# Patient Record
Sex: MALE | Race: WHITE | NOT HISPANIC OR LATINO | Employment: FULL TIME | URBAN - METROPOLITAN AREA
[De-identification: names, ages, dates, MRNs, and addresses within clinical notes are randomized per-mention and may not be internally consistent; named-entity substitution may affect disease eponyms.]

---

## 2020-02-18 ENCOUNTER — TELEPHONE (OUTPATIENT)
Dept: ENDOCRINOLOGY | Facility: CLINIC | Age: 40
End: 2020-02-18

## 2020-06-03 ENCOUNTER — HOSPITAL ENCOUNTER (EMERGENCY)
Facility: HOSPITAL | Age: 40
Discharge: HOME | End: 2020-06-04
Admitting: EMERGENCY MEDICINE

## 2020-06-03 DIAGNOSIS — E86.0 DEHYDRATION: Primary | ICD-10-CM

## 2020-06-03 DIAGNOSIS — R73.9 HYPERGLYCEMIA: ICD-10-CM

## 2020-06-03 LAB — GLUCOSE BLDC GLUCOMTR-MCNC: >500 MG/DL (ref 70–105)

## 2020-06-03 PROCEDURE — 96372 THER/PROPH/DIAG INJ SC/IM: CPT

## 2020-06-03 PROCEDURE — 99284 EMERGENCY DEPT VISIT MOD MDM: CPT

## 2020-06-03 PROCEDURE — 82962 GLUCOSE BLOOD TEST: CPT

## 2020-06-04 VITALS
HEIGHT: 73 IN | OXYGEN SATURATION: 99 % | BODY MASS INDEX: 28.63 KG/M2 | HEART RATE: 70 BPM | TEMPERATURE: 98 F | WEIGHT: 216.05 LBS | RESPIRATION RATE: 16 BRPM | DIASTOLIC BLOOD PRESSURE: 61 MMHG | SYSTOLIC BLOOD PRESSURE: 103 MMHG

## 2020-06-04 LAB
ACETONE BLD QL: NEGATIVE
ALBUMIN SERPL-MCNC: 4.6 G/DL (ref 3.5–5.2)
ALBUMIN/GLOB SERPL: 1.2 G/DL
ALP SERPL-CCNC: 99 U/L (ref 39–117)
ALT SERPL W P-5'-P-CCNC: 41 U/L (ref 1–41)
AMPHET+METHAMPHET UR QL: POSITIVE
ANION GAP SERPL CALCULATED.3IONS-SCNC: 14 MMOL/L (ref 5–15)
ARTERIAL PATENCY WRIST A: POSITIVE
AST SERPL-CCNC: 22 U/L (ref 1–40)
ATMOSPHERIC PRESS: ABNORMAL MM[HG]
BARBITURATES UR QL SCN: NEGATIVE
BASE EXCESS BLDA CALC-SCNC: -2.3 MMOL/L (ref 0–3)
BASOPHILS # BLD AUTO: 0.1 10*3/MM3 (ref 0–0.2)
BASOPHILS NFR BLD AUTO: 0.8 % (ref 0–1.5)
BDY SITE: ABNORMAL
BENZODIAZ UR QL SCN: NEGATIVE
BILIRUB SERPL-MCNC: 0.2 MG/DL (ref 0.2–1.2)
BILIRUB UR QL STRIP: NEGATIVE
BUN BLD-MCNC: 33 MG/DL (ref 6–20)
BUN BLD-MCNC: ABNORMAL MG/DL
BUN/CREAT SERPL: ABNORMAL
CALCIUM SPEC-SCNC: 10.1 MG/DL (ref 8.6–10.5)
CANNABINOIDS SERPL QL: POSITIVE
CHLORIDE SERPL-SCNC: 91 MMOL/L (ref 98–107)
CLARITY UR: CLEAR
CO2 BLDA-SCNC: 25.1 MMOL/L (ref 22–29)
CO2 SERPL-SCNC: 24 MMOL/L (ref 22–29)
COCAINE UR QL: NEGATIVE
COLOR UR: YELLOW
CREAT BLD-MCNC: 1.93 MG/DL (ref 0.76–1.27)
D-LACTATE SERPL-SCNC: 1.9 MMOL/L (ref 0.5–2)
DEPRECATED RDW RBC AUTO: 41.1 FL (ref 37–54)
EOSINOPHIL # BLD AUTO: 0.4 10*3/MM3 (ref 0–0.4)
EOSINOPHIL NFR BLD AUTO: 5 % (ref 0.3–6.2)
ERYTHROCYTE [DISTWIDTH] IN BLOOD BY AUTOMATED COUNT: 13.6 % (ref 12.3–15.4)
GFR SERPL CREATININE-BSD FRML MDRD: 38 ML/MIN/1.73
GFR SERPL CREATININE-BSD FRML MDRD: 47 ML/MIN/1.73
GLOBULIN UR ELPH-MCNC: 3.9 GM/DL
GLUCOSE BLD-MCNC: 494 MG/DL (ref 65–99)
GLUCOSE BLDC GLUCOMTR-MCNC: 342 MG/DL (ref 70–105)
GLUCOSE UR STRIP-MCNC: ABNORMAL MG/DL
HCO3 BLDA-SCNC: 23.8 MMOL/L (ref 21–28)
HCT VFR BLD AUTO: 48.1 % (ref 37.5–51)
HEMODILUTION: NO
HGB BLD-MCNC: 16.3 G/DL (ref 13–17.7)
HGB UR QL STRIP.AUTO: NEGATIVE
HOLD SPECIMEN: NORMAL
HOROWITZ INDEX BLD+IHG-RTO: 21 %
KETONES UR QL STRIP: NEGATIVE
LEUKOCYTE ESTERASE UR QL STRIP.AUTO: NEGATIVE
LYMPHOCYTES # BLD AUTO: 2.6 10*3/MM3 (ref 0.7–3.1)
LYMPHOCYTES NFR BLD AUTO: 31.6 % (ref 19.6–45.3)
MCH RBC QN AUTO: 29.4 PG (ref 26.6–33)
MCHC RBC AUTO-ENTMCNC: 33.9 G/DL (ref 31.5–35.7)
MCV RBC AUTO: 86.6 FL (ref 79–97)
METHADONE UR QL SCN: NEGATIVE
MODALITY: ABNORMAL
MONOCYTES # BLD AUTO: 0.7 10*3/MM3 (ref 0.1–0.9)
MONOCYTES NFR BLD AUTO: 8 % (ref 5–12)
NEUTROPHILS # BLD AUTO: 4.5 10*3/MM3 (ref 1.7–7)
NEUTROPHILS NFR BLD AUTO: 54.6 % (ref 42.7–76)
NITRITE UR QL STRIP: NEGATIVE
NRBC BLD AUTO-RTO: 0.1 /100 WBC (ref 0–0.2)
OPIATES UR QL: NEGATIVE
OXYCODONE UR QL SCN: NEGATIVE
PCO2 BLDA: 44.6 MM HG (ref 35–48)
PH BLDA: 7.33 PH UNITS (ref 7.35–7.45)
PH UR STRIP.AUTO: 5.5 [PH] (ref 5–8)
PLATELET # BLD AUTO: 283 10*3/MM3 (ref 140–450)
PMV BLD AUTO: 9.5 FL (ref 6–12)
PO2 BLDA: 78.3 MM HG (ref 83–108)
POTASSIUM BLD-SCNC: 4.6 MMOL/L (ref 3.5–5.2)
PROT SERPL-MCNC: 8.5 G/DL (ref 6–8.5)
PROT UR QL STRIP: NEGATIVE
RBC # BLD AUTO: 5.56 10*6/MM3 (ref 4.14–5.8)
SAO2 % BLDCOA: 94.5 % (ref 94–98)
SODIUM BLD-SCNC: 129 MMOL/L (ref 136–145)
SP GR UR STRIP: 1.03 (ref 1–1.03)
TROPONIN T SERPL-MCNC: <0.01 NG/ML (ref 0–0.03)
UROBILINOGEN UR QL STRIP: ABNORMAL
WBC NRBC COR # BLD: 8.3 10*3/MM3 (ref 3.4–10.8)

## 2020-06-04 PROCEDURE — 80307 DRUG TEST PRSMV CHEM ANLYZR: CPT | Performed by: NURSE PRACTITIONER

## 2020-06-04 PROCEDURE — 84484 ASSAY OF TROPONIN QUANT: CPT | Performed by: NURSE PRACTITIONER

## 2020-06-04 PROCEDURE — 87150 DNA/RNA AMPLIFIED PROBE: CPT | Performed by: NURSE PRACTITIONER

## 2020-06-04 PROCEDURE — 82962 GLUCOSE BLOOD TEST: CPT

## 2020-06-04 PROCEDURE — 81003 URINALYSIS AUTO W/O SCOPE: CPT | Performed by: NURSE PRACTITIONER

## 2020-06-04 PROCEDURE — 80053 COMPREHEN METABOLIC PANEL: CPT | Performed by: NURSE PRACTITIONER

## 2020-06-04 PROCEDURE — 83605 ASSAY OF LACTIC ACID: CPT

## 2020-06-04 PROCEDURE — 36600 WITHDRAWAL OF ARTERIAL BLOOD: CPT

## 2020-06-04 PROCEDURE — 82570 ASSAY OF URINE CREATININE: CPT | Performed by: NURSE PRACTITIONER

## 2020-06-04 PROCEDURE — 82803 BLOOD GASES ANY COMBINATION: CPT

## 2020-06-04 PROCEDURE — 87147 CULTURE TYPE IMMUNOLOGIC: CPT | Performed by: NURSE PRACTITIONER

## 2020-06-04 PROCEDURE — 87040 BLOOD CULTURE FOR BACTERIA: CPT | Performed by: NURSE PRACTITIONER

## 2020-06-04 PROCEDURE — 63710000001 INSULIN LISPRO (HUMAN) PER 5 UNITS: Performed by: PHYSICIAN ASSISTANT

## 2020-06-04 PROCEDURE — 85025 COMPLETE CBC W/AUTO DIFF WBC: CPT | Performed by: NURSE PRACTITIONER

## 2020-06-04 PROCEDURE — 93005 ELECTROCARDIOGRAM TRACING: CPT | Performed by: NURSE PRACTITIONER

## 2020-06-04 PROCEDURE — 82009 KETONE BODYS QUAL: CPT | Performed by: NURSE PRACTITIONER

## 2020-06-04 RX ORDER — SODIUM CHLORIDE 0.9 % (FLUSH) 0.9 %
10 SYRINGE (ML) INJECTION AS NEEDED
Status: DISCONTINUED | OUTPATIENT
Start: 2020-06-04 | End: 2020-06-04 | Stop reason: HOSPADM

## 2020-06-04 RX ADMIN — SODIUM CHLORIDE 1000 ML: 900 INJECTION, SOLUTION INTRAVENOUS at 00:14

## 2020-06-04 RX ADMIN — INSULIN LISPRO 8 UNITS: 100 INJECTION, SOLUTION INTRAVENOUS; SUBCUTANEOUS at 02:37

## 2020-06-04 RX ADMIN — SODIUM CHLORIDE 1000 ML: 0.9 INJECTION, SOLUTION INTRAVENOUS at 02:01

## 2020-06-04 RX ADMIN — SODIUM CHLORIDE 1000 ML: 0.9 INJECTION, SOLUTION INTRAVENOUS at 01:24

## 2020-06-04 NOTE — ED PROVIDER NOTES
Subjective   40-year-old  male presents to the emergency room with complaint of dehydration and high blood sugar and dizziness that started today.  Patient states that he worked outside with a family member and helped do yard work and felt like he got overheated and dehydrated so he came inside and drink some water but did not feel better continued to feel dizzy so he checked his blood sugar as he is a type II diabetic and it was over 500.  Onset: Today  Location: Generalized  Duration: Constant  Character: Fatigue and weakness and dizziness  Aggravating/Alleviating Factors: Activity/none  Radiation: None  Severity: Severe            Review of Systems   Constitutional: Positive for activity change, appetite change and fatigue.   HENT: Negative.    Eyes: Negative.    Respiratory: Negative.    Cardiovascular: Negative.    Gastrointestinal: Negative.    Genitourinary: Negative.    Musculoskeletal: Negative.    Allergic/Immunologic: Negative.    Neurological: Positive for dizziness and weakness.   Psychiatric/Behavioral: Negative.        History reviewed. No pertinent past medical history.    Allergies   Allergen Reactions   • Bactrim [Sulfamethoxazole-Trimethoprim] Swelling       History reviewed. No pertinent surgical history.    No family history on file.    Social History     Socioeconomic History   • Marital status: Single     Spouse name: Not on file   • Number of children: Not on file   • Years of education: Not on file   • Highest education level: Not on file           Objective   Physical Exam   Constitutional: He is oriented to person, place, and time. He appears well-developed and well-nourished. No distress.   HENT:   Head: Normocephalic and atraumatic.   Mouth/Throat: Mucous membranes are dry.   Eyes: Pupils are equal, round, and reactive to light. Conjunctivae and EOM are normal.   Neck: Normal range of motion. Neck supple.   Cardiovascular: Normal rate and regular rhythm.   Pulmonary/Chest:  Effort normal and breath sounds normal.   Abdominal: Soft. Bowel sounds are normal.   Musculoskeletal: Normal range of motion. He exhibits no edema, tenderness or deformity.   Neurological: He is alert and oriented to person, place, and time.   Skin: Skin is warm. Capillary refill takes less than 2 seconds. He is diaphoretic. No erythema.   Psychiatric: He has a normal mood and affect. His behavior is normal. Judgment and thought content normal.   Nursing note and vitals reviewed.      Procedures           ED Course  ED Course as of Jun 04 0226   Thu Jun 04, 2020   0130 TURNED PATIENT CARE OVER TO VITALIY MADRID    [CT]   0153 EKG interpreted by ER physician reviewed by myself.  Rate of 68 sinus rhythm.    [MG]      ED Course User Index  [CT] Minoo Barclay APRN  [MG] Bessy Lockhart PA-C        PIV ESTABLISHED. CBC, CMP, ABG, SERUM ACETONE, EKG AND TROPONIN OBTAINED. PATIENT APPEARS CLINICALLY DRY.  TOTAL OF 3 L NS BOLUS GIVEN DURING MY MANAGEMENT.  No radiology results for the last day  Labs Reviewed   COMPREHENSIVE METABOLIC PANEL - Abnormal; Notable for the following components:       Result Value    Glucose 494 (*)     Creatinine 1.93 (*)     Sodium 129 (*)     Chloride 91 (*)     eGFR Non  Amer 38 (*)     eGFR   Amer 47 (*)     All other components within normal limits    Narrative:     GFR Normal >60  Chronic Kidney Disease <60  Kidney Failure <15     URINALYSIS W/ MICROSCOPIC IF INDICATED (NO CULTURE) - Abnormal; Notable for the following components:    Glucose, UA >=1000 mg/dL (3+) (*)     All other components within normal limits    Narrative:     Urine microscopic not indicated.   URINE DRUG SCREEN EMPLOYEE HEALTH/OCC HEALTH - Abnormal; Notable for the following components:    THC, Screen, Urine Positive (*)     Amphet/Methamphet, Screen Positive (*)     All other components within normal limits    Narrative:     Negative Thresholds For Drugs Screened:     Amphetamines               500 ng/ml    Barbiturates               200 ng/ml   Benzodiazepines            100 ng/ml   Cocaine                    300 ng/ml   Methadone                  300 ng/ml   Opiates                    300 ng/ml   Oxycodone                  100 ng/ml   THC                        50 ng/ml    The Normal Value for all drugs tested is negative. This report includes final unconfirmed screening results to be used for medical treatment purposes only. Unconfirmed results must not be used for non-medical purposes such as employment or legal testing. Clinical consideration should be applied to any drug of abuse test, particulary when unconfirmed results are used.  All urine drugs of abuse requests without chain of custody are for medical screening purposes only.  False positives are possible.     BUN - Abnormal; Notable for the following components:    BUN 33 (*)     All other components within normal limits   BLOOD GAS, ARTERIAL - Abnormal; Notable for the following components:    pH, Arterial 7.335 (*)     pO2, Arterial 78.3 (*)     Base Excess, Arterial -2.3 (*)     All other components within normal limits   POCT GLUCOSE FINGERSTICK - Abnormal; Notable for the following components:    Glucose >500 (*)     All other components within normal limits   TROPONIN (IN-HOUSE) - Normal    Narrative:     Troponin T Reference Range:  <= 0.03 ng/mL-   Negative for AMI  >0.03 ng/mL-     Abnormal for myocardial necrosis.  Clinicians would have to utilize clinical acumen, EKG, Troponin and serial changes to determine if it is an Acute Myocardial Infarction or myocardial injury due to an underlying chronic condition.       Results may be falsely decreased if patient taking Biotin.     CBC WITH AUTO DIFFERENTIAL - Normal   ACETONE - Normal   POC LACTATE - Normal   BLOOD CULTURE   BLOOD CULTURE   BLOOD GAS, ARTERIAL   LACTIC ACID, PLASMA   CBC AND DIFFERENTIAL    Narrative:     The following orders were created for panel order CBC & Differential.  Procedure                                Abnormality         Status                     ---------                               -----------         ------                     CBC Auto Differential[[id number]]        Normal              Final result                 Please view results for these tests on the individual orders.   KETONE BODIES SERUM    Narrative:     The following orders were created for panel order Ketone Bodies, Serum (Not performed at [facility]).  Procedure                               Abnormality         Status                     ---------                               -----------         ------                     Acetone[[id number]]                      Normal              Final result                 Please view results for these tests on the individual orders.   EXTRA TUBES    Narrative:     The following orders were created for panel order Extra Tubes.  Procedure                               Abnormality         Status                     ---------                               -----------         ------                     Gold Top - SST[[id number]]                                   Final result                 Please view results for these tests on the individual orders.   GOLD TOP - SST     Medications   sodium chloride 0.9 % flush 10 mL (has no administration in time range)   sodium chloride 0.9 % bolus 1,000 mL (1,000 mL Intravenous New Bag 6/4/20 0201)   insulin lispro (humaLOG) injection 8 Units (has no administration in time range)   sodium chloride 0.9 % bolus 1,000 mL (0 mL Intravenous Stopped 6/4/20 0124)   sodium chloride 0.9 % bolus 1,000 mL (0 mL Intravenous Stopped 6/4/20 0200)                                            MDM  Number of Diagnoses or Management Options  Dehydration:   Hyperglycemia:   Diagnosis management comments: I examined the patient using the appropriate personal protective equipment.      DISPOSITION:   Chart Review:  Comorbidity:  has no past medical history on  file.  Differentials:this list is not all inclusive and does not constitute the entirety of considered causes --> DKA, dehydration, drug use, UTI,   ECG: interpreted by ER physician and reviewed by myself: Sinus rhythm   Labs:     Imaging: Was interpreted by physician and reviewed by myself:  No radiology results for the last day    Disposition/Treatment:    40-year-old male who presents to the ER with dizziness and clinically dehydrated. Patient's labwork was fairly unremarkable other than an elevated glucose >500, UA THC and amp/meth. He was given Insulin and 3L of NS with significant reduction in symptoms. He was told to follow up with his PCP for further work up and management. He was stable and in agreement with plan       Amount and/or Complexity of Data Reviewed  Clinical lab tests: reviewed    Patient Progress  Patient progress: stable      Final diagnoses:   Dehydration   Hyperglycemia            Bessy Lockhart PA-C  06/04/20 0227

## 2020-06-05 LAB
BACTERIA BLD CULT: ABNORMAL
BOTTLE TYPE: ABNORMAL

## 2020-06-06 LAB
BACTERIA SPEC AEROBE CULT: ABNORMAL
GRAM STN SPEC: ABNORMAL
ISOLATED FROM: ABNORMAL

## 2020-06-09 LAB — BACTERIA SPEC AEROBE CULT: NORMAL

## 2020-06-19 DIAGNOSIS — E11.65 UNCONTROLLED TYPE 2 DIABETES MELLITUS WITH HYPERGLYCEMIA (HCC): Primary | ICD-10-CM

## 2020-06-29 ENCOUNTER — TELEPHONE (OUTPATIENT)
Dept: ENDOCRINOLOGY | Facility: CLINIC | Age: 40
End: 2020-06-29

## 2020-07-22 ENCOUNTER — TELEPHONE (OUTPATIENT)
Dept: ENDOCRINOLOGY | Facility: CLINIC | Age: 40
End: 2020-07-22

## 2020-07-22 NOTE — TELEPHONE ENCOUNTER
Mailed patient letter letting know that Dr. Zavala is out of the office 8/10/20-8/12/20, and to contact the office to get the appointment R/S asap.

## 2020-07-27 ENCOUNTER — OFFICE VISIT (OUTPATIENT)
Dept: ENDOCRINOLOGY | Facility: CLINIC | Age: 40
End: 2020-07-27

## 2020-07-27 DIAGNOSIS — E11.65 UNCONTROLLED TYPE 2 DIABETES MELLITUS WITH HYPERGLYCEMIA (HCC): ICD-10-CM

## 2020-07-27 PROCEDURE — G0108 DIAB MANAGE TRN  PER INDIV: HCPCS | Performed by: DIETITIAN, REGISTERED

## 2020-07-27 RX ORDER — FLURBIPROFEN SODIUM 0.3 MG/ML
SOLUTION/ DROPS OPHTHALMIC
Qty: 150 EACH | Refills: 1 | Status: SHIPPED | OUTPATIENT
Start: 2020-07-27 | End: 2022-04-15 | Stop reason: SDUPTHER

## 2020-07-27 RX ORDER — FLURBIPROFEN SODIUM 0.3 MG/ML
SOLUTION/ DROPS OPHTHALMIC
COMMUNITY
End: 2020-07-27 | Stop reason: SDUPTHER

## 2020-07-27 NOTE — PROGRESS NOTES
Pt was seen by educ today & has appt again on 7/29.  Needs to yariel initial appt with me which was kunal for 8/11

## 2021-09-20 ENCOUNTER — TELEPHONE (OUTPATIENT)
Dept: ENDOCRINOLOGY | Facility: CLINIC | Age: 41
End: 2021-09-20

## 2021-09-20 NOTE — TELEPHONE ENCOUNTER
"Pt called this morning wanting to reschedule his NP appt from 8/11/20. Advised pt that since the referral was over a year old that I might need a new referral. Advised pt that I would look at the referral closer when I could get back to my office and that I would call him back later today. Attempted to call pt back to discuss referral. Pt answered the phone and said.... \"Quit f^^^ing calling me you stupid f^^k\" and hung up.          "

## 2022-03-14 ENCOUNTER — TELEPHONE (OUTPATIENT)
Dept: ENDOCRINOLOGY | Facility: CLINIC | Age: 42
End: 2022-03-14

## 2022-04-15 ENCOUNTER — OFFICE VISIT (OUTPATIENT)
Dept: ENDOCRINOLOGY | Facility: CLINIC | Age: 42
End: 2022-04-15

## 2022-04-15 VITALS
WEIGHT: 197 LBS | OXYGEN SATURATION: 99 % | TEMPERATURE: 97.1 F | SYSTOLIC BLOOD PRESSURE: 135 MMHG | DIASTOLIC BLOOD PRESSURE: 80 MMHG | HEART RATE: 108 BPM | BODY MASS INDEX: 26.11 KG/M2 | HEIGHT: 73 IN

## 2022-04-15 DIAGNOSIS — E55.9 VITAMIN D DEFICIENCY: ICD-10-CM

## 2022-04-15 DIAGNOSIS — I10 ESSENTIAL HYPERTENSION: ICD-10-CM

## 2022-04-15 DIAGNOSIS — Z79.4 TYPE 2 DIABETES MELLITUS WITH HYPERGLYCEMIA, WITH LONG-TERM CURRENT USE OF INSULIN: Primary | ICD-10-CM

## 2022-04-15 DIAGNOSIS — E11.65 TYPE 2 DIABETES MELLITUS WITH HYPERGLYCEMIA, WITH LONG-TERM CURRENT USE OF INSULIN: Primary | ICD-10-CM

## 2022-04-15 DIAGNOSIS — R79.89 LOW TESTOSTERONE: ICD-10-CM

## 2022-04-15 LAB — GLUCOSE BLDC GLUCOMTR-MCNC: 149 MG/DL (ref 70–105)

## 2022-04-15 PROCEDURE — 99204 OFFICE O/P NEW MOD 45 MIN: CPT | Performed by: INTERNAL MEDICINE

## 2022-04-15 PROCEDURE — 82962 GLUCOSE BLOOD TEST: CPT | Performed by: INTERNAL MEDICINE

## 2022-04-15 RX ORDER — FLURBIPROFEN SODIUM 0.3 MG/ML
SOLUTION/ DROPS OPHTHALMIC
Qty: 150 EACH | Refills: 1 | Status: ON HOLD | OUTPATIENT
Start: 2022-04-15

## 2022-04-15 RX ORDER — GABAPENTIN 600 MG/1
600 TABLET ORAL 3 TIMES DAILY
COMMUNITY
End: 2023-04-06

## 2022-04-15 RX ORDER — MAGNESIUM 200 MG
1000 TABLET ORAL DAILY
Qty: 100 EACH | Refills: 4 | Status: SHIPPED | OUTPATIENT
Start: 2022-04-15 | End: 2023-04-06

## 2022-04-15 RX ORDER — AMLODIPINE BESYLATE AND BENAZEPRIL HYDROCHLORIDE 10; 20 MG/1; MG/1
1 CAPSULE ORAL DAILY
COMMUNITY
End: 2023-04-06

## 2022-04-15 NOTE — PATIENT INSTRUCTIONS
Please change her Basaglar to 30 units subcu once a day  Please change NovoLog to 10 units subcu before each meal as a base dose along with a sliding scale of 1 unit for each 30-minute blood sugar over 140 at meals  Start vitamin D 5000 units p.o. daily  Start vitamin B12 1000 mcg sublingual daily  Get your labs done fasting in the next few days and make sure they are drawn before 10 AM  Arrange for comprehensive dives education  Always keep glucose source in case of low blood sugar  Annual eye exam and flu vaccine

## 2022-04-15 NOTE — PROGRESS NOTES
Endocrine Consult Outpatient  Referred by VITALIY Ordoñez for uncontrolled diabetes consultation  Patient Care Team:  Chikis Timmons PA as PCP - General (Physician Assistant)     Chief Complaint: Uncontrolled type 2 diabetes        HPI: This is a 41-year-old male with history of type 2 diabetes, hypertension is referred for diabetes evaluation management.    For type 2 diabetes: Initial diagnosis was in 2011, he has not done dives education, he does have a glucometer and checks his blood sugar 3-4 times a day unfortunately did not bring records for review.  He tells me his blood sugar runs between 1 42-80 most of the time.  He is trying to follow his diet.  He is currently on Basaglar 30 units twice a day with NovoLog on a sliding scale but does not take NovoLog if the blood sugar is less than 200.  He does admit fatigue and tiredness with some dry mouth.  Denies any weight changes.    Hypertension: Fair control    He was found to have low vitamin D and low testosterone and is not taking any of those at this time.  He does admit some erectile dysfunction.  He does get some morning erections.  Denies any excessive sweating or mood odors or body odor or chest trauma to the testes or trauma to the head.  He has fathered kids.  He was on Suboxone for a long time but he has been off Suboxone for now.  3 years.    Past Medical History:   Diagnosis Date   • Hypertension    • Type 2 diabetes mellitus (HCC)        Social History     Socioeconomic History   • Marital status: Single   • Number of children: 2   • Years of education: 14   Tobacco Use   • Smoking status: Never Smoker   • Smokeless tobacco: Never Used   Vaping Use   • Vaping Use: Never used   Substance and Sexual Activity   • Alcohol use: Never       Family History   Problem Relation Age of Onset   • Diabetes Father    • Hypertension Maternal Uncle    • Stroke Maternal Uncle        Allergies   Allergen Reactions   • Bactrim  [Sulfamethoxazole-Trimethoprim] Swelling       ROS:   Constitutional:  Admit fatigue, tiredness.    Eyes:  Denies change in visual acuity   HENT:  Denies nasal congestion or sore throat   Respiratory: denies cough, shortness of breath.   Cardiovascular:  denies chest pain, edema   GI:  Denies abdominal pain, nausea, vomiting.    :  Denies dysuria   Musculoskeletal:  Denies back pain or joint pain   Integument:  Denies dry skin, rash   Neurologic:  Denies headache, focal weakness or sensory changes   Endocrine:  Denies polyuria or polydipsia   Psychiatric:  Denies depression or anxiety      Vitals:    04/15/22 0834   BP: 135/80   Pulse: 108   Temp: 97.1 °F (36.2 °C)   SpO2: 99%     Body mass index is 25.99 kg/m².      Physical Exam:  GEN: NAD, conversant  EYES: EOMI, PERRL, no conjunctival erythema  NECK: no thyromegaly, full ROM   CV: RRR, no murmurs/rubs/gallops, no peripheral edema  LUNG: CTAB, no wheezes/rales/ronchi  SKIN: no rashes, no acanthosis  MSK: no deformities, full ROM of all extremities  NEURO: no tremors, DTR normal  PSYCH: AOX3, appropriate mood, affect normal      Results Review:     I reviewed the patient's new clinical results.    Lab Results   Component Value Date    GLUCOSE 494 (C) 06/04/2020    BUN  06/04/2020      Comment:      Testing performed by alternate method    BUN 33 (H) 06/04/2020    CREATININE 1.93 (H) 06/04/2020    EGFRIFNONA 38 (L) 06/04/2020    EGFRIFAFRI 47 (L) 06/04/2020    BCR  06/04/2020      Comment:      Testing not performed.    K 4.6 06/04/2020    CO2 24.0 06/04/2020    CALCIUM 10.1 06/04/2020    ALBUMIN 4.60 06/04/2020    AST 22 06/04/2020    ALT 41 06/04/2020     Labs from August 2021 showed a white count of 5.8, hemoglobin 16.1, hematocrit 46.3 and platelet count 250  Sodium 138, potassium 4.5, chloride 98, CO2 28, BUN 17, creatinine 0.9, glucose 235, calcium 10.1, AST 12, ALT 18  LDL was 89, cholesterol total was 207, HDL 35, triglycerides 417, TSH 2.1, free T4 1.3,  total T3 1.5, A1c 9.1, vitamin D 18, magnesium 2.1, B12 640, testosterone total was 181.    Medication Review: Reviewed.       Current Outpatient Medications:   •  amLODIPine-benazepril (LOTREL) 10-20 MG per capsule, Take 1 capsule by mouth Daily., Disp: , Rfl:   •  B-D UF III MINI PEN NEEDLES 31G X 5 MM misc, Pt to use with insulin pens 5 times daily, Disp: 150 each, Rfl: 1  •  gabapentin (NEURONTIN) 600 MG tablet, Take 600 mg by mouth 3 (Three) Times a Day., Disp: , Rfl:   •  insulin aspart (novoLOG FLEXPEN) 100 UNIT/ML solution pen-injector sc pen, Inject  under the skin into the appropriate area as directed 3 (Three) Times a Day With Meals. Sliding Scal, Disp: , Rfl:   •  Insulin Glargine (BASAGLAR KWIKPEN SC), Inject 30 Units under the skin into the appropriate area as directed 2 (Two) Times a Day., Disp: , Rfl:     Assessment/Plan   Diabetes mellitus type 2: Uncontrolled, at this time I will change Basaglar to 30 units once a day, add NovoLog 10 units with each meal as a base dose along with NovoLog sliding scale 1 unit for each 30 mg blood sugar over 140 at meals.  He will continue to check blood sugars at least 3 times a day.  I will check A1c, CMP, lipid panel, urine microalbumin along with C-peptide for further evaluation.  We will also refer him for comprehensive diabetes education.  He is advised to always keep glucose source in case of low blood sugars.    Hypertension: Fair control    Vitamin D deficiency: We will add vitamin D 5000 units p.o. daily    Low testosterone: We will recheck total and free testosterone with LH, FSH, SHBG and prolactin level for further evaluation.           Kathia Villatoro MD FACE.

## 2022-04-19 RX ORDER — INSULIN GLARGINE 100 [IU]/ML
30 INJECTION, SOLUTION SUBCUTANEOUS DAILY
Qty: 15 ML | Refills: 8 | Status: SHIPPED | OUTPATIENT
Start: 2022-04-19 | End: 2023-04-06

## 2023-04-06 ENCOUNTER — APPOINTMENT (OUTPATIENT)
Dept: CARDIOLOGY | Facility: HOSPITAL | Age: 43
End: 2023-04-06
Payer: MEDICAID

## 2023-04-06 ENCOUNTER — HOSPITAL ENCOUNTER (OUTPATIENT)
Facility: HOSPITAL | Age: 43
Setting detail: OBSERVATION
Discharge: HOME | End: 2023-04-07
Attending: EMERGENCY MEDICINE | Admitting: INTERNAL MEDICINE
Payer: MEDICAID

## 2023-04-06 ENCOUNTER — APPOINTMENT (OUTPATIENT)
Dept: GENERAL RADIOLOGY | Facility: HOSPITAL | Age: 43
End: 2023-04-06
Payer: MEDICAID

## 2023-04-06 ENCOUNTER — APPOINTMENT (OUTPATIENT)
Dept: CT IMAGING | Facility: HOSPITAL | Age: 43
End: 2023-04-06
Payer: MEDICAID

## 2023-04-06 DIAGNOSIS — L03.116 CELLULITIS OF LEFT LOWER EXTREMITY: Primary | ICD-10-CM

## 2023-04-06 DIAGNOSIS — R53.83 LETHARGY: ICD-10-CM

## 2023-04-06 LAB
ALBUMIN SERPL-MCNC: 4 G/DL (ref 3.5–5.2)
ALBUMIN/GLOB SERPL: 1.3 G/DL
ALP SERPL-CCNC: 72 U/L (ref 39–117)
ALT SERPL W P-5'-P-CCNC: 22 U/L (ref 1–41)
AMPHET+METHAMPHET UR QL: POSITIVE
ANION GAP SERPL CALCULATED.3IONS-SCNC: 8 MMOL/L (ref 5–15)
ARTERIAL PATENCY WRIST A: ABNORMAL
AST SERPL-CCNC: 22 U/L (ref 1–40)
ATMOSPHERIC PRESS: ABNORMAL MM[HG]
BARBITURATES UR QL SCN: NEGATIVE
BASE EXCESS BLDA CALC-SCNC: 3.4 MMOL/L (ref 0–3)
BASOPHILS # BLD AUTO: 0 10*3/MM3 (ref 0–0.2)
BASOPHILS NFR BLD AUTO: 0.7 % (ref 0–1.5)
BDY SITE: ABNORMAL
BENZODIAZ UR QL SCN: NEGATIVE
BH CV LOWER VASCULAR LEFT COMMON FEMORAL AUGMENT: NORMAL
BH CV LOWER VASCULAR LEFT COMMON FEMORAL COMPETENT: NORMAL
BH CV LOWER VASCULAR LEFT COMMON FEMORAL COMPRESS: NORMAL
BH CV LOWER VASCULAR LEFT COMMON FEMORAL PHASIC: NORMAL
BH CV LOWER VASCULAR LEFT COMMON FEMORAL SPONT: NORMAL
BH CV LOWER VASCULAR LEFT DISTAL FEMORAL COMPRESS: NORMAL
BH CV LOWER VASCULAR LEFT GASTRONEMIUS COMPRESS: NORMAL
BH CV LOWER VASCULAR LEFT GREATER SAPH AK COMPRESS: NORMAL
BH CV LOWER VASCULAR LEFT GREATER SAPH BK COMPRESS: NORMAL
BH CV LOWER VASCULAR LEFT LESSER SAPH COMPRESS: NORMAL
BH CV LOWER VASCULAR LEFT MID FEMORAL AUGMENT: NORMAL
BH CV LOWER VASCULAR LEFT MID FEMORAL COMPETENT: NORMAL
BH CV LOWER VASCULAR LEFT MID FEMORAL COMPRESS: NORMAL
BH CV LOWER VASCULAR LEFT MID FEMORAL PHASIC: NORMAL
BH CV LOWER VASCULAR LEFT MID FEMORAL SPONT: NORMAL
BH CV LOWER VASCULAR LEFT PERONEAL COMPRESS: NORMAL
BH CV LOWER VASCULAR LEFT POPLITEAL AUGMENT: NORMAL
BH CV LOWER VASCULAR LEFT POPLITEAL COMPETENT: NORMAL
BH CV LOWER VASCULAR LEFT POPLITEAL COMPRESS: NORMAL
BH CV LOWER VASCULAR LEFT POPLITEAL PHASIC: NORMAL
BH CV LOWER VASCULAR LEFT POPLITEAL SPONT: NORMAL
BH CV LOWER VASCULAR LEFT POSTERIOR TIBIAL COMPRESS: NORMAL
BH CV LOWER VASCULAR LEFT PROXIMAL FEMORAL COMPRESS: NORMAL
BH CV LOWER VASCULAR LEFT SAPHENOFEMORAL JUNCTION COMPRESS: NORMAL
BH CV LOWER VASCULAR RIGHT COMMON FEMORAL AUGMENT: NORMAL
BH CV LOWER VASCULAR RIGHT COMMON FEMORAL COMPETENT: NORMAL
BH CV LOWER VASCULAR RIGHT COMMON FEMORAL COMPRESS: NORMAL
BH CV LOWER VASCULAR RIGHT COMMON FEMORAL PHASIC: NORMAL
BH CV LOWER VASCULAR RIGHT COMMON FEMORAL SPONT: NORMAL
BH CV LOWER VASCULAR RIGHT DISTAL FEMORAL COMPRESS: NORMAL
BH CV LOWER VASCULAR RIGHT GASTRONEMIUS COMPRESS: NORMAL
BH CV LOWER VASCULAR RIGHT GREATER SAPH AK COMPRESS: NORMAL
BH CV LOWER VASCULAR RIGHT GREATER SAPH BK COMPRESS: NORMAL
BH CV LOWER VASCULAR RIGHT LESSER SAPH COMPRESS: NORMAL
BH CV LOWER VASCULAR RIGHT MID FEMORAL AUGMENT: NORMAL
BH CV LOWER VASCULAR RIGHT MID FEMORAL COMPETENT: NORMAL
BH CV LOWER VASCULAR RIGHT MID FEMORAL COMPRESS: NORMAL
BH CV LOWER VASCULAR RIGHT MID FEMORAL PHASIC: NORMAL
BH CV LOWER VASCULAR RIGHT MID FEMORAL SPONT: NORMAL
BH CV LOWER VASCULAR RIGHT PERONEAL COMPRESS: NORMAL
BH CV LOWER VASCULAR RIGHT POPLITEAL AUGMENT: NORMAL
BH CV LOWER VASCULAR RIGHT POPLITEAL COMPETENT: NORMAL
BH CV LOWER VASCULAR RIGHT POPLITEAL COMPRESS: NORMAL
BH CV LOWER VASCULAR RIGHT POPLITEAL PHASIC: NORMAL
BH CV LOWER VASCULAR RIGHT POPLITEAL SPONT: NORMAL
BH CV LOWER VASCULAR RIGHT POSTERIOR TIBIAL COMPRESS: NORMAL
BH CV LOWER VASCULAR RIGHT PROXIMAL FEMORAL COMPRESS: NORMAL
BH CV LOWER VASCULAR RIGHT SAPHENOFEMORAL JUNCTION COMPRESS: NORMAL
BH CV VAS PRELIMINARY FINDINGS SCRIPTING: 1
BILIRUB SERPL-MCNC: 0.4 MG/DL (ref 0–1.2)
BILIRUB UR QL STRIP: NEGATIVE
BUN SERPL-MCNC: 10 MG/DL (ref 6–20)
BUN/CREAT SERPL: 14.7 (ref 7–25)
CALCIUM SPEC-SCNC: 9.2 MG/DL (ref 8.6–10.5)
CANNABINOIDS SERPL QL: POSITIVE
CHLORIDE SERPL-SCNC: 101 MMOL/L (ref 98–107)
CLARITY UR: CLEAR
CO2 BLDA-SCNC: 31.2 MMOL/L (ref 22–29)
CO2 SERPL-SCNC: 28 MMOL/L (ref 22–29)
COCAINE UR QL: NEGATIVE
COLOR UR: YELLOW
CREAT SERPL-MCNC: 0.68 MG/DL (ref 0.76–1.27)
CRP SERPL-MCNC: 0.41 MG/DL (ref 0–0.5)
DEPRECATED RDW RBC AUTO: 42 FL (ref 37–54)
EGFRCR SERPLBLD CKD-EPI 2021: 117.5 ML/MIN/1.73
EOSINOPHIL # BLD AUTO: 0.3 10*3/MM3 (ref 0–0.4)
EOSINOPHIL NFR BLD AUTO: 5.8 % (ref 0.3–6.2)
ERYTHROCYTE [DISTWIDTH] IN BLOOD BY AUTOMATED COUNT: 13.5 % (ref 12.3–15.4)
ERYTHROCYTE [SEDIMENTATION RATE] IN BLOOD: 23 MM/HR (ref 0–15)
GLOBULIN UR ELPH-MCNC: 3 GM/DL
GLUCOSE SERPL-MCNC: 189 MG/DL (ref 65–99)
GLUCOSE UR STRIP-MCNC: ABNORMAL MG/DL
HCO3 BLDA-SCNC: 29.6 MMOL/L (ref 21–28)
HCT VFR BLD AUTO: 43.3 % (ref 37.5–51)
HEMODILUTION: NO
HGB BLD-MCNC: 14.2 G/DL (ref 13–17.7)
HGB UR QL STRIP.AUTO: NEGATIVE
HOLD SPECIMEN: NORMAL
HOLD SPECIMEN: NORMAL
INHALED O2 CONCENTRATION: 21 %
KETONES UR QL STRIP: NEGATIVE
LEUKOCYTE ESTERASE UR QL STRIP.AUTO: NEGATIVE
LYMPHOCYTES # BLD AUTO: 1.3 10*3/MM3 (ref 0.7–3.1)
LYMPHOCYTES NFR BLD AUTO: 22.7 % (ref 19.6–45.3)
MAXIMAL PREDICTED HEART RATE: 176 BPM
MCH RBC QN AUTO: 29.4 PG (ref 26.6–33)
MCHC RBC AUTO-ENTMCNC: 32.9 G/DL (ref 31.5–35.7)
MCV RBC AUTO: 89.3 FL (ref 79–97)
METHADONE UR QL SCN: NEGATIVE
MODALITY: ABNORMAL
MONOCYTES # BLD AUTO: 0.6 10*3/MM3 (ref 0.1–0.9)
MONOCYTES NFR BLD AUTO: 11 % (ref 5–12)
NEUTROPHILS NFR BLD AUTO: 3.3 10*3/MM3 (ref 1.7–7)
NEUTROPHILS NFR BLD AUTO: 59.8 % (ref 42.7–76)
NITRITE UR QL STRIP: NEGATIVE
NRBC BLD AUTO-RTO: 0.1 /100 WBC (ref 0–0.2)
NT-PROBNP SERPL-MCNC: <36 PG/ML (ref 0–450)
OPIATES UR QL: NEGATIVE
OXYCODONE UR QL SCN: NEGATIVE
PCO2 BLDA: 49.8 MM HG (ref 35–48)
PH BLDA: 7.38 PH UNITS (ref 7.35–7.45)
PH UR STRIP.AUTO: 6.5 [PH] (ref 5–8)
PLATELET # BLD AUTO: 272 10*3/MM3 (ref 140–450)
PMV BLD AUTO: 7.7 FL (ref 6–12)
PO2 BLDA: 104.6 MM HG (ref 83–108)
POTASSIUM SERPL-SCNC: 3.8 MMOL/L (ref 3.5–5.2)
PROT SERPL-MCNC: 7 G/DL (ref 6–8.5)
PROT UR QL STRIP: NEGATIVE
RBC # BLD AUTO: 4.85 10*6/MM3 (ref 4.14–5.8)
SAO2 % BLDCOA: 97.8 % (ref 94–98)
SODIUM SERPL-SCNC: 137 MMOL/L (ref 136–145)
SP GR UR STRIP: 1.04 (ref 1–1.03)
STRESS TARGET HR: 150 BPM
UROBILINOGEN UR QL STRIP: ABNORMAL
WBC NRBC COR # BLD: 5.5 10*3/MM3 (ref 3.4–10.8)
WHOLE BLOOD HOLD COAG: NORMAL
WHOLE BLOOD HOLD SPECIMEN: NORMAL

## 2023-04-06 PROCEDURE — 70450 CT HEAD/BRAIN W/O DYE: CPT

## 2023-04-06 PROCEDURE — G0378 HOSPITAL OBSERVATION PER HR: HCPCS

## 2023-04-06 PROCEDURE — 80307 DRUG TEST PRSMV CHEM ANLYZR: CPT | Performed by: NURSE PRACTITIONER

## 2023-04-06 PROCEDURE — 86140 C-REACTIVE PROTEIN: CPT | Performed by: NURSE PRACTITIONER

## 2023-04-06 PROCEDURE — 71045 X-RAY EXAM CHEST 1 VIEW: CPT

## 2023-04-06 PROCEDURE — 81003 URINALYSIS AUTO W/O SCOPE: CPT | Performed by: NURSE PRACTITIONER

## 2023-04-06 PROCEDURE — 85025 COMPLETE CBC W/AUTO DIFF WBC: CPT | Performed by: NURSE PRACTITIONER

## 2023-04-06 PROCEDURE — 93970 EXTREMITY STUDY: CPT

## 2023-04-06 PROCEDURE — 87040 BLOOD CULTURE FOR BACTERIA: CPT | Performed by: NURSE PRACTITIONER

## 2023-04-06 PROCEDURE — 96375 TX/PRO/DX INJ NEW DRUG ADDON: CPT

## 2023-04-06 PROCEDURE — 25010000002 NALOXONE PER 1 MG: Performed by: NURSE PRACTITIONER

## 2023-04-06 PROCEDURE — 96365 THER/PROPH/DIAG IV INF INIT: CPT

## 2023-04-06 PROCEDURE — 83880 ASSAY OF NATRIURETIC PEPTIDE: CPT | Performed by: NURSE PRACTITIONER

## 2023-04-06 PROCEDURE — 80053 COMPREHEN METABOLIC PANEL: CPT | Performed by: NURSE PRACTITIONER

## 2023-04-06 PROCEDURE — 93005 ELECTROCARDIOGRAM TRACING: CPT | Performed by: NURSE PRACTITIONER

## 2023-04-06 PROCEDURE — 96372 THER/PROPH/DIAG INJ SC/IM: CPT

## 2023-04-06 PROCEDURE — 36600 WITHDRAWAL OF ARTERIAL BLOOD: CPT

## 2023-04-06 PROCEDURE — 99285 EMERGENCY DEPT VISIT HI MDM: CPT

## 2023-04-06 PROCEDURE — 85652 RBC SED RATE AUTOMATED: CPT | Performed by: NURSE PRACTITIONER

## 2023-04-06 PROCEDURE — 25010000002 HEPARIN (PORCINE) PER 1000 UNITS: Performed by: NURSE PRACTITIONER

## 2023-04-06 PROCEDURE — 82803 BLOOD GASES ANY COMBINATION: CPT

## 2023-04-06 RX ORDER — SODIUM CHLORIDE 0.9 % (FLUSH) 0.9 %
10 SYRINGE (ML) INJECTION EVERY 12 HOURS SCHEDULED
Status: DISCONTINUED | OUTPATIENT
Start: 2023-04-06 | End: 2023-04-07 | Stop reason: HOSPADM

## 2023-04-06 RX ORDER — ACETAMINOPHEN 325 MG/1
650 TABLET ORAL EVERY 4 HOURS PRN
Status: DISCONTINUED | OUTPATIENT
Start: 2023-04-06 | End: 2023-04-07 | Stop reason: HOSPADM

## 2023-04-06 RX ORDER — NITROGLYCERIN 0.4 MG/1
0.4 TABLET SUBLINGUAL
Status: DISCONTINUED | OUTPATIENT
Start: 2023-04-06 | End: 2023-04-07 | Stop reason: HOSPADM

## 2023-04-06 RX ORDER — FAMOTIDINE 10 MG/ML
20 INJECTION, SOLUTION INTRAVENOUS EVERY 12 HOURS SCHEDULED
Status: DISCONTINUED | OUTPATIENT
Start: 2023-04-06 | End: 2023-04-07 | Stop reason: HOSPADM

## 2023-04-06 RX ORDER — INSULIN ASPART 100 [IU]/ML
10 INJECTION, SOLUTION INTRAVENOUS; SUBCUTANEOUS
OUTPATIENT
Start: 2023-04-06

## 2023-04-06 RX ORDER — FENOFIBRATE 145 MG/1
145 TABLET, COATED ORAL DAILY
Status: DISCONTINUED | OUTPATIENT
Start: 2023-04-07 | End: 2023-04-07 | Stop reason: HOSPADM

## 2023-04-06 RX ORDER — LOSARTAN POTASSIUM 50 MG/1
100 TABLET ORAL DAILY
Status: DISCONTINUED | OUTPATIENT
Start: 2023-04-06 | End: 2023-04-07 | Stop reason: HOSPADM

## 2023-04-06 RX ORDER — BISACODYL 5 MG/1
5 TABLET, DELAYED RELEASE ORAL DAILY PRN
Status: DISCONTINUED | OUTPATIENT
Start: 2023-04-06 | End: 2023-04-07 | Stop reason: HOSPADM

## 2023-04-06 RX ORDER — PEN NEEDLE, DIABETIC 30 GX3/16"
1 NEEDLE, DISPOSABLE MISCELLANEOUS 4 TIMES DAILY
Qty: 200 EACH | Refills: 2 | OUTPATIENT
Start: 2023-04-06

## 2023-04-06 RX ORDER — POLYETHYLENE GLYCOL 3350 17 G/17G
17 POWDER, FOR SOLUTION ORAL DAILY
Status: DISCONTINUED | OUTPATIENT
Start: 2023-04-06 | End: 2023-04-07 | Stop reason: HOSPADM

## 2023-04-06 RX ORDER — CLINDAMYCIN HYDROCHLORIDE 300 MG/1
300 CAPSULE ORAL 3 TIMES DAILY
Qty: 30 CAPSULE | Refills: 0 | Status: SHIPPED | OUTPATIENT
Start: 2023-04-06 | End: 2023-04-07 | Stop reason: HOSPADM

## 2023-04-06 RX ORDER — HEPARIN SODIUM 5000 [USP'U]/ML
5000 INJECTION, SOLUTION INTRAVENOUS; SUBCUTANEOUS EVERY 12 HOURS SCHEDULED
Status: DISCONTINUED | OUTPATIENT
Start: 2023-04-06 | End: 2023-04-07 | Stop reason: HOSPADM

## 2023-04-06 RX ORDER — BUPRENORPHINE HYDROCHLORIDE 8 MG/1
24 TABLET SUBLINGUAL NIGHTLY
Status: DISCONTINUED | OUTPATIENT
Start: 2023-04-06 | End: 2023-04-06

## 2023-04-06 RX ORDER — LANCETS 33 GAUGE
1 EACH MISCELLANEOUS
Qty: 200 EACH | Refills: 2 | OUTPATIENT
Start: 2023-04-06

## 2023-04-06 RX ORDER — CEPHALEXIN 500 MG/1
500 CAPSULE ORAL EVERY 8 HOURS SCHEDULED
Status: DISCONTINUED | OUTPATIENT
Start: 2023-04-06 | End: 2023-04-07 | Stop reason: HOSPADM

## 2023-04-06 RX ORDER — LANCING DEVICE
1 EACH MISCELLANEOUS ONCE
Qty: 1 EACH | Refills: 0 | OUTPATIENT
Start: 2023-04-06 | End: 2023-04-06

## 2023-04-06 RX ORDER — AMLODIPINE BESYLATE 5 MG/1
10 TABLET ORAL DAILY
Status: DISCONTINUED | OUTPATIENT
Start: 2023-04-06 | End: 2023-04-07 | Stop reason: HOSPADM

## 2023-04-06 RX ORDER — BUPRENORPHINE HYDROCHLORIDE 8 MG/1
8 TABLET SUBLINGUAL EVERY 8 HOURS
Status: DISCONTINUED | OUTPATIENT
Start: 2023-04-06 | End: 2023-04-07 | Stop reason: HOSPADM

## 2023-04-06 RX ORDER — INSULIN GLARGINE 100 [IU]/ML
30 INJECTION, SOLUTION SUBCUTANEOUS DAILY
OUTPATIENT
Start: 2023-04-06

## 2023-04-06 RX ORDER — BISACODYL 10 MG
10 SUPPOSITORY, RECTAL RECTAL DAILY PRN
Status: DISCONTINUED | OUTPATIENT
Start: 2023-04-06 | End: 2023-04-07 | Stop reason: HOSPADM

## 2023-04-06 RX ORDER — SODIUM CHLORIDE 0.9 % (FLUSH) 0.9 %
10 SYRINGE (ML) INJECTION AS NEEDED
Status: DISCONTINUED | OUTPATIENT
Start: 2023-04-06 | End: 2023-04-07 | Stop reason: HOSPADM

## 2023-04-06 RX ORDER — AMOXICILLIN 250 MG
2 CAPSULE ORAL 2 TIMES DAILY
Status: DISCONTINUED | OUTPATIENT
Start: 2023-04-06 | End: 2023-04-07 | Stop reason: HOSPADM

## 2023-04-06 RX ORDER — ONDANSETRON 2 MG/ML
4 INJECTION INTRAMUSCULAR; INTRAVENOUS EVERY 6 HOURS PRN
Status: DISCONTINUED | OUTPATIENT
Start: 2023-04-06 | End: 2023-04-07 | Stop reason: HOSPADM

## 2023-04-06 RX ORDER — NALOXONE HCL 0.4 MG/ML
0.4 VIAL (ML) INJECTION ONCE
Status: COMPLETED | OUTPATIENT
Start: 2023-04-06 | End: 2023-04-06

## 2023-04-06 RX ORDER — CLINDAMYCIN PHOSPHATE 600 MG/50ML
600 INJECTION, SOLUTION INTRAVENOUS ONCE
Status: COMPLETED | OUTPATIENT
Start: 2023-04-06 | End: 2023-04-06

## 2023-04-06 RX ORDER — SODIUM CHLORIDE 9 MG/ML
40 INJECTION, SOLUTION INTRAVENOUS AS NEEDED
Status: DISCONTINUED | OUTPATIENT
Start: 2023-04-06 | End: 2023-04-07 | Stop reason: HOSPADM

## 2023-04-06 RX ORDER — HYDROCHLOROTHIAZIDE 25 MG/1
25 TABLET ORAL DAILY
Status: DISCONTINUED | OUTPATIENT
Start: 2023-04-06 | End: 2023-04-07 | Stop reason: HOSPADM

## 2023-04-06 RX ORDER — SILDENAFIL 50 MG/1
50 TABLET, FILM COATED ORAL AS NEEDED
COMMUNITY

## 2023-04-06 RX ADMIN — Medication 10 ML: at 20:16

## 2023-04-06 RX ADMIN — CLINDAMYCIN PHOSPHATE 600 MG: 600 INJECTION, SOLUTION INTRAVENOUS at 13:07

## 2023-04-06 RX ADMIN — HEPARIN SODIUM 5000 UNITS: 5000 INJECTION INTRAVENOUS; SUBCUTANEOUS at 20:14

## 2023-04-06 RX ADMIN — LOSARTAN POTASSIUM 100 MG: 50 TABLET, FILM COATED ORAL at 17:27

## 2023-04-06 RX ADMIN — NALOXONE HYDROCHLORIDE 0.4 MG: 0.4 INJECTION, SOLUTION INTRAMUSCULAR; INTRAVENOUS; SUBCUTANEOUS at 11:25

## 2023-04-06 RX ADMIN — CEPHALEXIN 500 MG: 500 CAPSULE ORAL at 21:28

## 2023-04-06 RX ADMIN — CEPHALEXIN 500 MG: 500 CAPSULE ORAL at 14:22

## 2023-04-06 RX ADMIN — BUPRENORPHINE 8 MG: 8 TABLET SUBLINGUAL at 20:13

## 2023-04-06 RX ADMIN — ACETAMINOPHEN 650 MG: 325 TABLET, FILM COATED ORAL at 20:13

## 2023-04-06 RX ADMIN — HYDROCHLOROTHIAZIDE 25 MG: 25 TABLET ORAL at 17:27

## 2023-04-06 RX ADMIN — FAMOTIDINE 20 MG: 10 INJECTION INTRAVENOUS at 20:12

## 2023-04-06 RX ADMIN — SENNOSIDES AND DOCUSATE SODIUM 2 TABLET: 50; 8.6 TABLET ORAL at 20:13

## 2023-04-06 RX ADMIN — AMLODIPINE BESYLATE 10 MG: 5 TABLET ORAL at 17:27

## 2023-04-06 RX ADMIN — POLYETHYLENE GLYCOL 3350 17 G: 17 POWDER, FOR SOLUTION ORAL at 17:27

## 2023-04-06 NOTE — Clinical Note
Level of Care: Telemetry [5]   Admitting Physician: SURINDER BARTON [[id number]]   Attending Physician: SURINDER BARTON [[id number]]   Bed Request Comments: carlene

## 2023-04-06 NOTE — CONSULTS
"Diabetes Education  Assessment/Teaching    Patient Name:  Tomas Song  YOB: 1980  MRN: [id number]  Admit Date:  4/6/2023      Assessment Date:  4/6/2023  Flowsheet Row Most Recent Value   General Information     Referral From: MD order  [Received consult due to bs >200. A1c has been ordered this adm but has not been resulted. Adm bs 189.]   Height 185.4 cm (73\")   Height Method Stated   Weight 86.2 kg (190 lb)   Weight Method Stated   Pregnancy Assessment    Diabetes History    What type of diabetes do you have? Type 2   Length of Diabetes Diagnosis --  [Dx 2011]   Do you test your blood sugar at home? yes   Frequency of checks may check 1 time/day, he does not want to stick himself more often than this   Meter type True Metrix, [facility]   Who performs the test? self   Have you had low blood sugar? (<70mg/dl) no   Education Preferences    Nutrition Information    Assessment Topics    DM Goals           Flowsheet Row Most Recent Value   DM Education Needs    Meter Has own   Frequency of Testing 3 times a day  [Discussed importance of checking bs premeals to determine insulin dosing for the meal. Pt states, \"I will not prick my fingers that much. They are sensitive. I am wanting a sensor device but insurance not covering.\" Encouraged pt to discuss with PCP.]   Blood Glucose Target Range Discussed healthy A1c target and healthy bs range.   Medication Insulin, Vial, Pen  [Per Dr. Kathia Villatoro's chart note from 4/15/2022, pt to be taking Basaglar 30 units daily and Novolog 10 units ac plus 1 unit for every 30 mg/dL >140.]   Healthy Eating --  [Pt states usually eating 2 meals/day]   Motivation Not interested   Teaching Method Explanation, Discussion   Patient Response Needs reinforcement            Other Comments: Met with pt at bedside. Per NP chart note, pt ran out of diabetes medications last week. Pt telling educator he does have Basaglar at home. He stated he " has been taking 30 units bid. Later during conversation, pt states MD changed the dosage to 30 units daily and this is the dosage he has been taking. Pt then told educator he was out of Basaglar but it is ready to be picked up at [facility] pharmacy. Pt states he is also supposed to be taking Novolog but unsure of dosage. He does not take routinely before meals. He states he was not able to refill this medication because it was going to cost several hundred dollars. He states 2 weeks ago, he purchased Humulin R vial from [facility] and has been using this per sliding scale. Pt checking bs 1 time/day.     Pt used to go to [facility]. He last saw Dr. Kathia Villatoro 4/15/2022. Pt states he has been getting rxs refilled by PCP. Pt states has new PCP.     Educator contacted [facility] pharmacy. Pt does have rx for Basaglar at [facility]. It was restocked on 4/2/2023 due to patient did not . Pt has refills left but the rx expires on 4/18/2023. Rx refill would be $50 copay. Pt does have Novolog pens ready for pickup at [facility] pharmacy for $50 copay. He also has refills for this rx but the rx will be expiring soon. Per pharmacy tech, pt did receive True Metrix meter on 11/2022. To ensure pt will have appropriate supplies, pt will need new rx for Basaglar, Novolog, pen needles, True Metrix test strips, lancing device and lancets. Pt states he does not have lancing device anymore. Insulin has not been ordered yet for this admission. Pt lethargic during visit. Having difficulty remembering questions educator is asking. Educator will follow up with pt at later time for further education.         Electronically signed by:  Haven Marcum RN  04/06/23 16:22 EDT

## 2023-04-06 NOTE — ED NOTES
Nursing report ED to floor  Tomas Song  42 y.o.  male    HPI:   Chief Complaint   Patient presents with    Altered Mental Status     Pt found unconscious in vehicle at work. PMH DM -  with EMS. Pt reports just starting his BG medicine again about a week ago. Left leg edema with pain x1 week.       Admitting doctor:   Aldair Lind MD    Admitting diagnosis:   The primary encounter diagnosis was Cellulitis of left lower extremity. A diagnosis of Lethargy was also pertinent to this visit.    Code status:   Current Code Status       Date Active Code Status Order ID Comments User Context       4/6/2023 1534 CPR (Attempt to Resuscitate) [id number]  Jemima Sierra, [facility]        Question Answer    Code Status (Patient has no pulse and is not breathing) CPR (Attempt to Resuscitate)    Medical Interventions (Patient has pulse or is breathing) Full Support    Level Of Support Discussed With Patient                    Allergies:   Bactrim [sulfamethoxazole-trimethoprim]    Isolation:  No active isolations     Fall Risk:  Fall Risk Assessment was completed, and patient is at moderate risk for falls.   Predictive Model Details         2 (Low) Factor Value    Calculated 4/6/2023 14:09 Age 42    Risk of Fall Model Musculoskeletal Assessment WDL     Active Peripheral IV Present     Imaging order in this encounter Present     Respiratory Rate 7     Skin Assessment WDL     Magnesium not on file     Drug Use No     Junior Scale not on file     Number of Distinct Medication Classes administered 2     Peripheral Vascular Assessment WDL     Creatinine 0.68 mg/dL     Financial Class Medicaid     Clinically Relevant Sex Not Female     Chloride 101 mmol/L     Gastrointestinal Assessment WDL     Albumin 4 g/dL     Calcium 9.2 mg/dL     Cardiac Assessment WDL     Diastolic BP 92     Days after Admission 0.192     Total Bilirubin 0.4 mg/dL     ALT 22 U/L     Potassium 3.8 mmol/L         Weight:       04/06/23  0930    Weight: 86.2 kg (190 lb)       Intake and Output    Intake/Output Summary (Last 24 hours) at 4/6/2023 1747  Last data filed at 4/6/2023 1337  Gross per 24 hour   Intake 50 ml   Output --   Net 50 ml       Diet:   Dietary Orders (From admission, onward)       Start     Ordered    04/06/23 1521  Diet: Diabetic Diets; Consistent Carbohydrate; Texture: Regular Texture (IDDSI 7); Fluid Consistency: Thin (IDDSI 0)  Diet Effective Now        References:    Diet Order [place]   Question Answer Comment   Diets: Diabetic Diets    Diabetic Diet: Consistent Carbohydrate    Texture: Regular Texture (IDDSI 7)    Fluid Consistency: Thin (IDDSI 0)        04/06/23 1521                     Most recent vitals:   Vitals:    04/06/23 1547 04/06/23 1601 04/06/23 1727 04/06/23 1731   BP: 127/82 134/86 135/90 135/90   Pulse: 85 83 86 85   Resp: 10 11  11   Temp:       TempSrc:       SpO2: 97% 99%  99%   Weight:       Height:           Active LDAs/IV Access:   Lines, Drains & Airways       Active LDAs       Name Placement date Placement time Site Days    Peripheral IV 04/06/23 1014 Anterior;Right Hand 04/06/23  1014  Hand  less than 1                    Skin Condition:   Skin Assessments (last day)       None             Labs (abnormal labs have a star):   Labs Reviewed   COMPREHENSIVE METABOLIC PANEL - Abnormal; Notable for the following components:       Result Value    Glucose 189 (*)     Creatinine 0.68 (*)     All other components within normal limits    Narrative:     GFR Normal >60  Chronic Kidney Disease <60  Kidney Failure <15     URINE DRUG SCREEN - Abnormal; Notable for the following components:    Amphet/Methamphet, Screen Positive (*)     THC, Screen, Urine Positive (*)     All other components within normal limits    Narrative:     Negative Thresholds Per Drugs Screened:    Amphetamines                 500 ng/ml  Barbiturates                 200 ng/ml  Benzodiazepines              100 ng/ml  Cocaine                       300 ng/ml  Methadone                    300 ng/ml  Opiates                      300 ng/ml  Oxycodone                    100 ng/ml  THC                           50 ng/ml    The Normal Value for all drugs tested is negative. This report includes final unconfirmed screening results to be used for medical treatment purposes only. Unconfirmed results must not be used for non-medical purposes such as employment or legal testing. Clinical consideration should be applied to any drug of abuse test, particularly when unconfirmed results are used.          All urine drugs of abuse requests without chain of custody are for medical screening purposes only.  False positives are possible.     SEDIMENTATION RATE - Abnormal; Notable for the following components:    Sed Rate 23 (*)     All other components within normal limits   URINALYSIS W/ MICROSCOPIC IF INDICATED (NO CULTURE) - Abnormal; Notable for the following components:    Specific Gravity, UA 1.041 (*)     Glucose, UA >=1000 mg/dL (3+) (*)     All other components within normal limits    Narrative:     Urine microscopic not indicated.   BLOOD GAS, ARTERIAL - Abnormal; Notable for the following components:    pCO2, Arterial 49.8 (*)     HCO3, Arterial 29.6 (*)     Base Excess, Arterial 3.4 (*)     CO2 Content 31.2 (*)     All other components within normal limits   C-REACTIVE PROTEIN - Normal   BNP (IN-HOUSE) - Normal    Narrative:     Among patients with dyspnea, NT-proBNP is highly sensitive for the detection of acute congestive heart failure. In addition NT-proBNP of <300 pg/ml effectively rules out acute congestive heart failure with 99% negative predictive value.     CBC WITH AUTO DIFFERENTIAL - Normal   BLOOD CULTURE   BLOOD CULTURE   RAINBOW DRAW    Narrative:     The following orders were created for panel order [place] Draw.  Procedure                               Abnormality         Status                     ---------                               -----------          ------                     Green Top (Gel)[[id number]]                                  Final result               Lavender Top[[id number]]                                     Final result               Gold Top - SST[[id number]]                                   Final result               Light Blue Top[[id number]]                                   Final result                 Please view results for these tests on the individual orders.   BLOOD GAS, ARTERIAL   AMMONIA   TSH   HEMOGLOBIN A1C   LIPID PANEL   MAGNESIUM   PHOSPHORUS   VITAMIN B12   VITAMIN D,25-HYDROXY   CBC AND DIFFERENTIAL    Narrative:     The following orders were created for panel order CBC & Differential.  Procedure                               Abnormality         Status                     ---------                               -----------         ------                     CBC Auto Differential[[id number]]        Normal              Final result                 Please view results for these tests on the individual orders.   GREEN TOP   LAVENDER TOP   GOLD TOP - SST   LIGHT BLUE TOP       LOC: Person, Place, Time, and Situation    Telemetry:  Telemetry    Cardiac Monitoring Ordered: yes    EKG:   ECG 12 Lead Altered Mental Status   Preliminary Result   HEART RATE= 83  bpm   RR Interval= 728  ms   ME Interval= 171  ms   P Horizontal Axis= -13  deg   P Front Axis= 53  deg   QRSD Interval= 89  ms   QT Interval= 364  ms   QRS Axis= 61  deg   T Wave Axis= 64  deg   - NORMAL ECG -   Sinus rhythm   Electronically Signed By:    Date and Time of Study: 2023-04-06 09:52:45          Medications Given in the ED:   Medications   sodium chloride 0.9 % flush 10 mL (has no administration in time range)   cephalexin (KEFLEX) capsule 500 mg (500 mg Oral Given 4/6/23 1422)   sodium chloride 0.9 % flush 10 mL (has no administration in time range)   sodium chloride 0.9 % flush 10 mL (has no administration in time range)   sodium chloride 0.9 % infusion 40 mL (has  no administration in time range)   heparin (porcine) 5000 UNIT/ML injection 5,000 Units (has no administration in time range)   nitroglycerin (NITROSTAT) SL tablet 0.4 mg (has no administration in time range)   acetaminophen (TYLENOL) tablet 650 mg (has no administration in time range)   ondansetron (ZOFRAN) injection 4 mg (has no administration in time range)   famotidine (PEPCID) injection 20 mg (has no administration in time range)   sennosides-docusate (PERICOLACE) 8.6-50 MG per tablet 2 tablet (has no administration in time range)     And   polyethylene glycol (MIRALAX) packet 17 g (17 g Oral Given 4/6/23 1727)     And   bisacodyl (DULCOLAX) EC tablet 5 mg (has no administration in time range)     And   bisacodyl (DULCOLAX) suppository 10 mg (has no administration in time range)   vitamin D3 capsule 5,000 Units (has no administration in time range)   losartan (COZAAR) tablet 100 mg (100 mg Oral Given 4/6/23 1727)   hydroCHLOROthiazide (HYDRODIURIL) tablet 25 mg (25 mg Oral Given 4/6/23 1727)   fenofibrate (TRICOR) tablet 145 mg (has no administration in time range)   buprenorphine (SUBUTEX) SL tablet 24 mg (has no administration in time range)   amLODIPine (NORVASC) tablet 10 mg (10 mg Oral Given 4/6/23 1727)   naloxone (NARCAN) injection 0.4 mg (0.4 mg Intravenous Given 4/6/23 1125)   clindamycin (CLEOCIN) 600 mg in sodium chloride 0.9% 50 mL IVPB (premix) (0 mg Intravenous Stopped 4/6/23 1337)       Imaging results:  CT Head Without Contrast    Result Date: 4/6/2023  Impression: No acute intracranial abnormality. Probable chronic left maxillary and ethmoid sinusitis. Electronically Signed: Santana Xavier  4/6/2023 12:28 PM EDT  Workstation ID: [id number]    XR Chest 1 View    Result Date: 4/6/2023  Impression: No acute radiographic abnormality. Electronically Signed: Nito Velazquez  4/6/2023 10:49 AM EDT  Workstation ID: [id number]     Social issues:   Social History     Socioeconomic History    Marital status:  Single    Number of children: 2    Years of education: 14   Tobacco Use    Smoking status: Never    Smokeless tobacco: Never   Vaping Use    Vaping Use: Never used   Substance and Sexual Activity    Alcohol use: Never    Drug use: Not Currently       NIH Stroke Scale:  Interval: (not recorded)  1a. Level of Consciousness: (not recorded)  1b. LOC Questions: (not recorded)  1c. LOC Commands: (not recorded)  2. Best Gaze: (not recorded)  3. Visual: (not recorded)  4. Facial Palsy: (not recorded)  5a. Motor Arm, Left: (not recorded)  5b. Motor Arm, Right: (not recorded)  6a. Motor Leg, Left: (not recorded)  6b. Motor Leg, Right: (not recorded)  7. Limb Ataxia: (not recorded)  8. Sensory: (not recorded)  9. Best Language: (not recorded)  10. Dysarthria: (not recorded)  11. Extinction and Inattention (formerly Neglect): (not recorded)    Total (NIH Stroke Scale): (not recorded)     Additional notable assessment information:     Nursing report ED to floor:  Jeannette Bass RN   04/06/23 17:47 EDT

## 2023-04-06 NOTE — ED PROVIDER NOTES
Subjective   History of Present Illness  Chief complaint: Altered mental status      Context: Patient is a 42-year-old male who comes in by EMS with reportedly decreased LOC after he was found sleeping in his car.  He states he ran out of his medications last week including his diabetic medications and diuretics.  He states has had some symmetrical swelling to his lower extremities that is improving since resuming his diuretics a couple days ago but states his left leg is little bit more red than it normally is.  He states he has been having some sleepwalking as well.        PCP: delaney               Review of Systems   Constitutional: Positive for fatigue. Negative for fever.   Cardiovascular: Positive for leg swelling.       Past Medical History:   Diagnosis Date   • Hypertension    • Type 2 diabetes mellitus        Allergies   Allergen Reactions   • Bactrim [Sulfamethoxazole-Trimethoprim] Swelling       History reviewed. No pertinent surgical history.    Family History   Problem Relation Age of Onset   • Diabetes Father    • Hypertension Maternal Uncle    • Stroke Maternal Uncle        Social History     Socioeconomic History   • Marital status: Single   • Number of children: 2   • Years of education: 14   Tobacco Use   • Smoking status: Never   • Smokeless tobacco: Never   Vaping Use   • Vaping Use: Never used   Substance and Sexual Activity   • Alcohol use: Never   • Drug use: Not Currently           Objective   Physical Exam     Vital signs and triage nurse note reviewed.   Constitutional: Awake, ; disheveled.  Smells of marijuana  HEENT: Normocephalic, atraumatic; 2-3 mm pupils are PERRL with intact EOM; oropharynx is pink and moist without exudate or erythema, ill fitting upper dentures.  Neck: Supple, full range of motion without pain; no JVD   Cardiovascular: Regular rate and rhythm, normal S1-S2.  No murmurs or rubs   Pulmonary: Respiratory effort regular nonlabored, breath sounds clear to auscultation  all fields, notably no rales.   Abdomen: Soft, nontender nondistended with normoactive bowel sounds; no rebound or guarding.   Musculoskeletal: Independent range of motion of all extremities with no palpable tenderness; symmetrical swelling of bilateral lower extremities with some erythema noted on the left, distal resting sensorimotor intact   Neuro: oriented x3, speech is clear and appropriate, GCS 15   Skin:  Fleshtone warm, dry,     Procedures           ED Course  ED Course as of 04/06/23 1347   Thu Apr 06, 2023   1130 Nursing staff reports patient is lethargic [JW]      ED Course User Index  [JW] Danita Henderson, APRN           Labs Reviewed   COMPREHENSIVE METABOLIC PANEL - Abnormal; Notable for the following components:       Result Value    Glucose 189 (*)     Creatinine 0.68 (*)     All other components within normal limits    Narrative:     GFR Normal >60  Chronic Kidney Disease <60  Kidney Failure <15     URINE DRUG SCREEN - Abnormal; Notable for the following components:    Amphet/Methamphet, Screen Positive (*)     THC, Screen, Urine Positive (*)     All other components within normal limits    Narrative:     Negative Thresholds Per Drugs Screened:    Amphetamines                 500 ng/ml  Barbiturates                 200 ng/ml  Benzodiazepines              100 ng/ml  Cocaine                      300 ng/ml  Methadone                    300 ng/ml  Opiates                      300 ng/ml  Oxycodone                    100 ng/ml  THC                           50 ng/ml    The Normal Value for all drugs tested is negative. This report includes final unconfirmed screening results to be used for medical treatment purposes only. Unconfirmed results must not be used for non-medical purposes such as employment or legal testing. Clinical consideration should be applied to any drug of abuse test, particularly when unconfirmed results are used.          All urine drugs of abuse requests without chain of custody are  for medical screening purposes only.  False positives are possible.     SEDIMENTATION RATE - Abnormal; Notable for the following components:    Sed Rate 23 (*)     All other components within normal limits   URINALYSIS W/ MICROSCOPIC IF INDICATED (NO CULTURE) - Abnormal; Notable for the following components:    Specific Gravity, UA 1.041 (*)     Glucose, UA >=1000 mg/dL (3+) (*)     All other components within normal limits    Narrative:     Urine microscopic not indicated.   BLOOD GAS, ARTERIAL - Abnormal; Notable for the following components:    pCO2, Arterial 49.8 (*)     HCO3, Arterial 29.6 (*)     Base Excess, Arterial 3.4 (*)     CO2 Content 31.2 (*)     All other components within normal limits   C-REACTIVE PROTEIN - Normal   BNP (IN-HOUSE) - Normal    Narrative:     Among patients with dyspnea, NT-proBNP is highly sensitive for the detection of acute congestive heart failure. In addition NT-proBNP of <300 pg/ml effectively rules out acute congestive heart failure with 99% negative predictive value.     CBC WITH AUTO DIFFERENTIAL - Normal   BLOOD CULTURE   BLOOD CULTURE   RAINBOW DRAW    Narrative:     The following orders were created for panel order [place] Draw.  Procedure                               Abnormality         Status                     ---------                               -----------         ------                     Green Top (Gel)[[id number]]                                  Final result               Lavender Top[[id number]]                                     Final result               Gold Top - SST[[id number]]                                   Final result               Light Blue Top[[id number]]                                   Final result                 Please view results for these tests on the individual orders.   BLOOD GAS, ARTERIAL   CBC AND DIFFERENTIAL    Narrative:     The following orders were created for panel order CBC & Differential.  Procedure                              "  Abnormality         Status                     ---------                               -----------         ------                     CBC Auto Differential[[id number]]        Normal              Final result                 Please view results for these tests on the individual orders.   GREEN TOP   LAVENDER TOP   GOLD TOP - SST   LIGHT BLUE TOP     Medications   sodium chloride 0.9 % flush 10 mL (has no administration in time range)   naloxone (NARCAN) injection 0.4 mg (0.4 mg Intravenous Given 4/6/23 1125)   clindamycin (CLEOCIN) 600 mg in sodium chloride 0.9% 50 mL IVPB (premix) (600 mg Intravenous New Bag 4/6/23 1307)     CT Head Without Contrast    Result Date: 4/6/2023  Impression: No acute intracranial abnormality. Probable chronic left maxillary and ethmoid sinusitis. Electronically Signed: Santana Xavier  4/6/2023 12:28 PM EDT  Workstation ID: [id number]    XR Chest 1 View    Result Date: 4/6/2023  Impression: No acute radiographic abnormality. Electronically Signed: Nito Velazquez  4/6/2023 10:49 AM EDT  Workstation ID: [id number]    Prior to Admission medications    Medication Sig Start Date End Date Taking? Authorizing Provider   amLODIPine (NORVASC) 10 MG tablet  9/4/22   Scot Jane MD   amLODIPine-benazepril (LOTREL) 10-20 MG per capsule Take 1 capsule by mouth Daily.    Scot Jane MD   amphetamine-dextroamphetamine (ADDERALL) 30 MG tablet Take 1 tablet by mouth 3 (Three) Times a Day. 10/13/22   Scot Jane MD B-D 3CC LUER-MITCHELL SYR 25GX1/2\" 25G X 1-1/2\" 3 ML misc  9/1/22   Scot Jane MD B-D UF III MINI PEN NEEDLES 31G X 5 MM misc Pt to use with insulin pens 5 times daily 4/15/22   Kathia Villatoro MD   buprenorphine (SUBUTEX) 8 MG sublingual tablet SL tablet DISSOLVE THREE TABLETS UNDER THE TONGUE EVERY DAY 10/17/22   Scot Jane MD   clindamycin (CLEOCIN) 300 MG capsule Take 1 capsule by mouth 3 (Three) Times a Day. 4/6/23   Danita Henderson, APRN " "  Cyanocobalamin (Vitamin B-12) 1000 MCG sublingual tablet Place 1 tablet under the tongue Daily. 4/15/22   Kathia Villatoro MD   fenofibrate 160 MG tablet  8/22/22   Scot Jane MD   gabapentin (NEURONTIN) 600 MG tablet Take 600 mg by mouth 3 (Three) Times a Day.    Scot Jane MD   gabapentin (NEURONTIN) 800 MG tablet  7/21/22   Scot Jane MD   hydroCHLOROthiazide (HYDRODIURIL) 25 MG tablet Take 1 tablet by mouth Daily. 8/18/22   Scot Jane MD   insulin aspart (novoLOG FLEXPEN) 100 UNIT/ML solution pen-injector sc pen Inject 10 units before each meal as a base dose and a sliding scale 1 unit for each 30 mg blood sugar over 140 at meals. 4/15/22   Kathia Villatoro MD   Insulin Glargine (BASAGLAR KWIKPEN) 100 UNIT/ML injection pen Inject 30 Units under the skin into the appropriate area as directed Daily. 4/19/22   Kathia Villatoro MD   losartan (COZAAR) 100 MG tablet  8/23/22   Scot Jane MD   polyethylene glycol (MIRALAX) 17 GM/SCOOP powder See Admin Instructions. 9/9/22   Scot Jane MD   promethazine (PHENERGAN) 25 MG tablet  9/26/22   Scot Jane MD   QUEtiapine (SEROquel) 50 MG tablet  7/21/22   Scot Jane MD   Testosterone Cypionate (DEPOTESTOTERONE CYPIONATE) 200 MG/ML injection  8/23/22   Scot Jane MD   vitamin D3 125 MCG (5000 UT) capsule capsule Take 1 tab po daily. 4/15/22   Kathia Villatoro MD                                     Medical Decision Making  Inspect report: Patient most recently had 28 days of buprenorphine and 84 tablets for 28-day supply of Adderall filled on 3/10/2023          /85   Pulse 74   Temp 97.7 °F (36.5 °C) (Oral)   Resp 8   Ht 185.4 cm (73\")   Wt 86.2 kg (190 lb)   SpO2 97%   BMI 25.07 kg/m²           Radiology interpretation:  X-rays reviewed independently and interpreted by me: No focal infiltrates pulmonary edema.  CT of the head no ICH with chronic sinusitis  Further " interpretation by radiologist as above  Lab interpretation:  Labs all viewed by me and significant for,  drug screen positive for amphetamines and THC, has an scription for Adderall; glucose 189  EKG viewed and interpreted by me and corroborated by Dr. Morrison, sinus rhythm rate of 83 without acute ST depression elevation  comparison: 6/4/2020 sinus rhythm rate of 68            Appropriate PPE worn during exam.  Patient had an IV established labs x-ray CT was obtained to evaluate for electrolyte abnormalities infection ICH.  Cardiac ultrasound tech reports ultrasound negative for DVT SVT but some enlarged lymph nodes noted in the left groin.  Nursing staff reported he was drowsy was given Narcan.  He had no change in his mental status and remains somewhat somnolent but arousable to tactile stimuli.  ABG was ordered.  Given a dose of clindamycin was placed.  Spoke with Jemima nurse practitioner for hospitalist admission who accepted patient for further evaluation management and antibiotic therapy         i discussed findings with patient who voices understanding of discharge instructions, signs and symptoms requiring return to ED; discharged improved and in stable condition with follow up for re-evaluation.  This document is intended for medical expert use only. Reading of this document by patients and/or patient's family without participating medical staff guidance may result in misinterpretation and unintended morbidity.  Any interpretation of such data is the responsibility of the patient and/or family member responsible for the patient in concert with their primary or specialist providers, not to be left for sources of online searches such as [employer], [employer] or similar queries. Relying on these approaches to knowledge may result in misinterpretation, misguided goals of care and even death should patients or family members try recommendations outside of the realm of professional medical care in a supervised inpatient  environment.       Cellulitis of left lower extremity: acute illness or injury  Lethargy: acute illness or injury  Amount and/or Complexity of Data Reviewed  Labs: ordered.  Radiology: ordered.  ECG/medicine tests: ordered.      Risk  Prescription drug management.  Decision regarding hospitalization.          Final diagnoses:   Cellulitis of left lower extremity   Lethargy       ED Disposition  ED Disposition     ED Disposition   Decision to Admit    Condition   --    Comment   Level of Care: Telemetry [5]   Admitting Physician: SURINDER BARTON [[id number]]   Attending Physician: SURINDER BARTON [[id number]]   Bed Request Comments: [facility]               No follow-up provider specified.       Medication List      New Prescriptions    clindamycin 300 MG capsule  Commonly known as: CLEOCIN  Take 1 capsule by mouth 3 (Three) Times a Day.           Where to Get Your Medications      These medications were sent to [facility] [id number] - [place], [place] - [address] - [phone]  - [phone] FX  [address] [place] [address]    Phone: [phone]   · clindamycin 300 MG capsule          Danita Henderson, APRN  04/06/23 1246       Danita Henderson, APRN  04/06/23 1347

## 2023-04-06 NOTE — H&P
[facility] Medicine Services  History & Physical    Patient Name: Tomas Song  : 1980  MRN: [id number]  Primary Care Physician:  Ty Gao DO  Date of admission: 2023  Date and Time of Service: 2023 at 14:16 EDT      Subjective      Chief Complaint: Lethargy and cellulitis    History of Present Illness: Tomas Song is a 42 y.o. male who presented to [facility] on 2023 comes in by EMS with reportedly decreased LOC after he was found sleeping in his car.  He states he ran out of his medications last week including his diabetic medications and diuretics.  He states has had some symmetrical swelling to his lower extremities that is improving since resuming his diuretics a couple days ago but states his left leg is little bit more red than it normally is.  He states he has been having some sleepwalking as well.    In ED temp 97.7, pulse 85, /92 and on room air.  proBNP less than 36, glucose 199, potassium 3.8, creatinine 0.68, ALT and AST normal, CRP 0.41, WBC 5.50, sed rate 23.  ABG unremarkable    CT Head Without Contrast    Result Date: 2023  Impression: No acute intracranial abnormality. Probable chronic left maxillary and ethmoid sinusitis. Electronically Signed: Santana Xavier  2023 12:28 PM EDT  Workstation ID: [id number]    XR Chest 1 View    Result Date: 2023  Impression: No acute radiographic abnormality. Electronically Signed: Nito Velazquez  2023 10:49 AM EDT  Workstation ID: [id number]    ECG 12 Lead Altered Mental Status   Preliminary Result   HEART RATE= 83  bpm   RR Interval= 728  ms   CA Interval= 171  ms   P Horizontal Axis= -13  deg   P Front Axis= 53  deg   QRSD Interval= 89  ms   QT Interval= 364  ms   QRS Axis= 61  deg   T Wave Axis= 64  deg   - NORMAL ECG -   Sinus rhythm   Electronically Signed By:    Date and Time of Study: 2023 09:52:45              Review of Systems   Cardiovascular: Positive for leg  "swelling.   Endocrine:        Follows a diabetic diet at home. He lives with his parents and his father is also a diabetic and so his mother does the cooking.    Skin: Positive for color change.        Left lower extremity is reddened, swollen and has been this way for a week   Gastrointestinal:        A couple times a year gets a sulphur taste in his mouth and then will vomit profusely for about 24 hrs.    Psychiatric/Behavioral:        Admits to smoking marjuana but denies other drug use. Admits to changing shifts at work a few weeks ago which requires him to get up at 3am and he is having a hard time adjusting to it.    All other systems reviewed and are negative.      Personal History     Past Medical History:   Diagnosis Date   • Hypertension    • Type 2 diabetes mellitus        History reviewed. No pertinent surgical history.    Family History: family history includes Diabetes in his father; Hypertension in his maternal uncle; Stroke in his maternal uncle. Otherwise pertinent FHx was reviewed and not pertinent to current issue.    Social History:  reports that he has never smoked. He has never used smokeless tobacco. He reports that he does not currently use drugs. He reports that he does not drink alcohol.    Home Medications:  Prior to Admission Medications     Prescriptions Last Dose Informant Patient Reported? Taking?    amLODIPine (NORVASC) 10 MG tablet   Yes No    amLODIPine-benazepril (LOTREL) 10-20 MG per capsule   Yes No    Take 1 capsule by mouth Daily.    amphetamine-dextroamphetamine (ADDERALL) 30 MG tablet   Yes No    Take 1 tablet by mouth 3 (Three) Times a Day.    B-D 3CC LUER-MITCHELL SYR 25GX1/2\" 25G X 1-1/2\" 3 ML misc   Yes No    B-D UF III MINI PEN NEEDLES 31G X 5 MM misc   No No    Pt to use with insulin pens 5 times daily    buprenorphine (SUBUTEX) 8 MG sublingual tablet SL tablet   Yes No    DISSOLVE THREE TABLETS UNDER THE TONGUE EVERY DAY    Cyanocobalamin (Vitamin B-12) 1000 MCG sublingual " tablet   No No    Place 1 tablet under the tongue Daily.    fenofibrate 160 MG tablet   Yes No    gabapentin (NEURONTIN) 600 MG tablet   Yes No    Take 600 mg by mouth 3 (Three) Times a Day.    gabapentin (NEURONTIN) 800 MG tablet   Yes No    hydroCHLOROthiazide (HYDRODIURIL) 25 MG tablet   Yes No    Take 1 tablet by mouth Daily.    insulin aspart (novoLOG FLEXPEN) 100 UNIT/ML solution pen-injector sc pen   No No    Inject 10 units before each meal as a base dose and a sliding scale 1 unit for each 30 mg blood sugar over 140 at meals.    Insulin Glargine (BASAGLAR KWIKPEN) 100 UNIT/ML injection pen   No No    Inject 30 Units under the skin into the appropriate area as directed Daily.    losartan (COZAAR) 100 MG tablet   Yes No    polyethylene glycol (MIRALAX) 17 GM/SCOOP powder   Yes No    See Admin Instructions.    promethazine (PHENERGAN) 25 MG tablet   Yes No    QUEtiapine (SEROquel) 50 MG tablet   Yes No    Testosterone Cypionate (DEPOTESTOTERONE CYPIONATE) 200 MG/ML injection   Yes No    vitamin D3 125 MCG (5000 UT) capsule capsule   No No    Take 1 tab po daily.            Allergies:  Allergies   Allergen Reactions   • Bactrim [Sulfamethoxazole-Trimethoprim] Swelling       Objective      Vitals:   Temp:  [97.7 °F (36.5 °C)] 97.7 °F (36.5 °C)  Heart Rate:  [73-97] 85  Resp:  [7-18] 7  BP: (112-174)/() 135/92    Physical Exam               Result Review    Result Review:  I have personally reviewed the results from the time of this admission to 4/6/2023 14:16 EDT and agree with these findings:  [x]  Laboratory  [x]  Microbiology  [x]  Radiology  [x]  EKG/Telemetry   []  Cardiology/Vascular   []  Pathology  []  Old records  []  Other:      Assessment & Plan        Active Hospital Problems:  Active Hospital Problems    Diagnosis    • **Cellulitis of left lower extremity      Plan:     Lethargy  - Patient awakens with sternal rub and is alert and oriented x4 and answers questions appropriately  - CT head  negative  - ABG unremarkable  - UDS positive for THC and amphetamines, patient is on Adderall at home  - Vitamin B12, vitamin D, ammonia and TSH ordered  - Gabapentin on hold    Cellulitis of LLE  - Patient thinks he was bitten by something a week or so ago.  - Venous Doppler negative  -  WBC 5.50, sed rate 23, CRP 0.41 and afebrile  - Blood cultures pending  - Cleocin given in ER  - Keflex started    Type 2 diabetes  - Check A1c  - Consistent carb diet  - Sliding scale insulin and Accu-Cheks before meals and at bedtime  - Diabetic educator to assess any needs obtaining medications as patient was out of his medicine recently    Hypertension  Hyperlipidemia  - Check lipid panel  - /92  - Resume Norvasc, hydrochlorothiazide, losartan and fenofibrate     H/O drug abuse  - Resume Suboxone  - UDS reviewed and patient denies drug abuse except for marijuana    Constipation  - Resume home MiraLAX  - Patient reports if not on MiraLAX will go long periods without a BM    GI prophylaxis  - Pepcid    DVT prophylaxis:  Medical DVT prophylaxis orders are present.     Low testosterone: On testosterone injections weekly  Vitamin D deficiency: Check vitamin D level, resume vitamin D3        CODE STATUS:  Level Of Support Discussed With: Patient  Code Status (Patient has no pulse and is not breathing): CPR (Attempt to Resuscitate)  Medical Interventions (Patient has pulse or is breathing): Full Support       Admission Status:  I believe this patient meets observation status.    I discussed the patient's findings and my recommendations with patient.      Signature: Electronically signed by JESSICA Andujar, 04/06/23, 14:16 EDT.  [facility] Hospitalist Team

## 2023-04-07 VITALS
RESPIRATION RATE: 12 BRPM | HEART RATE: 96 BPM | WEIGHT: 182.32 LBS | TEMPERATURE: 98.6 F | HEIGHT: 73 IN | BODY MASS INDEX: 24.16 KG/M2 | OXYGEN SATURATION: 97 % | SYSTOLIC BLOOD PRESSURE: 122 MMHG | DIASTOLIC BLOOD PRESSURE: 62 MMHG

## 2023-04-07 LAB
25(OH)D3 SERPL-MCNC: 17.1 NG/ML (ref 30–100)
AMMONIA BLD-SCNC: 36 UMOL/L (ref 16–60)
ANION GAP SERPL CALCULATED.3IONS-SCNC: 8 MMOL/L (ref 5–15)
BASOPHILS # BLD AUTO: 0 10*3/MM3 (ref 0–0.2)
BASOPHILS NFR BLD AUTO: 0.5 % (ref 0–1.5)
BUN SERPL-MCNC: 11 MG/DL (ref 6–20)
BUN/CREAT SERPL: 13.9 (ref 7–25)
CALCIUM SPEC-SCNC: 9.4 MG/DL (ref 8.6–10.5)
CHLORIDE SERPL-SCNC: 102 MMOL/L (ref 98–107)
CHOLEST SERPL-MCNC: 130 MG/DL (ref 0–200)
CO2 SERPL-SCNC: 30 MMOL/L (ref 22–29)
CREAT SERPL-MCNC: 0.79 MG/DL (ref 0.76–1.27)
DEPRECATED RDW RBC AUTO: 40.7 FL (ref 37–54)
EGFRCR SERPLBLD CKD-EPI 2021: 112.3 ML/MIN/1.73
EOSINOPHIL # BLD AUTO: 0.3 10*3/MM3 (ref 0–0.4)
EOSINOPHIL NFR BLD AUTO: 4.2 % (ref 0.3–6.2)
ERYTHROCYTE [DISTWIDTH] IN BLOOD BY AUTOMATED COUNT: 13.2 % (ref 12.3–15.4)
GLUCOSE BLDC GLUCOMTR-MCNC: 151 MG/DL (ref 70–105)
GLUCOSE SERPL-MCNC: 154 MG/DL (ref 65–99)
HBA1C MFR BLD: 8.6 % (ref 4.8–5.6)
HCT VFR BLD AUTO: 47.6 % (ref 37.5–51)
HDLC SERPL-MCNC: 42 MG/DL (ref 40–60)
HGB BLD-MCNC: 15.5 G/DL (ref 13–17.7)
LDLC SERPL CALC-MCNC: 73 MG/DL (ref 0–100)
LDLC/HDLC SERPL: 1.73 {RATIO}
LYMPHOCYTES # BLD AUTO: 1.3 10*3/MM3 (ref 0.7–3.1)
LYMPHOCYTES NFR BLD AUTO: 16.9 % (ref 19.6–45.3)
MAGNESIUM SERPL-MCNC: 2.1 MG/DL (ref 1.6–2.6)
MCH RBC QN AUTO: 29.3 PG (ref 26.6–33)
MCHC RBC AUTO-ENTMCNC: 32.5 G/DL (ref 31.5–35.7)
MCV RBC AUTO: 90.1 FL (ref 79–97)
MONOCYTES # BLD AUTO: 0.6 10*3/MM3 (ref 0.1–0.9)
MONOCYTES NFR BLD AUTO: 8.3 % (ref 5–12)
NEUTROPHILS NFR BLD AUTO: 5.3 10*3/MM3 (ref 1.7–7)
NEUTROPHILS NFR BLD AUTO: 70.1 % (ref 42.7–76)
NRBC BLD AUTO-RTO: 0 /100 WBC (ref 0–0.2)
PHOSPHATE SERPL-MCNC: 3.5 MG/DL (ref 2.5–4.5)
PLATELET # BLD AUTO: 295 10*3/MM3 (ref 140–450)
PMV BLD AUTO: 7.8 FL (ref 6–12)
POTASSIUM SERPL-SCNC: 4.1 MMOL/L (ref 3.5–5.2)
QT INTERVAL: 364 MS
RBC # BLD AUTO: 5.29 10*6/MM3 (ref 4.14–5.8)
SODIUM SERPL-SCNC: 140 MMOL/L (ref 136–145)
TRIGL SERPL-MCNC: 77 MG/DL (ref 0–150)
TSH SERPL DL<=0.05 MIU/L-ACNC: 1.33 UIU/ML (ref 0.27–4.2)
VIT B12 BLD-MCNC: 706 PG/ML (ref 211–946)
VLDLC SERPL-MCNC: 15 MG/DL (ref 5–40)
WBC NRBC COR # BLD: 7.6 10*3/MM3 (ref 3.4–10.8)

## 2023-04-07 PROCEDURE — 82607 VITAMIN B-12: CPT | Performed by: NURSE PRACTITIONER

## 2023-04-07 PROCEDURE — 82962 GLUCOSE BLOOD TEST: CPT

## 2023-04-07 PROCEDURE — G0108 DIAB MANAGE TRN  PER INDIV: HCPCS

## 2023-04-07 PROCEDURE — 84443 ASSAY THYROID STIM HORMONE: CPT | Performed by: NURSE PRACTITIONER

## 2023-04-07 PROCEDURE — 82306 VITAMIN D 25 HYDROXY: CPT | Performed by: NURSE PRACTITIONER

## 2023-04-07 PROCEDURE — 84100 ASSAY OF PHOSPHORUS: CPT | Performed by: NURSE PRACTITIONER

## 2023-04-07 PROCEDURE — 80048 BASIC METABOLIC PNL TOTAL CA: CPT | Performed by: NURSE PRACTITIONER

## 2023-04-07 PROCEDURE — 25010000002 HEPARIN (PORCINE) PER 1000 UNITS: Performed by: NURSE PRACTITIONER

## 2023-04-07 PROCEDURE — 80061 LIPID PANEL: CPT | Performed by: NURSE PRACTITIONER

## 2023-04-07 PROCEDURE — 96376 TX/PRO/DX INJ SAME DRUG ADON: CPT

## 2023-04-07 PROCEDURE — 36415 COLL VENOUS BLD VENIPUNCTURE: CPT | Performed by: NURSE PRACTITIONER

## 2023-04-07 PROCEDURE — 82140 ASSAY OF AMMONIA: CPT | Performed by: NURSE PRACTITIONER

## 2023-04-07 PROCEDURE — 96372 THER/PROPH/DIAG INJ SC/IM: CPT

## 2023-04-07 PROCEDURE — 83735 ASSAY OF MAGNESIUM: CPT | Performed by: NURSE PRACTITIONER

## 2023-04-07 PROCEDURE — 83036 HEMOGLOBIN GLYCOSYLATED A1C: CPT | Performed by: NURSE PRACTITIONER

## 2023-04-07 PROCEDURE — G0378 HOSPITAL OBSERVATION PER HR: HCPCS

## 2023-04-07 PROCEDURE — 85025 COMPLETE CBC W/AUTO DIFF WBC: CPT | Performed by: NURSE PRACTITIONER

## 2023-04-07 RX ORDER — DOXYCYCLINE HYCLATE 100 MG/1
100 CAPSULE ORAL 2 TIMES DAILY
Qty: 14 CAPSULE | Refills: 0 | Status: SHIPPED | OUTPATIENT
Start: 2023-04-07

## 2023-04-07 RX ORDER — PROCHLORPERAZINE 25 MG/1
1 SUPPOSITORY RECTAL DAILY
Qty: 1 EACH | Refills: 2 | OUTPATIENT
Start: 2023-04-07

## 2023-04-07 RX ORDER — AZELASTINE 1 MG/ML
2 SPRAY, METERED NASAL DAILY
Status: DISCONTINUED | OUTPATIENT
Start: 2023-04-07 | End: 2023-04-07 | Stop reason: HOSPADM

## 2023-04-07 RX ORDER — AMLODIPINE BESYLATE 10 MG/1
10 TABLET ORAL DAILY
Qty: 30 TABLET | Refills: 0
Start: 2023-04-07

## 2023-04-07 RX ORDER — CETIRIZINE HYDROCHLORIDE, PSEUDOEPHEDRINE HYDROCHLORIDE 5; 120 MG/1; MG/1
1 TABLET, FILM COATED, EXTENDED RELEASE ORAL 2 TIMES DAILY
Status: DISCONTINUED | OUTPATIENT
Start: 2023-04-07 | End: 2023-04-07 | Stop reason: HOSPADM

## 2023-04-07 RX ORDER — OXYMETAZOLINE HYDROCHLORIDE 0.05 G/100ML
2 SPRAY NASAL 2 TIMES DAILY
Status: DISCONTINUED | OUTPATIENT
Start: 2023-04-07 | End: 2023-04-07 | Stop reason: HOSPADM

## 2023-04-07 RX ORDER — PROCHLORPERAZINE 25 MG/1
SUPPOSITORY RECTAL
Qty: 3 EACH | Refills: 2 | OUTPATIENT
Start: 2023-04-07

## 2023-04-07 RX ADMIN — CEPHALEXIN 500 MG: 500 CAPSULE ORAL at 05:34

## 2023-04-07 RX ADMIN — AMLODIPINE BESYLATE 10 MG: 5 TABLET ORAL at 09:50

## 2023-04-07 RX ADMIN — NASAL DECONGESTANT 2 SPRAY: 0.05 SPRAY NASAL at 13:49

## 2023-04-07 RX ADMIN — PHENYLEPHRINE HYDROCHLORIDE 1 SPRAY: 0.5 SPRAY NASAL at 13:49

## 2023-04-07 RX ADMIN — BUPRENORPHINE 8 MG: 8 TABLET SUBLINGUAL at 05:35

## 2023-04-07 RX ADMIN — Medication 10 ML: at 09:47

## 2023-04-07 RX ADMIN — CEPHALEXIN 500 MG: 500 CAPSULE ORAL at 13:49

## 2023-04-07 RX ADMIN — HEPARIN SODIUM 5000 UNITS: 5000 INJECTION INTRAVENOUS; SUBCUTANEOUS at 09:47

## 2023-04-07 RX ADMIN — SENNOSIDES AND DOCUSATE SODIUM 2 TABLET: 50; 8.6 TABLET ORAL at 09:48

## 2023-04-07 RX ADMIN — FAMOTIDINE 20 MG: 10 INJECTION INTRAVENOUS at 09:47

## 2023-04-07 RX ADMIN — CETIRIZINE HYDROCHLORIDE, PSEUDOEPHEDRINE HYDROCHLORIDE 1 TABLET: 5; 120 TABLET, FILM COATED, EXTENDED RELEASE ORAL at 13:49

## 2023-04-07 RX ADMIN — Medication 5000 UNITS: at 09:48

## 2023-04-07 RX ADMIN — AZELASTINE HYDROCHLORIDE 2 SPRAY: 137 SPRAY, METERED NASAL at 13:49

## 2023-04-07 RX ADMIN — BUPRENORPHINE 8 MG: 8 TABLET SUBLINGUAL at 13:49

## 2023-04-07 RX ADMIN — FENOFIBRATE 145 MG: 145 TABLET ORAL at 09:48

## 2023-04-07 RX ADMIN — HYDROCHLOROTHIAZIDE 25 MG: 25 TABLET ORAL at 09:50

## 2023-04-07 NOTE — PLAN OF CARE
Goal Outcome Evaluation:      Pt admitted to [facility] from ED. Patient with cellulitis to LLE. Patient also tested positive for meth and marijuana. Pt did receive narcan in ED. Will continue to provide pt care.

## 2023-04-07 NOTE — CONSULTS
Diabetes Education    Patient Name:  Tomas Song  YOB: 1980  MRN: [id number]  Admit Date:  4/6/2023        Follow-up with patient on taking meds and checking blood sugars. Patient stated that he has been taking his Basaglar 15 units BID and only taking Novolog if blood sugar >150. Explained to patient that home med list has patient on Novolog 10 units with sliding scale before meals and that the 10 units are meant to be taken before each meal to cover the food being eaten. The sliding scale is only meant to be used for blood sugars >150. Patient stated he misunderstood how to take the Novolog. Discussed with patient that he had insulin ready for  at [facility] with $50 co-pay for each insulin. Patient stated he has private insurance [employer] and has been trying to get on a continuous glucose monitor. Called pharmacy and found that patient's insurance would cover the Dexcom with $97 co-pay for the sensors and transmitter. Patient can download the breanna on his phone. Had patient watch video on the Dexcom and explained to patient that the transmitter is good for 3 months and to make sure removes from old sensor and inserts in new sensor when changing out sensors. Patient stated that he drinks a lot of water and diet drinks but has never had any diet instruction. Handouts given with discussion on 1 serving of carbs being = 15 grams, being allowed 4 servings  of carbs per meal, counting carbs, reading food labels, and meal planning. Patient stated that he is going to check with insurance to see if there is insulin with a lower co-pay. [employer] insulin handout given to patient and explained that he would want to take Novolin R 10 units before meals with sliding scale and Novolin N 15 units BID.  Prescriptions started in discharge orders for Dexcom G6 transmitters and sensors. Patient has no further questions or concerns related to diabetes at this time.    Electronically signed  by:  Bessy Fuller RN  04/07/23 14:50 EDT

## 2023-04-07 NOTE — PROGRESS NOTES
[facility] Medicine Services Daily Progress Note    Patient Name: Tomas Song  : 1980  MRN: [id number]  Primary Care Physician:  Ty Gao DO  Date of admission: 2023      Subjective      Chief Complaint: Lethargy and cellulitis     History of Present Illness: Tomas Song is a 42 y.o. male who presented to [facility] on 2023 comes in by EMS with reportedly decreased LOC after he was found sleeping in his car.  He states he ran out of his medications last week including his diabetic medications and diuretics.  He states has had some symmetrical swelling to his lower extremities that is improving since resuming his diuretics a couple days ago but states his left leg is little bit more red than it normally is.  He states he has been having some sleepwalking as well.     In ED temp 97.7, pulse 85, /92 and on room air.-  proBNP less than 36, glucose 199, potassium 3.8, creatinine 0.68, ALT and AST normal, CRP 0.41, WBC 5.50, sed rate 23.  ABG unremarkable     CT Head Without Contrast-Result Date: 2023-Impression: No acute intracranial abnormality. Probable chronic left maxillary and ethmoid sinusitis. Electronically Signed: Santana Xavier  2023 12:28 PM EDT  Workstation ID: [id number]     XR Chest 1 View-Result Date: 2023-Impression: No acute radiographic abnormality. Electronically Signed: Nito Velazquez  2023 10:49 AM EDT  Workstation ID: [id number]        23 doing better today, will dc home, condition on dc stable.    ROS *Review of Systems   Cardiovascular: Positive for leg swelling.   Endocrine:        Follows a diabetic diet at home. He lives with his parents and his father is also a diabetic and so his mother does the cooking.    Skin: Positive for color change.        Left lower extremity is reddened, swollen and has been this way for a week   Gastrointestinal:        A couple times a year gets a sulphur taste in his  mouth and then will vomit profusely for about 24 hrs.    Psychiatric/Behavioral:        Admits to smoking marjuana but denies other drug use. Admits to changing shifts at work a few weeks ago which requires him to get up at 3am and he is having a hard time adjusting to it.    All other systems reviewed and are negative.     Objective      Vitals:   Temp:  [97.3 °F (36.3 °C)-97.7 °F (36.5 °C)] 97.3 °F (36.3 °C)  Heart Rate:  [73-97] 89  Resp:  [7-18] 13  BP: (107-174)/() 137/88    Physical Exam               Result Review    Result Review:  I have personally reviewed the results from the time of this admission to 4/7/2023 08:59 EDT and agree with these findings:  []  Laboratory  []  Microbiology  []  Radiology  []  EKG/Telemetry   []  Cardiology/Vascular   []  Pathology  []  Old records  []  Other:  Most notable findings include:         CBC:      Lab 04/06/23  1053   WBC 5.50   HEMOGLOBIN 14.2   HEMATOCRIT 43.3   PLATELETS 272   NEUTROS ABS 3.30   LYMPHS ABS 1.30   MONOS ABS 0.60   EOS ABS 0.30   MCV 89.3     CMP:      Lab 04/06/23  1053   SODIUM 137   POTASSIUM 3.8   CHLORIDE 101   CO2 28.0   ANION GAP 8.0   BUN 10   CREATININE 0.68*   EGFR 117.5   GLUCOSE 189*   CALCIUM 9.2   TOTAL PROTEIN 7.0   ALBUMIN 4.0   GLOBULIN 3.0   ALT (SGPT) 22   AST (SGOT) 22   BILIRUBIN 0.4   ALK PHOS 72     No results found for: ACANTHNAEG, AFBCX, BPERTUSSISCX, BLOODCX  No results found for: BCIDPCR, CXREFLEX, CSFCX, CULTURETIS  No results found for: CULTURES, HSVCX, URCX  No results found for: EYECULTURE, GCCX, HSVCULTURE, LABHSV  No results found for: LEGIONELLA, MRSACX, MUMPSCX, MYCOPLASCX  No results found for: NOCARDIACX, STOOLCX  No results found for: THROATCX, UNSTIMCULT, URINECX, CULTURE, VZVCULTUR  No results found for: VIRALCULTU, WOUNDCX      Assessment & Plan      Brief Patient Summary:  Tomas Song is a 42 y.o. male who *      amLODIPine, 10 mg, Oral, Daily  buprenorphine, 8 mg, Sublingual, Q8H  cephalexin,  500 mg, Oral, Q8H  famotidine, 20 mg, Intravenous, Q12H  fenofibrate, 145 mg, Oral, Daily  heparin (porcine), 5,000 Units, Subcutaneous, Q12H  hydroCHLOROthiazide, 25 mg, Oral, Daily  losartan, 100 mg, Oral, Daily  senna-docusate sodium, 2 tablet, Oral, BID   And  polyethylene glycol, 17 g, Oral, Daily  sodium chloride, 10 mL, Intravenous, Q12H  vitamin D3, 5,000 Units, Oral, Daily             Active Hospital Problems:  Active Hospital Problems    Diagnosis    • **Cellulitis of left lower extremity      Lethargy  - Patient awakens with sternal rub and is alert and oriented x4 and answers questions appropriately  - CT head negative  - ABG unremarkable  - UDS positive for THC and amphetamines, patient is on Adderall at home  - Vitamin B12, vitamin D, ammonia and TSH ordered  - Gabapentin on hold     Cellulitis of LLE  - Patient thinks he was bitten by something a week or so ago.  - Venous Doppler negative  -  WBC 5.50, sed rate 23, CRP 0.41 and afebrile  - Blood cultures pending  - Cleocin given in ER  - Keflex started     Type 2 diabetes  - Check A1c  - Consistent carb diet  - Sliding scale insulin and Accu-Cheks before meals and at bedtime  - Diabetic educator to assess any needs obtaining medications as patient was out of his medicine recently     Hypertension  Hyperlipidemia  - Check lipid panel  - /92  - Resume Norvasc, hydrochlorothiazide, losartan and fenofibrate     H/O drug abuse  - Resume Suboxone  - UDS reviewed and patient denies drug abuse except for marijuana     Constipation  - Resume home MiraLAX  - Patient reports if not on MiraLAX will go long periods without a BM     GI prophylaxis  - Pepcid    DVT prophylaxis:  Medical DVT prophylaxis orders are present.    CODE STATUS:    Level Of Support Discussed With: Patient  Code Status (Patient has no pulse and is not breathing): CPR (Attempt to Resuscitate)  Medical Interventions (Patient has pulse or is breathing): Full Support      Disposition:  I  expect patient to be discharged *home    I have utilized all available, immediate resources to obtain, update, or review the patient's current medications including all prescriptions, over-the-counter products, herbals, cannabis/cannabidiol products, and vitamin.mineral/dietary (nutritional) supplements.      Level Of Support Discussed With: Patient  Code Status (Patient has no pulse and is not breathing): CPR (Attempt to Resuscitate)  Medical Interventions (Patient has pulse or is breathing): Full Support    Next of kin of Power of :     Admission Status:  I believe this patient meets obs* status.    Hospital Medicine Quality Measures for Heart Failure:  The patient has a history of heart transplant or LVAD. YES      This patient has been examined wearing appropriate Personal Protective Equipment and discussed with rn. 04/07/23      Electronically signed by Aldair Lind MD, 04/07/23, 08:59 EDT.  [facility] Hospitalist Team

## 2023-04-07 NOTE — PLAN OF CARE
Problem: Adult Inpatient Plan of Care  Goal: Plan of Care Review  Outcome: Ongoing, Progressing  Goal: Patient-Specific Goal (Individualized)  Outcome: Ongoing, Progressing  Goal: Absence of Hospital-Acquired Illness or Injury  Outcome: Ongoing, Progressing  Intervention: Prevent and Manage VTE (Venous Thromboembolism) Risk  Recent Flowsheet Documentation  Taken 4/7/2023 0900 by Rakel Jackman RN  Activity Management:   up ad jesus   ambulated in room   ambulated to bathroom  Goal: Optimal Comfort and Wellbeing  Outcome: Ongoing, Progressing  Intervention: Provide Person-Centered Care  Recent Flowsheet Documentation  Taken 4/7/2023 0900 by Rakel Jackman RN  Trust Relationship/Rapport:   care explained   questions answered   reassurance provided  Goal: Readiness for Transition of Care  Outcome: Ongoing, Progressing     Problem: Fall Injury Risk  Goal: Absence of Fall and Fall-Related Injury  Outcome: Ongoing, Progressing   Goal Outcome Evaluation: Pt requesting home adderall, however not on formulary, nurse asked pt if he could have someone bring home medication and he stated he had run out a couple of days ago!  MD notified about other med request. RN will monitor.

## 2023-04-07 NOTE — DISCHARGE SUMMARY
[facility] Medicine Services  DISCHARGE SUMMARY    Patient Name: Tomas Song  : 1980  MRN: [id number]    Date of Admission: 2023  Discharge Diagnosis: Lethargy  Date of Discharge: 23  Primary Care Physician: Ty Gao DO      Presenting Problem:   Lethargy [R53.83]  Cellulitis of left lower extremity [L03.116]    Active and Resolved Hospital Problems:  Active Hospital Problems    Diagnosis POA   • **Cellulitis of left lower extremity [L03.116] Yes      Resolved Hospital Problems   No resolved problems to display.     Lethargy  - Patient awakens with sternal rub and is alert and oriented x4 and answers questions appropriately  - CT head negative  - ABG unremarkable  - UDS positive for THC and amphetamines, patient is on Adderall at home  - Vitamin B12, vitamin D, ammonia and TSH ordered  - Gabapentin on hold     Cellulitis of LLE  - Patient thinks he was bitten by something a week or so ago.  - Venous Doppler negative  -  WBC 5.50, sed rate 23, CRP 0.41 and afebrile  - Blood cultures pending  - Cleocin given in ER  - Keflex started     Type 2 diabetes  - Check A1c  - Consistent carb diet  - Sliding scale insulin and Accu-Cheks before meals and at bedtime  - Diabetic educator to assess any needs obtaining medications as patient was out of his medicine recently     Hypertension  Hyperlipidemia  - Check lipid panel  - /92  - Resume Norvasc, hydrochlorothiazide, losartan and fenofibrate     H/O drug abuse  - Resume Suboxone  - UDS reviewed and patient denies drug abuse except for marijuana     Constipation  - Resume home MiraLAX  - Patient reports if not on MiraLAX will go long periods without a BM     GI prophylaxis  - [place] [facility]       History of Present Illness: Tomas Song is a 42 y.o. male who presented to [facility] on 2023 comes in by EMS with reportedly decreased LOC after he was found sleeping in his  car.  He states he ran out of his medications last week including his diabetic medications and diuretics.  He states has had some symmetrical swelling to his lower extremities that is improving since resuming his diuretics a couple days ago but states his left leg is little bit more red than it normally is.  He states he has been having some sleepwalking as well.     In ED temp 97.7, pulse 85, /92 and on room air.-  proBNP less than 36, glucose 199, potassium 3.8, creatinine 0.68, ALT and AST normal, CRP 0.41, WBC 5.50, sed rate 23.  ABG unremarkable     CT Head Without Contrast-Result Date: 4/6/2023-Impression: No acute intracranial abnormality. Probable chronic left maxillary and ethmoid sinusitis. Electronically Signed: Santana Xavier  4/6/2023 12:28 PM EDT  Workstation ID: [id number]     XR Chest 1 View-Result Date: 4/6/2023-Impression: No acute radiographic abnormality. Electronically Signed: Nito Velazquez  4/6/2023 10:49 AM EDT  Workstation ID: [id number]      4/7/23 doing better today, will dc home, condition on dc stable.    DISCHARGE Follow Up Recommendations for labs and diagnostics: Follow-up with PCP in a week      Reasons For Change In Medications and Indications for New Medications:doxy      Day of Discharge     Vital Signs:  Temp:  [97.3 °F (36.3 °C)-98.6 °F (37 °C)] 98.6 °F (37 °C)  Heart Rate:  [74-90] 90  Resp:  [7-13] 12  BP: (106-142)/(62-90) 122/62      Physical Exam  Vitals and nursing note reviewed.   Constitutional:       General: He is not in acute distress.     Appearance: Normal appearance. He is well-developed. He is not ill-appearing, toxic-appearing or diaphoretic.   HENT:      Head: Normocephalic and atraumatic.      Nose: Nose normal. No congestion or rhinorrhea.      Mouth/Throat:      Mouth: Mucous membranes are moist.      Pharynx: No oropharyngeal exudate.   Eyes:      General: No scleral icterus.        Right eye: No discharge.         Left eye: No discharge.      Extraocular  Movements: Extraocular movements intact.      Pupils: Pupils are equal, round, and reactive to light.   Neck:      Thyroid: No thyromegaly.      Vascular: No carotid bruit or JVD.      Trachea: No tracheal deviation.   Cardiovascular:      Rate and Rhythm: Normal rate and regular rhythm.      Pulses: Normal pulses.      Heart sounds: Normal heart sounds. No murmur heard.    No friction rub. No gallop.   Pulmonary:      Effort: Pulmonary effort is normal. No respiratory distress.      Breath sounds: Normal breath sounds. No stridor. No wheezing, rhonchi or rales.   Chest:      Chest wall: No tenderness.   Abdominal:      General: Bowel sounds are normal. There is no distension.      Palpations: Abdomen is soft. There is no mass.      Tenderness: There is no abdominal tenderness. There is no guarding or rebound.      Hernia: No hernia is present.   Musculoskeletal:         General: Swelling present. No tenderness, deformity or signs of injury. Normal range of motion.      Cervical back: Normal range of motion and neck supple. No rigidity. No muscular tenderness.      Right lower leg: No edema.      Left lower leg: No edema.   Lymphadenopathy:      Cervical: No cervical adenopathy.   Skin:     General: Skin is warm and dry.      Coloration: Skin is not jaundiced or pale.      Findings: No bruising, erythema or rash.   Neurological:      General: No focal deficit present.      Mental Status: He is alert and oriented to person, place, and time. Mental status is at baseline.      Cranial Nerves: No cranial nerve deficit.      Sensory: No sensory deficit.      Motor: No weakness or abnormal muscle tone.      Coordination: Coordination normal.   Psychiatric:         Mood and Affect: Mood normal.         Behavior: Behavior normal.         Thought Content: Thought content normal.         Judgment: Judgment normal.       Pertinent  and/or Most Recent Results     LAB RESULTS:      Lab 04/07/23  0945 04/06/23  1053   WBC 7.60  5.50   HEMOGLOBIN 15.5 14.2   HEMATOCRIT 47.6 43.3   PLATELETS 295 272   NEUTROS ABS 5.30 3.30   LYMPHS ABS 1.30 1.30   MONOS ABS 0.60 0.60   EOS ABS 0.30 0.30   MCV 90.1 89.3   SED RATE  --  23*   CRP  --  0.41         Lab 04/07/23  0945 04/06/23  1053   SODIUM 140 137   POTASSIUM 4.1 3.8   CHLORIDE 102 101   CO2 30.0* 28.0   ANION GAP 8.0 8.0   BUN 11 10   CREATININE 0.79 0.68*   EGFR 112.3 117.5   GLUCOSE 154* 189*   CALCIUM 9.4 9.2   MAGNESIUM 2.1  --    PHOSPHORUS 3.5  --    HEMOGLOBIN A1C 8.60*  --    TSH 1.330  --          Lab 04/06/23  1053   TOTAL PROTEIN 7.0   ALBUMIN 4.0   GLOBULIN 3.0   ALT (SGPT) 22   AST (SGOT) 22   BILIRUBIN 0.4   ALK PHOS 72         Lab 04/06/23  1053   PROBNP <36.0         Lab 04/07/23  0945   CHOLESTEROL 130   LDL CHOL 73   HDL CHOL 42   TRIGLYCERIDES 77             Lab 04/06/23  1310   PH, ARTERIAL 7.383   PCO2, ARTERIAL 49.8*   PO2 .6   O2 SATURATION ART 97.8   FIO2 21   HCO3 ART 29.6*   BASE EXCESS ART 3.4*     Brief Urine Lab Results  (Last result in the past 365 days)      Color   Clarity   Blood   Leuk Est   Nitrite   Protein   CREAT   Urine HCG        04/06/23 1122 Yellow   Clear   Negative   Negative   Negative   Negative               Microbiology Results (last 10 days)     Procedure Component Value - Date/Time    Blood Culture - Blood, Hand, Right [[id number]]  (Normal) Collected: 04/06/23 1018    Lab Status: Preliminary result Specimen: Blood from Hand, Right Updated: 04/07/23 1045     Blood Culture No growth at 24 hours    Blood Culture - Blood, Arm, Right [[id number]]  (Normal) Collected: 04/06/23 1018    Lab Status: Preliminary result Specimen: Blood from Arm, Right Updated: 04/07/23 1101     Blood Culture No growth at 24 hours          CT Head Without Contrast    Result Date: 4/6/2023  Impression: Impression: No acute intracranial abnormality. Probable chronic left maxillary and ethmoid sinusitis. Electronically Signed: Santana Xavier  4/6/2023 12:28 PM EDT   "Workstation ID: [id number]    XR Chest 1 View    Result Date: 4/6/2023  Impression: Impression: No acute radiographic abnormality. Electronically Signed: Nito Velazquez  4/6/2023 10:49 AM EDT  Workstation ID: [id number]      Results for orders placed during the hospital encounter of 04/06/23    Duplex Venous Lower Extremity - Bilateral    Interpretation Summary  •  Normal bilateral lower extremity venous duplex scan.  •  Enlarged left-sided lymph node(s) noted.      Results for orders placed during the hospital encounter of 04/06/23    Duplex Venous Lower Extremity - Bilateral    Interpretation Summary  •  Normal bilateral lower extremity venous duplex scan.  •  Enlarged left-sided lymph node(s) noted.          Labs Pending at Discharge:  Pending Labs     Order Current Status    Vitamin B12 In process    Vitamin D,25-Hydroxy In process    Blood Culture - Blood, Arm, Right Preliminary result    Blood Culture - Blood, Hand, Right Preliminary result          Procedures Performed           Consults:   Consults     No orders found for last 30 day(s).            Discharge Details        Discharge Medications      New Medications      Instructions Start Date   doxycycline 100 MG capsule  Commonly known as: VIBRAMYCIN   100 mg, Oral, 2 Times Daily         Continue These Medications      Instructions Start Date   amLODIPine 10 MG tablet  Commonly known as: NORVASC   10 mg, Oral, Daily      amphetamine-dextroamphetamine 30 MG tablet  Commonly known as: ADDERALL   1 tablet, Oral, 3 Times Daily      B-D 3CC LUER-MITCHELL SYR 25GX1/2\" 25G X 1-1/2\" 3 ML misc  Generic drug: Syringe/Needle (Disp)   No dose, route, or frequency recorded.      B-D UF III MINI PEN NEEDLES 31G X 5 MM misc  Generic drug: Insulin Pen Needle   Pt to use with insulin pens 5 times daily      buprenorphine 8 MG sublingual tablet SL tablet  Commonly known as: SUBUTEX   Place 3 tablets under the tongue Daily. Pt takes 1 sublingually every 8 hours      fenofibrate 160 " MG tablet   160 mg, Oral, Daily      gabapentin 800 MG tablet  Commonly known as: NEURONTIN   800 mg, Oral, 3 Times Daily      hydroCHLOROthiazide 25 MG tablet  Commonly known as: HYDRODIURIL   25 mg, Oral, Daily      insulin aspart 100 UNIT/ML solution pen-injector sc pen  Commonly known as: novoLOG FLEXPEN   Inject 10 units before each meal as a base dose and a sliding scale 1 unit for each 30 mg blood sugar over 140 at meals.      losartan 100 MG tablet  Commonly known as: COZAAR   100 mg, Oral, Daily      sildenafil 50 MG tablet  Commonly known as: VIAGRA   50 mg, Oral, As Needed      Testosterone Cypionate 200 MG/ML injection  Commonly known as: DEPOTESTOTERONE CYPIONATE   200 mg, Intramuscular, Weekly      vitamin D3 125 MCG (5000 UT) capsule capsule   Take 1 tab po daily.             Allergies   Allergen Reactions   • Bactrim [Sulfamethoxazole-Trimethoprim] Swelling         Discharge Disposition:   Home or Self Care    Diet:  Hospital:  Diet Order   Procedures   • Diet: Diabetic Diets; Consistent Carbohydrate; Texture: Regular Texture (IDDSI 7); Fluid Consistency: Thin (IDDSI 0)         Discharge Activity:   Activity Instructions     Gradually Increase Activity Until at Pre-Hospitalization Level              CODE STATUS:  Code Status and Medical Interventions:   Ordered at: 04/06/23 6299     Level Of Support Discussed With:    Patient     Code Status (Patient has no pulse and is not breathing):    CPR (Attempt to Resuscitate)     Medical Interventions (Patient has pulse or is breathing):    Full Support     I have utilized all available, immediate resources to obtain, update, or review the patient's current medications including all prescriptions, over-the-counter products, herbals, cannabis/cannabidiol products, and vitamin.mineral/dietary (nutritional) supplements.      Level Of Support Discussed With: Patient  Code Status (Patient has no pulse and is not breathing): CPR (Attempt to Resuscitate)  Medical  Interventions (Patient has pulse or is breathing): Full Support    Next of kin of Power of :         Admission Status:  I believe this patient meets obs status.            No future appointments.    Additional Instructions for the Follow-ups that You Need to Schedule     Discharge Follow-up with PCP   As directed       Currently Documented PCP:    Ty Gao DO    PCP Phone Number:    [phone]     Follow Up Details: 1 week               Time spent on Discharge including face to face service: 34 minutes    This patient has been examined wearing appropriate Personal Protective Equipment and discussed with rn. 04/07/23      Signature: Electronically signed by Aldair Lind MD, 04/08/23, 2:07 PM EDT.

## 2023-04-10 ENCOUNTER — TELEPHONE (OUTPATIENT)
Dept: DIABETES SERVICES | Facility: HOSPITAL | Age: 43
End: 2023-04-10
Payer: COMMERCIAL

## 2023-04-10 NOTE — CASE MANAGEMENT/SOCIAL WORK
Case Management Discharge Note      Final Note: Routine Home         Selected Continued Care - Discharged on 4/7/2023 Admission date: 4/6/2023 - Discharge disposition: Home or Self Care         Transportation Services  Private: Car    Final Discharge Disposition Code: 01 - home or self-care

## 2023-04-11 ENCOUNTER — TELEPHONE (OUTPATIENT)
Dept: DIABETES SERVICES | Facility: HOSPITAL | Age: 43
End: 2023-04-11
Payer: COMMERCIAL

## 2023-04-11 LAB
BACTERIA SPEC AEROBE CULT: NORMAL
BACTERIA SPEC AEROBE CULT: NORMAL

## 2023-04-11 NOTE — TELEPHONE ENCOUNTER
Pt states he has Basaglar at home and will be filling new rx shortly. Pt states he is using the Novolin R he has at home for meals. Discussed MD wanting pt to be using Novolog for better control. Pt concerned about copay for Novolog. Discussed he may want to contact MD to see if insurance would cover better for different rapid-acting insulin. Pt states has True Metrix meter, test strips and lancets at home. He states has been checking bs and taking insulin as prescribed.

## 2025-01-13 ENCOUNTER — APPOINTMENT (OUTPATIENT)
Dept: GENERAL RADIOLOGY | Facility: HOSPITAL | Age: 45
End: 2025-01-13
Payer: MEDICAID

## 2025-01-13 ENCOUNTER — APPOINTMENT (OUTPATIENT)
Dept: MRI IMAGING | Facility: HOSPITAL | Age: 45
End: 2025-01-13
Payer: MEDICAID

## 2025-01-13 ENCOUNTER — HOSPITAL ENCOUNTER (INPATIENT)
Facility: HOSPITAL | Age: 45
LOS: 10 days | Discharge: HOME | End: 2025-01-23
Attending: STUDENT IN AN ORGANIZED HEALTH CARE EDUCATION/TRAINING PROGRAM | Admitting: INTERNAL MEDICINE
Payer: MEDICAID

## 2025-01-13 DIAGNOSIS — M86.9 OSTEOMYELITIS OF LEFT FOOT, UNSPECIFIED TYPE: ICD-10-CM

## 2025-01-13 DIAGNOSIS — E11.65 UNCONTROLLED TYPE 2 DIABETES MELLITUS WITH HYPERGLYCEMIA: Primary | ICD-10-CM

## 2025-01-13 DIAGNOSIS — R52 PAIN: ICD-10-CM

## 2025-01-13 DIAGNOSIS — M86.9 OSTEOMYELITIS OF GREAT TOE OF RIGHT FOOT: ICD-10-CM

## 2025-01-13 LAB
ALBUMIN SERPL-MCNC: 3.5 G/DL (ref 3.5–5.2)
ALBUMIN/GLOB SERPL: 0.6 G/DL
ALP SERPL-CCNC: 104 U/L (ref 39–117)
ALT SERPL W P-5'-P-CCNC: 19 U/L (ref 1–41)
ANION GAP SERPL CALCULATED.3IONS-SCNC: 9.4 MMOL/L (ref 5–15)
AST SERPL-CCNC: 20 U/L (ref 1–40)
BASOPHILS # BLD AUTO: 0.02 10*3/MM3 (ref 0–0.2)
BASOPHILS NFR BLD AUTO: 0.3 % (ref 0–1.5)
BILIRUB SERPL-MCNC: 0.2 MG/DL (ref 0–1.2)
BILIRUB UR QL STRIP: NEGATIVE
BUN SERPL-MCNC: 12 MG/DL (ref 6–20)
BUN/CREAT SERPL: 12.5 (ref 7–25)
CALCIUM SPEC-SCNC: 9.5 MG/DL (ref 8.6–10.5)
CHLORIDE SERPL-SCNC: 99 MMOL/L (ref 98–107)
CLARITY UR: CLEAR
CO2 SERPL-SCNC: 30.6 MMOL/L (ref 22–29)
COLOR UR: YELLOW
CREAT SERPL-MCNC: 0.96 MG/DL (ref 0.76–1.27)
CRP SERPL-MCNC: 5.29 MG/DL (ref 0–0.5)
DEPRECATED RDW RBC AUTO: 40.8 FL (ref 37–54)
EGFRCR SERPLBLD CKD-EPI 2021: 98.7 ML/MIN/1.73
EOSINOPHIL # BLD AUTO: 0.59 10*3/MM3 (ref 0–0.4)
EOSINOPHIL NFR BLD AUTO: 9.9 % (ref 0.3–6.2)
ERYTHROCYTE [DISTWIDTH] IN BLOOD BY AUTOMATED COUNT: 13.1 % (ref 12.3–15.4)
ERYTHROCYTE [SEDIMENTATION RATE] IN BLOOD: 40 MM/HR (ref 0–15)
GLOBULIN UR ELPH-MCNC: 5.5 GM/DL
GLUCOSE BLDC GLUCOMTR-MCNC: 123 MG/DL (ref 70–105)
GLUCOSE BLDC GLUCOMTR-MCNC: 157 MG/DL (ref 70–105)
GLUCOSE SERPL-MCNC: 180 MG/DL (ref 65–99)
GLUCOSE UR STRIP-MCNC: ABNORMAL MG/DL
HBA1C MFR BLD: 9.1 % (ref 4.8–5.6)
HCT VFR BLD AUTO: 31.7 % (ref 37.5–51)
HGB BLD-MCNC: 9.7 G/DL (ref 13–17.7)
HGB UR QL STRIP.AUTO: NEGATIVE
HOLD SPECIMEN: NORMAL
IMM GRANULOCYTES # BLD AUTO: 0.02 10*3/MM3 (ref 0–0.05)
IMM GRANULOCYTES NFR BLD AUTO: 0.3 % (ref 0–0.5)
KETONES UR QL STRIP: NEGATIVE
LEUKOCYTE ESTERASE UR QL STRIP.AUTO: NEGATIVE
LYMPHOCYTES # BLD AUTO: 1.71 10*3/MM3 (ref 0.7–3.1)
LYMPHOCYTES NFR BLD AUTO: 28.6 % (ref 19.6–45.3)
MCH RBC QN AUTO: 26 PG (ref 26.6–33)
MCHC RBC AUTO-ENTMCNC: 30.6 G/DL (ref 31.5–35.7)
MCV RBC AUTO: 85 FL (ref 79–97)
MONOCYTES # BLD AUTO: 0.52 10*3/MM3 (ref 0.1–0.9)
MONOCYTES NFR BLD AUTO: 8.7 % (ref 5–12)
MRSA DNA SPEC QL NAA+PROBE: ABNORMAL
NEUTROPHILS NFR BLD AUTO: 3.11 10*3/MM3 (ref 1.7–7)
NEUTROPHILS NFR BLD AUTO: 52.2 % (ref 42.7–76)
NITRITE UR QL STRIP: NEGATIVE
NRBC BLD AUTO-RTO: 0 /100 WBC (ref 0–0.2)
PH UR STRIP.AUTO: 7.5 [PH] (ref 5–8)
PLATELET # BLD AUTO: 410 10*3/MM3 (ref 140–450)
PMV BLD AUTO: 9.2 FL (ref 6–12)
POTASSIUM SERPL-SCNC: 4 MMOL/L (ref 3.5–5.2)
PROT SERPL-MCNC: 9 G/DL (ref 6–8.5)
PROT UR QL STRIP: NEGATIVE
RBC # BLD AUTO: 3.73 10*6/MM3 (ref 4.14–5.8)
SODIUM SERPL-SCNC: 139 MMOL/L (ref 136–145)
SP GR UR STRIP: 1.03 (ref 1–1.03)
UROBILINOGEN UR QL STRIP: ABNORMAL
WBC NRBC COR # BLD AUTO: 5.97 10*3/MM3 (ref 3.4–10.8)
WHOLE BLOOD HOLD COAG: NORMAL

## 2025-01-13 PROCEDURE — 87040 BLOOD CULTURE FOR BACTERIA: CPT

## 2025-01-13 PROCEDURE — 73660 X-RAY EXAM OF TOE(S): CPT

## 2025-01-13 PROCEDURE — 85025 COMPLETE CBC W/AUTO DIFF WBC: CPT

## 2025-01-13 PROCEDURE — 83036 HEMOGLOBIN GLYCOSYLATED A1C: CPT

## 2025-01-13 PROCEDURE — 73718 MRI LOWER EXTREMITY W/O DYE: CPT

## 2025-01-13 PROCEDURE — 25010000002 VANCOMYCIN 2-0.9 GM/500ML-% SOLUTION

## 2025-01-13 PROCEDURE — 99222 1ST HOSP IP/OBS MODERATE 55: CPT | Performed by: PODIATRIST

## 2025-01-13 PROCEDURE — 36415 COLL VENOUS BLD VENIPUNCTURE: CPT

## 2025-01-13 PROCEDURE — 86140 C-REACTIVE PROTEIN: CPT

## 2025-01-13 PROCEDURE — 87186 SC STD MICRODIL/AGAR DIL: CPT

## 2025-01-13 PROCEDURE — 87077 CULTURE AEROBIC IDENTIFY: CPT

## 2025-01-13 PROCEDURE — 87070 CULTURE OTHR SPECIMN AEROBIC: CPT

## 2025-01-13 PROCEDURE — 25010000002 KETOROLAC TROMETHAMINE PER 15 MG

## 2025-01-13 PROCEDURE — 87205 SMEAR GRAM STAIN: CPT

## 2025-01-13 PROCEDURE — 85652 RBC SED RATE AUTOMATED: CPT

## 2025-01-13 PROCEDURE — 81003 URINALYSIS AUTO W/O SCOPE: CPT

## 2025-01-13 PROCEDURE — 87147 CULTURE TYPE IMMUNOLOGIC: CPT

## 2025-01-13 PROCEDURE — 80306 DRUG TEST PRSMV INSTRMNT: CPT | Performed by: NURSE PRACTITIONER

## 2025-01-13 PROCEDURE — 82948 REAGENT STRIP/BLOOD GLUCOSE: CPT

## 2025-01-13 PROCEDURE — 80053 COMPREHEN METABOLIC PANEL: CPT

## 2025-01-13 PROCEDURE — 87641 MR-STAPH DNA AMP PROBE: CPT

## 2025-01-13 PROCEDURE — 99285 EMERGENCY DEPT VISIT HI MDM: CPT

## 2025-01-13 PROCEDURE — 63710000001 INSULIN LISPRO (HUMAN) PER 5 UNITS

## 2025-01-13 PROCEDURE — 73630 X-RAY EXAM OF FOOT: CPT

## 2025-01-13 RX ORDER — INSULIN GLARGINE 100 [IU]/ML
30 INJECTION, SOLUTION SUBCUTANEOUS 2 TIMES DAILY
Status: DISCONTINUED | OUTPATIENT
Start: 2025-01-13 | End: 2025-01-21

## 2025-01-13 RX ORDER — IBUPROFEN 600 MG/1
1 TABLET ORAL
Status: DISCONTINUED | OUTPATIENT
Start: 2025-01-13 | End: 2025-01-24 | Stop reason: HOSPADM

## 2025-01-13 RX ORDER — VANCOMYCIN 2 GRAM/500 ML IN 0.9 % SODIUM CHLORIDE INTRAVENOUS
2000 ONCE
Status: COMPLETED | OUTPATIENT
Start: 2025-01-13 | End: 2025-01-13

## 2025-01-13 RX ORDER — FENOFIBRATE 145 MG/1
145 TABLET, COATED ORAL DAILY
Status: DISCONTINUED | OUTPATIENT
Start: 2025-01-13 | End: 2025-01-13

## 2025-01-13 RX ORDER — GABAPENTIN 800 MG/1
1.5 TABLET ORAL 2 TIMES DAILY
COMMUNITY

## 2025-01-13 RX ORDER — BUPRENORPHINE 8 MG/1
8 TABLET SUBLINGUAL 3 TIMES DAILY
Status: DISCONTINUED | OUTPATIENT
Start: 2025-01-13 | End: 2025-01-24 | Stop reason: HOSPADM

## 2025-01-13 RX ORDER — ONDANSETRON 2 MG/ML
4 INJECTION INTRAMUSCULAR; INTRAVENOUS EVERY 6 HOURS PRN
Status: DISCONTINUED | OUTPATIENT
Start: 2025-01-13 | End: 2025-01-24 | Stop reason: HOSPADM

## 2025-01-13 RX ORDER — KETOROLAC TROMETHAMINE 30 MG/ML
30 INJECTION, SOLUTION INTRAMUSCULAR; INTRAVENOUS EVERY 6 HOURS PRN
Status: DISPENSED | OUTPATIENT
Start: 2025-01-13 | End: 2025-01-18

## 2025-01-13 RX ORDER — INSULIN LISPRO 100 [IU]/ML
2-9 INJECTION, SOLUTION INTRAVENOUS; SUBCUTANEOUS
Status: DISCONTINUED | OUTPATIENT
Start: 2025-01-13 | End: 2025-01-24 | Stop reason: HOSPADM

## 2025-01-13 RX ORDER — NICOTINE POLACRILEX 4 MG
15 LOZENGE BUCCAL
Status: DISCONTINUED | OUTPATIENT
Start: 2025-01-13 | End: 2025-01-24 | Stop reason: HOSPADM

## 2025-01-13 RX ORDER — LOSARTAN POTASSIUM 50 MG/1
100 TABLET ORAL DAILY
Status: DISCONTINUED | OUTPATIENT
Start: 2025-01-14 | End: 2025-01-24 | Stop reason: HOSPADM

## 2025-01-13 RX ORDER — DEXTROSE MONOHYDRATE 25 G/50ML
25 INJECTION, SOLUTION INTRAVENOUS
Status: DISCONTINUED | OUTPATIENT
Start: 2025-01-13 | End: 2025-01-24 | Stop reason: HOSPADM

## 2025-01-13 RX ORDER — AMOXICILLIN 250 MG
2 CAPSULE ORAL 2 TIMES DAILY PRN
Status: DISCONTINUED | OUTPATIENT
Start: 2025-01-13 | End: 2025-01-24 | Stop reason: HOSPADM

## 2025-01-13 RX ORDER — ACETAMINOPHEN 160 MG/5ML
650 SOLUTION ORAL EVERY 4 HOURS PRN
Status: DISCONTINUED | OUTPATIENT
Start: 2025-01-13 | End: 2025-01-24 | Stop reason: HOSPADM

## 2025-01-13 RX ORDER — BISACODYL 5 MG/1
5 TABLET, DELAYED RELEASE ORAL DAILY PRN
Status: DISCONTINUED | OUTPATIENT
Start: 2025-01-13 | End: 2025-01-24 | Stop reason: HOSPADM

## 2025-01-13 RX ORDER — ACETAMINOPHEN 650 MG/1
650 SUPPOSITORY RECTAL EVERY 4 HOURS PRN
Status: DISCONTINUED | OUTPATIENT
Start: 2025-01-13 | End: 2025-01-24 | Stop reason: HOSPADM

## 2025-01-13 RX ORDER — HYDROCHLOROTHIAZIDE 25 MG/1
25 TABLET ORAL DAILY
Status: DISCONTINUED | OUTPATIENT
Start: 2025-01-14 | End: 2025-01-24 | Stop reason: HOSPADM

## 2025-01-13 RX ORDER — BISACODYL 10 MG
10 SUPPOSITORY, RECTAL RECTAL DAILY PRN
Status: DISCONTINUED | OUTPATIENT
Start: 2025-01-13 | End: 2025-01-24 | Stop reason: HOSPADM

## 2025-01-13 RX ORDER — GABAPENTIN 300 MG/1
600 CAPSULE ORAL EVERY 8 HOURS SCHEDULED
Status: DISCONTINUED | OUTPATIENT
Start: 2025-01-13 | End: 2025-01-13 | Stop reason: DRUGHIGH

## 2025-01-13 RX ORDER — OXYCODONE HYDROCHLORIDE 5 MG/1
5 TABLET ORAL EVERY 6 HOURS PRN
Status: ACTIVE | OUTPATIENT
Start: 2025-01-13 | End: 2025-01-18

## 2025-01-13 RX ORDER — AMLODIPINE BESYLATE 5 MG/1
10 TABLET ORAL DAILY
Status: DISCONTINUED | OUTPATIENT
Start: 2025-01-14 | End: 2025-01-24 | Stop reason: HOSPADM

## 2025-01-13 RX ORDER — KETOROLAC TROMETHAMINE 30 MG/ML
15 INJECTION, SOLUTION INTRAMUSCULAR; INTRAVENOUS ONCE
Status: COMPLETED | OUTPATIENT
Start: 2025-01-13 | End: 2025-01-13

## 2025-01-13 RX ORDER — INSULIN GLARGINE 100 [IU]/ML
30 INJECTION, SOLUTION SUBCUTANEOUS 2 TIMES DAILY
Status: ON HOLD | COMMUNITY
End: 2025-01-23

## 2025-01-13 RX ORDER — ACETAMINOPHEN 325 MG/1
650 TABLET ORAL EVERY 4 HOURS PRN
Status: DISCONTINUED | OUTPATIENT
Start: 2025-01-13 | End: 2025-01-24 | Stop reason: HOSPADM

## 2025-01-13 RX ORDER — GABAPENTIN 400 MG/1
1200 CAPSULE ORAL EVERY 12 HOURS SCHEDULED
Status: DISCONTINUED | OUTPATIENT
Start: 2025-01-13 | End: 2025-01-24 | Stop reason: HOSPADM

## 2025-01-13 RX ORDER — ONDANSETRON 4 MG/1
4 TABLET, ORALLY DISINTEGRATING ORAL EVERY 6 HOURS PRN
Status: DISCONTINUED | OUTPATIENT
Start: 2025-01-13 | End: 2025-01-24 | Stop reason: HOSPADM

## 2025-01-13 RX ORDER — POLYETHYLENE GLYCOL 3350 17 G/17G
17 POWDER, FOR SOLUTION ORAL DAILY PRN
Status: DISCONTINUED | OUTPATIENT
Start: 2025-01-13 | End: 2025-01-24 | Stop reason: HOSPADM

## 2025-01-13 RX ADMIN — KETOROLAC TROMETHAMINE 15 MG: 30 INJECTION, SOLUTION INTRAMUSCULAR at 12:33

## 2025-01-13 RX ADMIN — INSULIN LISPRO 2 UNITS: 100 INJECTION, SOLUTION INTRAVENOUS; SUBCUTANEOUS at 17:19

## 2025-01-13 RX ADMIN — BUPRENORPHINE HCL 8 MG: 8 TABLET SUBLINGUAL at 21:39

## 2025-01-13 RX ADMIN — Medication 2000 MG: at 14:09

## 2025-01-13 RX ADMIN — GABAPENTIN 1200 MG: 400 CAPSULE ORAL at 21:31

## 2025-01-13 NOTE — Clinical Note
Level of Care: Telemetry [5]   Admitting Physician: DANI DIAZ [[id number]]   Attending Physician: DANI DIAZ [[id number]]

## 2025-01-13 NOTE — H&P
"[facility]  History & Physical    Patient Name: Tomas Song  : 1980  MRN: [id number]  Primary Care Physician:  Ty Gao DO  Date of admission: 2025  Date and Time of Service: 2025 at 1317    Subjective      Chief Complaint: Left foot pain    History of Present Illness: Tomas Song is a 44 y.o. male with a CMH of type 2 diabetes mellitus, HTN, HLD, ADHD, opioid use disorder (on Subutex) who presented to [facility] on 2025 with left foot pain.  Patient reports approximately 3 months ago he \"ripped off callus\" on the left foot.  However he reports that approximately 3 weeks ago, he started to notice pain, erythema, purulent drainage and foul odor to left foot.  He reports constant pain to the left foot that is exacerbated with weightbearing with intermittent sharp spasms.  Patient also reports remote accidental injury with sledgehammer to left foot.  Patient also reports wound to right great toe but does not recall how that occurred.  Patient also endorses chills but otherwise denies fever, nausea, vomiting.  Patient reports blood glucose has been controlled and is compliant with insulin/diet.    On ED evaluation, patient was noted to be slightly tachycardic with heart rate of 109, otherwise HDS, afebrile.  Labs are pertinent for WBC 5.9, hemoglobin 9.7, CRP 5.2, ESR 40.  A1c was noted to be 9.1.  Wound cultures obtained in ED, pending.  X-ray of left foot revealed osteomyelitis involving distal shaft and head of the left first metatarsal bone as well as adjacent base of proximal phalanx.  X-ray of right foot revealed changes of osteomyelitis involving the distal phalanx of the right great toe with pathological fracture secondary to osteomyelitis of the limiting medial aspect of the head of the proximal phalanx.  Patient started on vancomycin in ED.  Hospital service to admit for further evaluation management.    Review of Systems " "  Constitutional:  Positive for chills. Negative for fever.   Respiratory:  Negative for shortness of breath.    Cardiovascular:  Negative for chest pain and palpitations.   Gastrointestinal:  Negative for abdominal pain, nausea and vomiting.   Musculoskeletal:         Left foot pain   Skin:         Wound to bottom of left foot with redness and swelling  Wound to right big toe       Personal History     Past Medical History:   Diagnosis Date    Hypertension     Type 2 diabetes mellitus        No past surgical history on file.    Family History: family history includes Diabetes in his father; Hypertension in his maternal uncle; Stroke in his maternal uncle. Otherwise pertinent FHx was reviewed and not pertinent to current issue.    Social History:  reports that he has never smoked. He has never used smokeless tobacco. He reports current drug use. Drugs: Methamphetamines and Marijuana. He reports that he does not drink alcohol.    Home Medications:  Prior to Admission Medications       Prescriptions Last Dose Informant Patient Reported? Taking?    amLODIPine (NORVASC) 10 MG tablet 1/13/2025  No Yes    Take 1 tablet by mouth Daily.    amphetamine-dextroamphetamine (ADDERALL) 30 MG tablet 1/13/2025  Yes Yes    Take 1 tablet by mouth 3 (Three) Times a Day.    buprenorphine (SUBUTEX) 8 MG sublingual tablet SL tablet 1/13/2025  Yes Yes    Place 3 tablets under the tongue Daily. Pt takes 1 sublingually every 8 hours    gabapentin (NEURONTIN) 800 MG tablet 1/13/2025  Yes Yes    Take 1 tablet by mouth 3 (Three) Times a Day.    hydroCHLOROthiazide (HYDRODIURIL) 25 MG tablet 1/13/2025  Yes Yes    Take 1 tablet by mouth Daily.    losartan (COZAAR) 100 MG tablet 1/13/2025  Yes Yes    Take 1 tablet by mouth Daily.    B-D 3CC LUER-MITCHELL SYR 25GX1/2\" 25G X 1-1/2\" 3 ML misc   Yes No    B-D UF III MINI PEN NEEDLES 31G X 5 MM misc   No No    Pt to use with insulin pens 5 times daily    fenofibrate 160 MG tablet   Yes No    Take 1 tablet " by mouth Daily.    insulin aspart (novoLOG FLEXPEN) 100 UNIT/ML solution pen-injector sc pen   No No    Inject 10 units before each meal as a base dose and a sliding scale 1 unit for each 30 mg blood sugar over 140 at meals.    sildenafil (VIAGRA) 50 MG tablet   Yes No    Take 1 tablet by mouth As Needed for Erectile Dysfunction (max dose of 2 tablets per week.).    Testosterone Cypionate (DEPOTESTOTERONE CYPIONATE) 200 MG/ML injection   Yes No    Inject 1 mL into the appropriate muscle as directed by prescriber 1 (One) Time Per Week.    vitamin D3 125 MCG (5000 UT) capsule capsule More than a month  No No    Take 1 tab po daily.              Allergies:  Allergies   Allergen Reactions    Bactrim [Sulfamethoxazole-Trimethoprim] Swelling       Objective      Vitals:   Temp:  [98 °F (36.7 °C)] 98 °F (36.7 °C)  Heart Rate:  [109] 109  Resp:  [18] 18  BP: (147)/(97) 147/97  Body mass index is 26.39 kg/m².    Physical Exam  General:45 yo, Alert and oriented, well nourished, no acute distress.  HENT: Normocephalic, normal hearing, moist oral mucosa, no scleral icterus.  Neck: Supple, nontender, no carotid bruits, no JVD, no LAD.  Lungs: Clear to auscultation, nonlabored respiration.  Heart: RRR, no murmur, gallop or edema.  Abdomen: Soft, nontender, nondistended, + bowel sounds.  Musculoskeletal: Erythema noted to extending up bilateral lower extremities.  Normal range of motion and strength, no tenderness or swelling.  Skin: 2 ulcers noted to plantar surface of left foot with surrounding erythema and tenderness as well as purulent discharge and foul odor.  Wound to medial aspect of right great toe.  Skin is warm, dry and pink.   Psychiatric: Cooperative, appropriate mood and affect.      Diagnostic Data:  Lab Results (last 24 hours)       Procedure Component Value Units Date/Time    Wound Culture - Wound, Foot, Left [[id number]] Collected: 01/13/25 1203    Specimen: Wound from Foot, Left Updated: 01/13/25 1319     Gram  Stain Many (4+) WBCs per low power field      Moderate (3+) Gram positive cocci in pairs, chains and clusters      Few (2+) Gram negative bacilli      Few (2+) Gram positive bacilli    Hemoglobin A1c [[id number]] Collected: 01/13/25 1226    Specimen: Blood Updated: 01/13/25 1314    Comprehensive Metabolic Panel [[id number]]  (Abnormal) Collected: 01/13/25 1226    Specimen: Blood Updated: 01/13/25 1311     Glucose 180 mg/dL      BUN 12 mg/dL      Creatinine 0.96 mg/dL      Sodium 139 mmol/L      Potassium 4.0 mmol/L      Chloride 99 mmol/L      CO2 30.6 mmol/L      Calcium 9.5 mg/dL      Total Protein 9.0 g/dL      Albumin 3.5 g/dL      ALT (SGPT) 19 U/L      AST (SGOT) 20 U/L      Alkaline Phosphatase 104 U/L      Total Bilirubin 0.2 mg/dL      Globulin 5.5 gm/dL      A/G Ratio 0.6 g/dL      BUN/Creatinine Ratio 12.5     Anion Gap 9.4 mmol/L      eGFR 98.7 mL/min/1.73     Narrative:      GFR Categories in Chronic Kidney Disease (CKD)      GFR Category          GFR (mL/min/1.73)    Interpretation  G1                     90 or greater         Normal or high (1)  G2                      60-89                Mild decrease (1)  G3a                   45-59                Mild to moderate decrease  G3b                   30-44                Moderate to severe decrease  G4                    15-29                Severe decrease  G5                    14 or less           Kidney failure          (1)In the absence of evidence of kidney disease, neither GFR category G1 or G2 fulfill the criteria for CKD.    eGFR calculation 2021 CKD-EPI creatinine equation, which does not include race as a factor    C-reactive Protein [[id number]]  (Abnormal) Collected: 01/13/25 1226    Specimen: Blood Updated: 01/13/25 1311     C-Reactive Protein 5.29 mg/dL     Sedimentation Rate [[id number]]  (Abnormal) Collected: 01/13/25 1226    Specimen: Blood Updated: 01/13/25 1251     Sed Rate 40 mm/hr     Extra Tubes [[id number]] Collected: 01/13/25 1226     Specimen: Blood, Venous Line Updated: 01/13/25 1245    Narrative:      The following orders were created for panel order Extra Tubes.  Procedure                               Abnormality         Status                     ---------                               -----------         ------                     Gold Top - SST[[id number]]                                   Final result               Light Blue Top[[id number]]                                   Final result                 Please view results for these tests on the individual orders.    Gold Top - SST [[id number]] Collected: 01/13/25 1226    Specimen: Blood Updated: 01/13/25 1245     Extra Tube Hold for add-ons.     Comment: Auto resulted.       Light Blue Top [[id number]] Collected: 01/13/25 1226    Specimen: Blood Updated: 01/13/25 1245     Extra Tube Hold for add-ons.     Comment: Auto resulted       CBC & Differential [[id number]]  (Abnormal) Collected: 01/13/25 1226    Specimen: Blood Updated: 01/13/25 1240    Narrative:      The following orders were created for panel order CBC & Differential.  Procedure                               Abnormality         Status                     ---------                               -----------         ------                     CBC Auto Differential[[id number]]        Abnormal            Final result                 Please view results for these tests on the individual orders.    CBC Auto Differential [[id number]]  (Abnormal) Collected: 01/13/25 1226    Specimen: Blood Updated: 01/13/25 1240     WBC 5.97 10*3/mm3      RBC 3.73 10*6/mm3      Hemoglobin 9.7 g/dL      Hematocrit 31.7 %      MCV 85.0 fL      MCH 26.0 pg      MCHC 30.6 g/dL      RDW 13.1 %      RDW-SD 40.8 fl      MPV 9.2 fL      Platelets 410 10*3/mm3      Neutrophil % 52.2 %      Lymphocyte % 28.6 %      Monocyte % 8.7 %      Eosinophil % 9.9 %      Basophil % 0.3 %      Immature Grans % 0.3 %      Neutrophils, Absolute 3.11 10*3/mm3      Lymphocytes,  Absolute 1.71 10*3/mm3      Monocytes, Absolute 0.52 10*3/mm3      Eosinophils, Absolute 0.59 10*3/mm3      Basophils, Absolute 0.02 10*3/mm3      Immature Grans, Absolute 0.02 10*3/mm3      nRBC 0.0 /100 WBC     Blood Culture - Blood, Arm, Right [[id number]] Collected: 01/13/25 1226    Specimen: Blood from Arm, Right Updated: 01/13/25 1238    Blood Culture - Blood, Arm, Right [[id number]] Collected: 01/13/25 1201    Specimen: Blood from Arm, Right Updated: 01/13/25 1205             Imaging Results (Last 24 Hours)       Procedure Component Value Units Date/Time    XR Foot 3+ View Left [[id number]] Collected: 01/13/25 1239     Updated: 01/13/25 1246    Narrative:      XR FOOT 3+ VW LEFT, XR TOE 2+ VW RIGHT    Date of Exam: 1/13/2025 12:02 PM EST    Indication: Diabetic wound infection involving the left foot in the right great toe.    Comparison: None available.    Findings:  Right great toe: There is a cortical destruction and periostitis involving the distal phalanx, especially along the medial border and also involving the medial aspect of the head of the proximal phalanx. The findings are consistent with osteomyelitis in   the appropriate clinical setting. There is a pathologic fracture secondary to the osteomyelitis involving the medial aspect of the head of the proximal phalanx. There is soft tissue swelling. There is a relatively deep ulcer along the medial aspect of   the right great toe at the site of the bony destructive changes. There are incidental arthritic changes involving the IP joint and first MTP joint.    Left foot: There is cortical destruction, osteolysis, and periosteal reaction involving the distal shaft and head of the first metatarsal bone as well as the adjacent base of the proximal phalanx. There also may be some osteolytic changes in the base of   the distal phalanx of the left great toe. This is consistent with radiographic changes of osteomyelitis. There is soft tissue swelling involving  mainly the left great toe. Shallow ulcers are suggested along the plantar aspect of the medial left forefoot   in the location of the radiographic changes of osteomyelitis. There are minor arthritic changes involving the inner-phalangeal joints of the digits.      Impression:      Impression:  1.Radiographic changes of osteomyelitis involving the distal phalanx of the right great toe with a pathologic fracture secondary to the osteomyelitis involving the medial aspect of the head of the proximal phalanx.  2.Radiographic changes of osteomyelitis involving the distal shaft and head of the left first metatarsal bone as well as the adjacent base of the proximal phalanx. There also may be some osteolytic changes in the base of the distal phalanx of the left   great toe representing osteomyelitis.  3.Soft tissue swelling. There is a relatively deep ulcer along the medial aspect of the right great toe at the site of the bony destructive changes.  4.Soft tissue swelling and shallow ulcers along the medial aspect of the left forefoot at the site of the bony destructive changes.        Electronically Signed: Agapito Rick MD    1/13/2025 12:44 PM EST    Workstation ID: [id number]    XR Toe 2+ View Right [[id number]] Collected: 01/13/25 1239     Updated: 01/13/25 1246    Narrative:      XR FOOT 3+ VW LEFT, XR TOE 2+ VW RIGHT    Date of Exam: 1/13/2025 12:02 PM EST    Indication: Diabetic wound infection involving the left foot in the right great toe.    Comparison: None available.    Findings:  Right great toe: There is a cortical destruction and periostitis involving the distal phalanx, especially along the medial border and also involving the medial aspect of the head of the proximal phalanx. The findings are consistent with osteomyelitis in   the appropriate clinical setting. There is a pathologic fracture secondary to the osteomyelitis involving the medial aspect of the head of the proximal phalanx. There is soft tissue  swelling. There is a relatively deep ulcer along the medial aspect of   the right great toe at the site of the bony destructive changes. There are incidental arthritic changes involving the IP joint and first MTP joint.    Left foot: There is cortical destruction, osteolysis, and periosteal reaction involving the distal shaft and head of the first metatarsal bone as well as the adjacent base of the proximal phalanx. There also may be some osteolytic changes in the base of   the distal phalanx of the left great toe. This is consistent with radiographic changes of osteomyelitis. There is soft tissue swelling involving mainly the left great toe. Shallow ulcers are suggested along the plantar aspect of the medial left forefoot   in the location of the radiographic changes of osteomyelitis. There are minor arthritic changes involving the inner-phalangeal joints of the digits.      Impression:      Impression:  1.Radiographic changes of osteomyelitis involving the distal phalanx of the right great toe with a pathologic fracture secondary to the osteomyelitis involving the medial aspect of the head of the proximal phalanx.  2.Radiographic changes of osteomyelitis involving the distal shaft and head of the left first metatarsal bone as well as the adjacent base of the proximal phalanx. There also may be some osteolytic changes in the base of the distal phalanx of the left   great toe representing osteomyelitis.  3.Soft tissue swelling. There is a relatively deep ulcer along the medial aspect of the right great toe at the site of the bony destructive changes.  4.Soft tissue swelling and shallow ulcers along the medial aspect of the left forefoot at the site of the bony destructive changes.        Electronically Signed: Agapito Rick MD    1/13/2025 12:44 PM EST    Workstation ID: [id number]              Assessment & Plan        This is a 44 y.o. male with:    Active and Resolved Problems  Active Hospital Problems    Diagnosis   POA    **Osteomyelitis of both feet [M86.9]  Yes      Resolved Hospital Problems   No resolved problems to display.     Left foot diabetic foot ulcer  Osteomyelitis of right great toe  Osteomyelitis of left foot  Type 2 diabetes mellitus  WBC 5.9, CRP 5.2, ESR 40  A1c 9.1, Moderate SSI, continue Lantus 30u twice daily  Blood and wound cultures pending, prelim growth with gram negative and positive bacilli, gram-positive cocci in pairs  Continue vancomycin, MRSA screen pending  Prn Tylenol/Toradol as patient on Subutex, cannot have opiateds   Podiatry consulted  ID consulted  Wound care consulted  Will need diabetes educator closer to d/c     Anemia  Hemoglobin noted to be 9.7, previously 15.5  No overt s/sx of bleeding  Iron studies, B12, folate pending   Continue to monitor CBC     Hypertension-BP elevated, continue amlodipine, HCTZ and losartan  Hyperlipidemia-continue fenofibrate  Opioid use disorder-continue Subutex once reconciled  ADHD-hold Adderall while patient        VTE Prophylaxis:  No VTE prophylaxis order currently exists.        The patient desires to be as follows:    CODE STATUS:    Code Status (Patient has no pulse and is not breathing): CPR (Attempt to Resuscitate)  Medical Interventions (Patient has pulse or is breathing): Full Support        María Elena Perez, who can be contacted at [phone], is the designated person to make medical decisions on the patient's behalf if He is incapable of doing so. This was clarified with patient and/or next of kin on 1/13/2025 during the course of this H&P.    Admission Status:  I believe this patient meets inpatient status.    Expected Length of Stay: 3 to 4 days    PDMP and Medication Dispenses via Sidebar reviewed and consistent with patient reported medications.    I discussed the patient's findings and my recommendations with patient.      Signature:     This document has been electronically signed by Camelia Ng PA-C on January 13, 2025 13:22 EST    [facility]

## 2025-01-13 NOTE — CONSULTS
01/13/25   Foot and Ankle Surgery - Consult  Provider: Dr. Alfredo Lees DPM  Location: [facility]    Subjective:  Tomas Song is a 44 y.o. male.     Chief Complaint   Patient presents with    Foot Pain     Wound on left foot x 3 months, pain to bilateral feet, hx of diabetes - uses insulin.        Consulting Physician: Primary service    Reason for Consult: Left foot infection    HPI: Patient is a 44-year-old uncontrolled diabetic male that presents to the ED with longstanding wound involving the left foot with recent exacerbation.  Patient states that he noticed increased redness, swelling, and drainage prompting him to report to the ED.  Patient feels that the wound has been present for approximately 3 months as a longstanding callus.  Symptoms progressed starting 3 weeks ago.  Patient relates recent chills but denies fever, nausea, vomiting.  Patient is unaware of any history of open wounds or signs of infection to his feet.  He has not seen a podiatrist in the past.  A1c this admission was noted to be 9.1.  Patient thought that his A1c was well-controlled.  No family at bedside.    Allergies   Allergen Reactions    Bactrim [Sulfamethoxazole-Trimethoprim] Swelling       Past Medical History:   Diagnosis Date    Hypertension     Type 2 diabetes mellitus        History reviewed. No pertinent surgical history.    Family History   Problem Relation Age of Onset    Diabetes Father     Hypertension Maternal Uncle     Stroke Maternal Uncle        Social History     Socioeconomic History    Marital status: Single    Number of children: 2    Years of education: 14   Tobacco Use    Smoking status: Never    Smokeless tobacco: Never   Vaping Use    Vaping status: Never Used   Substance and Sexual Activity    Alcohol use: Never    Drug use: Yes     Types: Methamphetamines, Marijuana    Sexual activity: Not Currently     Partners: Female          Current Facility-Administered Medications:     acetaminophen  (TYLENOL) tablet 650 mg, 650 mg, Oral, Q4H PRN **OR** acetaminophen (TYLENOL) 160 MG/5ML oral solution 650 mg, 650 mg, Oral, Q4H PRN **OR** acetaminophen (TYLENOL) suppository 650 mg, 650 mg, Rectal, Q4H PRN, Camelia Ng PA-C    sennosides-docusate (PERICOLACE) 8.6-50 MG per tablet 2 tablet, 2 tablet, Oral, BID PRN **AND** polyethylene glycol (MIRALAX) packet 17 g, 17 g, Oral, Daily PRN **AND** bisacodyl (DULCOLAX) EC tablet 5 mg, 5 mg, Oral, Daily PRN **AND** bisacodyl (DULCOLAX) suppository 10 mg, 10 mg, Rectal, Daily PRN, Camelia Ng PA-JITENDRA    Calcium Replacement - Follow Nurse / BPA Driven Protocol, , Not Applicable, PRN, Camelia Ng PA-C    dextrose (D50W) (25 g/50 mL) IV injection 25 g, 25 g, Intravenous, Q15 Min PRN, Camelia Ng PA-C    dextrose (GLUTOSE) oral gel 15 g, 15 g, Oral, Q15 Min PRN, Camelia Ng PA-C    glucagon (GLUCAGEN) injection 1 mg, 1 mg, Intramuscular, Q15 Min PRN, Camelia Ng PA-JITENDRA    insulin lispro (HUMALOG/ADMELOG) injection 2-9 Units, 2-9 Units, Subcutaneous, 4x Daily AC & at Bedtime, Camelia Ng PA-C    ketorolac (TORADOL) injection 30 mg, 30 mg, Intravenous, Q6H PRN, Camelia Ng PA-JITENDRA    Magnesium Standard Dose Replacement - Follow Nurse / BPA Driven Protocol, , Not Applicable, PRN, Camelia Ng PA-C    melatonin tablet 5 mg, 5 mg, Oral, Nightly PRN, Camelia Ng PA-C    ondansetron ODT (ZOFRAN-ODT) disintegrating tablet 4 mg, 4 mg, Oral, Q6H PRN **OR** ondansetron (ZOFRAN) injection 4 mg, 4 mg, Intravenous, Q6H PRN, Camelia Ng PA-C    oxyCODONE (ROXICODONE) immediate release tablet 5 mg, 5 mg, Oral, Q6H PRN, Camelia Ng PA-C    Phosphorus Replacement - Follow Nurse / BPA Driven Protocol, , Not Applicable, PRN, Camelia Ng PA-C    Potassium Replacement - Follow Nurse / BPA Driven Protocol, , Not Applicable, PRN, Camelia Ng PA-C    Current Outpatient Medications:     amLODIPine (NORVASC) 10 MG tablet, Take 1 tablet by  mouth Daily., Disp: 30 tablet, Rfl: 0    amphetamine-dextroamphetamine (ADDERALL) 30 MG tablet, Take 1 tablet by mouth 3 (Three) Times a Day., Disp: , Rfl:     buprenorphine (SUBUTEX) 8 MG sublingual tablet SL tablet, Place 3 tablets under the tongue 3 times a day., Disp: , Rfl:     Cholecalciferol (Vitamin D-3) 125 MCG (5000 UT) tablet, Take 1 tablet by mouth Daily., Disp: , Rfl:     gabapentin (NEURONTIN) 800 MG tablet, Take 1.5 tablets by mouth 2 (Two) Times a Day., Disp: , Rfl:     hydroCHLOROthiazide (HYDRODIURIL) 25 MG tablet, Take 1 tablet by mouth Daily., Disp: , Rfl:     insulin aspart (novoLOG FLEXPEN) 100 UNIT/ML solution pen-injector sc pen, Inject  under the skin into the appropriate area as directed 3 (Three) Times a Day With Meals. Sliding scale, Disp: , Rfl:     insulin glargine (LANTUS, SEMGLEE) 100 UNIT/ML injection, Inject 30 Units under the skin into the appropriate area as directed 2 (Two) Times a Day., Disp: , Rfl:     losartan (COZAAR) 100 MG tablet, Take 1 tablet by mouth Daily., Disp: , Rfl:     Testosterone Cypionate (DEPOTESTOTERONE CYPIONATE) 200 MG/ML injection, Inject 1 mL into the appropriate muscle as directed by prescriber 1 (One) Time Per Week., Disp: , Rfl:     fenofibrate 160 MG tablet, Take 1 tablet by mouth Daily., Disp: , Rfl:     sildenafil (VIAGRA) 50 MG tablet, Take 1 tablet by mouth As Needed for Erectile Dysfunction (max dose of 2 tablets per week.)., Disp: , Rfl:     Review of Systems:  General: Denies fever, chills, fatigue, and weakness.  Eyes: Denies vision loss, blurry vision, and excessive redness.  ENT: Denies hearing issues and difficulty swallowing.  Cardiovascular: Denies palpitations, chest pain, or syncopal episodes.  Respiratory: Denies shortness of breath, wheezing, and coughing.  GI: Denies abdominal pain, nausea, and vomiting.   : Denies frequency, hematuria, and urgency.  Musculoskeletal: Denies muscle cramps, joint pains, and stiffness.  Derm: + Left  "foot infection  Neuro: Denies headaches, numbness, loss of coordination, and tremors.  Psych: Denies anxiety and depression.  Endocrine: Denies temperature intolerance and changes in appetite.  Heme: Denies bleeding disorders or abnormal bruising.     Objective   /94 (BP Location: Left arm, Patient Position: Lying)   Pulse 109   Temp 98 °F (36.7 °C) (Oral)   Resp 18   Ht 185.4 cm (73\")   Wt 90.7 kg (200 lb)   SpO2 100%   BMI 26.39 kg/m²     Foot/Ankle Exam    GENERAL  Appearance:  disheveled  Orientation:  AAOx3  Affect:  appropriate    VASCULAR     Right Foot Vascularity   Dorsalis pedis:  2+  Posterior tibial:  2+  Skin temperature:  warm  Edema grading:  None  CFT:  3  Pedal hair growth:  Absent     Left Foot Vascularity   Dorsalis pedis:  2+  Posterior tibial:  2+  Skin temperature:  warm  Edema grading:  None  CFT:  3  Pedal hair growth:  Absent     NEUROLOGIC     Right Foot Neurologic   Light touch sensation: diminished  Hot/Cold sensation: diminished  Achilles reflex:  1+     Left Foot Neurologic   Light touch sensation: diminished  Hot/Cold sensation:  diminished  Achilles reflex:  1+    MUSCULOSKELETAL     Right Foot Musculoskeletal   Ecchymosis:  none  Tenderness:  none       Left Foot Musculoskeletal   Ecchymosis:  none  Tenderness:  none    MUSCLE STRENGTH     Right Foot Muscle Strength   Normal strength    Foot dorsiflexion:  5  Foot plantar flexion:  5  Foot inversion:  5  Foot eversion:  5     Left Foot Muscle Strength   Normal strength    Foot dorsiflexion:  5  Foot plantar flexion:  5  Foot inversion:  5  Foot eversion:  5    DERMATOLOGIC      Right Foot Dermatologic   Skin  Positive for dryness, atrophy and wound. Negative for cellulitis, drainage and gangrene.      Left Foot Dermatologic   Skin  Positive for cellulitis, drainage, dryness, erythema, ulcer and atrophy. Negative for gangrene.      Right foot additional comments: Atrophic changes involving the pretibial regions of both " lower extremities.  Small preulcerative lesion noted to the plantar medial aspect of the right great toe.     Left foot additional comments: Significant ulceration involving the plantar aspect of the left first metatarsal phalangeal joint region.  Wound extends to the level of the joint.  Significant purulence to the wound with prominent periwound erythema and edema involving the first metatarsophalangeal joint.  Plantar fluctuance            Results from last 7 days   Lab Units 01/13/25  1226   WBC 10*3/mm3 5.97   HEMOGLOBIN g/dL 9.7*   HEMATOCRIT % 31.7*   PLATELETS 10*3/mm3 410       Assessment & Plan   Cellulitis and abscess, left foot  Septic arthritis, left first metatarsal phalangeal joint  Acute osteomyelitis, left foot  Acute osteomyelitis, right great toe  Chronic ulceration with bone necrosis, left foot  Uncontrolled type 2 diabetes mellitus with peripheral neuropathy    Patient presents to the ED with worsening left foot wound.  On evaluation, he has purulent drainage with wound that extends to the plantar aspect of the first metatarsophalangeal joint.  Plain film imaging shows significant osseous destruction involving the first MTPJ region.  Findings are highly consistent with septic arthritis.  Patient also has a stable appearing wound involving the plantar medial aspect of the right great toe.  Imaging shows osseous destruction which could be representative of osteomyelitis.  I discussed the severity of the left foot infection with the patient.  Given the findings, I feel that the only appropriate option would be to proceed with partial first ray resection.  I did discuss the procedure, risk, goals, and recovery with him.  Patient is unwilling to proceed with surgery and is requesting a second opinion.  I have discussed the severity of the issue with the patient at length.  I did review with nursing and consult was placed with [facility] foot and ankle.  Continue supportive measures.      ++ Consult  reviewed from Dr. Garrison who will assume care.  Will sign off at this time.    Thank you for the consultation and allowing me to participate in this patient's care. Please call with any additional questions or concerns.     Note is dictated utilizing voice recognition software. Unfortunately this leads to occasional typographical errors. I apologize in advance if the situation occurs. If questions occur please do not hesitate to call our office.

## 2025-01-13 NOTE — ED NOTES
Pt made aware of need for urine specimen and states he cannot urinate at this time, because he went before he arrived here. Pt instructed to alert staff when able to provide sample.

## 2025-01-13 NOTE — ED PROVIDER NOTES
Subjective   History of Present Illness  Chief Complaint: Wound check      HPI: Patient is a 44-year-old male who presents by private vehicle with a known history of type 2 diabetes and hypertension, states he started with a callus to the bottom of his left foot, states this fell off approximately 3 months ago and now he has noted a progressively worsening wound with erythema.  It is now having foul discharge.  States he is compliant with his medications, blood sugars typically run in the 200s.  Does not have establish care with podiatry.  He also complains of a wound to his right great toe.    PCP: Sima    History provided by:  Patient      Review of Systems  See above as noted     Past Medical History:   Diagnosis Date    Hypertension     Type 2 diabetes mellitus        Allergies   Allergen Reactions    Bactrim [Sulfamethoxazole-Trimethoprim] Swelling       History reviewed. No pertinent surgical history.    Family History   Problem Relation Age of Onset    Diabetes Father     Hypertension Maternal Uncle     Stroke Maternal Uncle        Social History     Socioeconomic History    Marital status: Single    Number of children: 2    Years of education: 14   Tobacco Use    Smoking status: Never    Smokeless tobacco: Never   Vaping Use    Vaping status: Never Used   Substance and Sexual Activity    Alcohol use: Never    Drug use: Yes     Types: Methamphetamines, Marijuana    Sexual activity: Not Currently     Partners: Female           Objective   Physical Exam  Vitals reviewed.   Constitutional:       Appearance: He is ill-appearing.   Eyes:      Pupils: Pupils are equal, round, and reactive to light.   Cardiovascular:      Rate and Rhythm: Tachycardia present.      Pulses: Normal pulses.      Heart sounds: Normal heart sounds.   Pulmonary:      Breath sounds: Normal breath sounds.   Musculoskeletal:        Feet:    Feet:      Right foot:      Skin integrity: Callus and dry skin present.      Left foot:      Skin  "integrity: Callus and dry skin present.      Comments: Multiple open wounds noted to the plantar of the left foot, purulent drainage, foul odor with surrounding erythema.     Open wound noted to the medial aspect of the right great toe with edema and surrounding erythema.   Skin:     General: Skin is warm.   Neurological:      General: No focal deficit present.      Mental Status: He is alert and oriented to person, place, and time.         Procedures           ED Course  ED Course as of 01/13/25 1656   Mon Jan 13, 2025   1252 Sed Rate(!): 40 [BH]   1313 C-Reactive Protein(!): 5.29 [BH]      ED Course User Index  [] Susan Mariscal, JESSICA      /94 (BP Location: Left arm, Patient Position: Lying)   Pulse 109   Temp 97.8 °F (36.6 °C) (Oral)   Resp 18   Ht 185.4 cm (73\")   Wt 90.7 kg (200 lb)   SpO2 100%   BMI 26.39 kg/m²   Labs Reviewed   MRSA SCREEN, PCR - Abnormal; Notable for the following components:       Result Value    MRSA PCR MRSA Detected (*)     All other components within normal limits    Narrative:     The negative predictive value of this diagnostic test is high and should only be used to consider de-escalating anti-MRSA therapy. A positive result may indicate colonization with MRSA and must be correlated clinically.   COMPREHENSIVE METABOLIC PANEL - Abnormal; Notable for the following components:    Glucose 180 (*)     CO2 30.6 (*)     Total Protein 9.0 (*)     All other components within normal limits    Narrative:     GFR Categories in Chronic Kidney Disease (CKD)      GFR Category          GFR (mL/min/1.73)    Interpretation  G1                     90 or greater         Normal or high (1)  G2                      60-89                Mild decrease (1)  G3a                   45-59                Mild to moderate decrease  G3b                   30-44                Moderate to severe decrease  G4                    15-29                Severe decrease  G5                    14 or less  "          Kidney failure          (1)In the absence of evidence of kidney disease, neither GFR category G1 or G2 fulfill the criteria for CKD.    eGFR calculation 2021 CKD-EPI creatinine equation, which does not include race as a factor   CBC WITH AUTO DIFFERENTIAL - Abnormal; Notable for the following components:    RBC 3.73 (*)     Hemoglobin 9.7 (*)     Hematocrit 31.7 (*)     MCH 26.0 (*)     MCHC 30.6 (*)     Eosinophil % 9.9 (*)     Eosinophils, Absolute 0.59 (*)     All other components within normal limits   C-REACTIVE PROTEIN - Abnormal; Notable for the following components:    C-Reactive Protein 5.29 (*)     All other components within normal limits   SEDIMENTATION RATE - Abnormal; Notable for the following components:    Sed Rate 40 (*)     All other components within normal limits   HEMOGLOBIN A1C - Abnormal; Notable for the following components:    Hemoglobin A1C 9.10 (*)     All other components within normal limits   WOUND CULTURE   BLOOD CULTURE   BLOOD CULTURE   URINALYSIS W/ MICROSCOPIC IF INDICATED (NO CULTURE)   POCT GLUCOSE FINGERSTICK   POCT GLUCOSE FINGERSTICK   POCT GLUCOSE FINGERSTICK   POCT GLUCOSE FINGERSTICK   CBC AND DIFFERENTIAL    Narrative:     The following orders were created for panel order CBC & Differential.  Procedure                               Abnormality         Status                     ---------                               -----------         ------                     CBC Auto Differential[[id number]]        Abnormal            Final result                 Please view results for these tests on the individual orders.   EXTRA TUBES    Narrative:     The following orders were created for panel order Extra Tubes.  Procedure                               Abnormality         Status                     ---------                               -----------         ------                     Gold Top - SST[[id number]]                                   Final result                Light Blue Top[[id number]]                                   Final result                 Please view results for these tests on the individual orders.   GOLD TOP - SST   LIGHT BLUE TOP     Medications   Potassium Replacement - Follow Nurse / BPA Driven Protocol (has no administration in time range)   Magnesium Standard Dose Replacement - Follow Nurse / BPA Driven Protocol (has no administration in time range)   Phosphorus Replacement - Follow Nurse / BPA Driven Protocol (has no administration in time range)   Calcium Replacement - Follow Nurse / BPA Driven Protocol (has no administration in time range)   sennosides-docusate (PERICOLACE) 8.6-50 MG per tablet 2 tablet (has no administration in time range)     And   polyethylene glycol (MIRALAX) packet 17 g (has no administration in time range)     And   bisacodyl (DULCOLAX) EC tablet 5 mg (has no administration in time range)     And   bisacodyl (DULCOLAX) suppository 10 mg (has no administration in time range)   acetaminophen (TYLENOL) tablet 650 mg (has no administration in time range)     Or   acetaminophen (TYLENOL) 160 MG/5ML oral solution 650 mg (has no administration in time range)     Or   acetaminophen (TYLENOL) suppository 650 mg (has no administration in time range)   melatonin tablet 5 mg (has no administration in time range)   ondansetron ODT (ZOFRAN-ODT) disintegrating tablet 4 mg (has no administration in time range)     Or   ondansetron (ZOFRAN) injection 4 mg (has no administration in time range)   oxyCODONE (ROXICODONE) immediate release tablet 5 mg (has no administration in time range)   dextrose (GLUTOSE) oral gel 15 g (has no administration in time range)   dextrose (D50W) (25 g/50 mL) IV injection 25 g (has no administration in time range)   glucagon (GLUCAGEN) injection 1 mg (has no administration in time range)   insulin lispro (HUMALOG/ADMELOG) injection 2-9 Units (has no administration in time range)   ketorolac (TORADOL) injection 30 mg  (has no administration in time range)   ketorolac (TORADOL) injection 15 mg (15 mg Intravenous Given 1/13/25 1233)   vancomycin IVPB 2000 mg in 0.9% Sodium Chloride 500 mL (2,000 mg Intravenous New Bag 1/13/25 1409)     XR Foot 3+ View Left    Result Date: 1/13/2025  Impression: 1.Radiographic changes of osteomyelitis involving the distal phalanx of the right great toe with a pathologic fracture secondary to the osteomyelitis involving the medial aspect of the head of the proximal phalanx. 2.Radiographic changes of osteomyelitis involving the distal shaft and head of the left first metatarsal bone as well as the adjacent base of the proximal phalanx. There also may be some osteolytic changes in the base of the distal phalanx of the left great toe representing osteomyelitis. 3.Soft tissue swelling. There is a relatively deep ulcer along the medial aspect of the right great toe at the site of the bony destructive changes. 4.Soft tissue swelling and shallow ulcers along the medial aspect of the left forefoot at the site of the bony destructive changes. Electronically Signed: Agapito Rick MD  1/13/2025 12:44 PM EST  Workstation ID: [id number]    XR Toe 2+ View Right    Result Date: 1/13/2025  Impression: 1.Radiographic changes of osteomyelitis involving the distal phalanx of the right great toe with a pathologic fracture secondary to the osteomyelitis involving the medial aspect of the head of the proximal phalanx. 2.Radiographic changes of osteomyelitis involving the distal shaft and head of the left first metatarsal bone as well as the adjacent base of the proximal phalanx. There also may be some osteolytic changes in the base of the distal phalanx of the left great toe representing osteomyelitis. 3.Soft tissue swelling. There is a relatively deep ulcer along the medial aspect of the right great toe at the site of the bony destructive changes. 4.Soft tissue swelling and shallow ulcers along the medial aspect of the left  "forefoot at the site of the bony destructive changes. Electronically Signed: Agapito Rick MD  1/13/2025 12:44 PM EST  Workstation ID: [id number]                                                    Medical Decision Making  Patient presented with above complaints, noted physical exam was completed and IV was established and labs are obtained white blood cell count within normal limits patient's presentation triggers concern for hospital sepsis protocol blood cultures were added and pending lactic acid within normal limits, hemoglobin at 9.7, CRP 5.29 with a sed rate of 44.  I images were obtained of the patient's left foot as well as the right great toe where these open wounds are present and there is significant bony changes, as radiologist notes \"destructive changes\" consistent with osteomyelitis patient was started on vancomycin in the emergency room with consult placed to Dr. Lees with podiatry, I did not receive a return call as his on-call staff reported that he was in surgery, spoke with the hospitalist who agreed to patient admission.    Chart review: 4/6/2023 admission at [facility], [place] related to lower extremity cellulitis.   4/15/2022 outpatient visit with PCP related to chronic medical problems.     Labs: HGB 9.7, CRP 5.29, ESR 40    Radiology: Imaging reviewed by me and interpreted by radiologist.    Medications Medications  vancomycin IVPB 2000 mg in 0.9% Sodium Chloride 500 mL (2,000 mg Intravenous New Bag 1/13/25 1409)  Potassium Replacement - Follow Nurse / BPA Driven Protocol (has no administration in time range)  Magnesium Standard Dose Replacement - Follow Nurse / BPA Driven Protocol (has no administration in time range)  Phosphorus Replacement - Follow Nurse / BPA Driven Protocol (has no administration in time range)  Calcium Replacement - Follow Nurse / BPA Driven Protocol (has no administration in time range)  sennosides-docusate (PERICOLACE) 8.6-50 MG per tablet 2 tablet (has no administration in " time range)    And  polyethylene glycol (MIRALAX) packet 17 g (has no administration in time range)    And  bisacodyl (DULCOLAX) EC tablet 5 mg (has no administration in time range)    And  bisacodyl (DULCOLAX) suppository 10 mg (has no administration in time range)   acetaminophen (TYLENOL) tablet 650 mg (has no administration in time range)    Or  acetaminophen (TYLENOL) 160 MG/5ML oral solution 650 mg (has no administration in time range)    Or  acetaminophen (TYLENOL) suppository 650 mg (has no administration in time range)  melatonin tablet 5 mg (has no administration in time range)  ondansetron ODT (ZOFRAN-ODT) disintegrating tablet 4 mg (has no administration in time range)    Or  ondansetron (ZOFRAN) injection 4 mg (has no administration in time range)  oxyCODONE (ROXICODONE) immediate release tablet 5 mg (has no administration in time range)  dextrose (GLUTOSE) oral gel 15 g (has no administration in time range)  dextrose (D50W) (25 g/50 mL) IV injection 25 g (has no administration in time range)  glucagon (GLUCAGEN) injection 1 mg (has no administration in time range)  insulin lispro (HUMALOG/ADMELOG) injection 2-9 Units (has no administration in time range)  ketorolac (TORADOL) injection 30 mg (has no administration in time range)  ketorolac (TORADOL) injection 15 mg (15 mg Intravenous Given 1/13/25 1233)    Part of this note may be an electronic transcription/translation of spoken language to printed text using the Dragon Dictation System.    Appropriate PPE worn during exam.      Note Disclaimer: At [facility], we believe that sharing information builds trust and better  relationships. You are receiving this note because you recently visited [facility]. It is possible you will see health information before a provider has talked with you about it. This kind of information can be easy to misunderstand. To help you fully understand what it means for your health, we urge you to discuss this note  with your provider.      Problems Addressed:  Osteomyelitis of great toe of right foot: complicated acute illness or injury  Osteomyelitis of left foot, unspecified type: complicated acute illness or injury  Uncontrolled type 2 diabetes mellitus with hyperglycemia: complicated acute illness or injury    Amount and/or Complexity of Data Reviewed  Labs: ordered. Decision-making details documented in ED Course.  Radiology: ordered.    Risk  Prescription drug management.  Decision regarding hospitalization.        Final diagnoses:   Uncontrolled type 2 diabetes mellitus with hyperglycemia   Osteomyelitis of left foot, unspecified type   Osteomyelitis of great toe of right foot       ED Disposition  ED Disposition       ED Disposition   Decision to Admit    Condition   --    Comment   Level of Care: Med/Surg [1]   Diagnosis: Osteomyelitis of both feet [1983250]   Admitting Physician: DANI DIAZ [[id number]]   Attending Physician: DANI DIAZ [[id number]]   Certification: I Certify That Inpatient Hospital Services Are Medically Necessary For Greater Than 2 Midnights                 No follow-up provider specified.       Medication List        ASK your doctor about these medications      gabapentin 800 MG tablet  Commonly known as: NEURONTIN  Ask about: Which instructions should I use?     insulin aspart 100 UNIT/ML solution pen-injector sc pen  Commonly known as: novoLOG FLEXPEN  Ask about: Which instructions should I use?     Vitamin D-3 125 MCG (5000 UT) tablet  Ask about: Which instructions should I use?                 Susan Mariscal, APRN  01/13/25 [id number]
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
## 2025-01-14 ENCOUNTER — ANESTHESIA (OUTPATIENT)
Dept: PERIOP | Facility: HOSPITAL | Age: 45
End: 2025-01-14
Payer: MEDICAID

## 2025-01-14 ENCOUNTER — ANESTHESIA EVENT (OUTPATIENT)
Dept: PERIOP | Facility: HOSPITAL | Age: 45
End: 2025-01-14
Payer: MEDICAID

## 2025-01-14 ENCOUNTER — APPOINTMENT (OUTPATIENT)
Dept: CARDIOLOGY | Facility: HOSPITAL | Age: 45
End: 2025-01-14
Payer: MEDICAID

## 2025-01-14 LAB
AMPHET+METHAMPHET UR QL: POSITIVE
AMPHETAMINES UR QL: POSITIVE
ANION GAP SERPL CALCULATED.3IONS-SCNC: 8 MMOL/L (ref 5–15)
BARBITURATES UR QL SCN: NEGATIVE
BASOPHILS # BLD AUTO: 0.02 10*3/MM3 (ref 0–0.2)
BASOPHILS NFR BLD AUTO: 0.4 % (ref 0–1.5)
BENZODIAZ UR QL SCN: POSITIVE
BH CV LOWER ARTERIAL LEFT ABI RATIO: 1.3
BH CV LOWER ARTERIAL LEFT DORSALIS PEDIS SYS MAX: 173
BH CV LOWER ARTERIAL LEFT GREAT TOE SYS MAX: 112
BH CV LOWER ARTERIAL LEFT POST TIBIAL SYS MAX: 178
BH CV LOWER ARTERIAL LEFT TBI RATIO: 0.83
BH CV LOWER ARTERIAL RIGHT ABI RATIO: 1.5
BH CV LOWER ARTERIAL RIGHT DORSALIS PEDIS SYS MAX: 198
BH CV LOWER ARTERIAL RIGHT GREAT TOE SYS MAX: 175
BH CV LOWER ARTERIAL RIGHT POST TIBIAL SYS MAX: 203
BH CV LOWER ARTERIAL RIGHT TBI RATIO: 1.3
BUN SERPL-MCNC: 11 MG/DL (ref 6–20)
BUN/CREAT SERPL: 15.9 (ref 7–25)
BUPRENORPHINE SERPL-MCNC: POSITIVE NG/ML
CALCIUM SPEC-SCNC: 9 MG/DL (ref 8.6–10.5)
CANNABINOIDS SERPL QL: POSITIVE
CHLORIDE SERPL-SCNC: 104 MMOL/L (ref 98–107)
CO2 SERPL-SCNC: 25 MMOL/L (ref 22–29)
COCAINE UR QL: NEGATIVE
CREAT SERPL-MCNC: 0.69 MG/DL (ref 0.76–1.27)
DEPRECATED RDW RBC AUTO: 39 FL (ref 37–54)
EGFRCR SERPLBLD CKD-EPI 2021: 115.6 ML/MIN/1.73
EOSINOPHIL # BLD AUTO: 0.63 10*3/MM3 (ref 0–0.4)
EOSINOPHIL NFR BLD AUTO: 11.4 % (ref 0.3–6.2)
ERYTHROCYTE [DISTWIDTH] IN BLOOD BY AUTOMATED COUNT: 12.9 % (ref 12.3–15.4)
FOLATE SERPL-MCNC: 9.52 NG/ML (ref 4.78–24.2)
GLUCOSE BLDC GLUCOMTR-MCNC: 174 MG/DL (ref 70–105)
GLUCOSE BLDC GLUCOMTR-MCNC: 181 MG/DL (ref 70–105)
GLUCOSE BLDC GLUCOMTR-MCNC: 185 MG/DL (ref 70–105)
GLUCOSE BLDC GLUCOMTR-MCNC: 189 MG/DL (ref 70–105)
GLUCOSE SERPL-MCNC: 186 MG/DL (ref 65–99)
HCT VFR BLD AUTO: 30 % (ref 37.5–51)
HGB BLD-MCNC: 9.4 G/DL (ref 13–17.7)
IMM GRANULOCYTES # BLD AUTO: 0.02 10*3/MM3 (ref 0–0.05)
IMM GRANULOCYTES NFR BLD AUTO: 0.4 % (ref 0–0.5)
IRON 24H UR-MRATE: 56 MCG/DL (ref 59–158)
IRON SATN MFR SERPL: 27 % (ref 20–50)
LYMPHOCYTES # BLD AUTO: 1.62 10*3/MM3 (ref 0.7–3.1)
LYMPHOCYTES NFR BLD AUTO: 29.4 % (ref 19.6–45.3)
MAGNESIUM SERPL-MCNC: 2 MG/DL (ref 1.6–2.6)
MCH RBC QN AUTO: 26 PG (ref 26.6–33)
MCHC RBC AUTO-ENTMCNC: 31.3 G/DL (ref 31.5–35.7)
MCV RBC AUTO: 82.9 FL (ref 79–97)
METHADONE UR QL SCN: NEGATIVE
MONOCYTES # BLD AUTO: 0.42 10*3/MM3 (ref 0.1–0.9)
MONOCYTES NFR BLD AUTO: 7.6 % (ref 5–12)
NEUTROPHILS NFR BLD AUTO: 2.8 10*3/MM3 (ref 1.7–7)
NEUTROPHILS NFR BLD AUTO: 50.8 % (ref 42.7–76)
NRBC BLD AUTO-RTO: 0 /100 WBC (ref 0–0.2)
OPIATES UR QL: NEGATIVE
OXYCODONE UR QL SCN: NEGATIVE
PCP UR QL SCN: NEGATIVE
PLATELET # BLD AUTO: 353 10*3/MM3 (ref 140–450)
PMV BLD AUTO: 9 FL (ref 6–12)
POTASSIUM SERPL-SCNC: 4.2 MMOL/L (ref 3.5–5.2)
RBC # BLD AUTO: 3.62 10*6/MM3 (ref 4.14–5.8)
SODIUM SERPL-SCNC: 137 MMOL/L (ref 136–145)
TIBC SERPL-MCNC: 206 MCG/DL (ref 298–536)
TRANSFERRIN SERPL-MCNC: 138 MG/DL (ref 200–360)
TRICYCLICS UR QL SCN: NEGATIVE
UPPER ARTERIAL RIGHT ARM BRACHIAL SYS MAX: 135
VIT B12 BLD-MCNC: 772 PG/ML (ref 211–946)
WBC NRBC COR # BLD AUTO: 5.51 10*3/MM3 (ref 3.4–10.8)

## 2025-01-14 PROCEDURE — 25010000002 VANCOMYCIN 1.5-0.9 GM/500ML-% SOLUTION: Performed by: INTERNAL MEDICINE

## 2025-01-14 PROCEDURE — 87077 CULTURE AEROBIC IDENTIFY: CPT

## 2025-01-14 PROCEDURE — 25010000002 PROPOFOL 10 MG/ML EMULSION: Performed by: NURSE ANESTHETIST, CERTIFIED REGISTERED

## 2025-01-14 PROCEDURE — 88311 DECALCIFY TISSUE: CPT

## 2025-01-14 PROCEDURE — 25010000002 LIDOCAINE PF 2% 2 % SOLUTION: Performed by: NURSE ANESTHETIST, CERTIFIED REGISTERED

## 2025-01-14 PROCEDURE — 25810000003 SODIUM CHLORIDE 0.9 % SOLUTION: Performed by: NURSE ANESTHETIST, CERTIFIED REGISTERED

## 2025-01-14 PROCEDURE — 87015 SPECIMEN INFECT AGNT CONCNTJ: CPT

## 2025-01-14 PROCEDURE — 87076 CULTURE ANAEROBE IDENT EACH: CPT

## 2025-01-14 PROCEDURE — 82746 ASSAY OF FOLIC ACID SERUM: CPT

## 2025-01-14 PROCEDURE — 25010000002 CEFEPIME PER 500 MG: Performed by: INTERNAL MEDICINE

## 2025-01-14 PROCEDURE — 25010000002 KETOROLAC TROMETHAMINE PER 15 MG

## 2025-01-14 PROCEDURE — 82948 REAGENT STRIP/BLOOD GLUCOSE: CPT

## 2025-01-14 PROCEDURE — 25010000002 CEFEPIME PER 500 MG

## 2025-01-14 PROCEDURE — 84466 ASSAY OF TRANSFERRIN: CPT

## 2025-01-14 PROCEDURE — 0QBR0ZX EXCISION OF LEFT TOE PHALANX, OPEN APPROACH, DIAGNOSTIC: ICD-10-PCS

## 2025-01-14 PROCEDURE — 25810000003 SODIUM CHLORIDE 0.9 % SOLUTION 250 ML FLEX CONT

## 2025-01-14 PROCEDURE — 82607 VITAMIN B-12: CPT

## 2025-01-14 PROCEDURE — 25010000002 ALBUMIN HUMAN 5% PER 50 ML: Performed by: NURSE ANESTHETIST, CERTIFIED REGISTERED

## 2025-01-14 PROCEDURE — 0QDP0ZZ EXTRACTION OF LEFT METATARSAL, OPEN APPROACH: ICD-10-PCS

## 2025-01-14 PROCEDURE — 87075 CULTR BACTERIA EXCEPT BLOOD: CPT

## 2025-01-14 PROCEDURE — 25010000002 MIDAZOLAM PER 1 MG: Performed by: NURSE ANESTHETIST, CERTIFIED REGISTERED

## 2025-01-14 PROCEDURE — 83735 ASSAY OF MAGNESIUM: CPT

## 2025-01-14 PROCEDURE — 25010000002 VASOPRESSIN 20 UNIT/ML SOLUTION: Performed by: NURSE ANESTHETIST, CERTIFIED REGISTERED

## 2025-01-14 PROCEDURE — 80048 BASIC METABOLIC PNL TOTAL CA: CPT

## 2025-01-14 PROCEDURE — 88307 TISSUE EXAM BY PATHOLOGIST: CPT

## 2025-01-14 PROCEDURE — 87176 TISSUE HOMOGENIZATION CULTR: CPT

## 2025-01-14 PROCEDURE — 25010000002 VANCOMYCIN 1 G RECONSTITUTED SOLUTION 1 EACH VIAL

## 2025-01-14 PROCEDURE — P9041 ALBUMIN (HUMAN),5%, 50ML: HCPCS | Performed by: NURSE ANESTHETIST, CERTIFIED REGISTERED

## 2025-01-14 PROCEDURE — 63710000001 INSULIN GLARGINE PER 5 UNITS

## 2025-01-14 PROCEDURE — 87205 SMEAR GRAM STAIN: CPT

## 2025-01-14 PROCEDURE — 83540 ASSAY OF IRON: CPT

## 2025-01-14 PROCEDURE — 93922 UPR/L XTREMITY ART 2 LEVELS: CPT

## 2025-01-14 PROCEDURE — 25010000002 DEXAMETHASONE PER 1 MG: Performed by: NURSE ANESTHETIST, CERTIFIED REGISTERED

## 2025-01-14 PROCEDURE — 85025 COMPLETE CBC W/AUTO DIFF WBC: CPT

## 2025-01-14 PROCEDURE — 93922 UPR/L XTREMITY ART 2 LEVELS: CPT | Performed by: SURGERY

## 2025-01-14 PROCEDURE — 25010000002 LIDOCAINE 1 % SOLUTION

## 2025-01-14 PROCEDURE — 87070 CULTURE OTHR SPECIMN AEROBIC: CPT

## 2025-01-14 RX ORDER — MIDAZOLAM HYDROCHLORIDE 1 MG/ML
INJECTION, SOLUTION INTRAMUSCULAR; INTRAVENOUS AS NEEDED
Status: DISCONTINUED | OUTPATIENT
Start: 2025-01-14 | End: 2025-01-14 | Stop reason: SURG

## 2025-01-14 RX ORDER — VASOPRESSIN 20 [USP'U]/ML
INJECTION, SOLUTION INTRAVENOUS AS NEEDED
Status: DISCONTINUED | OUTPATIENT
Start: 2025-01-14 | End: 2025-01-14 | Stop reason: SURG

## 2025-01-14 RX ORDER — NALOXONE HCL 0.4 MG/ML
0.4 VIAL (ML) INJECTION AS NEEDED
Status: DISCONTINUED | OUTPATIENT
Start: 2025-01-14 | End: 2025-01-20

## 2025-01-14 RX ORDER — HYDRALAZINE HYDROCHLORIDE 20 MG/ML
5 INJECTION INTRAMUSCULAR; INTRAVENOUS
Status: DISCONTINUED | OUTPATIENT
Start: 2025-01-14 | End: 2025-01-20

## 2025-01-14 RX ORDER — DIPHENHYDRAMINE HYDROCHLORIDE 50 MG/ML
12.5 INJECTION INTRAMUSCULAR; INTRAVENOUS
Status: DISCONTINUED | OUTPATIENT
Start: 2025-01-14 | End: 2025-01-20

## 2025-01-14 RX ORDER — LIDOCAINE HYDROCHLORIDE 10 MG/ML
INJECTION, SOLUTION INFILTRATION; PERINEURAL
Status: DISPENSED
Start: 2025-01-14 | End: 2025-01-15

## 2025-01-14 RX ORDER — LIDOCAINE HYDROCHLORIDE 20 MG/ML
INJECTION, SOLUTION EPIDURAL; INFILTRATION; INTRACAUDAL; PERINEURAL AS NEEDED
Status: DISCONTINUED | OUTPATIENT
Start: 2025-01-14 | End: 2025-01-14 | Stop reason: SURG

## 2025-01-14 RX ORDER — IPRATROPIUM BROMIDE AND ALBUTEROL SULFATE 2.5; .5 MG/3ML; MG/3ML
3 SOLUTION RESPIRATORY (INHALATION) ONCE AS NEEDED
Status: DISCONTINUED | OUTPATIENT
Start: 2025-01-14 | End: 2025-01-20

## 2025-01-14 RX ORDER — PHENYLEPHRINE HCL IN 0.9% NACL 1 MG/10 ML
SYRINGE (ML) INTRAVENOUS AS NEEDED
Status: DISCONTINUED | OUTPATIENT
Start: 2025-01-14 | End: 2025-01-14 | Stop reason: SURG

## 2025-01-14 RX ORDER — OXYCODONE HYDROCHLORIDE 5 MG/1
5 TABLET ORAL ONCE AS NEEDED
Status: COMPLETED | OUTPATIENT
Start: 2025-01-14 | End: 2025-01-21

## 2025-01-14 RX ORDER — LIDOCAINE HYDROCHLORIDE 10 MG/ML
INJECTION, SOLUTION INFILTRATION; PERINEURAL AS NEEDED
Status: DISCONTINUED | OUTPATIENT
Start: 2025-01-14 | End: 2025-01-14 | Stop reason: HOSPADM

## 2025-01-14 RX ORDER — PROPOFOL 10 MG/ML
VIAL (ML) INTRAVENOUS AS NEEDED
Status: DISCONTINUED | OUTPATIENT
Start: 2025-01-14 | End: 2025-01-14 | Stop reason: SURG

## 2025-01-14 RX ORDER — EPHEDRINE SULFATE 5 MG/ML
5 INJECTION INTRAVENOUS ONCE AS NEEDED
Status: DISCONTINUED | OUTPATIENT
Start: 2025-01-14 | End: 2025-01-20

## 2025-01-14 RX ORDER — OXYCODONE HYDROCHLORIDE 5 MG/1
10 TABLET ORAL EVERY 4 HOURS PRN
Status: DISCONTINUED | OUTPATIENT
Start: 2025-01-14 | End: 2025-01-22

## 2025-01-14 RX ORDER — VANCOMYCIN/0.9 % SOD CHLORIDE 1.5G/250ML
1500 PLASTIC BAG, INJECTION (ML) INTRAVENOUS ONCE
Status: COMPLETED | OUTPATIENT
Start: 2025-01-14 | End: 2025-01-14

## 2025-01-14 RX ORDER — DIPHENHYDRAMINE HYDROCHLORIDE 50 MG/ML
12.5 INJECTION INTRAMUSCULAR; INTRAVENOUS ONCE AS NEEDED
Status: DISCONTINUED | OUTPATIENT
Start: 2025-01-14 | End: 2025-01-20

## 2025-01-14 RX ORDER — BUPIVACAINE HYDROCHLORIDE 2.5 MG/ML
INJECTION, SOLUTION EPIDURAL; INFILTRATION; INTRACAUDAL
Status: DISCONTINUED
Start: 2025-01-14 | End: 2025-01-14 | Stop reason: WASHOUT

## 2025-01-14 RX ORDER — SODIUM CHLORIDE 9 MG/ML
INJECTION, SOLUTION INTRAVENOUS CONTINUOUS PRN
Status: DISCONTINUED | OUTPATIENT
Start: 2025-01-14 | End: 2025-01-14 | Stop reason: SURG

## 2025-01-14 RX ORDER — ONDANSETRON 2 MG/ML
4 INJECTION INTRAMUSCULAR; INTRAVENOUS ONCE AS NEEDED
Status: DISCONTINUED | OUTPATIENT
Start: 2025-01-14 | End: 2025-01-20

## 2025-01-14 RX ORDER — ALBUMIN HUMAN 50 G/1000ML
SOLUTION INTRAVENOUS CONTINUOUS PRN
Status: DISCONTINUED | OUTPATIENT
Start: 2025-01-14 | End: 2025-01-14 | Stop reason: SURG

## 2025-01-14 RX ORDER — DEXAMETHASONE SODIUM PHOSPHATE 4 MG/ML
INJECTION, SOLUTION INTRA-ARTICULAR; INTRALESIONAL; INTRAMUSCULAR; INTRAVENOUS; SOFT TISSUE AS NEEDED
Status: DISCONTINUED | OUTPATIENT
Start: 2025-01-14 | End: 2025-01-14 | Stop reason: SURG

## 2025-01-14 RX ORDER — LABETALOL HYDROCHLORIDE 5 MG/ML
5 INJECTION, SOLUTION INTRAVENOUS
Status: DISCONTINUED | OUTPATIENT
Start: 2025-01-14 | End: 2025-01-20

## 2025-01-14 RX ORDER — MEPERIDINE HYDROCHLORIDE 25 MG/ML
12.5 INJECTION INTRAMUSCULAR; INTRAVENOUS; SUBCUTANEOUS
Status: DISCONTINUED | OUTPATIENT
Start: 2025-01-14 | End: 2025-01-14 | Stop reason: HOSPADM

## 2025-01-14 RX ADMIN — GABAPENTIN 1200 MG: 400 CAPSULE ORAL at 21:59

## 2025-01-14 RX ADMIN — Medication 5000 UNITS: at 08:15

## 2025-01-14 RX ADMIN — BUPRENORPHINE HCL 8 MG: 8 TABLET SUBLINGUAL at 15:13

## 2025-01-14 RX ADMIN — VASOPRESSIN 2 UNITS: 20 INJECTION INTRAVENOUS at 17:00

## 2025-01-14 RX ADMIN — Medication 200 MCG: at 17:19

## 2025-01-14 RX ADMIN — Medication 100 MCG: at 16:42

## 2025-01-14 RX ADMIN — ALBUMIN (HUMAN): 12.5 INJECTION, SOLUTION INTRAVENOUS at 16:47

## 2025-01-14 RX ADMIN — Medication 150 MCG: at 16:33

## 2025-01-14 RX ADMIN — VANCOMYCIN HYDROCHLORIDE 1000 MG: 1 INJECTION, POWDER, LYOPHILIZED, FOR SOLUTION INTRAVENOUS at 22:00

## 2025-01-14 RX ADMIN — LIDOCAINE HYDROCHLORIDE 60 MG: 20 INJECTION, SOLUTION EPIDURAL; INFILTRATION; INTRACAUDAL; PERINEURAL at 16:26

## 2025-01-14 RX ADMIN — AMLODIPINE BESYLATE 10 MG: 5 TABLET ORAL at 08:15

## 2025-01-14 RX ADMIN — CEFEPIME 2000 MG: 2 INJECTION, POWDER, FOR SOLUTION INTRAVENOUS at 21:59

## 2025-01-14 RX ADMIN — INSULIN GLARGINE 30 UNITS: 100 INJECTION, SOLUTION SUBCUTANEOUS at 08:15

## 2025-01-14 RX ADMIN — KETOROLAC TROMETHAMINE 30 MG: 30 INJECTION, SOLUTION INTRAMUSCULAR at 23:55

## 2025-01-14 RX ADMIN — HYDROCHLOROTHIAZIDE 25 MG: 25 TABLET ORAL at 08:15

## 2025-01-14 RX ADMIN — GABAPENTIN 1200 MG: 400 CAPSULE ORAL at 08:15

## 2025-01-14 RX ADMIN — Medication 1500 MG: at 14:17

## 2025-01-14 RX ADMIN — DEXAMETHASONE SODIUM PHOSPHATE 4 MG: 4 INJECTION, SOLUTION INTRAMUSCULAR; INTRAVENOUS at 16:39

## 2025-01-14 RX ADMIN — SODIUM CHLORIDE: 9 INJECTION, SOLUTION INTRAVENOUS at 16:22

## 2025-01-14 RX ADMIN — Medication 150 MCG: at 16:49

## 2025-01-14 RX ADMIN — PROPOFOL 200 MG: 10 INJECTION, EMULSION INTRAVENOUS at 16:26

## 2025-01-14 RX ADMIN — MIDAZOLAM 1 MG: 1 INJECTION INTRAMUSCULAR; INTRAVENOUS at 16:20

## 2025-01-14 RX ADMIN — LOSARTAN POTASSIUM 100 MG: 50 TABLET, FILM COATED ORAL at 08:15

## 2025-01-14 RX ADMIN — ALBUMIN (HUMAN): 12.5 INJECTION, SOLUTION INTRAVENOUS at 17:36

## 2025-01-14 RX ADMIN — MIDAZOLAM 1 MG: 1 INJECTION INTRAMUSCULAR; INTRAVENOUS at 16:23

## 2025-01-14 RX ADMIN — BUPRENORPHINE HCL 8 MG: 8 TABLET SUBLINGUAL at 08:40

## 2025-01-14 RX ADMIN — CEFEPIME 2000 MG: 2 INJECTION, POWDER, FOR SOLUTION INTRAVENOUS at 13:09

## 2025-01-14 RX ADMIN — VASOPRESSIN 2 UNITS: 20 INJECTION INTRAVENOUS at 16:56

## 2025-01-14 NOTE — ANESTHESIA POSTPROCEDURE EVALUATION
Patient: Tomas Song    Procedure Summary       Date: 01/14/25 Room / Location: [facility] OR 06 / [facility] MAIN OR    Anesthesia Start: 1622 Anesthesia Stop: 1732    Procedure: INCISION AND DRAINAGE WOUND BILATERAL FEET (Bilateral: Foot) Diagnosis:     Surgeons: Susan Garrison DPM Provider: Raheem Machado MD    Anesthesia Type: MAC, general ASA Status: 3            Anesthesia Type: MAC, general    Vitals  Vitals Value Taken Time   /62 01/14/25 1847   Temp 98 °F (36.7 °C) 01/14/25 1726   Pulse 95 01/14/25 1850   Resp 12 01/14/25 1826   SpO2 93 % 01/14/25 1850   Vitals shown include unfiled device data.        Post Anesthesia Care and Evaluation    Patient location during evaluation: PACU  Patient participation: complete - patient participated  Level of consciousness: awake  Pain scale: See nurse's notes for pain score.  Pain management: adequate    Airway patency: patent  Anesthetic complications: No anesthetic complications  PONV Status: none  Cardiovascular status: acceptable  Respiratory status: acceptable and spontaneous ventilation  Hydration status: acceptable    Comments: Patient seen and examined postoperatively; vital signs stable; SpO2 greater than or equal to 90%; cardiopulmonary status stable; nausea/vomiting adequately controlled; pain adequately controlled; no apparent anesthesia complications; patient discharged from anesthesia care when discharge criteria were met

## 2025-01-14 NOTE — PROGRESS NOTES
[facility] MEDICINE SERVICE  DAILY PROGRESS NOTE    NAME: Tomas Song  : 1980  MRN: [id number]      LOS: 1 day     PROVIDER OF SERVICE: Chikis Lisa MD    Chief Complaint: Osteomyelitis of both feet    Subjective:     Interval History:    Patient seen and evaluated at bedside. No complaints this morning. Pain controlled. Patient not amenable to amputation at this time but with plans for I&D in OR this afternoon.     Treatment plan discussed with patient. All questions addressed.     Review of Systems:   Denies fevers, chills  Denies chest pain, edema  Denies shortness of breath, cough  Denies nausea, vomiting, diarrhea  Denies dysuria, hematuria    Objective:     Vital Signs  Temp:  [97.8 °F (36.6 °C)-98 °F (36.7 °C)] 97.9 °F (36.6 °C)  Heart Rate:  [] 88  Resp:  [16-18] 16  BP: (126-161)/(71-97) 126/71   Body mass index is 26.39 kg/m².    Physical Exam   General: No acute distress  Neuro: AAOx3, no FND  CV: RRR, no peripheral edema  Pulm: CTAB, no increased work of breathing  Abd: Soft, nontender, nondistended  Skin: Bilateral foot wounds noted with bandages on left foot, purulent discharge noted; skin well perfused and pink.  Psych: Appropriate mood and affect    Scheduled Meds   amLODIPine, 10 mg, Oral, Daily  buprenorphine, 8 mg, Sublingual, TID  gabapentin, 1,200 mg, Oral, Q12H  hydroCHLOROthiazide, 25 mg, Oral, Daily  insulin glargine, 30 Units, Subcutaneous, BID  insulin lispro, 2-9 Units, Subcutaneous, 4x Daily AC & at Bedtime  losartan, 100 mg, Oral, Daily  vitamin D3, 5,000 Units, Oral, Daily       PRN Meds     acetaminophen **OR** acetaminophen **OR** acetaminophen    senna-docusate sodium **AND** polyethylene glycol **AND** bisacodyl **AND** bisacodyl    Calcium Replacement - Follow Nurse / BPA Driven Protocol    dextrose    dextrose    glucagon (human recombinant)    ketorolac    Magnesium Standard Dose Replacement - Follow Nurse / BPA Driven Protocol    melatonin     ondansetron ODT **OR** ondansetron    oxyCODONE    Phosphorus Replacement - Follow Nurse / BPA Driven Protocol    Potassium Replacement - Follow Nurse / BPA Driven Protocol   Infusions         Diagnostic Data    Results from last 7 days   Lab Units 01/14/25  0418 01/13/25  1226   WBC 10*3/mm3 5.51 5.97   HEMOGLOBIN g/dL 9.4* 9.7*   HEMATOCRIT % 30.0* 31.7*   PLATELETS 10*3/mm3 353 410   GLUCOSE mg/dL 186* 180*   CREATININE mg/dL 0.69* 0.96   BUN mg/dL 11 12   SODIUM mmol/L 137 139   POTASSIUM mmol/L 4.2 4.0   AST (SGOT) U/L  --  20   ALT (SGPT) U/L  --  19   ALK PHOS U/L  --  104   BILIRUBIN mg/dL  --  0.2   ANION GAP mmol/L 8.0 9.4       MRI Foot Right Without Contrast    Result Date: 1/13/2025  Impression: 1.Soft tissue wound along the medial aspect of the first toe with underlying lobulated fluid which may reflect small abscesses. 2.Marrow edema of the first proximal and distal phalanges with associated loss of normal T1 marrow and cortical signal along the medial aspect of the first interphalangeal joint. These findings are suspicious for osteomyelitis. There is also adjacent small to moderate effusion in the first interphalangeal joint concerning for septic arthritis. Electronically Signed: Jacob Kebede MD  1/13/2025 10:48 PM EST  Workstation ID: [id number]    MRI Foot Left Without Contrast    Result Date: 1/13/2025  Impression: 1.Soft tissue wound along the plantar medial forefoot just below the first metatarsophalangeal joint. There are associated lobulated fluid collections deep to the wound which may reflect small abscesses. There is involvement of the flexor hallucis longus  tendon at this location as well with mild associated tenosynovitis. 2.Marrow edema and loss of normal T1 cortical and marrow signal with erosions of the first metatarsal head as well as the base of the first proximal phalanx. There is associated lobulated small joint effusion at the first metatarsophalangeal joint. These  findings  are concerning for septic arthritis with osteomyelitis of the first metatarsal head and base of the first proximal phalanx. There is reactive marrow edema seen along the remainder of the first proximal phalanx and first metatarsal. Electronically Signed: Jacob Kebede MD  1/13/2025 10:22 PM EST  Workstation ID: [id number]    XR Foot 3+ View Left    Result Date: 1/13/2025  Impression: 1.Radiographic changes of osteomyelitis involving the distal phalanx of the right great toe with a pathologic fracture secondary to the osteomyelitis involving the medial aspect of the head of the proximal phalanx. 2.Radiographic changes of osteomyelitis involving the distal shaft and head of the left first metatarsal bone as well as the adjacent base of the proximal phalanx. There also may be some osteolytic changes in the base of the distal phalanx of the left great toe representing osteomyelitis. 3.Soft tissue swelling. There is a relatively deep ulcer along the medial aspect of the right great toe at the site of the bony destructive changes. 4.Soft tissue swelling and shallow ulcers along the medial aspect of the left forefoot at the site of the bony destructive changes. Electronically Signed: Agapito Rick MD  1/13/2025 12:44 PM EST  Workstation ID: [id number]    XR Toe 2+ View Right    Result Date: 1/13/2025  Impression: 1.Radiographic changes of osteomyelitis involving the distal phalanx of the right great toe with a pathologic fracture secondary to the osteomyelitis involving the medial aspect of the head of the proximal phalanx. 2.Radiographic changes of osteomyelitis involving the distal shaft and head of the left first metatarsal bone as well as the adjacent base of the proximal phalanx. There also may be some osteolytic changes in the base of the distal phalanx of the left great toe representing osteomyelitis. 3.Soft tissue swelling. There is a relatively deep ulcer along the medial aspect of the right great toe at the site of  the bony destructive changes. 4.Soft tissue swelling and shallow ulcers along the medial aspect of the left forefoot at the site of the bony destructive changes. Electronically Signed: Agpaito Rick MD  1/13/2025 12:44 PM EST  Workstation ID: [id number]     Interval results reviewed.    Assessment/Plan:     Osteomyelitis of right great toe  Osteomyelitis of left foot  Left foot diabetic ulcer  - Podiatry consulted, appreciate recs  - ID consulted, appreciate recs  - Continue vancomycin, cefepime  - Analgesia, avoid opiates as able  - Wound care consult  - Plan for I&D in OR today  - ABIs pending  - Recommendations for amputation; however, patient not amenable at this time and would like to discuss alternative options    Type 2 diabetes mellitus  - Continue lantus  - SSI, hypoglycemia protocol, CCD  - Plan for diabetic educator prior to discharge    Anemia  - No overt s/sx bleeding  - In setting of acute infection  - Repeat CBC in AM    Hypertension  - Continue amlodipine, HCTZ, losartan    Hyperlipidemia  - Continue fenofibrate    Opioid use disorder  - Continue subutex    ADHD  - Holding home meds during admission    Treatment plan discussed with nursing staff.     VTE Prophylaxis:  No VTE prophylaxis order currently exists.    Holding pharmacological ppx given surgical plans; not amendable to SCDs given bilateral lower extremity wounds.     Code status is   Code Status and Medical Interventions: CPR (Attempt to Resuscitate); Full Support   Ordered at: 01/13/25 1314     Code Status (Patient has no pulse and is not breathing):    CPR (Attempt to Resuscitate)     Medical Interventions (Patient has pulse or is breathing):    Full Support       Plan for disposition: Home 1/17/25 pending clinical course    Barriers to discharge: OR today, ID consult pending, cultures pending, IV abx    Time: 35+ minutes     Signature: Electronically signed by Chikis Lisa MD, 01/14/25, 07:29 EST.  [facility] Hospitalist Team

## 2025-01-14 NOTE — ANESTHESIA PROCEDURE NOTES
Airway  Urgency: elective    Date/Time: 1/14/2025 4:27 PM    General Information and Staff    Patient location during procedure: OR  CRNA/CAA: Jessica Gilliam CRNA    Indications and Patient Condition  Indications for airway management: airway protection    Preoxygenated: yes  MILS maintained throughout  Mask difficulty assessment: 1 - vent by mask    Final Airway Details  Final airway type: supraglottic airway      Successful airway: I-gel  Size 4     Number of attempts at approach: 1  Assessment: lips, teeth, and gum same as pre-op and atraumatic intubation

## 2025-01-14 NOTE — PROGRESS NOTES
"Pharmacy Antimicrobial Dosing Service    Subjective:  Tomas Song is a 44 y.o.male admitted with osteomyelitis. Pharmacy has been consulted to dose Vancomycin for possible bone and joint infection.    PMH: Hypertension, type 2 diabetes, diabetic neuropathy      Assessment/Plan    1. Day #2 Vancomycin: Goal -600 mcg*h/mL. Patient received vancomycin 2g (22mg/kg ABW) yesterday at 1409. Since it has been 24 hours since initial dosing, will schedule patient for dose of 1500 mg (16mg/kg ABW) x 1 dose then Vancomycin 1000mg (11mg/kg ABW) q8h. Will obtain levels prior to third dose of this regimen on 1/15. Predicted AUC is 552 mcg*H/mL.    2. Day #1 Cefepime: 2000mg IV q8h for estCrCl > 60 mL/min.    Will continue to monitor drug levels, renal function, culture and sensitivities, and patient clinical status.       Objective:  Relevant clinical data and objective history reviewed:  185.4 cm (73\")   90.7 kg (200 lb)   Ideal body weight: 79.9 kg (176 lb 2.4 oz)  Adjusted ideal body weight: 84.2 kg (185 lb 11 oz)  Body mass index is 26.39 kg/m².        Results from last 7 days   Lab Units 01/14/25  0418 01/13/25  1226   CREATININE mg/dL 0.69* 0.96     Estimated Creatinine Clearance: 171.6 mL/min (A) (by C-G formula based on SCr of 0.69 mg/dL (L)).  I/O last 3 completed shifts:  In: 840 [P.O.:840]  Out: 1675 [Urine:1675]    Results from last 7 days   Lab Units 01/14/25  0418 01/13/25  1226   WBC 10*3/mm3 5.51 5.97     Temperature    01/13/25 2139 01/14/25 0413 01/14/25 0800   Temp: 98 °F (36.7 °C) 97.9 °F (36.6 °C) 98.1 °F (36.7 °C)     Baseline culture/source/susceptibility:  Microbiology Results (last 10 days)       Procedure Component Value - Date/Time    MRSA Screen, PCR (Inpatient) - Swab, Nares [[id number]]  (Abnormal) Collected: 01/13/25 1422    Lab Status: Final result Specimen: Swab from Nares Updated: 01/13/25 1547     MRSA PCR MRSA Detected    Narrative:      The negative predictive value of this " diagnostic test is high and should only be used to consider de-escalating anti-MRSA therapy. A positive result may indicate colonization with MRSA and must be correlated clinically.    Blood Culture - Blood, Arm, Right [[id number]]  (Normal) Collected: 01/13/25 1226    Lab Status: Preliminary result Specimen: Blood from Arm, Right Updated: 01/14/25 1245     Blood Culture No growth at 24 hours    Wound Culture - Wound, Foot, Left [[id number]]  (Abnormal) Collected: 01/13/25 1203    Lab Status: Preliminary result Specimen: Wound from Foot, Left Updated: 01/14/25 0909     Wound Culture Light growth (2+) Gram Negative Bacilli      Light growth (2+) Gram Positive Cocci     Gram Stain Many (4+) WBCs per low power field      Moderate (3+) Gram positive cocci in pairs, chains and clusters      Few (2+) Gram negative bacilli      Few (2+) Gram positive bacilli    Blood Culture - Blood, Arm, Right [[id number]]  (Normal) Collected: 01/13/25 1201    Lab Status: Preliminary result Specimen: Blood from Arm, Right Updated: 01/14/25 1215     Blood Culture No growth at 24 hours            Gwen Payne, Pharmacy Intern  01/14/25 14:04 EST

## 2025-01-14 NOTE — BRIEF OP NOTE
INCISION AND DRAINAGE WOUND  Progress Note    Tomas Song  1/14/2025    Pre-op Diagnosis:   Left foot abscess  Left foot diabetic foot ulcer and cellulitis  Left foot osteomyelitis  Right hallux diabetic foot ulcer  Right hallux osteomyelitis       Post-Op Diagnosis Codes:   Left foot abscess  Left foot diabetic foot ulcer and cellulitis  Left foot osteomyelitis  Right hallux diabetic foot ulcer  Right hallux osteomyelitis         Procedure/CPT® Codes:        Procedure(s):  INCISION AND DRAINAGE WOUND BILATERAL FEET with bone biopsy              Surgeon(s):  Susan Garrison DPM    Anesthesia: Choice    Staff:   Circulator: Fabian Fuentes RN  Scrub Person: Carmencita Joseph         Estimated Blood Loss: 10 mL    Urine Voided: * No values recorded between 1/14/2025  4:22 PM and 1/14/2025  5:25 PM *    Specimens:                Specimens       ID Source Type Tests Collected By Collected At Frozen?    1 Foot, Left Tissue ANAEROBIC CULTURE  TISSUE / BONE CULTURE   Susan Garrison DPM 1/14/25 1645     Description: LEFT FOOT SOFT TISSUE FOR AEROBIC AND ANAEROBIC CULTURE    2 Foot, Left Bone ANAEROBIC CULTURE  TISSUE / BONE CULTURE   Susan Garrison DPM 1/14/25 1651     Description: LEFT FIRST METATARSAL BONE FOR AEROBIC AND ANAEROBIC CULTURE.    A Foot, Left Bone TISSUE PATHOLOGY EXAM   Susan Garrison DPM 1/14/25 1652     Description: LEFT FIRST METATARSAL BONE.    B Foot, Right Bone TISSUE PATHOLOGY EXAM   Susan Garrison DPM 1/14/25 1646     Description: RIGHT HALLUX BONE                  Drains: * No LDAs found *    Findings: Left foot purulence expressed approx 10 mL.Right hallux without purulence. Bone was soft upon removal of specimen, bilateral. No remaining purulence.        Complications: none          Susan Garrison DPM     Date: 1/14/2025  Time: 17:34 EST

## 2025-01-14 NOTE — PROGRESS NOTES
"     LOS: 1 day   Patient Care Team:  Ty Gao DO as PCP - General (Internal Medicine)    Chief Complaint:  Bilateral foot osteomyelitis    Subjective     Interval History: Patient denies pain. Remains NPO.. Ready to proceed with surgery today.     Patient Complaints: none  Patient Denies:  n/v/f/c/sob  History taken from: patient    Review of Systems:  Pertinent positives in HPI       Objective     Vital Signs  Temp:  [97.8 °F (36.6 °C)-98.1 °F (36.7 °C)] 98.1 °F (36.7 °C)  Heart Rate:  [] 88  Resp:  [16-18] 16  BP: (121-161)/(71-97) 121/76    Physical Exam:  Dressing in place to left foot without strikethrough or soil. Erythema/edema improving to the legs. Right hallux remains stable.       Labs:  Results from last 7 days   Lab Units 01/14/25  0418   WBC 10*3/mm3 5.51   HEMOGLOBIN g/dL 9.4*   HEMATOCRIT % 30.0*   PLATELETS 10*3/mm3 353     Results from last 7 days   Lab Units 01/14/25  0418   SODIUM mmol/L 137   POTASSIUM mmol/L 4.2   CHLORIDE mmol/L 104   CO2 mmol/L 25.0   BUN mg/dL 11   CREATININE mg/dL 0.69*   GLUCOSE mg/dL 186*   CALCIUM mg/dL 9.0     Glucose   Date Value Ref Range Status   01/14/2025 186 (H) 65 - 99 mg/dL Final   01/13/2025 180 (H) 65 - 99 mg/dL Final     Results from last 7 days   Lab Units 01/13/25  1226   SED RATE mm/hr 40*     Results from last 7 days   Lab Units 01/13/25  1226   CRP mg/dL 5.29*         No components found for: \"HBA1C\"    Imaging:   Imaging Results (All)       Procedure Component Value Units Date/Time    MRI Foot Right Without Contrast [[id number]] Collected: 01/13/25 2238     Updated: 01/13/25 2250    Narrative:      MRI FOOT RIGHT WO CONTRAST    Date of Exam: 1/13/2025 7:35 PM EST    Indication: Right hallux osteomyelitis.     Comparison: Toe radiographs 1/13/2025    Technique:  Routine multiplanar/multisequence sequence images of the right foot were obtained without contrast administration.      Findings:  Bones: There is marrow edema of the first " proximal and distal phalanges with associated loss of normal T1 marrow and cortical signal along the medial aspect of the first interphalangeal joint. No additional areas of abnormal marrow edema. No suspicious   osseous lesions. Small effusion of the first metatarsophalangeal joint. Small to moderate effusion in the first interphalangeal joint.    Soft tissues: Lisfranc ligament is intact. Plantar plates appear intact. Visualized tendons appear grossly intact. Diffuse subcutaneous edema. Atrophy of the intrinsic foot muscles. There is soft tissue wound along the medial aspect of the first toe with   underlying lobulated fluid which may reflect small abscesses. Small mild intermetatarsal bursal fluid in the first, second and third interspaces.      Impression:      Impression:  1.Soft tissue wound along the medial aspect of the first toe with underlying lobulated fluid which may reflect small abscesses.  2.Marrow edema of the first proximal and distal phalanges with associated loss of normal T1 marrow and cortical signal along the medial aspect of the first interphalangeal joint. These findings are suspicious for osteomyelitis. There is also adjacent   small to moderate effusion in the first interphalangeal joint concerning for septic arthritis.        Electronically Signed: Jacob Kebede MD    1/13/2025 10:48 PM EST    Workstation ID: [id number]    MRI Foot Left Without Contrast [[id number]] Collected: 01/13/25 2212     Updated: 01/13/25 2224    Narrative:      MRI FOOT LEFT WO CONTRAST    Date of Exam: 1/13/2025 7:35 PM EST    Indication: Evaluate abcess and osteomyelitis.     Comparison: One 1325 foot radiographs    Technique:  Routine multiplanar/multisequence sequence images of the left foot were obtained without contrast administration.      Findings:  Bones and joints: Marrow edema and loss of normal T1 cortical and marrow signal with erosions of the first metatarsal head as well as the base of the first  proximal phalanx. There is associated lobulated small joint effusion at the first   metatarsophalangeal joint. There is marrow edema seen throughout the remainder of the first metatarsal and the first proximal phalanx. Minimal marrow edema seen along the first distal phalanx. No additional areas of abnormal marrow edema. No suspicious   osseous lesions.    Soft tissues: Soft tissue wound along the plantar medial forefoot just below the first metatarsophalangeal joint. There are associated lobulated fluid seen deep to the wound which may reflect small abscesses. There is involvement of the flexor houses   longus tendon at this location as well with mild associated tenosynovitis. Remaining tendons appear grossly intact. Lisfranc ligament is intact. The plantar plates of the second through fifth digits appear intact. Diffuse subcutaneous edema.      Impression:      Impression:  1.Soft tissue wound along the plantar medial forefoot just below the first metatarsophalangeal joint. There are associated lobulated fluid collections deep to the wound which may reflect small abscesses. There is involvement of the flexor hallucis longus   tendon at this location as well with mild associated tenosynovitis.  2.Marrow edema and loss of normal T1 cortical and marrow signal with erosions of the first metatarsal head as well as the base of the first proximal phalanx. There is associated lobulated small joint effusion at the first metatarsophalangeal joint. These   findings are concerning for septic arthritis with osteomyelitis of the first metatarsal head and base of the first proximal phalanx. There is reactive marrow edema seen along the remainder of the first proximal phalanx and first metatarsal.        Electronically Signed: Jacob Kebede MD    1/13/2025 10:22 PM EST    Workstation ID: [id number]    XR Foot 3+ View Left [[id number]] Collected: 01/13/25 1239     Updated: 01/13/25 1246    Narrative:      XR FOOT 3+ VW LEFT, XR  TOE 2+ VW RIGHT    Date of Exam: 1/13/2025 12:02 PM EST    Indication: Diabetic wound infection involving the left foot in the right great toe.    Comparison: None available.    Findings:  Right great toe: There is a cortical destruction and periostitis involving the distal phalanx, especially along the medial border and also involving the medial aspect of the head of the proximal phalanx. The findings are consistent with osteomyelitis in   the appropriate clinical setting. There is a pathologic fracture secondary to the osteomyelitis involving the medial aspect of the head of the proximal phalanx. There is soft tissue swelling. There is a relatively deep ulcer along the medial aspect of   the right great toe at the site of the bony destructive changes. There are incidental arthritic changes involving the IP joint and first MTP joint.    Left foot: There is cortical destruction, osteolysis, and periosteal reaction involving the distal shaft and head of the first metatarsal bone as well as the adjacent base of the proximal phalanx. There also may be some osteolytic changes in the base of   the distal phalanx of the left great toe. This is consistent with radiographic changes of osteomyelitis. There is soft tissue swelling involving mainly the left great toe. Shallow ulcers are suggested along the plantar aspect of the medial left forefoot   in the location of the radiographic changes of osteomyelitis. There are minor arthritic changes involving the inner-phalangeal joints of the digits.      Impression:      Impression:  1.Radiographic changes of osteomyelitis involving the distal phalanx of the right great toe with a pathologic fracture secondary to the osteomyelitis involving the medial aspect of the head of the proximal phalanx.  2.Radiographic changes of osteomyelitis involving the distal shaft and head of the left first metatarsal bone as well as the adjacent base of the proximal phalanx. There also may be some  osteolytic changes in the base of the distal phalanx of the left   great toe representing osteomyelitis.  3.Soft tissue swelling. There is a relatively deep ulcer along the medial aspect of the right great toe at the site of the bony destructive changes.  4.Soft tissue swelling and shallow ulcers along the medial aspect of the left forefoot at the site of the bony destructive changes.        Electronically Signed: Agapito Rick MD    1/13/2025 12:44 PM EST    Workstation ID: [id number]    XR Toe 2+ View Right [[id number]] Collected: 01/13/25 1239     Updated: 01/13/25 1246    Narrative:      XR FOOT 3+ VW LEFT, XR TOE 2+ VW RIGHT    Date of Exam: 1/13/2025 12:02 PM EST    Indication: Diabetic wound infection involving the left foot in the right great toe.    Comparison: None available.    Findings:  Right great toe: There is a cortical destruction and periostitis involving the distal phalanx, especially along the medial border and also involving the medial aspect of the head of the proximal phalanx. The findings are consistent with osteomyelitis in   the appropriate clinical setting. There is a pathologic fracture secondary to the osteomyelitis involving the medial aspect of the head of the proximal phalanx. There is soft tissue swelling. There is a relatively deep ulcer along the medial aspect of   the right great toe at the site of the bony destructive changes. There are incidental arthritic changes involving the IP joint and first MTP joint.    Left foot: There is cortical destruction, osteolysis, and periosteal reaction involving the distal shaft and head of the first metatarsal bone as well as the adjacent base of the proximal phalanx. There also may be some osteolytic changes in the base of   the distal phalanx of the left great toe. This is consistent with radiographic changes of osteomyelitis. There is soft tissue swelling involving mainly the left great toe. Shallow ulcers are suggested along the plantar aspect  "of the medial left forefoot   in the location of the radiographic changes of osteomyelitis. There are minor arthritic changes involving the inner-phalangeal joints of the digits.      Impression:      Impression:  1.Radiographic changes of osteomyelitis involving the distal phalanx of the right great toe with a pathologic fracture secondary to the osteomyelitis involving the medial aspect of the head of the proximal phalanx.  2.Radiographic changes of osteomyelitis involving the distal shaft and head of the left first metatarsal bone as well as the adjacent base of the proximal phalanx. There also may be some osteolytic changes in the base of the distal phalanx of the left   great toe representing osteomyelitis.  3.Soft tissue swelling. There is a relatively deep ulcer along the medial aspect of the right great toe at the site of the bony destructive changes.  4.Soft tissue swelling and shallow ulcers along the medial aspect of the left forefoot at the site of the bony destructive changes.        Electronically Signed: Agapito Rick MD    1/13/2025 12:44 PM EST    Workstation ID: [id number]            Cultures:   Wound culture:   Wound Culture   Date Value Ref Range Status   01/13/2025 Light growth (2+) Gram Negative Bacilli (A)  Preliminary   01/13/2025 Light growth (2+) Gram Positive Cocci (A)  Preliminary     Blood culture: No results found for: \"BLOODCX\"     Results Review:     I reviewed the patient's new clinical results.  I reviewed the patient's new imaging results and agree with the interpretation.  I reviewed the patient's other test results and agree with the interpretation      Assessment & Plan     Tomas Song is a 44 y.o. male presenting with Left foot osteomyelitis / abscess with severe infection and right hallux osteomyelitis.      #Diabetic foot ulcer, sub 1st metatarsal head, left  #Severe diabetic foot infection, left  #Osteomyelitis, left 1st metatarsal / hallux   #Abscess, left foot  #Right " hallux ulcer - stable  #Osteomyelitis, right hallux  - Examined patient and discussed findings of exam as noted below with patient.   - Xray and MRI confirm osteomyelitis right hallux and left hallux proximal phalanx and 1st metatarsal head. Abscess noted to right hallux and left foot.   - EMILY/TBI normal with some elevation representing calcinosis.    Recommend  - Leave dressing intact to the feet, will remove in OR. OK to change if soiled. Betadine soaked gauze, kerlix, ace left foot. Betadine paint, right foot.  - Weightbearing: WBAT pre-op, NWB LLE post-op. Will need PT eval.  - ID consultation - patient not consenting to amputation currently.   - Strongly recommended surgical intervention given appearance of infection. Patient is amenable to having intervention however he will not consent to amputation. Will plan for I&D with bone biopsy and extended period of antibiotics, however social situation will need to be considered for long term antibiotics. Recommended Left partial 1st ray amputation, right hallux amputation however patient is not currently amenable to amputation.   - Future plan: Patient is scheduled for OR tomorrow for left foot I&D and bone biopsy approximately 4:30 PM. Will discuss again with patient in the morning.  - NPO: confirmed    Surgical Plan: Left foot I&D with bone biopsy, Right hallux I&D with possible bone biopsy  - Reviewed procedure and perioperative course.   - Discussed risks, benefits, alternatives.  - Reviewed perioperative course.  - Post-operatively               Readmit to floor              NWB on Left foot postop              PT               Abx per ID (will obtain intraoperative cultures)  - Discussed possible complications including but not limited to infection, delayed or nonhealing surgical site (soft tissue / bone), swelling, bleeding, hematoma, DVT/PE, pain, CRPS, failure of procedure to resolve all symptoms, and need for further surgery.  - All patient's questions  were satisfactorily answered.  - NPO confirmed.        Susan Garrison DPM  01/14/25  11:34 EST      I spent a total of 65 minutes on this patient encounter including preparation, review of medical, social, surgical hx, medications, labs, xrays review and interpretation, face to face care, evaluation, treatment, counseling, education, consultation with another provider, documentation, and answering patient questions regarding the plan noted above.

## 2025-01-14 NOTE — CASE MANAGEMENT/SOCIAL WORK
Discharge Planning Assessment   Kevin     Patient Name: Tomas Song  MRN: [id number]  Today's Date: 1/14/2025    Admit Date: 1/13/2025    Plan: From home alone. May stay with mom.   Discharge Needs Assessment       Row Name 01/14/25 1003       Living Environment    People in Home alone    Current Living Arrangements apartment    Potentially Unsafe Housing Conditions none    In the past 12 months has the electric, gas, oil, or water company threatened to shut off services in your home? No    Primary Care Provided by self    Provides Primary Care For no one    Family Caregiver if Needed parent(s)    Family Caregiver Names Ju Bradford    Quality of Family Relationships helpful    Able to Return to Prior Arrangements other (see comments)  May go stay with mom       Resource/Environmental Concerns    Resource/Environmental Concerns none    Transportation Concerns none       Transition Planning    Patient/Family Anticipates Transition to home with family    Patient/Family Anticipated Services at Transition none    Transportation Anticipated car, drives self       Discharge Needs Assessment    Readmission Within the Last 30 Days no previous admission in last 30 days    Equipment Currently Used at Home glucometer    Concerns to be Addressed denies needs/concerns at this time    Anticipated Changes Related to Illness none    Equipment Needed After Discharge none                   Discharge Plan       Row Name 01/14/25 1004       Plan    Plan From home alone. May stay with mom.    Patient/Family in Agreement with Plan yes    Plan Comments CM met with patient at bedside. Confirmed PCP, insurance, and pharmacy.  Meds to beds enrolled. Patient denies any difficulty affording medications. Patient not current with any therapies. Confirmed transportation at discharge will be his mom. DC Barriers: IV vanc, NPO for I&D today 1/14, podiatry and ID following.                  Continued Care and Services - Admitted Since  1/13/2025    No active coordination exists for this encounter.          Demographic Summary       Row Name 01/14/25 1001       General Information    Admission Type inpatient    Arrived From emergency department    Referral Source admission list    Reason for Consult discharge planning    Preferred Language English       Contact Information    Permission Granted to Share Info With                    Functional Status       Row Name 01/14/25 1003       Functional Status    Usual Activity Tolerance good    Current Activity Tolerance good       Functional Status, IADL    Medications independent    Meal Preparation independent    Housekeeping independent    Laundry independent    Shopping independent    If for any reason you need help with day-to-day activities such as bathing, preparing meals, shopping, managing finances, etc., do you get the help you need? I get all the help I need           Tracie Wilburn RN     Office: [phone]

## 2025-01-14 NOTE — PLAN OF CARE
Goal Outcome Evaluation:                               Patient NPO this shift, awaiting surgery of left and right food for I&D and possible biopsy.

## 2025-01-14 NOTE — SIGNIFICANT NOTE
01/14/25 1008   Living Situation   Current Living Arrangements apartment   Potentially Unsafe Housing Conditions none   Food Insecurity   Within the past 12 months, you worried that your food would run out before you got the money to buy more. Never true   Within the past 12 months, the food you bought just didn't last and you didn't have money to get more. Never true   Transportation Needs   In the past 12 months, has lack of transportation kept you from medical appointments or from getting medications? no   In the past 12 months, has lack of transportation kept you from meetings, work, or from getting things needed for daily living? No   Utilities   In the past 12 months has the electric, gas, oil, or water company threatened to shut off services in your home? No   Abuse Screen (yes response referral indicated)   Feels Unsafe at Home or Work/School no   Feels Threatened by Someone no   Does Anyone Try to Keep You From Having Contact with Others or Doing Things Outside Your Home? no   Physical Signs of Abuse Present no   Financial Resource Strain   How hard is it for you to pay for the very basics like food, housing, medical care, and heating? Not very   Employment   Do you want help finding or keeping work or a job? Patient declined   Family and Community Support   If for any reason you need help with day-to-day activities such as bathing, preparing meals, shopping, managing finances, etc., do you get the help you need? I get all the help I need   How often do you feel lonely or isolated from those around you? Rarely   Education   Preferred Language English   Do you want help with school or training? For example, starting or completing job training or getting a high school diploma, GED or equivalent Patient declined   Physical Activity   On average, how many days per week do you engage in moderate to strenuous exercise (like a brisk walk)? 0 days   On average, how many minutes do you engage in exercise at this  level? 0 min   Number of minutes of exercise per week (!) 0   Alcohol Use   Q1: How often do you have a drink containing alcohol? Never   Q2: How many drinks containing alcohol do you have on a typical day when you are drinking? None   Q3: How often do you have six or more drinks on one occasion? Never   Stress   Do you feel stress - tense, restless, nervous, or anxious, or unable to sleep at night because your mind is troubled all the time - these days? Only a littl   Mental Health   Why did the patient not complete the Depression Screen questions? Patient declined   Disabilities   Difficulty Concentrating, Remembering or Making Decisions no   Difficulty Managing Errands Independently no

## 2025-01-14 NOTE — CONSULTS
Infectious Diseases Consult Note    Referring Provider: Chikis Lisa MD    Reason for Consultation: foot wounds    Patient Care Team:  Ty Gao DO as PCP - General (Internal Medicine)    Chief complaint worsening left foot wound    Subjective     History of present illness:      This is a 44-year-old male presents to the hospital on 1/11/2025 with complaints of worsening left great toe wound.  Patient also has a wound to his right toe.  The left wound has been there approximately 3 months and was originally a callus.  The right wound has been there approximately a month.  Podiatry initially recommended a right toe amputation and a first partial ray amputation on the left but patient refused and asked for second opinion.  Although the second podiatry group also recommended the same thing patient once again refused.  Plans for an I&D with bone biopsy tomorrow.  Patient is diabetic but not immunocompromise    Review of Systems   Review of Systems   Constitutional: Negative.    HENT: Negative.     Eyes: Negative.    Respiratory: Negative.     Cardiovascular: Negative.    Gastrointestinal: Negative.    Endocrine: Negative.    Genitourinary: Negative.    Musculoskeletal: Negative.    Skin:  Positive for wound.   Neurological: Negative.    Psychiatric/Behavioral: Negative.     All other systems reviewed and are negative.      Medications  (Not in a hospital admission)      History  Past Medical History:   Diagnosis Date    Hypertension     Type 2 diabetes mellitus      History reviewed. No pertinent surgical history.    Family History  Family History   Problem Relation Age of Onset    Diabetes Father     Hypertension Maternal Uncle     Stroke Maternal Uncle        Social History   reports that he has never smoked. He has never used smokeless tobacco. He reports current drug use. Drugs: Methamphetamines and Marijuana. He reports that he does not drink alcohol.    Allergies  Bactrim  [sulfamethoxazole-trimethoprim]    Objective     Vital Signs   Vital Signs (last 24 hours)         01/13 0700  01/14 0659 01/14 0700  01/14 1338   Most Recent      Temp (°F) 97.8 -  98      98.1     98.1 (36.7) 01/14 0800    Heart Rate 88 -  109       88 01/14 0413    Resp 16 -  18      16     16 01/14 0800    /71 -  161/87      121/76     121/76 01/14 0800    SpO2 (%) 99 -  100       99 01/14 0413            Physical Exam:  Physical Exam  Vitals and nursing note reviewed.   Constitutional:       General: He is not in acute distress.     Appearance: Normal appearance. He is well-developed and normal weight. He is not diaphoretic.   HENT:      Head: Normocephalic and atraumatic.   Eyes:      Conjunctiva/sclera: Conjunctivae normal.      Pupils: Pupils are equal, round, and reactive to light.   Cardiovascular:      Rate and Rhythm: Normal rate and regular rhythm.      Heart sounds: Normal heart sounds, S1 normal and S2 normal.   Pulmonary:      Effort: Pulmonary effort is normal. No respiratory distress.      Breath sounds: Normal breath sounds. No stridor. No wheezing or rales.   Abdominal:      General: Bowel sounds are normal. There is no distension.      Palpations: Abdomen is soft. There is no mass.      Tenderness: There is no abdominal tenderness. There is no guarding.   Musculoskeletal:         General: No deformity. Normal range of motion.      Cervical back: Neck supple.   Skin:     General: Skin is warm and dry.      Coloration: Skin is not pale.      Findings: No erythema or rash.      Comments: Small lesion to the plantar medial aspect of the right great toe    Patient has a significant ulceration to the plantar aspect of the left first metatarsal phalangeal joint region with purulence and periwound erythema       Neurological:      Mental Status: He is alert and oriented to person, place, and time.      Cranial Nerves: No cranial nerve deficit.   Psychiatric:         Mood and Affect: Mood normal.          Microbiology  Microbiology Results (last 10 days)       Procedure Component Value - Date/Time    MRSA Screen, PCR (Inpatient) - Swab, Nares [[id number]]  (Abnormal) Collected: 01/13/25 1422    Lab Status: Final result Specimen: Swab from Nares Updated: 01/13/25 1547     MRSA PCR MRSA Detected    Narrative:      The negative predictive value of this diagnostic test is high and should only be used to consider de-escalating anti-MRSA therapy. A positive result may indicate colonization with MRSA and must be correlated clinically.    Blood Culture - Blood, Arm, Right [[id number]]  (Normal) Collected: 01/13/25 1226    Lab Status: Preliminary result Specimen: Blood from Arm, Right Updated: 01/14/25 1245     Blood Culture No growth at 24 hours    Wound Culture - Wound, Foot, Left [[id number]]  (Abnormal) Collected: 01/13/25 1203    Lab Status: Preliminary result Specimen: Wound from Foot, Left Updated: 01/14/25 0909     Wound Culture Light growth (2+) Gram Negative Bacilli      Light growth (2+) Gram Positive Cocci     Gram Stain Many (4+) WBCs per low power field      Moderate (3+) Gram positive cocci in pairs, chains and clusters      Few (2+) Gram negative bacilli      Few (2+) Gram positive bacilli    Blood Culture - Blood, Arm, Right [[id number]]  (Normal) Collected: 01/13/25 1201    Lab Status: Preliminary result Specimen: Blood from Arm, Right Updated: 01/14/25 1215     Blood Culture No growth at 24 hours            Laboratory  Results from last 7 days   Lab Units 01/14/25  0418   WBC 10*3/mm3 5.51   HEMOGLOBIN g/dL 9.4*   HEMATOCRIT % 30.0*   PLATELETS 10*3/mm3 353     Results from last 7 days   Lab Units 01/14/25  0418   SODIUM mmol/L 137   POTASSIUM mmol/L 4.2   CHLORIDE mmol/L 104   CO2 mmol/L 25.0   BUN mg/dL 11   CREATININE mg/dL 0.69*   GLUCOSE mg/dL 186*   CALCIUM mg/dL 9.0     Results from last 7 days   Lab Units 01/14/25  0418   SODIUM mmol/L 137   POTASSIUM mmol/L 4.2   CHLORIDE mmol/L 104   CO2  mmol/L 25.0   BUN mg/dL 11   CREATININE mg/dL 0.69*   GLUCOSE mg/dL 186*   CALCIUM mg/dL 9.0         Results from last 7 days   Lab Units 01/13/25  1226   SED RATE mm/hr 40*           Radiology  Imaging Results (Last 72 Hours)       Procedure Component Value Units Date/Time    MRI Foot Right Without Contrast [[id number]] Collected: 01/13/25 2238     Updated: 01/13/25 2250    Narrative:      MRI FOOT RIGHT WO CONTRAST    Date of Exam: 1/13/2025 7:35 PM EST    Indication: Right hallux osteomyelitis.     Comparison: Toe radiographs 1/13/2025    Technique:  Routine multiplanar/multisequence sequence images of the right foot were obtained without contrast administration.      Findings:  Bones: There is marrow edema of the first proximal and distal phalanges with associated loss of normal T1 marrow and cortical signal along the medial aspect of the first interphalangeal joint. No additional areas of abnormal marrow edema. No suspicious   osseous lesions. Small effusion of the first metatarsophalangeal joint. Small to moderate effusion in the first interphalangeal joint.    Soft tissues: Lisfranc ligament is intact. Plantar plates appear intact. Visualized tendons appear grossly intact. Diffuse subcutaneous edema. Atrophy of the intrinsic foot muscles. There is soft tissue wound along the medial aspect of the first toe with   underlying lobulated fluid which may reflect small abscesses. Small mild intermetatarsal bursal fluid in the first, second and third interspaces.      Impression:      Impression:  1.Soft tissue wound along the medial aspect of the first toe with underlying lobulated fluid which may reflect small abscesses.  2.Marrow edema of the first proximal and distal phalanges with associated loss of normal T1 marrow and cortical signal along the medial aspect of the first interphalangeal joint. These findings are suspicious for osteomyelitis. There is also adjacent   small to moderate effusion in the first  interphalangeal joint concerning for septic arthritis.        Electronically Signed: Jacob Kebede MD    1/13/2025 10:48 PM EST    Workstation ID: [id number]    MRI Foot Left Without Contrast [[id number]] Collected: 01/13/25 2212     Updated: 01/13/25 2224    Narrative:      MRI FOOT LEFT WO CONTRAST    Date of Exam: 1/13/2025 7:35 PM EST    Indication: Evaluate abcess and osteomyelitis.     Comparison: One 1325 foot radiographs    Technique:  Routine multiplanar/multisequence sequence images of the left foot were obtained without contrast administration.      Findings:  Bones and joints: Marrow edema and loss of normal T1 cortical and marrow signal with erosions of the first metatarsal head as well as the base of the first proximal phalanx. There is associated lobulated small joint effusion at the first   metatarsophalangeal joint. There is marrow edema seen throughout the remainder of the first metatarsal and the first proximal phalanx. Minimal marrow edema seen along the first distal phalanx. No additional areas of abnormal marrow edema. No suspicious   osseous lesions.    Soft tissues: Soft tissue wound along the plantar medial forefoot just below the first metatarsophalangeal joint. There are associated lobulated fluid seen deep to the wound which may reflect small abscesses. There is involvement of the flexor houses   longus tendon at this location as well with mild associated tenosynovitis. Remaining tendons appear grossly intact. Lisfranc ligament is intact. The plantar plates of the second through fifth digits appear intact. Diffuse subcutaneous edema.      Impression:      Impression:  1.Soft tissue wound along the plantar medial forefoot just below the first metatarsophalangeal joint. There are associated lobulated fluid collections deep to the wound which may reflect small abscesses. There is involvement of the flexor hallucis longus   tendon at this location as well with mild associated  tenosynovitis.  2.Marrow edema and loss of normal T1 cortical and marrow signal with erosions of the first metatarsal head as well as the base of the first proximal phalanx. There is associated lobulated small joint effusion at the first metatarsophalangeal joint. These   findings are concerning for septic arthritis with osteomyelitis of the first metatarsal head and base of the first proximal phalanx. There is reactive marrow edema seen along the remainder of the first proximal phalanx and first metatarsal.        Electronically Signed: Jacob Kebede MD    1/13/2025 10:22 PM EST    Workstation ID: [id number]    XR Foot 3+ View Left [[id number]] Collected: 01/13/25 1239     Updated: 01/13/25 1246    Narrative:      XR FOOT 3+ VW LEFT, XR TOE 2+ VW RIGHT    Date of Exam: 1/13/2025 12:02 PM EST    Indication: Diabetic wound infection involving the left foot in the right great toe.    Comparison: None available.    Findings:  Right great toe: There is a cortical destruction and periostitis involving the distal phalanx, especially along the medial border and also involving the medial aspect of the head of the proximal phalanx. The findings are consistent with osteomyelitis in   the appropriate clinical setting. There is a pathologic fracture secondary to the osteomyelitis involving the medial aspect of the head of the proximal phalanx. There is soft tissue swelling. There is a relatively deep ulcer along the medial aspect of   the right great toe at the site of the bony destructive changes. There are incidental arthritic changes involving the IP joint and first MTP joint.    Left foot: There is cortical destruction, osteolysis, and periosteal reaction involving the distal shaft and head of the first metatarsal bone as well as the adjacent base of the proximal phalanx. There also may be some osteolytic changes in the base of   the distal phalanx of the left great toe. This is consistent with radiographic changes of  osteomyelitis. There is soft tissue swelling involving mainly the left great toe. Shallow ulcers are suggested along the plantar aspect of the medial left forefoot   in the location of the radiographic changes of osteomyelitis. There are minor arthritic changes involving the inner-phalangeal joints of the digits.      Impression:      Impression:  1.Radiographic changes of osteomyelitis involving the distal phalanx of the right great toe with a pathologic fracture secondary to the osteomyelitis involving the medial aspect of the head of the proximal phalanx.  2.Radiographic changes of osteomyelitis involving the distal shaft and head of the left first metatarsal bone as well as the adjacent base of the proximal phalanx. There also may be some osteolytic changes in the base of the distal phalanx of the left   great toe representing osteomyelitis.  3.Soft tissue swelling. There is a relatively deep ulcer along the medial aspect of the right great toe at the site of the bony destructive changes.  4.Soft tissue swelling and shallow ulcers along the medial aspect of the left forefoot at the site of the bony destructive changes.        Electronically Signed: Agapito Rick MD    1/13/2025 12:44 PM EST    Workstation ID: [id number]    XR Toe 2+ View Right [[id number]] Collected: 01/13/25 1239     Updated: 01/13/25 1246    Narrative:      XR FOOT 3+ VW LEFT, XR TOE 2+ VW RIGHT    Date of Exam: 1/13/2025 12:02 PM EST    Indication: Diabetic wound infection involving the left foot in the right great toe.    Comparison: None available.    Findings:  Right great toe: There is a cortical destruction and periostitis involving the distal phalanx, especially along the medial border and also involving the medial aspect of the head of the proximal phalanx. The findings are consistent with osteomyelitis in   the appropriate clinical setting. There is a pathologic fracture secondary to the osteomyelitis involving the medial aspect of the  head of the proximal phalanx. There is soft tissue swelling. There is a relatively deep ulcer along the medial aspect of   the right great toe at the site of the bony destructive changes. There are incidental arthritic changes involving the IP joint and first MTP joint.    Left foot: There is cortical destruction, osteolysis, and periosteal reaction involving the distal shaft and head of the first metatarsal bone as well as the adjacent base of the proximal phalanx. There also may be some osteolytic changes in the base of   the distal phalanx of the left great toe. This is consistent with radiographic changes of osteomyelitis. There is soft tissue swelling involving mainly the left great toe. Shallow ulcers are suggested along the plantar aspect of the medial left forefoot   in the location of the radiographic changes of osteomyelitis. There are minor arthritic changes involving the inner-phalangeal joints of the digits.      Impression:      Impression:  1.Radiographic changes of osteomyelitis involving the distal phalanx of the right great toe with a pathologic fracture secondary to the osteomyelitis involving the medial aspect of the head of the proximal phalanx.  2.Radiographic changes of osteomyelitis involving the distal shaft and head of the left first metatarsal bone as well as the adjacent base of the proximal phalanx. There also may be some osteolytic changes in the base of the distal phalanx of the left   great toe representing osteomyelitis.  3.Soft tissue swelling. There is a relatively deep ulcer along the medial aspect of the right great toe at the site of the bony destructive changes.  4.Soft tissue swelling and shallow ulcers along the medial aspect of the left forefoot at the site of the bony destructive changes.        Electronically Signed: Agapito Rick MD    1/13/2025 12:44 PM EST    Workstation ID: [id number]            Cardiology      Results Review:  I have reviewed all clinical data, test, lab,  and imaging results.       Schedule Meds  amLODIPine, 10 mg, Oral, Daily  buprenorphine, 8 mg, Sublingual, TID  cefepime, 2,000 mg, Intravenous, Once  cefepime, 2,000 mg, Intravenous, Q8H  gabapentin, 1,200 mg, Oral, Q12H  hydroCHLOROthiazide, 25 mg, Oral, Daily  insulin glargine, 30 Units, Subcutaneous, BID  insulin lispro, 2-9 Units, Subcutaneous, 4x Daily AC & at Bedtime  losartan, 100 mg, Oral, Daily  vitamin D3, 5,000 Units, Oral, Daily        Infusion Meds  Pharmacy to dose vancomycin,         PRN Meds    acetaminophen **OR** acetaminophen **OR** acetaminophen    senna-docusate sodium **AND** polyethylene glycol **AND** bisacodyl **AND** bisacodyl    Calcium Replacement - Follow Nurse / BPA Driven Protocol    dextrose    dextrose    glucagon (human recombinant)    ketorolac    Magnesium Standard Dose Replacement - Follow Nurse / BPA Driven Protocol    melatonin    ondansetron ODT **OR** ondansetron    oxyCODONE    Pharmacy to dose vancomycin    Phosphorus Replacement - Follow Nurse / BPA Driven Protocol    Potassium Replacement - Follow Nurse / BPA Driven Protocol      Assessment & Plan       Assessment    Left diabetic foot with extensive infection in the left first MTP joint.  MRI showed abscess, tenosynovitis and osteomyelitis of the first metatarsal head.  Cultures are pending    Right diabetic foot with osteomyelitis involving the right large toe.  Wound cultures are pending    Diabetes mellitus type 1 with A1c of 9.1    History of IV methamphetamine abuse per care everywhere records.      Plan    Restart IV vancomycin-ask pharmacy to monitor and dose  Start IV cefepime 2 g every 8 hours  Waiting on wound cultures  Highly recommend left first ray partial amputation and right big toe amputation but patient is refusing.  Podiatry service is following the patient and planning for I&D and bone biopsy tomorrow.  Hepatitis panel and HIV screen  Continue supportive care  A.m. labs  Patient is a high risk for  limb loss  Case discussed with podiatry  Patient will need to get his blood sugars under control to heal this infection and avoid future infections    The above note was transcribed for Dr. Turcios-physical exam and review of systems were performed by him    Latonya Marie, JESSICA  01/14/25  13:38 EST    Note is dictated utilizing voice recognition software/Dragon

## 2025-01-14 NOTE — ANESTHESIA PREPROCEDURE EVALUATION
Anesthesia Evaluation     Patient summary reviewed and Nursing notes reviewed   NPO Solid Status: > 8 hours  NPO Liquid Status: > 8 hours           Airway   Mallampati: II  TM distance: >3 FB  Neck ROM: full  No difficulty expected  Dental - normal exam     Pulmonary    Cardiovascular     (+) hypertension      Neuro/Psych  GI/Hepatic/Renal/Endo    (+) diabetes mellitus    Musculoskeletal     Abdominal    Substance History      OB/GYN          Other                    Anesthesia Plan    ASA 3     general     intravenous induction     Anesthetic plan, risks, benefits, and alternatives have been provided, discussed and informed consent has been obtained with: patient.    Plan discussed with CRNA.    CODE STATUS:    Code Status (Patient has no pulse and is not breathing): CPR (Attempt to Resuscitate)  Medical Interventions (Patient has pulse or is breathing): Full Support

## 2025-01-15 LAB
ANION GAP SERPL CALCULATED.3IONS-SCNC: 8.1 MMOL/L (ref 5–15)
BASOPHILS # BLD AUTO: 0.03 10*3/MM3 (ref 0–0.2)
BASOPHILS NFR BLD AUTO: 0.3 % (ref 0–1.5)
BUN SERPL-MCNC: 14 MG/DL (ref 6–20)
BUN/CREAT SERPL: 16.9 (ref 7–25)
CALCIUM SPEC-SCNC: 9.5 MG/DL (ref 8.6–10.5)
CHLORIDE SERPL-SCNC: 100 MMOL/L (ref 98–107)
CO2 SERPL-SCNC: 27.9 MMOL/L (ref 22–29)
CREAT SERPL-MCNC: 0.83 MG/DL (ref 0.76–1.27)
DEPRECATED RDW RBC AUTO: 39.4 FL (ref 37–54)
EGFRCR SERPLBLD CKD-EPI 2021: 109.3 ML/MIN/1.73
EOSINOPHIL # BLD AUTO: 0.01 10*3/MM3 (ref 0–0.4)
EOSINOPHIL NFR BLD AUTO: 0.1 % (ref 0.3–6.2)
ERYTHROCYTE [DISTWIDTH] IN BLOOD BY AUTOMATED COUNT: 13 % (ref 12.3–15.4)
GLUCOSE BLDC GLUCOMTR-MCNC: 244 MG/DL (ref 70–105)
GLUCOSE BLDC GLUCOMTR-MCNC: 255 MG/DL (ref 70–105)
GLUCOSE BLDC GLUCOMTR-MCNC: 257 MG/DL (ref 70–105)
GLUCOSE BLDC GLUCOMTR-MCNC: 266 MG/DL (ref 70–105)
GLUCOSE SERPL-MCNC: 312 MG/DL (ref 65–99)
HAV IGM SERPL QL IA: ABNORMAL
HBV CORE IGM SERPL QL IA: ABNORMAL
HBV SURFACE AG SERPL QL IA: ABNORMAL
HCT VFR BLD AUTO: 31.6 % (ref 37.5–51)
HCV AB SER QL: REACTIVE
HGB BLD-MCNC: 9.7 G/DL (ref 13–17.7)
HIV 1+2 AB+HIV1 P24 AG SERPL QL IA: NORMAL
IMM GRANULOCYTES # BLD AUTO: 0.04 10*3/MM3 (ref 0–0.05)
IMM GRANULOCYTES NFR BLD AUTO: 0.4 % (ref 0–0.5)
LYMPHOCYTES # BLD AUTO: 0.7 10*3/MM3 (ref 0.7–3.1)
LYMPHOCYTES NFR BLD AUTO: 7 % (ref 19.6–45.3)
MAGNESIUM SERPL-MCNC: 2.1 MG/DL (ref 1.6–2.6)
MCH RBC QN AUTO: 25.6 PG (ref 26.6–33)
MCHC RBC AUTO-ENTMCNC: 30.7 G/DL (ref 31.5–35.7)
MCV RBC AUTO: 83.4 FL (ref 79–97)
MONOCYTES # BLD AUTO: 0.11 10*3/MM3 (ref 0.1–0.9)
MONOCYTES NFR BLD AUTO: 1.1 % (ref 5–12)
NEUTROPHILS NFR BLD AUTO: 9.04 10*3/MM3 (ref 1.7–7)
NEUTROPHILS NFR BLD AUTO: 91.1 % (ref 42.7–76)
NRBC BLD AUTO-RTO: 0 /100 WBC (ref 0–0.2)
PHOSPHATE SERPL-MCNC: 3 MG/DL (ref 2.5–4.5)
PLATELET # BLD AUTO: 393 10*3/MM3 (ref 140–450)
PMV BLD AUTO: 9.1 FL (ref 6–12)
POTASSIUM SERPL-SCNC: 4.7 MMOL/L (ref 3.5–5.2)
RBC # BLD AUTO: 3.79 10*6/MM3 (ref 4.14–5.8)
SODIUM SERPL-SCNC: 136 MMOL/L (ref 136–145)
VANCOMYCIN PEAK SERPL-MCNC: 17.1 MCG/ML (ref 20–40)
VANCOMYCIN TROUGH SERPL-MCNC: 12.7 MCG/ML (ref 5–20)
WBC NRBC COR # BLD AUTO: 9.93 10*3/MM3 (ref 3.4–10.8)

## 2025-01-15 PROCEDURE — 80074 ACUTE HEPATITIS PANEL: CPT

## 2025-01-15 PROCEDURE — 97162 PT EVAL MOD COMPLEX 30 MIN: CPT

## 2025-01-15 PROCEDURE — 85025 COMPLETE CBC W/AUTO DIFF WBC: CPT

## 2025-01-15 PROCEDURE — 25810000003 SODIUM CHLORIDE 0.9 % SOLUTION 250 ML FLEX CONT

## 2025-01-15 PROCEDURE — 80202 ASSAY OF VANCOMYCIN: CPT

## 2025-01-15 PROCEDURE — 87902 NFCT AGT GNTYP ALYS HEP C: CPT | Performed by: NURSE PRACTITIONER

## 2025-01-15 PROCEDURE — 63710000001 INSULIN LISPRO (HUMAN) PER 5 UNITS

## 2025-01-15 PROCEDURE — 82948 REAGENT STRIP/BLOOD GLUCOSE: CPT

## 2025-01-15 PROCEDURE — 80048 BASIC METABOLIC PNL TOTAL CA: CPT

## 2025-01-15 PROCEDURE — 63710000001 INSULIN GLARGINE PER 5 UNITS

## 2025-01-15 PROCEDURE — G0432 EIA HIV-1/HIV-2 SCREEN: HCPCS

## 2025-01-15 PROCEDURE — 84100 ASSAY OF PHOSPHORUS: CPT

## 2025-01-15 PROCEDURE — 83735 ASSAY OF MAGNESIUM: CPT

## 2025-01-15 PROCEDURE — 25010000002 CEFEPIME PER 500 MG

## 2025-01-15 PROCEDURE — 25010000002 VANCOMYCIN 1 G RECONSTITUTED SOLUTION 1 EACH VIAL

## 2025-01-15 PROCEDURE — 87522 HEPATITIS C REVRS TRNSCRPJ: CPT | Performed by: NURSE PRACTITIONER

## 2025-01-15 RX ADMIN — INSULIN LISPRO 4 UNITS: 100 INJECTION, SOLUTION INTRAVENOUS; SUBCUTANEOUS at 07:39

## 2025-01-15 RX ADMIN — BUPRENORPHINE HCL 8 MG: 8 TABLET SUBLINGUAL at 08:20

## 2025-01-15 RX ADMIN — INSULIN LISPRO 6 UNITS: 100 INJECTION, SOLUTION INTRAVENOUS; SUBCUTANEOUS at 12:00

## 2025-01-15 RX ADMIN — INSULIN LISPRO 6 UNITS: 100 INJECTION, SOLUTION INTRAVENOUS; SUBCUTANEOUS at 21:45

## 2025-01-15 RX ADMIN — BUPRENORPHINE HCL 8 MG: 8 TABLET SUBLINGUAL at 15:36

## 2025-01-15 RX ADMIN — CEFEPIME 2000 MG: 2 INJECTION, POWDER, FOR SOLUTION INTRAVENOUS at 05:19

## 2025-01-15 RX ADMIN — GABAPENTIN 1200 MG: 400 CAPSULE ORAL at 21:44

## 2025-01-15 RX ADMIN — BUPRENORPHINE HCL 8 MG: 8 TABLET SUBLINGUAL at 21:54

## 2025-01-15 RX ADMIN — SENNOSIDES AND DOCUSATE SODIUM 2 TABLET: 50; 8.6 TABLET ORAL at 12:08

## 2025-01-15 RX ADMIN — VANCOMYCIN HYDROCHLORIDE 1000 MG: 1 INJECTION, POWDER, LYOPHILIZED, FOR SOLUTION INTRAVENOUS at 21:45

## 2025-01-15 RX ADMIN — Medication 5000 UNITS: at 08:09

## 2025-01-15 RX ADMIN — VANCOMYCIN HYDROCHLORIDE 1000 MG: 1 INJECTION, POWDER, LYOPHILIZED, FOR SOLUTION INTRAVENOUS at 05:19

## 2025-01-15 RX ADMIN — GABAPENTIN 1200 MG: 400 CAPSULE ORAL at 08:09

## 2025-01-15 RX ADMIN — CEFEPIME 2000 MG: 2 INJECTION, POWDER, FOR SOLUTION INTRAVENOUS at 23:54

## 2025-01-15 RX ADMIN — INSULIN LISPRO 6 UNITS: 100 INJECTION, SOLUTION INTRAVENOUS; SUBCUTANEOUS at 19:19

## 2025-01-15 RX ADMIN — INSULIN GLARGINE 30 UNITS: 100 INJECTION, SOLUTION SUBCUTANEOUS at 08:09

## 2025-01-15 RX ADMIN — VANCOMYCIN HYDROCHLORIDE 1000 MG: 1 INJECTION, POWDER, LYOPHILIZED, FOR SOLUTION INTRAVENOUS at 15:36

## 2025-01-15 RX ADMIN — OXYCODONE HYDROCHLORIDE 10 MG: 5 TABLET ORAL at 12:00

## 2025-01-15 RX ADMIN — OXYCODONE HYDROCHLORIDE 10 MG: 5 TABLET ORAL at 19:19

## 2025-01-15 RX ADMIN — LOSARTAN POTASSIUM 100 MG: 50 TABLET, FILM COATED ORAL at 08:08

## 2025-01-15 RX ADMIN — INSULIN GLARGINE 30 UNITS: 100 INJECTION, SOLUTION SUBCUTANEOUS at 21:45

## 2025-01-15 RX ADMIN — CEFEPIME 2000 MG: 2 INJECTION, POWDER, FOR SOLUTION INTRAVENOUS at 13:28

## 2025-01-15 RX ADMIN — OXYCODONE HYDROCHLORIDE 10 MG: 5 TABLET ORAL at 07:39

## 2025-01-15 RX ADMIN — AMLODIPINE BESYLATE 10 MG: 5 TABLET ORAL at 08:08

## 2025-01-15 RX ADMIN — HYDROCHLOROTHIAZIDE 25 MG: 25 TABLET ORAL at 08:09

## 2025-01-15 NOTE — THERAPY EVALUATION
Patient Name: Tomas Song  : 1980    MRN: [id number]                              Today's Date: 1/15/2025       Admit Date: 2025    Visit Dx:     ICD-10-CM ICD-9-CM   1. Uncontrolled type 2 diabetes mellitus with hyperglycemia  E11.65 250.02   2. Osteomyelitis of left foot, unspecified type  M86.9 730.27   3. Osteomyelitis of great toe of right foot  M86.9 730.27   4. Pain  R52 780.96     Patient Active Problem List   Diagnosis    Type 2 diabetes mellitus with hyperglycemia, with long-term current use of insulin    Essential hypertension    Vitamin D deficiency    Low testosterone    Cellulitis of left lower extremity    Osteomyelitis of both feet     Past Medical History:   Diagnosis Date    Hypertension     Type 2 diabetes mellitus      Past Surgical History:   Procedure Laterality Date    INCISION AND DRAINAGE OF WOUND Bilateral 2025    Procedure: INCISION AND DRAINAGE WOUND BILATERAL FEET;  Surgeon: Susan Garrison DPM;  Location: [facility];  Service: Podiatry;  Laterality: Bilateral;      General Information       [facility] Name 01/15/25 1145          Physical Therapy Time and Intention    Document Type evaluation  -     Mode of Treatment physical therapy  -[facility] Name 01/15/25 1145          General Information    Patient Profile Reviewed yes  -     Prior Level of Function independent:;all household mobility;community mobility;driving;ADL's  -     Existing Precautions/Restrictions fall  -     Barriers to Rehab medically complex  -[facility] Name 01/15/25 1145          Living Environment    People in Home alone  -[facility] Name 01/15/25 1145          Home Main Entrance    Number of Stairs, Main Entrance two  may DC to mom's house that has 8 [facility]  -     Stair Railings, Main Entrance railings safe and in good condition  -[facility] Name 01/15/25 1145          Stairs Within Home, Primary    Number of Stairs, Within Home, Primary none  -[facility] Name 01/15/25  1145          Cognition    Orientation Status (Cognition) oriented x 4  -       Row Name 01/15/25 1145          Safety Issues/Impairments Affecting Functional Mobility    Impairments Affecting Function (Mobility) pain  -               User Key  (r) = Recorded By, (t) = Taken By, (c) = Cosigned By      Initials Name Provider Type     Jennifer Ewing, PT Physical Therapist                   Mobility       Row Name 01/15/25 1629          Bed Mobility    Bed Mobility bed mobility (all) activities  -     All Activities, [place] (Bed Mobility) standby assist  -[facility] Name 01/15/25 1629          Bed-Chair Transfer    Bed-Chair [place] (Transfers) contact guard;minimum assist (75% patient effort)  -     Assistive Device (Bed-Chair Transfers) walker, front-wheeled  -[facility] Name 01/15/25 1629          Sit-Stand Transfer    Sit-Stand [place] (Transfers) minimum assist (75% patient effort)  -     Assistive Device (Sit-Stand Transfers) walker, front-wheeled  -[facility] Name 01/15/25 1629          Gait/Stairs (Locomotion)    [place] Level (Gait) contact guard  -     Assistive Device (Gait) walker, front-wheeled  -     Patient was able to Ambulate yes  -     Distance in Feet (Gait) 25  -     Comment, (Gait/Stairs) cues for safe proximity to device. pt maintains LLE NWB well. R post op shoe donned.  -               User Key  (r) = Recorded By, (t) = Taken By, (c) = Cosigned By      Initials Name Provider Type     Jennifer Ewing, PT Physical Therapist                   Obj/Interventions       Row Name 01/15/25 1630          Range of Motion Comprehensive    Comment, General Range of Motion BLE ankle/foot impairments post op.  -[facility] Name 01/15/25 1630          Strength Comprehensive (MMT)    Comment, General Manual Muscle Testing (MMT) Assessment hips and knee grossly 4/5 BLE. B foot/ankle not tested  -[facility] Name 01/15/25 1630          Motor Skills    Motor  Skills functional endurance  -     Functional Endurance FAIR (+)  -[facility] Name 01/15/25 1630          Balance    Balance Assessment sitting static balance;sitting dynamic balance;standing static balance;standing dynamic balance  -     Static Sitting Balance standby assist  -     Dynamic Sitting Balance standby assist  -     Position, Sitting Balance sitting edge of bed  -     Static Standing Balance contact guard  -     Dynamic Standing Balance contact guard  -     Position/Device Used, Standing Balance supported;walker, front-wheeled  -[facility] Name 01/15/25 1630          Sensory Assessment (Somatosensory)    Sensory Assessment (Somatosensory) --  neuropathy BLE.  -               User Key  (r) = Recorded By, (t) = Taken By, (c) = Cosigned By      Initials Name Provider Type     Jennifer Ewing, PT Physical Therapist                   Goals/Plan       [facility] Name 01/15/25 1634          Bed Mobility Goal 1 (PT)    Activity/Assistive Device (Bed Mobility Goal 1, PT) bed mobility activities, all  -     [place] Level/Cues Needed (Bed Mobility Goal 1, PT) independent  -     Time Frame (Bed Mobility Goal 1, PT) long term goal (LTG)  -[facility] Name 01/15/25 1634          Transfer Goal 1 (PT)    Activity/Assistive Device (Transfer Goal 1, PT) transfers, all;sit-to-stand/stand-to-sit;bed-to-chair/chair-to-bed;toilet;walker, rolling  -     [place] Level/Cues Needed (Transfer Goal 1, PT) independent  -     Time Frame (Transfer Goal 1, PT) long term goal (LTG);2 weeks  -     Strategies/Barriers (Transfers Goal 1, PT) with good hand placement and sequencing without cues needed.  -[facility] Name 01/15/25 1634          Gait Training Goal 1 (PT)    Activity/Assistive Device (Gait Training Goal 1, PT) gait (walking locomotion);assistive device use;walker, rolling  -     [place] Level (Gait Training Goal 1, PT) modified independence  -     Distance (Gait Training Goal 1, PT)  50'  -     Time Frame (Gait Training Goal 1, PT) long term goal (LTG);2 weeks  -     Strategies/Barriers (Gait Training Goal 1, PT) maintain LLE NWB  -[facility] Name 01/15/25 1634          Balance Goal 1 (PT)    Activity/Assistive Device (Balance Goal) standing static balance;standing dynamic balance;walker, rolling  -     [place] Level/Cues Needed (Balance Goal 1, PT) modified independence  -     Time Frame (Balance Goal 1, PT) long-term goal (LTG);2 weeks  -[facility] Name 01/15/25 1634          Stairs Goal 1 (PT)    Activity/Assistive Device (Stairs Goal 1, PT) stairs, all skills  with crutch and rail or with RW.  -     [place] Level/Cues Needed (Stairs Goal 1, PT) contact guard required  -     Number of Stairs (Stairs Goal 1, PT) 2  -     Strategies/Barriers (Stairs Goal 1, PT) maintain LLE NWB  -[facility]       Row Name 01/15/25 1634          Problem Specific Goal 1 (PT)    Problem Specific Goal 1 (PT) Don/doff RLE post op shoe independently.  -     Time Frame (Problem Specific Goal 1, PT) long-term goal (LTG);2 weeks  -[facility] Name 01/15/25 1634          Therapy Assessment/Plan (PT)    Planned Therapy Interventions (PT) balance training;bed mobility training;gait training;home exercise program;transfer training;strengthening;patient/family education;neuromuscular re-education  -               User Key  (r) = Recorded By, (t) = Taken By, (c) = Cosigned By      Initials Name Provider Type     Jennifer Ewing, PT Physical Therapist                   Clinical Impression       [facility] Name 01/15/25 1632          Pain    Pretreatment Pain Rating 4/10  -     Posttreatment Pain Rating 5/10  -     Pain Location foot  -     Pain Side/Orientation bilateral  -     Pain Management Interventions nursing notified;activity modification encouraged;positioning techniques utilized  -     Response to Pain Interventions activity participation with tolerable pain  -[facility] Name 01/15/25  1632          Plan of Care Review    Plan of Care Reviewed With patient  -     Outcome Evaluation Tomas Song is a 44 y.o. male with a CMH of type 2 diabetes mellitus (A1c 9.7), HTN, HLD, ADHD, opioid use disorder (on Subutex) presenting 1/13/25  with Left foot osteomyelitis / abscess with severe infection and right hallux ulcer and osteomyelitis. S/p I&D B feet. Podiatry recommended right hallux and left partial first ray amputations but pt is not agreeable at this time. Pt lives alone in an apt with 2 CRIS with rail. He works full time in construction. Usually independent with all mobility and ADLs, drives. Pt attempted to use crutches prior to hospitalization but reports it wasn't really working. Pt presents today with bandages and wraps B feet. He verbalizes WB restrictions and need for post op shoe, and demos understanding and adherence. Pt completes bed mobility independently. Gait x25' with CGA and RW. Sit to/from stand with CGA with impaired hand placement and sequencing despite edu and demo. Pt plans to DC to his house due to easier entry with the fewest steps.  He'll benefit from additional transfer training and stair training prior to DC. PT recommends walker for home use; pt agreeable. Pt anticipates pt will progress quickly and be safe to DC home without f/u therapy.  -       Row Name 01/15/25 1632          Therapy Assessment/Plan (PT)    Patient/Family Therapy Goals Statement (PT) go home.  -     Rehab Potential (PT) good  -     Criteria for Skilled Interventions Met (PT) yes;meets criteria  -     Therapy Frequency (PT) 5 times/wk  -       Row Name 01/15/25 1632          Positioning and Restraints    Post Treatment Position chair  -     In Chair notified nsg;reclined;call light within reach;encouraged to call for assist;with family/caregiver  RN okay'd no exit alarm in the chair.  -               User Key  (r) = Recorded By, (t) = Taken By, (c) = Cosigned By      Initials Name  Provider Type     Jennifer Ewing, PT Physical Therapist                   Outcome Measures       Row Name 01/15/25 1638 01/15/25 0739       How much help from another person do you currently need...    Turning from your back to your side while in flat bed without using bedrails? 4  - 4  -LB    Moving from lying on back to sitting on the side of a flat bed without bedrails? 4  - 4  -LB    Moving to and from a bed to a chair (including a wheelchair)? 3  - 3  -LB    Standing up from a chair using your arms (e.g., wheelchair, bedside chair)? 3  - 3  -LB    Climbing 3-5 steps with a railing? 2  - 2  -LB    To walk in hospital room? 3  - 2  -LB    AM-PAC 6 Clicks Score (PT) 19  - 18  -LB              User Key  (r) = Recorded By, (t) = Taken By, (c) = Cosigned By      Initials Name Provider Type     Faye Verdugo RN Registered Nurse     Jennifer Ewing, PT Physical Therapist                                 Physical Therapy Education       Title: PT OT SLP Therapies (Done)       Topic: Physical Therapy (Done)       Point: Mobility training (Done)       Learning Progress Summary            Patient Acceptance, E, VU,NR by  at 1/15/2025 1639                      Point: Home exercise program (Done)       Learning Progress Summary            Patient Acceptance, E, VU,NR by  at 1/15/2025 1639                      Point: Body mechanics (Done)       Learning Progress Summary            Patient Acceptance, E, VU,NR by  at 1/15/2025 1639                      Point: Precautions (Done)       Learning Progress Summary            Patient Acceptance, E, VU,NR by  at 1/15/2025 1639                                      User Key       Initials Effective Dates Name Provider Type [facility] 12/04/23 -  Jennifer Ewing, PT Physical Therapist PT                  PT Recommendation and Plan  Planned Therapy Interventions (PT): balance training, bed mobility training, gait training, home exercise program,  transfer training, strengthening, patient/family education, neuromuscular re-education  Outcome Evaluation: Tomas Song is a 44 y.o. male with a CMH of type 2 diabetes mellitus (A1c 9.7), HTN, HLD, ADHD, opioid use disorder (on Subutex) presenting 1/13/25  with Left foot osteomyelitis / abscess with severe infection and right hallux ulcer and osteomyelitis. S/p I&D B feet. Podiatry recommended right hallux and left partial first ray amputations but pt is not agreeable at this time. Pt lives alone in an apt with 2 CRIS with rail. He works full time in construction. Usually independent with all mobility and ADLs, drives. Pt attempted to use crutches prior to hospitalization but reports it wasn't really working. Pt presents today with bandages and wraps B feet. He verbalizes WB restrictions and need for post op shoe, and demos understanding and adherence. Pt completes bed mobility independently. Gait x25' with CGA and RW. Sit to/from stand with CGA with impaired hand placement and sequencing despite edu and demo. Pt plans to DC to his house due to easier entry with the fewest steps.  He'll benefit from additional transfer training and stair training prior to DC. PT recommends walker for home use; pt agreeable. Pt anticipates pt will progress quickly and be safe to DC home without f/u therapy.     Time Calculation:         PT Charges       Row Name 01/15/25 1639             Time Calculation    Start Time 1139  -      Stop Time 1208  -      Time Calculation (min) 29 min  -      PT Non-Billable Time (min) 0 min  -      PT Received On 01/15/25  -      PT - Next Appointment 01/16/25  -      PT Goal Re-Cert Due Date 01/29/25  -         Time Calculation- PT    Total Timed Code Minutes- PT 0 minute(s)  -                User Key  (r) = Recorded By, (t) = Taken By, (c) = Cosigned By      Initials Name Provider Type    Jennifer Allred, PT Physical Therapist                  Therapy Charges for Today        Code Description Service Date Service Provider Modifiers Qty    44669602825 HC PT EVAL MOD COMPLEXITY 4 1/15/2025 Jennifer Ewing, PT GP 1            PT G-Codes  AM-PAC 6 Clicks Score (PT): 19  PT Discharge Summary  Anticipated Discharge Disposition (PT): home with assist    Jennifer Ewing, PT  1/15/2025

## 2025-01-15 NOTE — PROGRESS NOTES
Infectious Diseases Progress Note      LOS: 2 days   Patient Care Team:  Ty Gao DO as PCP - General (Internal Medicine)    Chief Complaint: Left foot wound    Subjective       The patient has been afebrile for the last 24 hours.  The patient is on room air, hemodynamically stable, and is tolerating antimicrobial therapy.  Patient is status post surgery      Review of Systems:   Review of Systems   Constitutional: Negative.    HENT: Negative.     Eyes: Negative.    Respiratory: Negative.     Cardiovascular: Negative.    Gastrointestinal: Negative.    Endocrine: Negative.    Genitourinary: Negative.    Musculoskeletal: Negative.    Skin: Negative.  Positive for wound.   Neurological: Negative.    Psychiatric/Behavioral: Negative.     All other systems reviewed and are negative.       Objective     Vital Signs  Temp:  [97.6 °F (36.4 °C)-98.9 °F (37.2 °C)] 98.3 °F (36.8 °C)  Heart Rate:  [] 99  Resp:  [7-16] 15  BP: ()/() 136/65    Physical Exam:  Physical Exam  Vitals and nursing note reviewed.   Constitutional:       General: He is not in acute distress.     Appearance: Normal appearance. He is well-developed and normal weight. He is not diaphoretic.   HENT:      Head: Normocephalic and atraumatic.   Eyes:      Conjunctiva/sclera: Conjunctivae normal.      Pupils: Pupils are equal, round, and reactive to light.   Cardiovascular:      Rate and Rhythm: Normal rate and regular rhythm.      Heart sounds: Normal heart sounds, S1 normal and S2 normal.   Pulmonary:      Effort: Pulmonary effort is normal. No respiratory distress.      Breath sounds: Normal breath sounds. No stridor. No wheezing or rales.   Abdominal:      General: Bowel sounds are normal. There is no distension.      Palpations: Abdomen is soft. There is no mass.      Tenderness: There is no abdominal tenderness. There is no guarding.   Musculoskeletal:         General: No deformity. Normal range of motion.      Cervical back:  Neck supple.   Skin:     General: Skin is warm and dry.      Coloration: Skin is not pale.      Findings: No erythema or rash.      Comments: Dressings on both feet   Neurological:      Mental Status: He is alert and oriented to person, place, and time.      Cranial Nerves: No cranial nerve deficit.   Psychiatric:         Mood and Affect: Mood normal.          Results Review:    I have reviewed all clinical data, test, lab, and imaging results.     Radiology  No Radiology Exams Resulted Within Past 24 Hours    Cardiology    Laboratory    Results from last 7 days   Lab Units 01/15/25  0024 01/14/25 0418 01/13/25  1226   WBC 10*3/mm3 9.93 5.51 5.97   HEMOGLOBIN g/dL 9.7* 9.4* 9.7*   HEMATOCRIT % 31.6* 30.0* 31.7*   PLATELETS 10*3/mm3 393 353 410     Results from last 7 days   Lab Units 01/15/25  0024 01/14/25 0418 01/13/25  1226   SODIUM mmol/L 136 137 139   POTASSIUM mmol/L 4.7 4.2 4.0   CHLORIDE mmol/L 100 104 99   CO2 mmol/L 27.9 25.0 30.6*   BUN mg/dL 14 11 12   CREATININE mg/dL 0.83 0.69* 0.96   GLUCOSE mg/dL 312* 186* 180*   ALBUMIN g/dL  --   --  3.5   BILIRUBIN mg/dL  --   --  0.2   ALK PHOS U/L  --   --  104   AST (SGOT) U/L  --   --  20   ALT (SGPT) U/L  --   --  19   CALCIUM mg/dL 9.5 9.0 9.5         Results from last 7 days   Lab Units 01/13/25  1226   SED RATE mm/hr 40*         Microbiology   Microbiology Results (last 10 days)       Procedure Component Value - Date/Time    Tissue / Bone Culture - Bone, Foot, Left [[id number]]  (Abnormal) Collected: 01/14/25 1651    Lab Status: Preliminary result Specimen: Bone from Foot, Left Updated: 01/15/25 0996     Tissue Culture Scant growth (1+) Gram Negative Bacilli      Rare growth Proteus species      Scant growth (1+) Gram Positive Cocci     Gram Stain Rare (1+) WBCs per low power field      Rare (1+) Gram positive cocci in pairs    Tissue / Bone Culture - Tissue, Foot, Left [[id number]]  (Abnormal) Collected: 01/14/25 6730    Lab Status: Preliminary result  Specimen: Tissue from Foot, Left Updated: 01/15/25 1001     Tissue Culture Scant growth (1+) Gram Negative Bacilli      Rare growth Proteus species     Gram Stain Moderate (3+) WBCs per low power field      Moderate (3+) Gram negative bacilli      Moderate (3+) Gram positive cocci    MRSA Screen, PCR (Inpatient) - Swab, Nares [[id number]]  (Abnormal) Collected: 01/13/25 1422    Lab Status: Final result Specimen: Swab from Nares Updated: 01/13/25 1547     MRSA PCR MRSA Detected    Narrative:      The negative predictive value of this diagnostic test is high and should only be used to consider de-escalating anti-MRSA therapy. A positive result may indicate colonization with MRSA and must be correlated clinically.    Blood Culture - Blood, Arm, Right [[id number]]  (Normal) Collected: 01/13/25 1226    Lab Status: Preliminary result Specimen: Blood from Arm, Right Updated: 01/15/25 1245     Blood Culture No growth at 2 days    Wound Culture - Wound, Foot, Left [[id number]]  (Abnormal)  (Susceptibility) Collected: 01/13/25 1203    Lab Status: Preliminary result Specimen: Wound from Foot, Left Updated: 01/15/25 0917     Wound Culture Light growth (2+) Escherichia coli      Light growth (2+) Gram Positive Cocci      Rare growth Proteus species     Gram Stain Many (4+) WBCs per low power field      Moderate (3+) Gram positive cocci in pairs, chains and clusters      Few (2+) Gram negative bacilli      Few (2+) Gram positive bacilli    Susceptibility        Escherichia coli      JOE      Amikacin Susceptible      Amoxicillin + Clavulanate Susceptible      Ampicillin Resistant      Ampicillin + Sulbactam Resistant      Cefazolin (Non Urine) Intermediate      Cefepime Susceptible      Ceftazidime Susceptible      Ceftriaxone Susceptible      Gentamicin Resistant      Levofloxacin Susceptible      Piperacillin + Tazobactam Susceptible      Tetracycline Resistant      Tobramycin Intermediate      Trimethoprim + Sulfamethoxazole  Resistant                       Susceptibility Comments       Escherichia coli    With the exception of urinary-sourced infections, aminoglycosides should not be used as monotherapy.               Blood Culture - Blood, Arm, Right [[id number]]  (Normal) Collected: 01/13/25 1201    Lab Status: Preliminary result Specimen: Blood from Arm, Right Updated: 01/15/25 1215     Blood Culture No growth at 2 days            Medication Review:       Schedule Meds  amLODIPine, 10 mg, Oral, Daily  buprenorphine, 8 mg, Sublingual, TID  cefepime, 2,000 mg, Intravenous, Q8H  gabapentin, 1,200 mg, Oral, Q12H  hydroCHLOROthiazide, 25 mg, Oral, Daily  insulin glargine, 30 Units, Subcutaneous, BID  insulin lispro, 2-9 Units, Subcutaneous, 4x Daily AC & at Bedtime  losartan, 100 mg, Oral, Daily  vancomycin, 1,000 mg, Intravenous, Q8H  vitamin D3, 5,000 Units, Oral, Daily        Infusion Meds  Pharmacy to dose vancomycin,         PRN Meds    acetaminophen **OR** acetaminophen **OR** acetaminophen    atropine    senna-docusate sodium **AND** polyethylene glycol **AND** bisacodyl **AND** bisacodyl    Calcium Replacement - Follow Nurse / BPA Driven Protocol    dextrose    dextrose    diphenhydrAMINE    diphenhydrAMINE    ePHEDrine Sulfate (Pressors)    glucagon (human recombinant)    hydrALAZINE    HYDROmorphone    HYDROmorphone    ipratropium-albuterol    ketorolac    labetalol    lidocaine (cardiac)    Magnesium Standard Dose Replacement - Follow Nurse / BPA Driven Protocol    melatonin    naloxone    ondansetron ODT **OR** ondansetron    ondansetron    oxyCODONE    oxyCODONE    oxyCODONE    Pharmacy to dose vancomycin    Phosphorus Replacement - Follow Nurse / BPA Driven Protocol    Potassium Replacement - Follow Nurse / BPA Driven Protocol        Assessment & Plan       Antimicrobial Therapy   1.  IV vancomycin        2.  IV cefepime        3.        4.        5.          Assessment     Left diabetic foot with extensive infection in the  left first MTP joint.  MRI showed abscess, tenosynovitis and osteomyelitis of the first metatarsal head.  Initial culture growing E. coli, gram-positive cocci and Proteus species.  Patient status post incision and drainage with bone biopsy on 1/14/2024.  Purulent drainage noted and interoperative cultures growing gram-negative bacilli, gram-positive cocci and Proteus species.     Right diabetic foot with osteomyelitis involving the right large toe.  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  Patient notes to remote drug use but denies IV drug abuse..  HIV screen was negative.  Drug screen positive for THC, methamphetamines, amphetamines benzodiazepines and buprenorphine.  Of note patient does have Adderall on his home medication left    Hepatitis C infection.  Diagnosed this admission.       Plan     Continue V vancomycin-ask pharmacy to monitor and dose  Continue IV cefepime 2 g every 8 hours  Waiting on wound cultures  Waiting on bone biopsies  Hepatitis C RNA PCR and genotype  Continue supportive care  A.m. labs  Patient is a high risk for limb loss  Case discussed with patient and family member at bedside  Patient will need to get his blood sugars under control to heal this infection and avoid future infections    Latonya Marie, JESSICA  01/15/25  16:11 EST    Note is dictated utilizing voice recognition software/Dragon

## 2025-01-15 NOTE — PROGRESS NOTES
"Pharmacy Antimicrobial Dosing Service    Subjective:  Tomas Song is a 44 y.o.male admitted with osteomyelitis. Pharmacy has been consulted to dose Vancomycin for possible bone and joint infection.    PMH: Hypertension, type 2 diabetes, diabetic neuropathy      Assessment/Plan    1. Day #3 Vancomycin: Goal -600 mcg*h/mL. Patient received vancomycin 2g (22mg/kg ABW) once, 24 hr later another vancomycin 1500 mg (16mg/kg ABW) x 1 dose, followed by Vancomycin 1000mg (11mg/kg ABW) q8h. Based on vancomycin peak and trough levels collected on 1/15/25, predicted AUC is 474 mcg*h/mL. Will continue current dose and schedule next vancomycin trough in 2 days.     2. Day #2 Cefepime: 2000mg IV q8h for estCrCl > 60 mL/min.    Will continue to monitor drug levels, renal function, culture and sensitivities, and patient clinical status.       Objective:  Relevant clinical data and objective history reviewed:  185.4 cm (73\")   95.5 kg (210 lb 7 oz)   Ideal body weight: 79.9 kg (176 lb 2.4 oz)  Adjusted ideal body weight: 86.1 kg (189 lb 13.8 oz)  Body mass index is 27.76 kg/m².    Results from last 7 days   Lab Units 01/15/25  1345 01/15/25  1017   VANCOMYCIN PK mcg/mL  --  17.10*   VANCOMYCIN TR mcg/mL 12.70  --      Results from last 7 days   Lab Units 01/15/25  0024 01/14/25 0418 01/13/25  1226   CREATININE mg/dL 0.83 0.69* 0.96     Estimated Creatinine Clearance: 150.2 mL/min (by C-G formula based on SCr of 0.83 mg/dL).  I/O last 3 completed shifts:  In: 1860 [P.O.:960; I.V.:400; IV Piggyback:500]  Out: 3300 [Urine:3300]    Results from last 7 days   Lab Units 01/15/25  0024 01/14/25  0418 01/13/25  1226   WBC 10*3/mm3 9.93 5.51 5.97     Temperature    01/15/25 0431 01/15/25 0746 01/15/25 1127   Temp: 98.4 °F (36.9 °C) 97.6 °F (36.4 °C) 98.3 °F (36.8 °C)     Baseline culture/source/susceptibility:  Microbiology Results (last 10 days)       Procedure Component Value - Date/Time    Tissue / Bone Culture - Bone, " Foot, Left [[id number]]  (Abnormal) Collected: 01/14/25 1651    Lab Status: Preliminary result Specimen: Bone from Foot, Left Updated: 01/15/25 0946     Tissue Culture Scant growth (1+) Gram Negative Bacilli      Rare growth Proteus species      Scant growth (1+) Gram Positive Cocci     Gram Stain Rare (1+) WBCs per low power field      Rare (1+) Gram positive cocci in pairs    Tissue / Bone Culture - Tissue, Foot, Left [[id number]]  (Abnormal) Collected: 01/14/25 1645    Lab Status: Preliminary result Specimen: Tissue from Foot, Left Updated: 01/15/25 1001     Tissue Culture Scant growth (1+) Gram Negative Bacilli      Rare growth Proteus species     Gram Stain Moderate (3+) WBCs per low power field      Moderate (3+) Gram negative bacilli      Moderate (3+) Gram positive cocci    MRSA Screen, PCR (Inpatient) - Swab, Nares [[id number]]  (Abnormal) Collected: 01/13/25 1422    Lab Status: Final result Specimen: Swab from Nares Updated: 01/13/25 1547     MRSA PCR MRSA Detected    Narrative:      The negative predictive value of this diagnostic test is high and should only be used to consider de-escalating anti-MRSA therapy. A positive result may indicate colonization with MRSA and must be correlated clinically.    Blood Culture - Blood, Arm, Right [[id number]]  (Normal) Collected: 01/13/25 1226    Lab Status: Preliminary result Specimen: Blood from Arm, Right Updated: 01/15/25 1245     Blood Culture No growth at 2 days    Wound Culture - Wound, Foot, Left [[id number]]  (Abnormal)  (Susceptibility) Collected: 01/13/25 1203    Lab Status: Preliminary result Specimen: Wound from Foot, Left Updated: 01/15/25 0917     Wound Culture Light growth (2+) Escherichia coli      Light growth (2+) Gram Positive Cocci      Rare growth Proteus species     Gram Stain Many (4+) WBCs per low power field      Moderate (3+) Gram positive cocci in pairs, chains and clusters      Few (2+) Gram negative bacilli      Few (2+) Gram positive bacilli     Susceptibility        Escherichia coli      JOE      Amikacin 4 ug/ml Susceptible      Amoxicillin + Clavulanate 8 ug/ml Susceptible      Ampicillin >=32 ug/ml Resistant      Ampicillin + Sulbactam >=32 ug/ml Resistant      Cefazolin (Non Urine) 4 ug/ml Intermediate      Cefepime <=0.12 ug/ml Susceptible      Ceftazidime <=0.5 ug/ml Susceptible      Ceftriaxone <=0.25 ug/ml Susceptible      Gentamicin >=16 ug/ml Resistant      Levofloxacin <=0.12 ug/ml Susceptible      Piperacillin + Tazobactam <=4 ug/ml Susceptible      Tetracycline >=16 ug/ml Resistant      Tobramycin 8 ug/ml Intermediate      Trimethoprim + Sulfamethoxazole >=320 ug/ml Resistant                       Susceptibility Comments       Escherichia coli    With the exception of urinary-sourced infections, aminoglycosides should not be used as monotherapy.               Blood Culture - Blood, Arm, Right [[id number]]  (Normal) Collected: 01/13/25 1201    Lab Status: Preliminary result Specimen: Blood from Arm, Right Updated: 01/15/25 1215     Blood Culture No growth at 2 days            Destinee Bullard RP  01/15/25 14:31 EST

## 2025-01-15 NOTE — OP NOTE
Operative Report    PATIENT NAME:   Tomas Song  YOB: 1980  MEDICAL RECORD #: [id number]     DATE OF PROCEDURE: 01/14/25     SURGEON(S): Surgeons and Role:     * Susan Garrison DPM - Primary     PREOPERATIVE DIAGNOSIS:   Left foot abscess  Left foot diabetic foot ulcer and cellulitis  Left foot osteomyelitis  Right hallux diabetic foot ulcer  Right hallux osteomyelitis     POSTOPERATIVE DIAGNOSIS:    Left foot abscess  Left foot diabetic foot ulcer and cellulitis  Left foot osteomyelitis  Right hallux diabetic foot ulcer  Right hallux osteomyelitis     OPERATIVE PROCEDURE:  INCISION AND DRAINAGE WOUND BILATERAL FEET:      ANESTHESIA:  LMA with local block using 20 mL of 1% Lidocaine preoperatively     HEMOSTASIS: PAT @ 200 mmHg x 22 minutes Left ankle, manual compression right foot    IMPLANTS: * No implants in log *    INJECTABLES: Local anesthesia    PATHOLOGY:   ID Type Source Tests Collected by Time   1 : LEFT FOOT SOFT TISSUE FOR AEROBIC AND ANAEROBIC CULTURE Tissue Foot, Left ANAEROBIC CULTURE, TISSUE / BONE CULTURE Susan Garrison, DPM 1/14/2025 1645   2 : LEFT FIRST METATARSAL BONE FOR AEROBIC AND ANAEROBIC CULTURE. Bone Foot, Left ANAEROBIC CULTURE, TISSUE / BONE CULTURE Susan Garrison DPM 1/14/2025 1651   A : LEFT FIRST METATARSAL BONE. Bone Foot, Left TISSUE PATHOLOGY EXAM Susan Garrison DPM 1/14/2025 1652   B : RIGHT HALLUX BONE Bone Foot, Right TISSUE PATHOLOGY EXAM Susan Garrison, JOSE DANIELM 1/14/2025 1646       DRAINS: None     COMPLICATIONS: None    ESTIMATED BLOOD LOSS: 10 mL    INDICATIONS FOR PROCEDURE:  This is a 44 year old male patient who presented to the ED with severe infection to the left foot. Imaging revealed abscess and osteomyelitis to bilateral feet. Discussed the options with patient and recommended surgical intervention to assist in source control of infection. After discussing all potential risks, benefits, complications, and alternatives, they  elected to proceed with surgical intervention.         DESCRIPTION OF PROCEDURE:  The patient was brought to the operating room and was placed on the operating room table in the supine position.  A timeout was performed to identify the patient, surgical site, and procedure. All OR staff and surgeons were in agreement. A pneumatic ankle tourniquet was then placed over the patient's left ankle.  Following IV sedation, local anesthesia was obtained using 10 mL of 1% Lidocaine plain. The feet were then scrubbed, prepped and draped in the usual aseptic manner. The tourniquet was then inflated to 200 mmHg.    Attention was fist directed to the right foot where a wound was noted to the medial hallux IPJ masuring approximatey 1 cm x 0.5 cm. A linear incision was made to the medial aspect of the hallux just superior to the wound. The wound was inspected and no fluid collection was identified however the bone was easily palpated. The wound was debrided and noted to probe to bone. Given proximity to the bone, a rongeur was utilized to remove specimen from the right hallux bone deep to the wound. The bone was noted to be soft. This was passed off the back table for lab evaluation.     Attention was then directed to the left foot where two ulcers were noted. The index ulcer sub 1st metatarsal head was noted to measure 1.5 cm x 1.5 cm and a proximal wound measuring 0.5 cm x 0.5 cm. The distal wound was noted to probe to bone. Purulent drainage was noted. A freer was fist utilized to evaluate the tunneling. Next a #15 blade was utilized to create a linear incision between the two wounds. Approximately 5 mL of purulence was expressed from the foot at this time. A rongeur was utilized to remove nonviable tissue which was sent to the lab for culture evaluation. Majority of the nonviable tissue was located deep to the distal wound.The bone was easily identifiable and two bone specimens were taken and passed off the field, one for  "pathology and one for culture. Once all purulence has been expressed and nonviable tissue removed, the surgical siteds were flush.     The surgical sites were then flushed with 3L of Xperience solution in pulse lavage fashion.The right foot incision and wound were closed with 3-0 nylon suture.  The left foot incision and wounds were gently reapproximated with 2-0 prolene suture and packed with 1/4\" iodoform packing.  The surgical site was dressed with betadine soaked 4 x 4 gauze, clean gauze, ABD pad, Kerlix, and ACE bandage. The tourniquet was then deflated for a total time of 22 minutes and prompt hyperemic response was noted.     The patient tolerated the procedure well and was transferred to the recovery room with all vital signs stable and vascular status intact to bilateral feet. Following postoperative monitoring, the patient will return to the floor. Patient will followed up on tomorrow.     POSTOPERATIVE PLAN:   1) Keep surgical dressings clean, dry, and intact. OK to reinforce if needed.  2) Elevate operative feet above the level of the heart as often as possible.   3) Weightbearing Status: Nonweightbearing to Left lower extremity, WBAT with surgical shoe RLE  4) Ortho ID consult for antibiotic recommendations.    "

## 2025-01-15 NOTE — PLAN OF CARE
Goal Outcome Evaluation:  Plan of Care Reviewed With: patient           Outcome Evaluation: Tomas Song is a 44 y.o. male with a CMH of type 2 diabetes mellitus (A1c 9.7), HTN, HLD, ADHD, opioid use disorder (on Subutex) presenting 1/13/25  with Left foot osteomyelitis / abscess with severe infection and right hallux ulcer and osteomyelitis. S/p I&D B feet. Podiatry recommended right hallux and left partial first ray amputations but pt is not agreeable at this time. Pt lives alone in an apt with 2 CRIS with rail. He works full time in construction. Usually independent with all mobility and ADLs, drives. Pt attempted to use crutches prior to hospitalization but reports it wasn't really working. Pt presents today with bandages and wraps B feet. He verbalizes WB restrictions and need for post op shoe, and demos understanding and adherence. Pt completes bed mobility independently. Gait x25' with CGA and RW. Sit to/from stand with CGA with impaired hand placement and sequencing despite edu and demo. Pt plans to DC to his house due to easier entry with the fewest steps.  He'll benefit from additional transfer training and stair training prior to DC. PT recommends walker for home use; pt agreeable. Pt anticipates pt will progress quickly and be safe to DC home without f/u therapy.    Anticipated Discharge Disposition (PT): home with assist

## 2025-01-15 NOTE — CASE MANAGEMENT/SOCIAL WORK
Continued Stay Note   Kevin     Patient Name: Tomas Song  MRN: [id number]  Today's Date: 1/15/2025    Admit Date: 1/13/2025    Plan: From home alone.  May stay with Mom at dc   Watch for IV antibiotics at dc   Discharge Plan       Row Name 01/15/25 1630       Plan    Plan From home alone.  May stay with Mom at dc   Watch for IV antibiotics at dc    Plan Comments DC barriers: 01/14/25 1643   INCISION AND DRAINAGE WOUND BILATERAL FEET   , IV vanc, cefipime, wound culture pending bone culture pending   possible repeat washout of left foot on Friday                    Expected Discharge Date and Time       Expected Discharge Date Expected Discharge Time    Jan 17, 2025             Margie Darke RN    [facility]  [email or web address]  Office [phone]  Cell [phone]

## 2025-01-15 NOTE — PLAN OF CARE
Goal Outcome Evaluation:  Plan of Care Reviewed With: patient        Progress: no change  Outcome Evaluation: Pt VSS, Pt is able to make needs known, Pt had a LIANA foot surgeries, Left foot NWB and R foot WBAT with a surgical boot on, IV antibiotics, Call light in reach, Plan on going

## 2025-01-15 NOTE — PLAN OF CARE
Goal Outcome Evaluation:              Outcome Evaluation: pt doing well with pain control and ambulation around room with PT. Pts dressings were changed my MD today.

## 2025-01-15 NOTE — PROGRESS NOTES
LOS: 2 days   Patient Care Team:  Ty Gao DO as PCP - General (Internal Medicine)    Chief Complaint:  Bilateral foot osteomyelitis    Subjective     Interval History: S/p bilateral foot I&D (DOS 1/14/25). Denies pain right foot, left foot with minimal throbbing.     Patient Complaints: none  Patient Denies:  n/v/f/c/sob  History taken from: patient    Review of Systems:  Pertinent positives in HPI  + difficulty with bowel movement       Objective     Vital Signs  Temp:  [97.6 °F (36.4 °C)-98.9 °F (37.2 °C)] 97.6 °F (36.4 °C)  Heart Rate:  [] 104  Resp:  [7-16] 14  BP: ()/() 140/77    Physical Exam:  Vascular:  - Difficult to palpate 2/2 edema  - Moderate edema, bilateral legs / left foot and right hallux - edema improving. Skin tension lines becoming apparent.   - Capillary refill time <5 seconds to all digits, bilaterally  - Mild erythema left forefoot / bilateral legs - improving.    Dermatological:  Right hallux incisions intact. No open wounds. No drainage Moderate edema.   Left plantar foot loosely closed with sutures intact. Mcaration noted to the borders of wound / incision. Minimal purulence expressed.     Neurologic:  - Protective sensation diminished, bilaterally  - Sensation intact to light touch, bilaterally    Musculoskeletal:  - Some pain with palpation around surgical site, left foot.   - Deformities: None  - Muscle strength 5/5 for all muscle groups of the lower extremities, bilaterally.       Labs:  Results from last 7 days   Lab Units 01/15/25  0024   WBC 10*3/mm3 9.93   HEMOGLOBIN g/dL 9.7*   HEMATOCRIT % 31.6*   PLATELETS 10*3/mm3 393     Results from last 7 days   Lab Units 01/15/25  0024   SODIUM mmol/L 136   POTASSIUM mmol/L 4.7   CHLORIDE mmol/L 100   CO2 mmol/L 27.9   BUN mg/dL 14   CREATININE mg/dL 0.83   GLUCOSE mg/dL 312*   CALCIUM mg/dL 9.5     Glucose   Date Value Ref Range Status   01/15/2025 312 (H) 65 - 99 mg/dL Final   01/14/2025 186 (H) 65 - 99  "mg/dL Final   01/13/2025 180 (H) 65 - 99 mg/dL Final     Results from last 7 days   Lab Units 01/13/25  1226   SED RATE mm/hr 40*     Results from last 7 days   Lab Units 01/13/25  1226   CRP mg/dL 5.29*         No components found for: \"HBA1C\"    Imaging:   Imaging Results (All)       Procedure Component Value Units Date/Time    MRI Foot Right Without Contrast [[id number]] Collected: 01/13/25 2238     Updated: 01/13/25 2250    Narrative:      MRI FOOT RIGHT WO CONTRAST    Date of Exam: 1/13/2025 7:35 PM EST    Indication: Right hallux osteomyelitis.     Comparison: Toe radiographs 1/13/2025    Technique:  Routine multiplanar/multisequence sequence images of the right foot were obtained without contrast administration.      Findings:  Bones: There is marrow edema of the first proximal and distal phalanges with associated loss of normal T1 marrow and cortical signal along the medial aspect of the first interphalangeal joint. No additional areas of abnormal marrow edema. No suspicious   osseous lesions. Small effusion of the first metatarsophalangeal joint. Small to moderate effusion in the first interphalangeal joint.    Soft tissues: Lisfranc ligament is intact. Plantar plates appear intact. Visualized tendons appear grossly intact. Diffuse subcutaneous edema. Atrophy of the intrinsic foot muscles. There is soft tissue wound along the medial aspect of the first toe with   underlying lobulated fluid which may reflect small abscesses. Small mild intermetatarsal bursal fluid in the first, second and third interspaces.      Impression:      Impression:  1.Soft tissue wound along the medial aspect of the first toe with underlying lobulated fluid which may reflect small abscesses.  2.Marrow edema of the first proximal and distal phalanges with associated loss of normal T1 marrow and cortical signal along the medial aspect of the first interphalangeal joint. These findings are suspicious for osteomyelitis. There is also " adjacent   small to moderate effusion in the first interphalangeal joint concerning for septic arthritis.        Electronically Signed: Jacob Kebede MD    1/13/2025 10:48 PM EST    Workstation ID: [id number]    MRI Foot Left Without Contrast [[id number]] Collected: 01/13/25 2212     Updated: 01/13/25 2224    Narrative:      MRI FOOT LEFT WO CONTRAST    Date of Exam: 1/13/2025 7:35 PM EST    Indication: Evaluate abcess and osteomyelitis.     Comparison: One 1325 foot radiographs    Technique:  Routine multiplanar/multisequence sequence images of the left foot were obtained without contrast administration.      Findings:  Bones and joints: Marrow edema and loss of normal T1 cortical and marrow signal with erosions of the first metatarsal head as well as the base of the first proximal phalanx. There is associated lobulated small joint effusion at the first   metatarsophalangeal joint. There is marrow edema seen throughout the remainder of the first metatarsal and the first proximal phalanx. Minimal marrow edema seen along the first distal phalanx. No additional areas of abnormal marrow edema. No suspicious   osseous lesions.    Soft tissues: Soft tissue wound along the plantar medial forefoot just below the first metatarsophalangeal joint. There are associated lobulated fluid seen deep to the wound which may reflect small abscesses. There is involvement of the flexor houses   longus tendon at this location as well with mild associated tenosynovitis. Remaining tendons appear grossly intact. Lisfranc ligament is intact. The plantar plates of the second through fifth digits appear intact. Diffuse subcutaneous edema.      Impression:      Impression:  1.Soft tissue wound along the plantar medial forefoot just below the first metatarsophalangeal joint. There are associated lobulated fluid collections deep to the wound which may reflect small abscesses. There is involvement of the flexor hallucis longus   tendon at this  location as well with mild associated tenosynovitis.  2.Marrow edema and loss of normal T1 cortical and marrow signal with erosions of the first metatarsal head as well as the base of the first proximal phalanx. There is associated lobulated small joint effusion at the first metatarsophalangeal joint. These   findings are concerning for septic arthritis with osteomyelitis of the first metatarsal head and base of the first proximal phalanx. There is reactive marrow edema seen along the remainder of the first proximal phalanx and first metatarsal.        Electronically Signed: Jacob Kebede MD    1/13/2025 10:22 PM EST    Workstation ID: [id number]    XR Foot 3+ View Left [[id number]] Collected: 01/13/25 1239     Updated: 01/13/25 1246    Narrative:      XR FOOT 3+ VW LEFT, XR TOE 2+ VW RIGHT    Date of Exam: 1/13/2025 12:02 PM EST    Indication: Diabetic wound infection involving the left foot in the right great toe.    Comparison: None available.    Findings:  Right great toe: There is a cortical destruction and periostitis involving the distal phalanx, especially along the medial border and also involving the medial aspect of the head of the proximal phalanx. The findings are consistent with osteomyelitis in   the appropriate clinical setting. There is a pathologic fracture secondary to the osteomyelitis involving the medial aspect of the head of the proximal phalanx. There is soft tissue swelling. There is a relatively deep ulcer along the medial aspect of   the right great toe at the site of the bony destructive changes. There are incidental arthritic changes involving the IP joint and first MTP joint.    Left foot: There is cortical destruction, osteolysis, and periosteal reaction involving the distal shaft and head of the first metatarsal bone as well as the adjacent base of the proximal phalanx. There also may be some osteolytic changes in the base of   the distal phalanx of the left great toe. This is  consistent with radiographic changes of osteomyelitis. There is soft tissue swelling involving mainly the left great toe. Shallow ulcers are suggested along the plantar aspect of the medial left forefoot   in the location of the radiographic changes of osteomyelitis. There are minor arthritic changes involving the inner-phalangeal joints of the digits.      Impression:      Impression:  1.Radiographic changes of osteomyelitis involving the distal phalanx of the right great toe with a pathologic fracture secondary to the osteomyelitis involving the medial aspect of the head of the proximal phalanx.  2.Radiographic changes of osteomyelitis involving the distal shaft and head of the left first metatarsal bone as well as the adjacent base of the proximal phalanx. There also may be some osteolytic changes in the base of the distal phalanx of the left   great toe representing osteomyelitis.  3.Soft tissue swelling. There is a relatively deep ulcer along the medial aspect of the right great toe at the site of the bony destructive changes.  4.Soft tissue swelling and shallow ulcers along the medial aspect of the left forefoot at the site of the bony destructive changes.        Electronically Signed: Agapito Rick MD    1/13/2025 12:44 PM EST    Workstation ID: [id number]    XR Toe 2+ View Right [[id number]] Collected: 01/13/25 1239     Updated: 01/13/25 1246    Narrative:      XR FOOT 3+ VW LEFT, XR TOE 2+ VW RIGHT    Date of Exam: 1/13/2025 12:02 PM EST    Indication: Diabetic wound infection involving the left foot in the right great toe.    Comparison: None available.    Findings:  Right great toe: There is a cortical destruction and periostitis involving the distal phalanx, especially along the medial border and also involving the medial aspect of the head of the proximal phalanx. The findings are consistent with osteomyelitis in   the appropriate clinical setting. There is a pathologic fracture secondary to the  osteomyelitis involving the medial aspect of the head of the proximal phalanx. There is soft tissue swelling. There is a relatively deep ulcer along the medial aspect of   the right great toe at the site of the bony destructive changes. There are incidental arthritic changes involving the IP joint and first MTP joint.    Left foot: There is cortical destruction, osteolysis, and periosteal reaction involving the distal shaft and head of the first metatarsal bone as well as the adjacent base of the proximal phalanx. There also may be some osteolytic changes in the base of   the distal phalanx of the left great toe. This is consistent with radiographic changes of osteomyelitis. There is soft tissue swelling involving mainly the left great toe. Shallow ulcers are suggested along the plantar aspect of the medial left forefoot   in the location of the radiographic changes of osteomyelitis. There are minor arthritic changes involving the inner-phalangeal joints of the digits.      Impression:      Impression:  1.Radiographic changes of osteomyelitis involving the distal phalanx of the right great toe with a pathologic fracture secondary to the osteomyelitis involving the medial aspect of the head of the proximal phalanx.  2.Radiographic changes of osteomyelitis involving the distal shaft and head of the left first metatarsal bone as well as the adjacent base of the proximal phalanx. There also may be some osteolytic changes in the base of the distal phalanx of the left   great toe representing osteomyelitis.  3.Soft tissue swelling. There is a relatively deep ulcer along the medial aspect of the right great toe at the site of the bony destructive changes.  4.Soft tissue swelling and shallow ulcers along the medial aspect of the left forefoot at the site of the bony destructive changes.        Electronically Signed: Agapito Rick MD    1/13/2025 12:44 PM EST    Workstation ID: [id number]            Cultures:   Wound culture:    Wound Culture   Date Value Ref Range Status   01/13/2025 Light growth (2+) Escherichia coli (A)  Preliminary   01/13/2025 Light growth (2+) Gram Positive Cocci (A)  Preliminary   01/13/2025 Rare growth Proteus species (A)  Preliminary     Blood culture:   Blood Culture   Date Value Ref Range Status   01/13/2025 No growth at 24 hours  Preliminary   01/13/2025 No growth at 24 hours  Preliminary        Results Review:     I reviewed the patient's new clinical results.  I reviewed the patient's new imaging results and agree with the interpretation.  I reviewed the patient's other test results and agree with the interpretation      Assessment & Plan     Tomas Song is a 44 y.o. male presenting with Left foot osteomyelitis / abscess with severe infection and right hallux osteomyelitis.      #Diabetic foot ulcer, sub 1st metatarsal head, left  #Severe diabetic foot infection, left  #Osteomyelitis, left 1st metatarsal / hallux   #Abscess, left foot  #Right hallux ulcer - stable  #Osteomyelitis, right hallux  - Examined patient and discussed findings of exam as noted below with patient.   - Xray and MRI confirm osteomyelitis right hallux and left hallux proximal phalanx and 1st metatarsal head. Abscess noted to right hallux and left foot.   - EMILY/TBI normal with some elevation representing calcinosis.    Recommend  - Betadine to incisions, adaptic, kerlix and ACE. Left foot with ABD pad. Changed today. Will plan to change in the morning. OK to change or reinforce if soiled.  - Weightbearing: WBAT pre-op, NWB LLE post-op. Will need PT eval.  - ID following - + bone cultures  - Recommended right hallux and left partial first ray amputations. Patient not amenable to amputation currently. May be amenable in the future.   - Intra-op findings - right hallux without abscess. Wound closed. Left foot with purulence contained to plantar medial forefoot. Incision gently reapproximated to allow for purulent drainage.   -  Future plan: Will evaluate tomorrow morning to determine if left foot repeat I&D is necessary given minimal purulence today. Would plan for OR Friday if repeat washout is needed.      Susan Garrison DPM  01/15/25  11:28 EST      I spent a total of 55 minutes on this patient encounter including preparation, review of medical, social, surgical hx, medications, labs, xrays review and interpretation, face to face care, evaluation, treatment, counseling, education, consultation with another provider, documentation, and answering patient questions regarding the plan noted above.

## 2025-01-15 NOTE — PROGRESS NOTES
[facility] MEDICINE SERVICE  DAILY PROGRESS NOTE    NAME: Tomas Song  : 1980  MRN: [id number]      LOS: 2 days     PROVIDER OF SERVICE: Aldair Lind MD    Chief Complaint: Osteomyelitis of both feet    Subjective:     Interval History:      -Patient seen and evaluated at bedside. No complaints this morning. Pain controlled. Patient not amenable to amputation at this time but with plans for I&D in OR this afternoon.   1/15 patient seen and examined in bed no acute distress, vital signs stable, discussed with RN.  Awaiting cultures.    Treatment plan discussed with patient. All questions addressed.     Review of Systems:   Denies fevers, chills  Denies chest pain, edema  Denies shortness of breath, cough  Denies nausea, vomiting, diarrhea  Denies dysuria, hematuria    Objective:     Vital Signs  Temp:  [98 °F (36.7 °C)-98.9 °F (37.2 °C)] 98.4 °F (36.9 °C)  Heart Rate:  [] 88  Resp:  [7-16] 12  BP: ()/() 112/67  Flow (L/min) (Oxygen Therapy):  [6] 6   Body mass index is 27.76 kg/m².    Physical Exam   General: No acute distress  Neuro: AAOx3, no FND  CV: RRR, no peripheral edema  Pulm: CTAB, no increased work of breathing  Abd: Soft, nontender, nondistended  Skin: Bilateral foot wounds noted with bandages on left foot, purulent discharge noted; skin well perfused and pink.  Psych: Appropriate mood and affect    Scheduled Meds   amLODIPine, 10 mg, Oral, Daily  buprenorphine, 8 mg, Sublingual, TID  cefepime, 2,000 mg, Intravenous, Q8H  gabapentin, 1,200 mg, Oral, Q12H  hydroCHLOROthiazide, 25 mg, Oral, Daily  insulin glargine, 30 Units, Subcutaneous, BID  insulin lispro, 2-9 Units, Subcutaneous, 4x Daily AC & at Bedtime  losartan, 100 mg, Oral, Daily  vancomycin, 1,000 mg, Intravenous, Q8H  vitamin D3, 5,000 Units, Oral, Daily       PRN Meds     acetaminophen **OR** acetaminophen **OR** acetaminophen    atropine    senna-docusate sodium **AND** polyethylene glycol  **AND** bisacodyl **AND** bisacodyl    Calcium Replacement - Follow Nurse / BPA Driven Protocol    dextrose    dextrose    diphenhydrAMINE    diphenhydrAMINE    ePHEDrine Sulfate (Pressors)    glucagon (human recombinant)    hydrALAZINE    HYDROmorphone    HYDROmorphone    ipratropium-albuterol    ketorolac    labetalol    lidocaine (cardiac)    Magnesium Standard Dose Replacement - Follow Nurse / BPA Driven Protocol    melatonin    naloxone    ondansetron ODT **OR** ondansetron    ondansetron    oxyCODONE    oxyCODONE    oxyCODONE    Pharmacy to dose vancomycin    Phosphorus Replacement - Follow Nurse / BPA Driven Protocol    Potassium Replacement - Follow Nurse / BPA Driven Protocol   Infusions  Pharmacy to dose vancomycin,           Diagnostic Data    Results from last 7 days   Lab Units 01/15/25  0024 01/14/25  0418 01/13/25  1226   WBC 10*3/mm3 9.93   < > 5.97   HEMOGLOBIN g/dL 9.7*   < > 9.7*   HEMATOCRIT % 31.6*   < > 31.7*   PLATELETS 10*3/mm3 393   < > 410   GLUCOSE mg/dL 312*   < > 180*   CREATININE mg/dL 0.83   < > 0.96   BUN mg/dL 14   < > 12   SODIUM mmol/L 136   < > 139   POTASSIUM mmol/L 4.7   < > 4.0   AST (SGOT) U/L  --   --  20   ALT (SGPT) U/L  --   --  19   ALK PHOS U/L  --   --  104   BILIRUBIN mg/dL  --   --  0.2   ANION GAP mmol/L 8.1   < > 9.4    < > = values in this interval not displayed.       MRI Foot Right Without Contrast    Result Date: 1/13/2025  Impression: 1.Soft tissue wound along the medial aspect of the first toe with underlying lobulated fluid which may reflect small abscesses. 2.Marrow edema of the first proximal and distal phalanges with associated loss of normal T1 marrow and cortical signal along the medial aspect of the first interphalangeal joint. These findings are suspicious for osteomyelitis. There is also adjacent small to moderate effusion in the first interphalangeal joint concerning for septic arthritis. Electronically Signed: Jacob Kebede MD  1/13/2025 10:48  PM EST  Workstation ID: [id number]    MRI Foot Left Without Contrast    Result Date: 1/13/2025  Impression: 1.Soft tissue wound along the plantar medial forefoot just below the first metatarsophalangeal joint. There are associated lobulated fluid collections deep to the wound which may reflect small abscesses. There is involvement of the flexor hallucis longus  tendon at this location as well with mild associated tenosynovitis. 2.Marrow edema and loss of normal T1 cortical and marrow signal with erosions of the first metatarsal head as well as the base of the first proximal phalanx. There is associated lobulated small joint effusion at the first metatarsophalangeal joint. These  findings are concerning for septic arthritis with osteomyelitis of the first metatarsal head and base of the first proximal phalanx. There is reactive marrow edema seen along the remainder of the first proximal phalanx and first metatarsal. Electronically Signed: Jacob Kebede MD  1/13/2025 10:22 PM EST  Workstation ID: [id number]    XR Foot 3+ View Left    Result Date: 1/13/2025  Impression: 1.Radiographic changes of osteomyelitis involving the distal phalanx of the right great toe with a pathologic fracture secondary to the osteomyelitis involving the medial aspect of the head of the proximal phalanx. 2.Radiographic changes of osteomyelitis involving the distal shaft and head of the left first metatarsal bone as well as the adjacent base of the proximal phalanx. There also may be some osteolytic changes in the base of the distal phalanx of the left great toe representing osteomyelitis. 3.Soft tissue swelling. There is a relatively deep ulcer along the medial aspect of the right great toe at the site of the bony destructive changes. 4.Soft tissue swelling and shallow ulcers along the medial aspect of the left forefoot at the site of the bony destructive changes. Electronically Signed: Agapito Rick MD  1/13/2025 12:44 PM EST  Workstation ID:  [id number]    XR Toe 2+ View Right    Result Date: 1/13/2025  Impression: 1.Radiographic changes of osteomyelitis involving the distal phalanx of the right great toe with a pathologic fracture secondary to the osteomyelitis involving the medial aspect of the head of the proximal phalanx. 2.Radiographic changes of osteomyelitis involving the distal shaft and head of the left first metatarsal bone as well as the adjacent base of the proximal phalanx. There also may be some osteolytic changes in the base of the distal phalanx of the left great toe representing osteomyelitis. 3.Soft tissue swelling. There is a relatively deep ulcer along the medial aspect of the right great toe at the site of the bony destructive changes. 4.Soft tissue swelling and shallow ulcers along the medial aspect of the left forefoot at the site of the bony destructive changes. Electronically Signed: Agapito Rick MD  1/13/2025 12:44 PM EST  Workstation ID: [id number]     Interval results reviewed.    Assessment/Plan:     Osteomyelitis of right great toe  Osteomyelitis of left foot  Left foot diabetic ulcer  - Podiatry consulted, appreciate recs  - ID consulted, appreciate recs  - Continue vancomycin, cefepime  - Analgesia, avoid opiates as able  - Wound care consult  - Plan for I&D in OR today  - ABIs pending  - Recommendations for amputation; however, patient not amenable at this time and would like to discuss alternative options    Type 2 diabetes mellitus  - Continue lantus  - SSI, hypoglycemia protocol, CCD  - Plan for diabetic educator prior to discharge    Anemia  - No overt s/sx bleeding  - In setting of acute infection  - Repeat CBC in AM    Hypertension  - Continue amlodipine, HCTZ, losartan    Hyperlipidemia  - Continue fenofibrate    Opioid use disorder  - Continue subutex    ADHD  - Holding home meds during admission    Treatment plan discussed with nursing staff.     VTE Prophylaxis:  No VTE prophylaxis order currently exists.    Holding  pharmacological ppx given surgical plans; not amendable to SCDs given bilateral lower extremity wounds.     Code status is   Code Status and Medical Interventions: CPR (Attempt to Resuscitate); Full Support   Ordered at: 01/13/25 1314     Code Status (Patient has no pulse and is not breathing):    CPR (Attempt to Resuscitate)     Medical Interventions (Patient has pulse or is breathing):    Full Support       Plan for disposition: Home 1/17/25 pending clinical course    Barriers to discharge: OR today, ID consult pending, cultures pending, IV abx    Time: 35+ minutes     Signature: Electronically signed by Aldair Lind MD, 01/15/25, 07:15 EST.  [facility] Hospitalist Team

## 2025-01-16 LAB
ANION GAP SERPL CALCULATED.3IONS-SCNC: 6.5 MMOL/L (ref 5–15)
BASOPHILS # BLD AUTO: 0.04 10*3/MM3 (ref 0–0.2)
BASOPHILS NFR BLD AUTO: 0.5 % (ref 0–1.5)
BUN SERPL-MCNC: 16 MG/DL (ref 6–20)
BUN/CREAT SERPL: 19.8 (ref 7–25)
CALCIUM SPEC-SCNC: 9.2 MG/DL (ref 8.6–10.5)
CHLORIDE SERPL-SCNC: 98 MMOL/L (ref 98–107)
CO2 SERPL-SCNC: 29.5 MMOL/L (ref 22–29)
CREAT SERPL-MCNC: 0.81 MG/DL (ref 0.76–1.27)
DEPRECATED RDW RBC AUTO: 41.1 FL (ref 37–54)
EGFRCR SERPLBLD CKD-EPI 2021: 110.1 ML/MIN/1.73
EOSINOPHIL # BLD AUTO: 0.47 10*3/MM3 (ref 0–0.4)
EOSINOPHIL NFR BLD AUTO: 6 % (ref 0.3–6.2)
ERYTHROCYTE [DISTWIDTH] IN BLOOD BY AUTOMATED COUNT: 13.2 % (ref 12.3–15.4)
GLUCOSE BLDC GLUCOMTR-MCNC: 224 MG/DL (ref 70–105)
GLUCOSE BLDC GLUCOMTR-MCNC: 225 MG/DL (ref 70–105)
GLUCOSE BLDC GLUCOMTR-MCNC: 239 MG/DL (ref 70–105)
GLUCOSE BLDC GLUCOMTR-MCNC: 242 MG/DL (ref 70–105)
GLUCOSE SERPL-MCNC: 248 MG/DL (ref 65–99)
HCT VFR BLD AUTO: 31.5 % (ref 37.5–51)
HGB BLD-MCNC: 9.6 G/DL (ref 13–17.7)
IMM GRANULOCYTES # BLD AUTO: 0.02 10*3/MM3 (ref 0–0.05)
IMM GRANULOCYTES NFR BLD AUTO: 0.3 % (ref 0–0.5)
LAB AP CASE REPORT: NORMAL
LYMPHOCYTES # BLD AUTO: 2.12 10*3/MM3 (ref 0.7–3.1)
LYMPHOCYTES NFR BLD AUTO: 27 % (ref 19.6–45.3)
MAGNESIUM SERPL-MCNC: 2.1 MG/DL (ref 1.6–2.6)
MCH RBC QN AUTO: 26 PG (ref 26.6–33)
MCHC RBC AUTO-ENTMCNC: 30.5 G/DL (ref 31.5–35.7)
MCV RBC AUTO: 85.4 FL (ref 79–97)
MONOCYTES # BLD AUTO: 0.56 10*3/MM3 (ref 0.1–0.9)
MONOCYTES NFR BLD AUTO: 7.1 % (ref 5–12)
NEUTROPHILS NFR BLD AUTO: 4.63 10*3/MM3 (ref 1.7–7)
NEUTROPHILS NFR BLD AUTO: 59.1 % (ref 42.7–76)
NRBC BLD AUTO-RTO: 0 /100 WBC (ref 0–0.2)
PATH REPORT.FINAL DX SPEC: NORMAL
PATH REPORT.GROSS SPEC: NORMAL
PLATELET # BLD AUTO: 402 10*3/MM3 (ref 140–450)
PMV BLD AUTO: 8.9 FL (ref 6–12)
POTASSIUM SERPL-SCNC: 3.7 MMOL/L (ref 3.5–5.2)
RBC # BLD AUTO: 3.69 10*6/MM3 (ref 4.14–5.8)
SODIUM SERPL-SCNC: 134 MMOL/L (ref 136–145)
WBC NRBC COR # BLD AUTO: 7.84 10*3/MM3 (ref 3.4–10.8)

## 2025-01-16 PROCEDURE — 85025 COMPLETE CBC W/AUTO DIFF WBC: CPT

## 2025-01-16 PROCEDURE — 25010000002 CEFEPIME PER 500 MG

## 2025-01-16 PROCEDURE — 25810000003 SODIUM CHLORIDE 0.9 % SOLUTION 250 ML FLEX CONT

## 2025-01-16 PROCEDURE — 80048 BASIC METABOLIC PNL TOTAL CA: CPT

## 2025-01-16 PROCEDURE — 25010000002 VANCOMYCIN 1 G RECONSTITUTED SOLUTION 1 EACH VIAL

## 2025-01-16 PROCEDURE — 82948 REAGENT STRIP/BLOOD GLUCOSE: CPT

## 2025-01-16 PROCEDURE — 83735 ASSAY OF MAGNESIUM: CPT

## 2025-01-16 PROCEDURE — 63710000001 INSULIN LISPRO (HUMAN) PER 5 UNITS

## 2025-01-16 PROCEDURE — 97116 GAIT TRAINING THERAPY: CPT

## 2025-01-16 PROCEDURE — 97530 THERAPEUTIC ACTIVITIES: CPT

## 2025-01-16 PROCEDURE — 63710000001 INSULIN GLARGINE PER 5 UNITS

## 2025-01-16 RX ADMIN — GABAPENTIN 1200 MG: 400 CAPSULE ORAL at 07:56

## 2025-01-16 RX ADMIN — INSULIN LISPRO 4 UNITS: 100 INJECTION, SOLUTION INTRAVENOUS; SUBCUTANEOUS at 21:24

## 2025-01-16 RX ADMIN — INSULIN LISPRO 4 UNITS: 100 INJECTION, SOLUTION INTRAVENOUS; SUBCUTANEOUS at 13:07

## 2025-01-16 RX ADMIN — BUPRENORPHINE HCL 8 MG: 8 TABLET SUBLINGUAL at 16:52

## 2025-01-16 RX ADMIN — Medication 5000 UNITS: at 07:57

## 2025-01-16 RX ADMIN — INSULIN LISPRO 4 UNITS: 100 INJECTION, SOLUTION INTRAVENOUS; SUBCUTANEOUS at 16:52

## 2025-01-16 RX ADMIN — AMLODIPINE BESYLATE 10 MG: 5 TABLET ORAL at 07:56

## 2025-01-16 RX ADMIN — INSULIN GLARGINE 30 UNITS: 100 INJECTION, SOLUTION SUBCUTANEOUS at 21:24

## 2025-01-16 RX ADMIN — BUPRENORPHINE HCL 8 MG: 8 TABLET SUBLINGUAL at 09:51

## 2025-01-16 RX ADMIN — VANCOMYCIN HYDROCHLORIDE 1000 MG: 1 INJECTION, POWDER, LYOPHILIZED, FOR SOLUTION INTRAVENOUS at 05:11

## 2025-01-16 RX ADMIN — GABAPENTIN 1200 MG: 400 CAPSULE ORAL at 21:24

## 2025-01-16 RX ADMIN — VANCOMYCIN HYDROCHLORIDE 1000 MG: 1 INJECTION, POWDER, LYOPHILIZED, FOR SOLUTION INTRAVENOUS at 13:08

## 2025-01-16 RX ADMIN — CEFEPIME 2000 MG: 2 INJECTION, POWDER, FOR SOLUTION INTRAVENOUS at 07:57

## 2025-01-16 RX ADMIN — VANCOMYCIN HYDROCHLORIDE 1000 MG: 1 INJECTION, POWDER, LYOPHILIZED, FOR SOLUTION INTRAVENOUS at 21:24

## 2025-01-16 RX ADMIN — CEFEPIME 2000 MG: 2 INJECTION, POWDER, FOR SOLUTION INTRAVENOUS at 17:13

## 2025-01-16 RX ADMIN — SENNOSIDES AND DOCUSATE SODIUM 2 TABLET: 50; 8.6 TABLET ORAL at 07:56

## 2025-01-16 RX ADMIN — OXYCODONE HYDROCHLORIDE 10 MG: 5 TABLET ORAL at 13:12

## 2025-01-16 RX ADMIN — INSULIN GLARGINE 30 UNITS: 100 INJECTION, SOLUTION SUBCUTANEOUS at 07:57

## 2025-01-16 RX ADMIN — BUPRENORPHINE HCL 8 MG: 8 TABLET SUBLINGUAL at 21:28

## 2025-01-16 RX ADMIN — LOSARTAN POTASSIUM 100 MG: 50 TABLET, FILM COATED ORAL at 07:56

## 2025-01-16 RX ADMIN — HYDROCHLOROTHIAZIDE 25 MG: 25 TABLET ORAL at 07:56

## 2025-01-16 RX ADMIN — INSULIN LISPRO 4 UNITS: 100 INJECTION, SOLUTION INTRAVENOUS; SUBCUTANEOUS at 07:57

## 2025-01-16 NOTE — PLAN OF CARE
Problem: Adult Inpatient Plan of Care  Goal: Plan of Care Review  Outcome: Progressing  Flowsheets (Taken 1/16/2025 1717)  Progress: improving  Outcome Evaluation: Patient is stable. minimal complaints of pain. See MAR. Dressing change completed by MD. Patient to be NPO at midnight for closure of Left foot tomorrow per MD order. Patient educated on not using Vape pin while in hospital. Safety measures in place. call light within reach. Patient able to  make needs known. Plan of care ongoing.  Plan of Care Reviewed With: patient  Goal: Patient-Specific Goal (Individualized)  Outcome: Progressing  Goal: Absence of Hospital-Acquired Illness or Injury  Outcome: Progressing  Intervention: Identify and Manage Fall Risk  Recent Flowsheet Documentation  Taken 1/16/2025 1616 by Lynda Mariscal, RN  Safety Promotion/Fall Prevention:   activity supervised   assistive device/personal items within reach   clutter free environment maintained   nonskid shoes/slippers when out of bed   room organization consistent   safety round/check completed  Taken 1/16/2025 1436 by Lynda Mariscal, RN  Safety Promotion/Fall Prevention:   activity supervised   assistive device/personal items within reach   clutter free environment maintained   nonskid shoes/slippers when out of bed   room organization consistent   safety round/check completed  Taken 1/16/2025 1312 by Lynda Mariscal, RN  Safety Promotion/Fall Prevention:   activity supervised   assistive device/personal items within reach   clutter free environment maintained   nonskid shoes/slippers when out of bed   room organization consistent   safety round/check completed  Taken 1/16/2025 1003 by Lynda Mariscal, RN  Safety Promotion/Fall Prevention:   activity supervised   assistive device/personal items within reach   clutter free environment maintained   nonskid shoes/slippers when out of bed   room organization consistent   safety round/check completed   fall prevention program  maintained   gait belt  Taken 1/16/2025 0807 by Lynda Mariscal RN  Safety Promotion/Fall Prevention:   activity supervised   assistive device/personal items within reach   clutter free environment maintained   nonskid shoes/slippers when out of bed   room organization consistent   safety round/check completed   fall prevention program maintained  Intervention: Prevent Skin Injury  Recent Flowsheet Documentation  Taken 1/16/2025 0807 by Lynda Mariscal RN  Body Position: position changed independently  Intervention: Prevent and Manage VTE (Venous Thromboembolism) Risk  Recent Flowsheet Documentation  Taken 1/16/2025 0807 by Lynda Mariscal RN  VTE Prevention/Management:   bilateral   SCDs (sequential compression devices) off   patient refused intervention  Intervention: Prevent Infection  Recent Flowsheet Documentation  Taken 1/16/2025 1312 by Lynda Mariscal RN  Infection Prevention:   hand hygiene promoted   personal protective equipment utilized   single patient room provided  Taken 1/16/2025 1003 by Lynda Mariscal RN  Infection Prevention:   hand hygiene promoted   personal protective equipment utilized   single patient room provided  Taken 1/16/2025 0807 by Lynda Mariscal RN  Infection Prevention:   hand hygiene promoted   personal protective equipment utilized   single patient room provided  Goal: Optimal Comfort and Wellbeing  Outcome: Progressing  Intervention: Provide Person-Centered Care  Recent Flowsheet Documentation  Taken 1/16/2025 0807 by Lynda Mariscal RN  Trust Relationship/Rapport:   care explained   questions answered   questions encouraged   thoughts/feelings acknowledged   reassurance provided  Goal: Readiness for Transition of Care  Outcome: Progressing     Problem: Fall Injury Risk  Goal: Absence of Fall and Fall-Related Injury  Outcome: Progressing  Intervention: Identify and Manage Contributors  Recent Flowsheet Documentation  Taken 1/16/2025 1616 by Lynda Mariscal RN  Medication  Review/Management: medications reviewed  Taken 1/16/2025 1436 by Lynda Mariscal RN  Medication Review/Management: medications reviewed  Taken 1/16/2025 1312 by Lynda Mariscal RN  Medication Review/Management: medications reviewed  Taken 1/16/2025 1003 by Lynda Mariscal RN  Medication Review/Management: medications reviewed  Taken 1/16/2025 0807 by Lynda Mariscal RN  Medication Review/Management: medications reviewed  Intervention: Promote Injury-Free Environment  Recent Flowsheet Documentation  Taken 1/16/2025 1616 by Lynda Mariscal RN  Safety Promotion/Fall Prevention:   activity supervised   assistive device/personal items within reach   clutter free environment maintained   nonskid shoes/slippers when out of bed   room organization consistent   safety round/check completed  Taken 1/16/2025 1436 by Lynda Mariscal RN  Safety Promotion/Fall Prevention:   activity supervised   assistive device/personal items within reach   clutter free environment maintained   nonskid shoes/slippers when out of bed   room organization consistent   safety round/check completed  Taken 1/16/2025 1312 by Lynda Mariscal RN  Safety Promotion/Fall Prevention:   activity supervised   assistive device/personal items within reach   clutter free environment maintained   nonskid shoes/slippers when out of bed   room organization consistent   safety round/check completed  Taken 1/16/2025 1003 by Lynda Mariscal RN  Safety Promotion/Fall Prevention:   activity supervised   assistive device/personal items within reach   clutter free environment maintained   nonskid shoes/slippers when out of bed   room organization consistent   safety round/check completed   fall prevention program maintained   gait belt  Taken 1/16/2025 0807 by Lynda Mariscal RN  Safety Promotion/Fall Prevention:   activity supervised   assistive device/personal items within reach   clutter free environment maintained   nonskid shoes/slippers when out of bed   room  organization consistent   safety round/check completed   fall prevention program maintained     Problem: Surgery Nonspecified  Goal: Absence of Bleeding  Outcome: Progressing  Goal: Effective Bowel Elimination  Outcome: Progressing  Goal: Blood Glucose Level Within Target Range  Outcome: Progressing  Goal: Absence of Infection Signs and Symptoms  Outcome: Progressing   Goal Outcome Evaluation:  Plan of Care Reviewed With: patient        Progress: improving  Outcome Evaluation: Patient is stable. minimal complaints of pain. See MAR. Dressing change completed by MD. Patient to be NPO at midnight for closure of Left foot tomorrow per MD order. Patient educated on not using Vape pin while in hospital. Safety measures in place. call light within reach. Patient able to  make needs known. Plan of care ongoing.

## 2025-01-16 NOTE — THERAPY TREATMENT NOTE
"Subjective: Pt agreeable to therapeutic plan of care.    Objective:     Precautions - NWB LLE; WBAT RLE with post op shoe.     Bed mobility - Independent    Transfers - Supervision and with rolling walker    Ambulation - 80 feet Supervision and with rolling walker. Pt able to maintain NWB through LLE.     Vitals: WNL    Pain: 5 VAS   Location: B feet  Intervention for pain: Repositioned, Increased Activity, and Therapeutic Presence    Education: Provided education on the importance of mobility in the acute care setting, Verbal/Tactile Cues, Transfer Training, Gait Training, and WB'ing status    Assessment: Tomas Song presents with functional mobility impairments which indicate the need for skilled intervention. Tolerating session today without incident. Will continue to follow and progress as tolerated.     Plan/Recommendations:   If medically appropriate, Low Intensity Therapy recommended post-acute care - This is recommended as therapy feels this patient would require 2-3 visits per week. OP or HH would be the best option depending on patient's home bound status. Consider, if the patient has other  \"skilled\" needs such as wounds, IV antibiotics, etc. Combined with \"low intensity\" could also equate to a SNF. If patient is medically complex, consider LTAC. Pt requires rolling walker at discharge.     Pt desires Home with Home Health at discharge. Pt cooperative; agreeable to therapeutic recommendations and plan of care.         Basic Mobility 6-click:  Rollin = Total, A lot = 2, A little = 3; 4 = None  Supine>Sit:   1 = Total, A lot = 2, A little = 3; 4 = None   Sit>Stand with arms:  1 = Total, A lot = 2, A little = 3; 4 = None  Bed>Chair:   1 = Total, A lot = 2, A little = 3; 4 = None  Ambulate in room:  1 = Total, A lot = 2, A little = 3; 4 = None  3-5 Steps with railin = Total, A lot = 2, A little = 3; 4 = None  Score: 23    Modified [place]: N/A = No pre-op stroke/TIA    Post-Tx " Position: Up in Chair, Alarms activated, and Call light and personal items within reach  PPE: gloves    Therapy Charges for Today       Code Description Service Date Service Provider Modifiers Qty    69535779605 HC PT THERAPEUTIC ACT EA 15 MIN 1/16/2025 Keon Jordan PTA GP 1    14311083739 HC GAIT TRAINING EA 15 MIN 1/16/2025 Keon Jordan PTA GP 1           PT Charges       Row Name 01/16/25 1429             Time Calculation    Start Time 1036  -UN      Stop Time 1052  -UN      Time Calculation (min) 16 min  -UN      PT Received On 01/16/25  -UN      PT - Next Appointment 01/17/25  -UN         Time Calculation- PT    Total Timed Code Minutes- PT 16 minute(s)  -UN                User Key  (r) = Recorded By, (t) = Taken By, (c) = Cosigned By      Initials Name Provider Type    UN Keon Jordan PTA Physical Therapist Assistant

## 2025-01-16 NOTE — PROGRESS NOTES
[facility] MEDICINE SERVICE  DAILY PROGRESS NOTE    NAME: Tomas Song  : 1980  MRN: [id number]      LOS: 3 days     PROVIDER OF SERVICE: Aldair Lind MD    Chief Complaint: Osteomyelitis of both feet    Subjective:     Interval History:      -Patient seen and evaluated at bedside. No complaints this morning. Pain controlled. Patient not amenable to amputation at this time but with plans for I&D in OR this afternoon.   1/15 patient seen and examined in bed no acute distress, vital signs stable, discussed with RN.  Awaiting cultures.  25 patient seen and examined.  No acute distress, no new complaints, for surgery in a.m.  Cough tolerating antibiotics, discussed with RN.    Treatment plan discussed with patient. All questions addressed.     Review of Systems:   Denies fevers, chills  Denies chest pain, edema  Denies shortness of breath, cough  Denies nausea, vomiting, diarrhea  Denies dysuria, hematuria    Objective:     Vital Signs  Temp:  [98 °F (36.7 °C)-98.5 °F (36.9 °C)] 98 °F (36.7 °C)  Heart Rate:  [] 91  Resp:  [15-16] 16  BP: (116-136)/(65-87) 127/87   Body mass index is 27.76 kg/m².    Physical Exam   General: No acute distress  Neuro: AAOx3, no FND  CV: RRR, no peripheral edema  Pulm: CTAB, no increased work of breathing  Abd: Soft, nontender, nondistended  Skin: Bilateral foot wounds noted with bandages on left foot, purulent discharge noted; skin well perfused and pink.  Psych: Appropriate mood and affect    Scheduled Meds   amLODIPine, 10 mg, Oral, Daily  buprenorphine, 8 mg, Sublingual, TID  cefepime, 2,000 mg, Intravenous, Q8H  gabapentin, 1,200 mg, Oral, Q12H  hydroCHLOROthiazide, 25 mg, Oral, Daily  insulin glargine, 30 Units, Subcutaneous, BID  insulin lispro, 2-9 Units, Subcutaneous, 4x Daily AC & at Bedtime  losartan, 100 mg, Oral, Daily  vancomycin, 1,000 mg, Intravenous, Q8H  vitamin D3, 5,000 Units, Oral, Daily       PRN Meds     acetaminophen  **OR** acetaminophen **OR** acetaminophen    atropine    senna-docusate sodium **AND** polyethylene glycol **AND** bisacodyl **AND** bisacodyl    Calcium Replacement - Follow Nurse / BPA Driven Protocol    dextrose    dextrose    diphenhydrAMINE    diphenhydrAMINE    ePHEDrine Sulfate (Pressors)    glucagon (human recombinant)    hydrALAZINE    HYDROmorphone    HYDROmorphone    ipratropium-albuterol    ketorolac    labetalol    lidocaine (cardiac)    Magnesium Standard Dose Replacement - Follow Nurse / BPA Driven Protocol    melatonin    naloxone    ondansetron ODT **OR** ondansetron    ondansetron    oxyCODONE    oxyCODONE    oxyCODONE    Pharmacy to dose vancomycin    Phosphorus Replacement - Follow Nurse / BPA Driven Protocol    Potassium Replacement - Follow Nurse / BPA Driven Protocol   Infusions  Pharmacy to dose vancomycin,           Diagnostic Data    Results from last 7 days   Lab Units 01/16/25  0004 01/14/25  0418 01/13/25  1226   WBC 10*3/mm3 7.84   < > 5.97   HEMOGLOBIN g/dL 9.6*   < > 9.7*   HEMATOCRIT % 31.5*   < > 31.7*   PLATELETS 10*3/mm3 402   < > 410   GLUCOSE mg/dL 248*   < > 180*   CREATININE mg/dL 0.81   < > 0.96   BUN mg/dL 16   < > 12   SODIUM mmol/L 134*   < > 139   POTASSIUM mmol/L 3.7   < > 4.0   AST (SGOT) U/L  --   --  20   ALT (SGPT) U/L  --   --  19   ALK PHOS U/L  --   --  104   BILIRUBIN mg/dL  --   --  0.2   ANION GAP mmol/L 6.5   < > 9.4    < > = values in this interval not displayed.       No radiology results for the last day    Interval results reviewed.    Assessment/Plan:     Osteomyelitis of right great toe  Osteomyelitis of left foot  Left foot diabetic ulcer  - Podiatry consulted, appreciate recs  - ID consulted, appreciate recs  - Continue vancomycin, cefepime  - Analgesia, avoid opiates as able  - Wound care consult  - Plan for I&D in OR today  - ABIs pending  - Recommendations for amputation; however, patient not amenable at this time and would like to discuss  alternative options    Type 2 diabetes mellitus  - Continue lantus  - SSI, hypoglycemia protocol, CCD  - Plan for diabetic educator prior to discharge    Anemia  - No overt s/sx bleeding  - In setting of acute infection  - Repeat CBC in AM    Hypertension  - Continue amlodipine, HCTZ, losartan    Hyperlipidemia  - Continue fenofibrate    Opioid use disorder  - Continue subutex    ADHD  - Holding home meds during admission    Treatment plan discussed with nursing staff.     VTE Prophylaxis:  No VTE prophylaxis order currently exists.    Holding pharmacological ppx given surgical plans; not amendable to SCDs given bilateral lower extremity wounds.     Code status is   Code Status and Medical Interventions: CPR (Attempt to Resuscitate); Full Support   Ordered at: 01/13/25 1314     Code Status (Patient has no pulse and is not breathing):    CPR (Attempt to Resuscitate)     Medical Interventions (Patient has pulse or is breathing):    Full Support       Plan for disposition: Home 1/17/25 pending clinical course    Barriers to discharge: OR today, ID consult pending, cultures pending, IV abx    Time: 35+ minutes     Signature: Electronically signed by Aldair Lind MD, 01/16/25, 10:02 EST.  [facility]ist Team

## 2025-01-16 NOTE — PROGRESS NOTES
LOS: 3 days   Patient Care Team:  Ty Gao DO as PCP - General (Internal Medicine)    Chief Complaint:  Bilateral foot osteomyelitis    Subjective     Interval History: S/p bilateral foot I&D (DOS 1/14/25). Pain controlled per current regiment.      Patient Complaints: none  Patient Denies:  n/v/f/c/sob  History taken from: patient    Review of Systems:  Pertinent positives in HPI       Objective     Vital Signs  Temp:  [98 °F (36.7 °C)-98.5 °F (36.9 °C)] 98 °F (36.7 °C)  Heart Rate:  [] 91  Resp:  [15-16] 16  BP: (116-136)/(65-87) 127/87    Physical Exam:  Vascular:  - Difficult to palpate 2/2 edema  - Moderate edema, left foot and right hallux - edema improving. Skin tension lines becoming apparent.   - Capillary refill time <5 seconds to all digits, bilaterally  - Erythema resolved left forefoot, minimal erythema bilateral legs.    Dermatological:  Right foot dressing remains intact.   Left plantar foot loosely closed with sutures intact. Mcaration noted to the borders of wound / incision. Moderate amount of  purulence expressed.    Neurologic:  - Protective sensation diminished, bilaterally  - Sensation intact to light touch, bilaterally    Musculoskeletal:  - Some pain with palpation around surgical site, left foot.   - Deformities: None  - Muscle strength 5/5 for all muscle groups of the lower extremities, bilaterally.       Labs:  Results from last 7 days   Lab Units 01/16/25  0004   WBC 10*3/mm3 7.84   HEMOGLOBIN g/dL 9.6*   HEMATOCRIT % 31.5*   PLATELETS 10*3/mm3 402     Results from last 7 days   Lab Units 01/16/25  0004   SODIUM mmol/L 134*   POTASSIUM mmol/L 3.7   CHLORIDE mmol/L 98   CO2 mmol/L 29.5*   BUN mg/dL 16   CREATININE mg/dL 0.81   GLUCOSE mg/dL 248*   CALCIUM mg/dL 9.2     Glucose   Date Value Ref Range Status   01/16/2025 248 (H) 65 - 99 mg/dL Final   01/15/2025 312 (H) 65 - 99 mg/dL Final   01/14/2025 186 (H) 65 - 99 mg/dL Final   01/13/2025 180 (H) 65 - 99 mg/dL Final  "    Results from last 7 days   Lab Units 01/13/25  1226   SED RATE mm/hr 40*     Results from last 7 days   Lab Units 01/13/25  1226   CRP mg/dL 5.29*         No components found for: \"HBA1C\"    Imaging:   Imaging Results (All)       Procedure Component Value Units Date/Time    MRI Foot Right Without Contrast [[id number]] Collected: 01/13/25 2238     Updated: 01/13/25 2250    Narrative:      MRI FOOT RIGHT WO CONTRAST    Date of Exam: 1/13/2025 7:35 PM EST    Indication: Right hallux osteomyelitis.     Comparison: Toe radiographs 1/13/2025    Technique:  Routine multiplanar/multisequence sequence images of the right foot were obtained without contrast administration.      Findings:  Bones: There is marrow edema of the first proximal and distal phalanges with associated loss of normal T1 marrow and cortical signal along the medial aspect of the first interphalangeal joint. No additional areas of abnormal marrow edema. No suspicious   osseous lesions. Small effusion of the first metatarsophalangeal joint. Small to moderate effusion in the first interphalangeal joint.    Soft tissues: Lisfranc ligament is intact. Plantar plates appear intact. Visualized tendons appear grossly intact. Diffuse subcutaneous edema. Atrophy of the intrinsic foot muscles. There is soft tissue wound along the medial aspect of the first toe with   underlying lobulated fluid which may reflect small abscesses. Small mild intermetatarsal bursal fluid in the first, second and third interspaces.      Impression:      Impression:  1.Soft tissue wound along the medial aspect of the first toe with underlying lobulated fluid which may reflect small abscesses.  2.Marrow edema of the first proximal and distal phalanges with associated loss of normal T1 marrow and cortical signal along the medial aspect of the first interphalangeal joint. These findings are suspicious for osteomyelitis. There is also adjacent   small to moderate effusion in the first " interphalangeal joint concerning for septic arthritis.        Electronically Signed: Jacob Kebede MD    1/13/2025 10:48 PM EST    Workstation ID: [id number]    MRI Foot Left Without Contrast [[id number]] Collected: 01/13/25 2212     Updated: 01/13/25 2224    Narrative:      MRI FOOT LEFT WO CONTRAST    Date of Exam: 1/13/2025 7:35 PM EST    Indication: Evaluate abcess and osteomyelitis.     Comparison: One 1325 foot radiographs    Technique:  Routine multiplanar/multisequence sequence images of the left foot were obtained without contrast administration.      Findings:  Bones and joints: Marrow edema and loss of normal T1 cortical and marrow signal with erosions of the first metatarsal head as well as the base of the first proximal phalanx. There is associated lobulated small joint effusion at the first   metatarsophalangeal joint. There is marrow edema seen throughout the remainder of the first metatarsal and the first proximal phalanx. Minimal marrow edema seen along the first distal phalanx. No additional areas of abnormal marrow edema. No suspicious   osseous lesions.    Soft tissues: Soft tissue wound along the plantar medial forefoot just below the first metatarsophalangeal joint. There are associated lobulated fluid seen deep to the wound which may reflect small abscesses. There is involvement of the flexor houses   longus tendon at this location as well with mild associated tenosynovitis. Remaining tendons appear grossly intact. Lisfranc ligament is intact. The plantar plates of the second through fifth digits appear intact. Diffuse subcutaneous edema.      Impression:      Impression:  1.Soft tissue wound along the plantar medial forefoot just below the first metatarsophalangeal joint. There are associated lobulated fluid collections deep to the wound which may reflect small abscesses. There is involvement of the flexor hallucis longus   tendon at this location as well with mild associated  tenosynovitis.  2.Marrow edema and loss of normal T1 cortical and marrow signal with erosions of the first metatarsal head as well as the base of the first proximal phalanx. There is associated lobulated small joint effusion at the first metatarsophalangeal joint. These   findings are concerning for septic arthritis with osteomyelitis of the first metatarsal head and base of the first proximal phalanx. There is reactive marrow edema seen along the remainder of the first proximal phalanx and first metatarsal.        Electronically Signed: Jacob Kebede MD    1/13/2025 10:22 PM EST    Workstation ID: [id number]    XR Foot 3+ View Left [[id number]] Collected: 01/13/25 1239     Updated: 01/13/25 1246    Narrative:      XR FOOT 3+ VW LEFT, XR TOE 2+ VW RIGHT    Date of Exam: 1/13/2025 12:02 PM EST    Indication: Diabetic wound infection involving the left foot in the right great toe.    Comparison: None available.    Findings:  Right great toe: There is a cortical destruction and periostitis involving the distal phalanx, especially along the medial border and also involving the medial aspect of the head of the proximal phalanx. The findings are consistent with osteomyelitis in   the appropriate clinical setting. There is a pathologic fracture secondary to the osteomyelitis involving the medial aspect of the head of the proximal phalanx. There is soft tissue swelling. There is a relatively deep ulcer along the medial aspect of   the right great toe at the site of the bony destructive changes. There are incidental arthritic changes involving the IP joint and first MTP joint.    Left foot: There is cortical destruction, osteolysis, and periosteal reaction involving the distal shaft and head of the first metatarsal bone as well as the adjacent base of the proximal phalanx. There also may be some osteolytic changes in the base of   the distal phalanx of the left great toe. This is consistent with radiographic changes of  osteomyelitis. There is soft tissue swelling involving mainly the left great toe. Shallow ulcers are suggested along the plantar aspect of the medial left forefoot   in the location of the radiographic changes of osteomyelitis. There are minor arthritic changes involving the inner-phalangeal joints of the digits.      Impression:      Impression:  1.Radiographic changes of osteomyelitis involving the distal phalanx of the right great toe with a pathologic fracture secondary to the osteomyelitis involving the medial aspect of the head of the proximal phalanx.  2.Radiographic changes of osteomyelitis involving the distal shaft and head of the left first metatarsal bone as well as the adjacent base of the proximal phalanx. There also may be some osteolytic changes in the base of the distal phalanx of the left   great toe representing osteomyelitis.  3.Soft tissue swelling. There is a relatively deep ulcer along the medial aspect of the right great toe at the site of the bony destructive changes.  4.Soft tissue swelling and shallow ulcers along the medial aspect of the left forefoot at the site of the bony destructive changes.        Electronically Signed: Agapito Rick MD    1/13/2025 12:44 PM EST    Workstation ID: [id number]    XR Toe 2+ View Right [[id number]] Collected: 01/13/25 1239     Updated: 01/13/25 1246    Narrative:      XR FOOT 3+ VW LEFT, XR TOE 2+ VW RIGHT    Date of Exam: 1/13/2025 12:02 PM EST    Indication: Diabetic wound infection involving the left foot in the right great toe.    Comparison: None available.    Findings:  Right great toe: There is a cortical destruction and periostitis involving the distal phalanx, especially along the medial border and also involving the medial aspect of the head of the proximal phalanx. The findings are consistent with osteomyelitis in   the appropriate clinical setting. There is a pathologic fracture secondary to the osteomyelitis involving the medial aspect of the  head of the proximal phalanx. There is soft tissue swelling. There is a relatively deep ulcer along the medial aspect of   the right great toe at the site of the bony destructive changes. There are incidental arthritic changes involving the IP joint and first MTP joint.    Left foot: There is cortical destruction, osteolysis, and periosteal reaction involving the distal shaft and head of the first metatarsal bone as well as the adjacent base of the proximal phalanx. There also may be some osteolytic changes in the base of   the distal phalanx of the left great toe. This is consistent with radiographic changes of osteomyelitis. There is soft tissue swelling involving mainly the left great toe. Shallow ulcers are suggested along the plantar aspect of the medial left forefoot   in the location of the radiographic changes of osteomyelitis. There are minor arthritic changes involving the inner-phalangeal joints of the digits.      Impression:      Impression:  1.Radiographic changes of osteomyelitis involving the distal phalanx of the right great toe with a pathologic fracture secondary to the osteomyelitis involving the medial aspect of the head of the proximal phalanx.  2.Radiographic changes of osteomyelitis involving the distal shaft and head of the left first metatarsal bone as well as the adjacent base of the proximal phalanx. There also may be some osteolytic changes in the base of the distal phalanx of the left   great toe representing osteomyelitis.  3.Soft tissue swelling. There is a relatively deep ulcer along the medial aspect of the right great toe at the site of the bony destructive changes.  4.Soft tissue swelling and shallow ulcers along the medial aspect of the left forefoot at the site of the bony destructive changes.        Electronically Signed: Agapito Rick MD    1/13/2025 12:44 PM EST    Workstation ID: [id number]            Cultures:   Wound culture:   Wound Culture   Date Value Ref Range Status    01/13/2025 Light growth (2+) Escherichia coli (A)  Preliminary   01/13/2025 (A)  Preliminary    Light growth (2+) Streptococcus agalactiae (Group B)     Comment:       This organism is considered to be universally susceptible to penicillin.  No further antibiotic testing will be performed. If Clindamycin or Erythromycin is the drug of choice, notify the laboratory within 7 days to request susceptibility testing.   01/13/2025 Rare growth Proteus mirabilis (A)  Preliminary   01/13/2025 Scant growth (1+) Gram Positive Cocci (A)  Corrected     Comment:       Appended report. These results have been appended to a previously final verified report.     Blood culture:   Blood Culture   Date Value Ref Range Status   01/13/2025 No growth at 2 days  Preliminary   01/13/2025 No growth at 2 days  Preliminary        Results Review:     I reviewed the patient's new clinical results.  I reviewed the patient's new imaging results and agree with the interpretation.  I reviewed the patient's other test results and agree with the interpretation      Assessment & Plan     Tomas Song is a 44 y.o. male presenting with Left foot osteomyelitis / abscess with severe infection and right hallux osteomyelitis.      #Diabetic foot ulcer, sub 1st metatarsal head, left  #Severe diabetic foot infection, left  #Osteomyelitis, left 1st metatarsal / hallux   #Abscess, left foot  #Right hallux ulcer - stable  #Osteomyelitis, right hallux  #s/p Right hallux I&D w bone biopsy and left foot I&D with bone biopsy (DOS 1/14/25)  - Examined patient and discussed findings of exam as noted below with patient.   - Xray and MRI confirm osteomyelitis right hallux and left hallux proximal phalanx and 1st metatarsal head. Abscess noted to right hallux and left foot.   - EMILY/TBI normal with some elevation representing calcinosis.    Recommend  - Right foot dressing remains in place. OK to reinforce if needed. Left foot redressed with betadine soaked gauze,  ABD pad, kerlix, ACE.  - Weightbearing: NWB LLE, Right foot WBAT with surgical shoe or Boot.  - ID following - + bone cultures  - Recommended right hallux and left partial first ray amputations. Patient not amenable to amputation currently. May be amenable in the future.   - Intra-op findings - right hallux without abscess. Wound closed. Left foot with purulence contained to plantar medial forefoot. Incision gently reapproximated to allow for purulent drainage.   - Future plan: Given purulence today to left foot, plan for left foot I&D tomorrow morning at 7:30. NPO at 11:30 PM.     Surgical Plan: Left foot I&D with possible wound excision / closure  - Reviewed procedure and perioperative course.   - Discussed risks, benefits, alternatives.  - Reviewed perioperative course.  - Post-operatively               Readmit to floor              NWB on LEft foot postop              PT               Abx per ID   - Discussed possible complications including but not limited to infection, delayed or nonhealing surgical site (soft tissue / bone), swelling, bleeding, hematoma, DVT/PE, pain, CRPS, failure of procedure to resolve all symptoms, and need for further surgery.  - All patient's questions were satisfactorily answered.    Susan Garrison DPM  01/16/25  09:15 EST      I spent a total of 55 minutes on this patient encounter including preparation, review of medical, social, surgical hx, medications, labs, xrays review and interpretation, face to face care, evaluation, treatment, counseling, education, consultation with another provider, documentation, and answering patient questions regarding the plan noted above.

## 2025-01-16 NOTE — PLAN OF CARE
Goal Outcome Evaluation:  Plan of Care Reviewed With: patient        Progress: improving  Outcome Evaluation: Pt VSS, Pt is able to make needs known, ACHS, IV antibiotics, Pain treated per MAR, Call light in reach, Plan on going. LIANA foot dressings monitored

## 2025-01-16 NOTE — PLAN OF CARE
"Assessment: Tomas Song presents with functional mobility impairments which indicate the need for skilled intervention. Tolerating session today without incident. Will continue to follow and progress as tolerated.     Plan/Recommendations:   If medically appropriate, Low Intensity Therapy recommended post-acute care - This is recommended as therapy feels this patient would require 2-3 visits per week. OP or HH would be the best option depending on patient's home bound status. Consider, if the patient has other  \"skilled\" needs such as wounds, IV antibiotics, etc. Combined with \"low intensity\" could also equate to a SNF. If patient is medically complex, consider LTAC. Pt requires rolling walker at discharge.     Pt desires Home with Home Health at discharge. Pt cooperative; agreeable to therapeutic recommendations and plan of care.     "

## 2025-01-17 ENCOUNTER — ANESTHESIA EVENT (OUTPATIENT)
Dept: PERIOP | Facility: HOSPITAL | Age: 45
End: 2025-01-17
Payer: MEDICAID

## 2025-01-17 ENCOUNTER — ANESTHESIA (OUTPATIENT)
Dept: PERIOP | Facility: HOSPITAL | Age: 45
End: 2025-01-17
Payer: MEDICAID

## 2025-01-17 LAB
ANION GAP SERPL CALCULATED.3IONS-SCNC: 8.3 MMOL/L (ref 5–15)
BACTERIA SPEC AEROBE CULT: ABNORMAL
BASOPHILS # BLD AUTO: 0.06 10*3/MM3 (ref 0–0.2)
BASOPHILS NFR BLD AUTO: 0.9 % (ref 0–1.5)
BUN SERPL-MCNC: 14 MG/DL (ref 6–20)
BUN/CREAT SERPL: 18.4 (ref 7–25)
CALCIUM SPEC-SCNC: 9.6 MG/DL (ref 8.6–10.5)
CHLORIDE SERPL-SCNC: 100 MMOL/L (ref 98–107)
CO2 SERPL-SCNC: 29.7 MMOL/L (ref 22–29)
CREAT SERPL-MCNC: 0.76 MG/DL (ref 0.76–1.27)
DEPRECATED RDW RBC AUTO: 40.2 FL (ref 37–54)
EGFRCR SERPLBLD CKD-EPI 2021: 112.3 ML/MIN/1.73
EOSINOPHIL # BLD AUTO: 0.52 10*3/MM3 (ref 0–0.4)
EOSINOPHIL NFR BLD AUTO: 7.7 % (ref 0.3–6.2)
ERYTHROCYTE [DISTWIDTH] IN BLOOD BY AUTOMATED COUNT: 13.4 % (ref 12.3–15.4)
GLUCOSE BLDC GLUCOMTR-MCNC: 213 MG/DL (ref 70–105)
GLUCOSE BLDC GLUCOMTR-MCNC: 218 MG/DL (ref 70–105)
GLUCOSE BLDC GLUCOMTR-MCNC: 261 MG/DL (ref 70–105)
GLUCOSE BLDC GLUCOMTR-MCNC: 321 MG/DL (ref 70–105)
GLUCOSE BLDC GLUCOMTR-MCNC: 323 MG/DL (ref 70–105)
GLUCOSE SERPL-MCNC: 280 MG/DL (ref 65–99)
GRAM STN SPEC: ABNORMAL
HCT VFR BLD AUTO: 33.1 % (ref 37.5–51)
HGB BLD-MCNC: 10.2 G/DL (ref 13–17.7)
IMM GRANULOCYTES # BLD AUTO: 0.03 10*3/MM3 (ref 0–0.05)
IMM GRANULOCYTES NFR BLD AUTO: 0.4 % (ref 0–0.5)
LYMPHOCYTES # BLD AUTO: 1.84 10*3/MM3 (ref 0.7–3.1)
LYMPHOCYTES NFR BLD AUTO: 27.2 % (ref 19.6–45.3)
MCH RBC QN AUTO: 25.8 PG (ref 26.6–33)
MCHC RBC AUTO-ENTMCNC: 30.8 G/DL (ref 31.5–35.7)
MCV RBC AUTO: 83.8 FL (ref 79–97)
MONOCYTES # BLD AUTO: 0.53 10*3/MM3 (ref 0.1–0.9)
MONOCYTES NFR BLD AUTO: 7.8 % (ref 5–12)
NEUTROPHILS NFR BLD AUTO: 3.78 10*3/MM3 (ref 1.7–7)
NEUTROPHILS NFR BLD AUTO: 56 % (ref 42.7–76)
NRBC BLD AUTO-RTO: 0 /100 WBC (ref 0–0.2)
PLATELET # BLD AUTO: 396 10*3/MM3 (ref 140–450)
PMV BLD AUTO: 8.9 FL (ref 6–12)
POTASSIUM SERPL-SCNC: 4.1 MMOL/L (ref 3.5–5.2)
RBC # BLD AUTO: 3.95 10*6/MM3 (ref 4.14–5.8)
SODIUM SERPL-SCNC: 138 MMOL/L (ref 136–145)
VANCOMYCIN TROUGH SERPL-MCNC: 10.7 MCG/ML (ref 5–20)
WBC NRBC COR # BLD AUTO: 6.76 10*3/MM3 (ref 3.4–10.8)

## 2025-01-17 PROCEDURE — 25010000002 MIDAZOLAM PER 1 MG: Performed by: NURSE ANESTHETIST, CERTIFIED REGISTERED

## 2025-01-17 PROCEDURE — 25010000002 CEFTRIAXONE PER 250 MG: Performed by: NURSE PRACTITIONER

## 2025-01-17 PROCEDURE — 80202 ASSAY OF VANCOMYCIN: CPT | Performed by: INTERNAL MEDICINE

## 2025-01-17 PROCEDURE — 25010000002 LIDOCAINE 1 % SOLUTION

## 2025-01-17 PROCEDURE — 80048 BASIC METABOLIC PNL TOTAL CA: CPT | Performed by: NURSE PRACTITIONER

## 2025-01-17 PROCEDURE — 63710000001 INSULIN LISPRO (HUMAN) PER 5 UNITS

## 2025-01-17 PROCEDURE — 82948 REAGENT STRIP/BLOOD GLUCOSE: CPT

## 2025-01-17 PROCEDURE — 25010000002 HYDROMORPHONE 1 MG/ML SOLUTION: Performed by: NURSE ANESTHETIST, CERTIFIED REGISTERED

## 2025-01-17 PROCEDURE — 97530 THERAPEUTIC ACTIVITIES: CPT

## 2025-01-17 PROCEDURE — 25810000003 SODIUM CHLORIDE 0.9 % SOLUTION 250 ML FLEX CONT

## 2025-01-17 PROCEDURE — 25010000002 DEXAMETHASONE PER 1 MG: Performed by: NURSE ANESTHETIST, CERTIFIED REGISTERED

## 2025-01-17 PROCEDURE — 25010000002 LIDOCAINE PF 1% 1 % SOLUTION: Performed by: NURSE ANESTHETIST, CERTIFIED REGISTERED

## 2025-01-17 PROCEDURE — 25810000003 LACTATED RINGERS PER 1000 ML: Performed by: NURSE ANESTHETIST, CERTIFIED REGISTERED

## 2025-01-17 PROCEDURE — 25010000002 VANCOMYCIN 1 G RECONSTITUTED SOLUTION

## 2025-01-17 PROCEDURE — 85025 COMPLETE CBC W/AUTO DIFF WBC: CPT | Performed by: NURSE PRACTITIONER

## 2025-01-17 PROCEDURE — 25810000003 SODIUM CHLORIDE 0.9 % SOLUTION 250 ML FLEX CONT: Performed by: NURSE PRACTITIONER

## 2025-01-17 PROCEDURE — 97166 OT EVAL MOD COMPLEX 45 MIN: CPT

## 2025-01-17 PROCEDURE — 25010000002 CEFEPIME PER 500 MG

## 2025-01-17 PROCEDURE — 25010000002 ONDANSETRON PER 1 MG: Performed by: NURSE ANESTHETIST, CERTIFIED REGISTERED

## 2025-01-17 PROCEDURE — 97116 GAIT TRAINING THERAPY: CPT

## 2025-01-17 PROCEDURE — 25810000003 LACTATED RINGERS PER 1000 ML

## 2025-01-17 PROCEDURE — 97110 THERAPEUTIC EXERCISES: CPT

## 2025-01-17 PROCEDURE — 25010000002 VANCOMYCIN 1 G RECONSTITUTED SOLUTION 1 EACH VIAL

## 2025-01-17 PROCEDURE — 25010000002 FENTANYL CITRATE (PF) 100 MCG/2ML SOLUTION: Performed by: NURSE ANESTHETIST, CERTIFIED REGISTERED

## 2025-01-17 PROCEDURE — 25010000002 VANCOMYCIN 1 G RECONSTITUTED SOLUTION 1 EACH VIAL: Performed by: NURSE PRACTITIONER

## 2025-01-17 PROCEDURE — 25010000002 PROPOFOL 200 MG/20ML EMULSION: Performed by: NURSE ANESTHETIST, CERTIFIED REGISTERED

## 2025-01-17 PROCEDURE — 63710000001 INSULIN GLARGINE PER 5 UNITS

## 2025-01-17 PROCEDURE — 0JDR0ZZ EXTRACTION OF LEFT FOOT SUBCUTANEOUS TISSUE AND FASCIA, OPEN APPROACH: ICD-10-PCS

## 2025-01-17 RX ORDER — DEXAMETHASONE SODIUM PHOSPHATE 4 MG/ML
INJECTION, SOLUTION INTRA-ARTICULAR; INTRALESIONAL; INTRAMUSCULAR; INTRAVENOUS; SOFT TISSUE AS NEEDED
Status: DISCONTINUED | OUTPATIENT
Start: 2025-01-17 | End: 2025-01-17 | Stop reason: SURG

## 2025-01-17 RX ORDER — FENTANYL CITRATE 50 UG/ML
INJECTION, SOLUTION INTRAMUSCULAR; INTRAVENOUS AS NEEDED
Status: DISCONTINUED | OUTPATIENT
Start: 2025-01-17 | End: 2025-01-17 | Stop reason: SURG

## 2025-01-17 RX ORDER — PHENYLEPHRINE HCL IN 0.9% NACL 1 MG/10 ML
SYRINGE (ML) INTRAVENOUS AS NEEDED
Status: DISCONTINUED | OUTPATIENT
Start: 2025-01-17 | End: 2025-01-17 | Stop reason: SURG

## 2025-01-17 RX ORDER — LIDOCAINE HYDROCHLORIDE 10 MG/ML
INJECTION, SOLUTION EPIDURAL; INFILTRATION; INTRACAUDAL; PERINEURAL AS NEEDED
Status: DISCONTINUED | OUTPATIENT
Start: 2025-01-17 | End: 2025-01-17 | Stop reason: SURG

## 2025-01-17 RX ORDER — OXYCODONE HYDROCHLORIDE 5 MG/1
5 TABLET ORAL ONCE AS NEEDED
Status: COMPLETED | OUTPATIENT
Start: 2025-01-17 | End: 2025-01-21

## 2025-01-17 RX ORDER — SODIUM CHLORIDE, SODIUM LACTATE, POTASSIUM CHLORIDE, CALCIUM CHLORIDE 600; 310; 30; 20 MG/100ML; MG/100ML; MG/100ML; MG/100ML
INJECTION, SOLUTION INTRAVENOUS CONTINUOUS PRN
Status: DISCONTINUED | OUTPATIENT
Start: 2025-01-17 | End: 2025-01-17 | Stop reason: SURG

## 2025-01-17 RX ORDER — HYDRALAZINE HYDROCHLORIDE 20 MG/ML
5 INJECTION INTRAMUSCULAR; INTRAVENOUS
Status: DISCONTINUED | OUTPATIENT
Start: 2025-01-17 | End: 2025-01-20

## 2025-01-17 RX ORDER — PROPOFOL 10 MG/ML
INJECTION, EMULSION INTRAVENOUS AS NEEDED
Status: DISCONTINUED | OUTPATIENT
Start: 2025-01-17 | End: 2025-01-17 | Stop reason: SURG

## 2025-01-17 RX ORDER — MIDAZOLAM HYDROCHLORIDE 1 MG/ML
INJECTION, SOLUTION INTRAMUSCULAR; INTRAVENOUS AS NEEDED
Status: DISCONTINUED | OUTPATIENT
Start: 2025-01-17 | End: 2025-01-17 | Stop reason: SURG

## 2025-01-17 RX ORDER — SODIUM CHLORIDE, SODIUM LACTATE, POTASSIUM CHLORIDE, CALCIUM CHLORIDE 600; 310; 30; 20 MG/100ML; MG/100ML; MG/100ML; MG/100ML
20 INJECTION, SOLUTION INTRAVENOUS ONCE
Status: COMPLETED | OUTPATIENT
Start: 2025-01-17 | End: 2025-01-17

## 2025-01-17 RX ORDER — NALOXONE HCL 0.4 MG/ML
0.4 VIAL (ML) INJECTION AS NEEDED
Status: DISCONTINUED | OUTPATIENT
Start: 2025-01-17 | End: 2025-01-24 | Stop reason: HOSPADM

## 2025-01-17 RX ORDER — LABETALOL HYDROCHLORIDE 5 MG/ML
5 INJECTION, SOLUTION INTRAVENOUS
Status: DISCONTINUED | OUTPATIENT
Start: 2025-01-17 | End: 2025-01-20

## 2025-01-17 RX ORDER — LIDOCAINE HYDROCHLORIDE 10 MG/ML
INJECTION, SOLUTION INFILTRATION; PERINEURAL AS NEEDED
Status: DISCONTINUED | OUTPATIENT
Start: 2025-01-17 | End: 2025-01-17 | Stop reason: HOSPADM

## 2025-01-17 RX ORDER — DIPHENHYDRAMINE HYDROCHLORIDE 50 MG/ML
12.5 INJECTION INTRAMUSCULAR; INTRAVENOUS
Status: DISCONTINUED | OUTPATIENT
Start: 2025-01-17 | End: 2025-01-20

## 2025-01-17 RX ORDER — EPHEDRINE SULFATE 5 MG/ML
5 INJECTION INTRAVENOUS ONCE AS NEEDED
Status: DISCONTINUED | OUTPATIENT
Start: 2025-01-17 | End: 2025-01-20

## 2025-01-17 RX ORDER — VANCOMYCIN HYDROCHLORIDE 1 G/20ML
INJECTION, POWDER, LYOPHILIZED, FOR SOLUTION INTRAVENOUS AS NEEDED
Status: DISCONTINUED | OUTPATIENT
Start: 2025-01-17 | End: 2025-01-17 | Stop reason: HOSPADM

## 2025-01-17 RX ORDER — OXYCODONE HYDROCHLORIDE 5 MG/1
10 TABLET ORAL EVERY 4 HOURS PRN
Status: DISCONTINUED | OUTPATIENT
Start: 2025-01-17 | End: 2025-01-20 | Stop reason: SDUPTHER

## 2025-01-17 RX ORDER — DIPHENHYDRAMINE HYDROCHLORIDE 50 MG/ML
12.5 INJECTION INTRAMUSCULAR; INTRAVENOUS ONCE AS NEEDED
Status: DISCONTINUED | OUTPATIENT
Start: 2025-01-17 | End: 2025-01-20

## 2025-01-17 RX ORDER — IPRATROPIUM BROMIDE AND ALBUTEROL SULFATE 2.5; .5 MG/3ML; MG/3ML
3 SOLUTION RESPIRATORY (INHALATION) ONCE AS NEEDED
Status: DISCONTINUED | OUTPATIENT
Start: 2025-01-17 | End: 2025-01-20

## 2025-01-17 RX ORDER — ONDANSETRON 2 MG/ML
INJECTION INTRAMUSCULAR; INTRAVENOUS AS NEEDED
Status: DISCONTINUED | OUTPATIENT
Start: 2025-01-17 | End: 2025-01-17 | Stop reason: SURG

## 2025-01-17 RX ORDER — ONDANSETRON 2 MG/ML
4 INJECTION INTRAMUSCULAR; INTRAVENOUS ONCE AS NEEDED
Status: DISCONTINUED | OUTPATIENT
Start: 2025-01-17 | End: 2025-01-20

## 2025-01-17 RX ORDER — FLUMAZENIL 0.1 MG/ML
0.2 INJECTION INTRAVENOUS AS NEEDED
Status: DISCONTINUED | OUTPATIENT
Start: 2025-01-17 | End: 2025-01-24 | Stop reason: HOSPADM

## 2025-01-17 RX ADMIN — CEFEPIME 2000 MG: 2 INJECTION, POWDER, FOR SOLUTION INTRAVENOUS at 00:19

## 2025-01-17 RX ADMIN — CEFEPIME 2000 MG: 2 INJECTION, POWDER, FOR SOLUTION INTRAVENOUS at 10:01

## 2025-01-17 RX ADMIN — BUPRENORPHINE HCL 8 MG: 8 TABLET SUBLINGUAL at 20:49

## 2025-01-17 RX ADMIN — Medication 100 MCG: at 07:45

## 2025-01-17 RX ADMIN — INSULIN GLARGINE 30 UNITS: 100 INJECTION, SOLUTION SUBCUTANEOUS at 10:07

## 2025-01-17 RX ADMIN — VANCOMYCIN HYDROCHLORIDE 1000 MG: 1 INJECTION, POWDER, LYOPHILIZED, FOR SOLUTION INTRAVENOUS at 14:21

## 2025-01-17 RX ADMIN — OXYCODONE HYDROCHLORIDE 10 MG: 5 TABLET ORAL at 21:13

## 2025-01-17 RX ADMIN — VANCOMYCIN HYDROCHLORIDE 1000 MG: 1 INJECTION, POWDER, LYOPHILIZED, FOR SOLUTION INTRAVENOUS at 06:22

## 2025-01-17 RX ADMIN — FENTANYL CITRATE 25 MCG: 50 INJECTION, SOLUTION INTRAMUSCULAR; INTRAVENOUS at 07:35

## 2025-01-17 RX ADMIN — HYDROMORPHONE HYDROCHLORIDE 0.5 MG: 1 INJECTION, SOLUTION INTRAMUSCULAR; INTRAVENOUS; SUBCUTANEOUS at 08:41

## 2025-01-17 RX ADMIN — BUPRENORPHINE HCL 8 MG: 8 TABLET SUBLINGUAL at 16:38

## 2025-01-17 RX ADMIN — GABAPENTIN 1200 MG: 400 CAPSULE ORAL at 10:04

## 2025-01-17 RX ADMIN — SODIUM CHLORIDE, SODIUM LACTATE, POTASSIUM CHLORIDE, AND CALCIUM CHLORIDE: .6; .31; .03; .02 INJECTION, SOLUTION INTRAVENOUS at 07:30

## 2025-01-17 RX ADMIN — LOSARTAN POTASSIUM 100 MG: 50 TABLET, FILM COATED ORAL at 10:05

## 2025-01-17 RX ADMIN — AMLODIPINE BESYLATE 10 MG: 5 TABLET ORAL at 10:04

## 2025-01-17 RX ADMIN — OXYCODONE HYDROCHLORIDE 10 MG: 5 TABLET ORAL at 14:41

## 2025-01-17 RX ADMIN — LIDOCAINE HYDROCHLORIDE 50 MG: 10 INJECTION, SOLUTION EPIDURAL; INFILTRATION; INTRACAUDAL; PERINEURAL at 07:35

## 2025-01-17 RX ADMIN — VANCOMYCIN HYDROCHLORIDE 1000 MG: 1 INJECTION, POWDER, LYOPHILIZED, FOR SOLUTION INTRAVENOUS at 21:13

## 2025-01-17 RX ADMIN — ONDANSETRON 4 MG: 2 INJECTION INTRAMUSCULAR; INTRAVENOUS at 07:39

## 2025-01-17 RX ADMIN — Medication 100 MCG: at 08:03

## 2025-01-17 RX ADMIN — Medication 100 MCG: at 07:52

## 2025-01-17 RX ADMIN — MIDAZOLAM 2 MG: 1 INJECTION INTRAMUSCULAR; INTRAVENOUS at 07:25

## 2025-01-17 RX ADMIN — SODIUM CHLORIDE, POTASSIUM CHLORIDE, SODIUM LACTATE AND CALCIUM CHLORIDE 20 ML/HR: 600; 310; 30; 20 INJECTION, SOLUTION INTRAVENOUS at 06:54

## 2025-01-17 RX ADMIN — PROPOFOL 180 MG: 10 INJECTION, EMULSION INTRAVENOUS at 07:35

## 2025-01-17 RX ADMIN — INSULIN GLARGINE 30 UNITS: 100 INJECTION, SOLUTION SUBCUTANEOUS at 20:48

## 2025-01-17 RX ADMIN — INSULIN LISPRO 7 UNITS: 100 INJECTION, SOLUTION INTRAVENOUS; SUBCUTANEOUS at 20:49

## 2025-01-17 RX ADMIN — DEXAMETHASONE SODIUM PHOSPHATE 4 MG: 4 INJECTION, SOLUTION INTRAMUSCULAR; INTRAVENOUS at 07:39

## 2025-01-17 RX ADMIN — Medication 100 MCG: at 07:37

## 2025-01-17 RX ADMIN — INSULIN LISPRO 7 UNITS: 100 INJECTION, SOLUTION INTRAVENOUS; SUBCUTANEOUS at 17:38

## 2025-01-17 RX ADMIN — CEFTRIAXONE SODIUM 2000 MG: 2 INJECTION, POWDER, FOR SOLUTION INTRAMUSCULAR; INTRAVENOUS at 17:39

## 2025-01-17 RX ADMIN — GABAPENTIN 1200 MG: 400 CAPSULE ORAL at 20:49

## 2025-01-17 NOTE — ANESTHESIA PREPROCEDURE EVALUATION
Anesthesia Evaluation     Patient summary reviewed and Nursing notes reviewed   no history of anesthetic complications:   NPO Solid Status: > 8 hours  NPO Liquid Status: > 8 hours           Airway   Mallampati: II  TM distance: >3 FB  Neck ROM: full  No difficulty expected  Dental      Pulmonary - normal exam   Cardiovascular - normal exam    ECG reviewed    (+) hypertension      Neuro/Psych  GI/Hepatic/Renal/Endo    (+) diabetes mellitus type 2 poorly controlled using insulin    Musculoskeletal     Abdominal    Substance History   (+) drug use     OB/GYN          Other                    Anesthesia Plan    ASA 3     general     intravenous induction     Anesthetic plan, risks, benefits, and alternatives have been provided, discussed and informed consent has been obtained with: patient.    Plan discussed with CRNA.    CODE STATUS:    Code Status (Patient has no pulse and is not breathing): CPR (Attempt to Resuscitate)  Medical Interventions (Patient has pulse or is breathing): Full Support

## 2025-01-17 NOTE — PROGRESS NOTES
[facility] MEDICINE SERVICE  DAILY PROGRESS NOTE    NAME: Tomas Song  : 1980  MRN: [id number]      LOS: 4 days     PROVIDER OF SERVICE: Aldair Lind MD    Chief Complaint: Osteomyelitis of both feet    Subjective:     Interval History:      -Patient seen and evaluated at bedside. No complaints this morning. Pain controlled. Patient not amenable to amputation at this time but with plans for I&D in OR this afternoon.   1/15 patient seen and examined in bed no acute distress, vital signs stable, discussed with RN.  Awaiting cultures.  25 patient seen and examined.  No acute distress, no new complaints, for surgery in a.m.  Cough tolerating antibiotics, discussed with RN.  pt will need less  than 30 days in SNF    seen in bed NAD, no new complaints, DW RN vss  Treatment plan discussed with patient. All questions addressed.     Review of Systems:   Denies fevers, chills  Denies chest pain, edema  Denies shortness of breath, cough  Denies nausea, vomiting, diarrhea  Denies dysuria, hematuria    Objective:     Vital Signs  Temp:  [97.7 °F (36.5 °C)-98.8 °F (37.1 °C)] 98.5 °F (36.9 °C)  Heart Rate:  [] 100  Resp:  [8-17] 13  BP: ()/(46-86) 99/54  Flow (L/min) (Oxygen Therapy):  [2] 2   Body mass index is 27.76 kg/m².    Physical Exam   General: No acute distress  Neuro: AAOx3, no FND  CV: RRR, no peripheral edema  Pulm: CTAB, no increased work of breathing  Abd: Soft, nontender, nondistended  Skin: Bilateral foot wounds noted with bandages on left foot, purulent discharge noted; skin well perfused and pink.  Psych: Appropriate mood and affect    Scheduled Meds   amLODIPine, 10 mg, Oral, Daily  buprenorphine, 8 mg, Sublingual, TID  cefepime, 2,000 mg, Intravenous, Q8H  gabapentin, 1,200 mg, Oral, Q12H  hydroCHLOROthiazide, 25 mg, Oral, Daily  insulin glargine, 30 Units, Subcutaneous, BID  insulin lispro, 2-9 Units, Subcutaneous, 4x Daily AC & at Bedtime  losartan, 100  mg, Oral, Daily  vancomycin, 1,000 mg, Intravenous, Q8H  vitamin D3, 5,000 Units, Oral, Daily       PRN Meds     acetaminophen **OR** acetaminophen **OR** acetaminophen    atropine    atropine    senna-docusate sodium **AND** polyethylene glycol **AND** bisacodyl **AND** bisacodyl    Calcium Replacement - Follow Nurse / BPA Driven Protocol    dextrose    dextrose    diphenhydrAMINE    diphenhydrAMINE    diphenhydrAMINE    diphenhydrAMINE    ePHEDrine Sulfate (Pressors)    ePHEDrine Sulfate (Pressors)    flumazenil    glucagon (human recombinant)    hydrALAZINE    hydrALAZINE    HYDROmorphone    HYDROmorphone    HYDROmorphone    HYDROmorphone    ipratropium-albuterol    ipratropium-albuterol    ketorolac    labetalol    labetalol    lidocaine (cardiac)    lidocaine (cardiac)    Magnesium Standard Dose Replacement - Follow Nurse / BPA Driven Protocol    melatonin    naloxone    naloxone    ondansetron ODT **OR** ondansetron    ondansetron    ondansetron    oxyCODONE    oxyCODONE    oxyCODONE    oxyCODONE    oxyCODONE    Pharmacy to dose vancomycin    Phosphorus Replacement - Follow Nurse / BPA Driven Protocol    Potassium Replacement - Follow Nurse / BPA Driven Protocol   Infusions  Pharmacy to dose vancomycin,           Diagnostic Data    Results from last 7 days   Lab Units 01/17/25  0537 01/14/25  0418 01/13/25  1226   WBC 10*3/mm3 6.76   < > 5.97   HEMOGLOBIN g/dL 10.2*   < > 9.7*   HEMATOCRIT % 33.1*   < > 31.7*   PLATELETS 10*3/mm3 396   < > 410   GLUCOSE mg/dL 280*   < > 180*   CREATININE mg/dL 0.76   < > 0.96   BUN mg/dL 14   < > 12   SODIUM mmol/L 138   < > 139   POTASSIUM mmol/L 4.1   < > 4.0   AST (SGOT) U/L  --   --  20   ALT (SGPT) U/L  --   --  19   ALK PHOS U/L  --   --  104   BILIRUBIN mg/dL  --   --  0.2   ANION GAP mmol/L 8.3   < > 9.4    < > = values in this interval not displayed.       No radiology results for the last day    Interval results reviewed.    Assessment/Plan:     Osteomyelitis of  right great toe  Osteomyelitis of left foot  Left foot diabetic ulcer  - Podiatry consulted, appreciate recs  - ID consulted, appreciate recs  - Continue vancomycin, cefepime  - Analgesia, avoid opiates as able  - Wound care consult  - Plan for I&D in OR today  - ABIs pending  - Recommendations for amputation; however, patient not amenable at this time and would like to discuss alternative options    Type 2 diabetes mellitus  - Continue lantus  - SSI, hypoglycemia protocol, CCD  - Plan for diabetic educator prior to discharge    Anemia  - No overt s/sx bleeding  - In setting of acute infection  - Repeat CBC in AM    Hypertension  - Continue amlodipine, HCTZ, losartan    Hyperlipidemia  - Continue fenofibrate    Opioid use disorder  - Continue subutex    ADHD  - Holding home meds during admission    Treatment plan discussed with nursing staff.     VTE Prophylaxis:  No VTE prophylaxis order currently exists.    Holding pharmacological ppx given surgical plans; not amendable to SCDs given bilateral lower extremity wounds.     Code status is   Code Status and Medical Interventions: CPR (Attempt to Resuscitate); Full Support   Ordered at: 01/13/25 1314     Code Status (Patient has no pulse and is not breathing):    CPR (Attempt to Resuscitate)     Medical Interventions (Patient has pulse or is breathing):    Full Support       Plan for disposition: Home 1/17/25 pending clinical course    Barriers to discharge: OR today, ID consult pending, cultures pending, IV abx    Time: 35+ minutes     Signature: Electronically signed by Aldair Lind MD, 01/17/25, 14:43 EST.  [facility]ist Team

## 2025-01-17 NOTE — ANESTHESIA PROCEDURE NOTES
Airway  Urgency: elective    Date/Time: 1/17/2025 7:36 AM  Airway not difficult    General Information and Staff    Patient location during procedure: OR    Indications and Patient Condition  Indications for airway management: airway protection    Preoxygenated: yes  MILS maintained throughout  Mask difficulty assessment: 1 - vent by mask    Final Airway Details  Final airway type: supraglottic airway      Successful airway: I-gel  Size 4     Number of attempts at approach: 1  Assessment: lips, teeth, and gum same as pre-op and atraumatic intubation

## 2025-01-17 NOTE — PLAN OF CARE
Goal Outcome Evaluation:  Plan of Care Reviewed With: patient           Outcome Evaluation: Tomas Song is a 44 y.o. male with a CMH of type 2 diabetes mellitus (A1c 9.7), HTN, HLD, ADHD, opioid use disorder (on Subutex) presenting 1/13/25  with Left foot osteomyelitis / abscess with severe infection and right hallux ulcer and osteomyelitis. S/p I&D B feet. Podiatry recommended right hallux and left partial first ray amputations but pt is not agreeable at this time. Pt lives alone in an apt with 2 CRIS with rail. He works full time in construction. Usually independent with all mobility and ADLs, drives.  Pt underwent I&D of L foot today.  This date pt able to mobilize with supervision and able to complete dressing without assist.  He has good UE strength and is able to maintain NWB precuations of LUE.   Will continue to follow, recommend home with assist from girlfriend.    Anticipated Discharge Disposition (OT): home with assist

## 2025-01-17 NOTE — THERAPY EVALUATION
Patient Name: Tomas Song  : 1980    MRN: [id number]                              Today's Date: 2025       Admit Date: 2025    Visit Dx:     ICD-10-CM ICD-9-CM   1. Uncontrolled type 2 diabetes mellitus with hyperglycemia  E11.65 250.02   2. Osteomyelitis of left foot, unspecified type  M86.9 730.27   3. Osteomyelitis of great toe of right foot  M86.9 730.27   4. Pain  R52 780.96     Patient Active Problem List   Diagnosis    Type 2 diabetes mellitus with hyperglycemia, with long-term current use of insulin    Essential hypertension    Vitamin D deficiency    Low testosterone    Cellulitis of left lower extremity    Osteomyelitis of both feet     Past Medical History:   Diagnosis Date    Hypertension     Type 2 diabetes mellitus      Past Surgical History:   Procedure Laterality Date    INCISION AND DRAINAGE OF WOUND Bilateral 2025    Procedure: INCISION AND DRAINAGE WOUND BILATERAL FEET;  Surgeon: Susan Garrison DPM;  Location: [facility];  Service: Podiatry;  Laterality: Bilateral;      General Information       Row Name 25 1513          OT Time and Intention    Document Type evaluation  -SR     Mode of Treatment occupational therapy  -SR       Row Name 25 1513          Occupational Profile    Reason for Services/Referral (Occupational Profile) Tomas Song is a 44 y.o. male with a CMH of type 2 diabetes mellitus (A1c 9.7), HTN, HLD, ADHD, opioid use disorder (on Subutex) presenting 25  with Left foot osteomyelitis / abscess with severe infection and right hallux ulcer and osteomyelitis. S/p I&D B feet. Podiatry recommended right hallux and left partial first ray amputations but pt is not agreeable at this time. Pt lives alone in an apt with 2 CRIS with rail. He works full time in construction. Usually independent with all mobility and ADLs, drives.  Pt underwent I&D of L foot today.  -SR       Row Name 25 1510          Living Environment     People in Home alone  -SR       Row Name 01/17/25 1513          Home Main Entrance    Number of Stairs, Main Entrance two  -SR       Row Name 01/17/25 1513          Cognition    Orientation Status (Cognition) oriented x 4  -SR               User Key  (r) = Recorded By, (t) = Taken By, (c) = Cosigned By      Initials Name Provider Type    SR Heike Yanes, OT Occupational Therapist                     Mobility/ADL's       Row Name 01/17/25 1528          Sit-Stand Transfer    Sit-Stand [place] (Transfers) supervision  -SR     Assistive Device (Sit-Stand Transfers) walker, front-wheeled  -SR       Row Name 01/17/25 1528          Functional Mobility    Functional Mobility- Ind. Level supervision required  -SR     Functional Mobility- Device walker, front-wheeled  -SR       Row Name 01/17/25 1528          Activities of Daily Living    BADL Assessment/Intervention lower body dressing  -SR       Row Name 01/17/25 1528          Lower Body Dressing Assessment/Training    Comment, (Lower Body Dressing) Pt reports donning shoes and pants without assist, and was able to demonstrate reaching to knees while standing maintaining NWB precautions.  -SR               User Key  (r) = Recorded By, (t) = Taken By, (c) = Cosigned By      Initials Name Provider Type    SR Heike Yanes, CARMEN Occupational Therapist                   Obj/Interventions       Row Name 01/17/25 1533          Range of Motion Comprehensive    General Range of Motion no range of motion deficits identified  -SR       Row Name 01/17/25 1533          Strength Comprehensive (MMT)    General Manual Muscle Testing (MMT) Assessment no strength deficits identified  -SR       Row Name 01/17/25 1533          Balance    Static Sitting Balance supervision  -SR     Dynamic Sitting Balance supervision  -SR     Static Standing Balance supervision  -SR     Dynamic Standing Balance supervision  -SR     Position/Device Used, Standing Balance walker,  front-wheeled  -SR     Balance Interventions sitting;standing;sit to stand;supported;static;dynamic;minimal challenge  -SR               User Key  (r) = Recorded By, (t) = Taken By, (c) = Cosigned By      Initials Name Provider Type    SR Heike Yanes, OT Occupational Therapist                   Goals/Plan       Row Name 01/17/25 1539          Bathing Goal 1 (OT)    Activity/Device (Bathing Goal 1, OT) bathing skills, all  -SR     [place] Level/Cues Needed (Bathing Goal 1, OT) modified independence  -SR     Time Frame (Bathing Goal 1, OT) 2 weeks  -SR       Row Name 01/17/25 1539          Toileting Goal 1 (OT)    Activity/Device (Toileting Goal 1, OT) toileting skills, all  -SR     [place] Level/Cues Needed (Toileting Goal 1, OT) modified independence  -SR     Time Frame (Toileting Goal 1, OT) 2 weeks  -SR       Row Name 01/17/25 1539          Therapy Assessment/Plan (OT)    Planned Therapy Interventions (OT) activity tolerance training;BADL retraining;IADL retraining;functional balance retraining;neuromuscular control/coordination retraining;ROM/therapeutic exercise;transfer/mobility retraining;strengthening exercise;occupation/activity based interventions  -SR               User Key  (r) = Recorded By, (t) = Taken By, (c) = Cosigned By      Initials Name Provider Type    SR Heike Yanes, OT Occupational Therapist                   Clinical Impression       Row Name 01/17/25 1534          Pain Assessment    Pretreatment Pain Rating 4/10  -SR     Posttreatment Pain Rating 5/10  -SR       Row Name 01/17/25 1534          Plan of Care Review    Plan of Care Reviewed With patient  -SR     Outcome Evaluation Tomas Song is a 44 y.o. male with a CMH of type 2 diabetes mellitus (A1c 9.7), HTN, HLD, ADHD, opioid use disorder (on Subutex) presenting 1/13/25  with Left foot osteomyelitis / abscess with severe infection and right hallux ulcer and osteomyelitis. S/p I&D B feet. Podiatry  recommended right hallux and left partial first ray amputations but pt is not agreeable at this time. Pt lives alone in an apt with 2 CR[address] with rail. He works full time in construction. Usually independent with all mobility and ADLs, drives.  Pt underwent I&D of L foot today.  This date pt able to mobilize with supervision and able to complete dressing without assist.  He has good UE strength and is able to maintain NWB precuations of LUE.   Will continue to follow, recommend home with assist from girlfriend.  -SR       Row Name 01/17/25 1534          Therapy Assessment/Plan (OT)    Rehab Potential (OT) good  -SR     Criteria for Skilled Therapeutic Interventions Met (OT) yes  -SR     Therapy Frequency (OT) 3 times/wk  -SR     Predicted Duration of Therapy Intervention (OT) Until discharge  -SR       Row Name 01/17/25 1534          Therapy Plan Review/Discharge Plan (OT)    Anticipated Discharge Disposition (OT) home with assist  -SR       Row Name 01/17/25 1534          Positioning and Restraints    Pre-Treatment Position sitting in chair/recliner  -SR     Post Treatment Position chair  -SR     In Chair call light within reach;encouraged to call for assist;exit alarm on  -SR               User Key  (r) = Recorded By, (t) = Taken By, (c) = Cosigned By      Initials Name Provider Type    SR Heike Yanes, OT Occupational Therapist                   Outcome Measures       Row Name 01/17/25 0936          How much help from another person do you currently need...    Turning from your back to your side while in flat bed without using bedrails? 4  -AH     Moving from lying on back to sitting on the side of a flat bed without bedrails? 4  -AH     Moving to and from a bed to a chair (including a wheelchair)? 3  -AH     Standing up from a chair using your arms (e.g., wheelchair, bedside chair)? 3  -AH     Climbing 3-5 steps with a railing? 3  -AH     To walk in hospital room? 3  -AH     AM-PAC 6 Clicks Score (PT) 20   -               User Key  (r) = Recorded By, (t) = Taken By, (c) = Cosigned By      Initials Name Provider Type     Lynda Mariscal, RN Registered Nurse                    Occupational Therapy Education       Title: PT OT SLP Therapies (In Progress)       Topic: Occupational Therapy (In Progress)       Point: ADL training (In Progress)       Description:   Instruct learner(s) on proper safety adaptation and remediation techniques during self care or transfers.   Instruct in proper use of assistive devices.                  Learning Progress Summary            Patient Acceptance, E,TB, NR by  at 1/17/2025 1539                      Point: Home exercise program (Not Started)       Description:   Instruct learner(s) on appropriate technique for monitoring, assisting and/or progressing therapeutic exercises/activities.                  Learner Progress:  Not documented in this visit.              Point: Precautions (Not Started)       Description:   Instruct learner(s) on prescribed precautions during self-care and functional transfers.                  Learner Progress:  Not documented in this visit.              Point: Body mechanics (In Progress)       Description:   Instruct learner(s) on proper positioning and spine alignment during self-care, functional mobility activities and/or exercises.                  Learning Progress Summary            Patient Acceptance, E,TB, NR by  at 1/17/2025 1539                                      User Key       Initials Effective Dates Name Provider Type [facility] 06/16/21 -  Heike Yanes OT Occupational Therapist OT                  OT Recommendation and Plan  Planned Therapy Interventions (OT): activity tolerance training, BADL retraining, IADL retraining, functional balance retraining, neuromuscular control/coordination retraining, ROM/therapeutic exercise, transfer/mobility retraining, strengthening exercise, occupation/activity based  interventions  Therapy Frequency (OT): 3 times/wk  Plan of Care Review  Plan of Care Reviewed With: patient  Outcome Evaluation: Tomas Song is a 44 y.o. male with a CMH of type 2 diabetes mellitus (A1c 9.7), HTN, HLD, ADHD, opioid use disorder (on Subutex) presenting 1/13/25  with Left foot osteomyelitis / abscess with severe infection and right hallux ulcer and osteomyelitis. S/p I&D B feet. Podiatry recommended right hallux and left partial first ray amputations but pt is not agreeable at this time. Pt lives alone in an apt with 2 CRIS with rail. He works full time in construction. Usually independent with all mobility and ADLs, drives.  Pt underwent I&D of L foot today.  This date pt able to mobilize with supervision and able to complete dressing without assist.  He has good UE strength and is able to maintain NWB precuations of LUE.   Will continue to follow, recommend home with assist from girlfriend.     Time Calculation:         Time Calculation- OT       Row Name 01/17/25 1556             Time Calculation- OT    OT Start Time 1400  -SR      OT Stop Time 1415  -SR      OT Time Calculation (min) 15 min  -SR      Total Timed Code Minutes- OT 0 minute(s)  -SR      OT Received On 01/17/25  -SR      OT - Next Appointment 01/20/25  -SR      OT Goal Re-Cert Due Date 01/31/25  -SR                User Key  (r) = Recorded By, (t) = Taken By, (c) = Cosigned By      Initials Name Provider Type    SR Heike Yanes OT Occupational Therapist                  Therapy Charges for Today       Code Description Service Date Service Provider Modifiers Qty    [id number]  OT EVAL MOD COMPLEXITY 4 1/17/2025 Heike Yanes OT GO 1                 Heike Yanes OT  1/17/2025

## 2025-01-17 NOTE — ANESTHESIA POSTPROCEDURE EVALUATION
Patient: Tomas Song    Procedure Summary       Date: 01/17/25 Room / Location: [facility] OR 07 / [facility] MAIN OR    Anesthesia Start: 0730 Anesthesia Stop: 0811    Procedure: INCISION AND DRAINAGE FOOT (Left) Diagnosis:     Surgeons: Susan Garrison DPM Provider: Isamar Oneal MD    Anesthesia Type: general ASA Status: 3            Anesthesia Type: general    Vitals  Vitals Value Taken Time   /78 01/17/25 0901   Temp 97.9 °F (36.6 °C) 01/17/25 0900   Pulse 95 01/17/25 0902   Resp 10 01/17/25 0900   SpO2 95 % 01/17/25 0902   Vitals shown include unfiled device data.        Post Anesthesia Care and Evaluation    Patient location during evaluation: PACU  Patient participation: complete - patient participated  Level of consciousness: awake and alert  Pain management: satisfactory to patient    Airway patency: patent  Anesthetic complications: No anesthetic complications  PONV Status: none  Cardiovascular status: acceptable  Respiratory status: acceptable  Hydration status: acceptable

## 2025-01-17 NOTE — PROGRESS NOTES
LOS: 4 days   Patient Care Team:  Ty Gao DO as PCP - General (Internal Medicine)    Chief Complaint:  Bilateral foot osteomyelitis    Subjective     Interval History: S/p bilateral foot I&D (DOS 1/14/25). Pain controlled per current regiment.  Ready for OR this morning. NPO.     Patient Complaints: none  Patient Denies:  n/v/f/c/sob  History taken from: patient    Review of Systems:  Pertinent positives in HPI       Objective     Vital Signs  Temp:  [98 °F (36.7 °C)-98.4 °F (36.9 °C)] 98.4 °F (36.9 °C)  Heart Rate:  [] 101  Resp:  [8-17] 8  BP: (120-149)/(72-87) 149/86    Physical Exam:  Vascular:  - Difficult to palpate 2/2 edema  - Moderate edema, left foot and right hallux - edema improving. Skin tension lines becoming apparent.   - Capillary refill time <5 seconds to all digits, bilaterally  - Erythema resolved left forefoot, minimal erythema bilateral legs.    Dermatological:  Right foot dressing remains intact.   Left plantar foot loosely closed with sutures intact. Mcaration noted to the borders of wound / incision. Moderate amount of  purulence expressed.    Neurologic:  - Protective sensation diminished, bilaterally  - Sensation intact to light touch, bilaterally    Musculoskeletal:  - Some pain with palpation around surgical site, left foot.   - Deformities: None  - Muscle strength 5/5 for all muscle groups of the lower extremities, bilaterally.       Labs:  Results from last 7 days   Lab Units 01/17/25  0537   WBC 10*3/mm3 6.76   HEMOGLOBIN g/dL 10.2*   HEMATOCRIT % 33.1*   PLATELETS 10*3/mm3 396     Results from last 7 days   Lab Units 01/17/25  0537   SODIUM mmol/L 138   POTASSIUM mmol/L 4.1   CHLORIDE mmol/L 100   CO2 mmol/L 29.7*   BUN mg/dL 14   CREATININE mg/dL 0.76   GLUCOSE mg/dL 280*   CALCIUM mg/dL 9.6     Glucose   Date Value Ref Range Status   01/17/2025 280 (H) 65 - 99 mg/dL Final   01/16/2025 248 (H) 65 - 99 mg/dL Final   01/15/2025 312 (H) 65 - 99 mg/dL Final  "    Results from last 7 days   Lab Units 01/13/25  1226   SED RATE mm/hr 40*     Results from last 7 days   Lab Units 01/13/25  1226   CRP mg/dL 5.29*         No components found for: \"HBA1C\"    Imaging:   Imaging Results (All)       Procedure Component Value Units Date/Time    MRI Foot Right Without Contrast [[id number]] Collected: 01/13/25 2238     Updated: 01/13/25 2250    Narrative:      MRI FOOT RIGHT WO CONTRAST    Date of Exam: 1/13/2025 7:35 PM EST    Indication: Right hallux osteomyelitis.     Comparison: Toe radiographs 1/13/2025    Technique:  Routine multiplanar/multisequence sequence images of the right foot were obtained without contrast administration.      Findings:  Bones: There is marrow edema of the first proximal and distal phalanges with associated loss of normal T1 marrow and cortical signal along the medial aspect of the first interphalangeal joint. No additional areas of abnormal marrow edema. No suspicious   osseous lesions. Small effusion of the first metatarsophalangeal joint. Small to moderate effusion in the first interphalangeal joint.    Soft tissues: Lisfranc ligament is intact. Plantar plates appear intact. Visualized tendons appear grossly intact. Diffuse subcutaneous edema. Atrophy of the intrinsic foot muscles. There is soft tissue wound along the medial aspect of the first toe with   underlying lobulated fluid which may reflect small abscesses. Small mild intermetatarsal bursal fluid in the first, second and third interspaces.      Impression:      Impression:  1.Soft tissue wound along the medial aspect of the first toe with underlying lobulated fluid which may reflect small abscesses.  2.Marrow edema of the first proximal and distal phalanges with associated loss of normal T1 marrow and cortical signal along the medial aspect of the first interphalangeal joint. These findings are suspicious for osteomyelitis. There is also adjacent   small to moderate effusion in the first " interphalangeal joint concerning for septic arthritis.        Electronically Signed: Jacob Kebede MD    1/13/2025 10:48 PM EST    Workstation ID: [id number]    MRI Foot Left Without Contrast [[id number]] Collected: 01/13/25 2212     Updated: 01/13/25 2224    Narrative:      MRI FOOT LEFT WO CONTRAST    Date of Exam: 1/13/2025 7:35 PM EST    Indication: Evaluate abcess and osteomyelitis.     Comparison: One 1325 foot radiographs    Technique:  Routine multiplanar/multisequence sequence images of the left foot were obtained without contrast administration.      Findings:  Bones and joints: Marrow edema and loss of normal T1 cortical and marrow signal with erosions of the first metatarsal head as well as the base of the first proximal phalanx. There is associated lobulated small joint effusion at the first   metatarsophalangeal joint. There is marrow edema seen throughout the remainder of the first metatarsal and the first proximal phalanx. Minimal marrow edema seen along the first distal phalanx. No additional areas of abnormal marrow edema. No suspicious   osseous lesions.    Soft tissues: Soft tissue wound along the plantar medial forefoot just below the first metatarsophalangeal joint. There are associated lobulated fluid seen deep to the wound which may reflect small abscesses. There is involvement of the flexor houses   longus tendon at this location as well with mild associated tenosynovitis. Remaining tendons appear grossly intact. Lisfranc ligament is intact. The plantar plates of the second through fifth digits appear intact. Diffuse subcutaneous edema.      Impression:      Impression:  1.Soft tissue wound along the plantar medial forefoot just below the first metatarsophalangeal joint. There are associated lobulated fluid collections deep to the wound which may reflect small abscesses. There is involvement of the flexor hallucis longus   tendon at this location as well with mild associated  tenosynovitis.  2.Marrow edema and loss of normal T1 cortical and marrow signal with erosions of the first metatarsal head as well as the base of the first proximal phalanx. There is associated lobulated small joint effusion at the first metatarsophalangeal joint. These   findings are concerning for septic arthritis with osteomyelitis of the first metatarsal head and base of the first proximal phalanx. There is reactive marrow edema seen along the remainder of the first proximal phalanx and first metatarsal.        Electronically Signed: Jacob Kebede MD    1/13/2025 10:22 PM EST    Workstation ID: [id number]    XR Foot 3+ View Left [[id number]] Collected: 01/13/25 1239     Updated: 01/13/25 1246    Narrative:      XR FOOT 3+ VW LEFT, XR TOE 2+ VW RIGHT    Date of Exam: 1/13/2025 12:02 PM EST    Indication: Diabetic wound infection involving the left foot in the right great toe.    Comparison: None available.    Findings:  Right great toe: There is a cortical destruction and periostitis involving the distal phalanx, especially along the medial border and also involving the medial aspect of the head of the proximal phalanx. The findings are consistent with osteomyelitis in   the appropriate clinical setting. There is a pathologic fracture secondary to the osteomyelitis involving the medial aspect of the head of the proximal phalanx. There is soft tissue swelling. There is a relatively deep ulcer along the medial aspect of   the right great toe at the site of the bony destructive changes. There are incidental arthritic changes involving the IP joint and first MTP joint.    Left foot: There is cortical destruction, osteolysis, and periosteal reaction involving the distal shaft and head of the first metatarsal bone as well as the adjacent base of the proximal phalanx. There also may be some osteolytic changes in the base of   the distal phalanx of the left great toe. This is consistent with radiographic changes of  osteomyelitis. There is soft tissue swelling involving mainly the left great toe. Shallow ulcers are suggested along the plantar aspect of the medial left forefoot   in the location of the radiographic changes of osteomyelitis. There are minor arthritic changes involving the inner-phalangeal joints of the digits.      Impression:      Impression:  1.Radiographic changes of osteomyelitis involving the distal phalanx of the right great toe with a pathologic fracture secondary to the osteomyelitis involving the medial aspect of the head of the proximal phalanx.  2.Radiographic changes of osteomyelitis involving the distal shaft and head of the left first metatarsal bone as well as the adjacent base of the proximal phalanx. There also may be some osteolytic changes in the base of the distal phalanx of the left   great toe representing osteomyelitis.  3.Soft tissue swelling. There is a relatively deep ulcer along the medial aspect of the right great toe at the site of the bony destructive changes.  4.Soft tissue swelling and shallow ulcers along the medial aspect of the left forefoot at the site of the bony destructive changes.        Electronically Signed: Agapito Rick MD    1/13/2025 12:44 PM EST    Workstation ID: [id number]    XR Toe 2+ View Right [[id number]] Collected: 01/13/25 1239     Updated: 01/13/25 1246    Narrative:      XR FOOT 3+ VW LEFT, XR TOE 2+ VW RIGHT    Date of Exam: 1/13/2025 12:02 PM EST    Indication: Diabetic wound infection involving the left foot in the right great toe.    Comparison: None available.    Findings:  Right great toe: There is a cortical destruction and periostitis involving the distal phalanx, especially along the medial border and also involving the medial aspect of the head of the proximal phalanx. The findings are consistent with osteomyelitis in   the appropriate clinical setting. There is a pathologic fracture secondary to the osteomyelitis involving the medial aspect of the  "head of the proximal phalanx. There is soft tissue swelling. There is a relatively deep ulcer along the medial aspect of   the right great toe at the site of the bony destructive changes. There are incidental arthritic changes involving the IP joint and first MTP joint.    Left foot: There is cortical destruction, osteolysis, and periosteal reaction involving the distal shaft and head of the first metatarsal bone as well as the adjacent base of the proximal phalanx. There also may be some osteolytic changes in the base of   the distal phalanx of the left great toe. This is consistent with radiographic changes of osteomyelitis. There is soft tissue swelling involving mainly the left great toe. Shallow ulcers are suggested along the plantar aspect of the medial left forefoot   in the location of the radiographic changes of osteomyelitis. There are minor arthritic changes involving the inner-phalangeal joints of the digits.      Impression:      Impression:  1.Radiographic changes of osteomyelitis involving the distal phalanx of the right great toe with a pathologic fracture secondary to the osteomyelitis involving the medial aspect of the head of the proximal phalanx.  2.Radiographic changes of osteomyelitis involving the distal shaft and head of the left first metatarsal bone as well as the adjacent base of the proximal phalanx. There also may be some osteolytic changes in the base of the distal phalanx of the left   great toe representing osteomyelitis.  3.Soft tissue swelling. There is a relatively deep ulcer along the medial aspect of the right great toe at the site of the bony destructive changes.  4.Soft tissue swelling and shallow ulcers along the medial aspect of the left forefoot at the site of the bony destructive changes.        Electronically Signed: Agapito Rick MD    1/13/2025 12:44 PM EST    Workstation ID: [id number]            Cultures:   Wound culture:   No results found for: \"WOUNDCX\"    Blood culture: " "  No results found for: \"BLOODCX\"       Results Review:     I reviewed the patient's new clinical results.  I reviewed the patient's new imaging results and agree with the interpretation.  I reviewed the patient's other test results and agree with the interpretation      Assessment & Plan     Tomas Song is a 44 y.o. male presenting with Left foot osteomyelitis / abscess with severe infection and right hallux osteomyelitis.      #Diabetic foot ulcer, sub 1st metatarsal head, left  #Severe diabetic foot infection, left  #Osteomyelitis, left 1st metatarsal / hallux   #Abscess, left foot  #Right hallux ulcer - stable  #Osteomyelitis, right hallux  #s/p Right hallux I&D w bone biopsy and left foot I&D with bone biopsy (DOS 1/14/25)  - Examined patient and discussed findings of exam as noted below with patient.   - Xray and MRI confirm osteomyelitis right hallux and left hallux proximal phalanx and 1st metatarsal head. Abscess noted to right hallux and left foot.   - EMILY/TBI normal with some elevation representing calcinosis.    Recommend  - Right foot dressing remains in place. OK to reinforce if needed. Left foot redressed with betadine soaked gauze, ABD pad, kerlix, ACE.  - Weightbearing: NWB LLE, Right foot WBAT with surgical shoe or Boot.  - ID following - + bone cultures  - Recommended right hallux and left partial first ray amputations. Patient not amenable to amputation currently. May be amenable in the future.   - Intra-op findings - right hallux without abscess. Wound closed. Left foot with purulence contained to plantar medial forefoot. Incision gently reapproximated to allow for purulent drainage.   - Future plan: Given purulence today to left foot, plan for left foot I&D today.    Surgical Plan: Left foot I&D with possible wound excision / closure  - Reviewed procedure and perioperative course.   - Discussed risks, benefits, alternatives.  - Reviewed perioperative course.  - Post-operatively          "      Readmit to floor              NWB on LEft foot postop              PT               Abx per ID   - Discussed possible complications including but not limited to infection, delayed or nonhealing surgical site (soft tissue / bone), swelling, bleeding, hematoma, DVT/PE, pain, CRPS, failure of procedure to resolve all symptoms, and need for further surgery.  - All patient's questions were satisfactorily answered.    Susan Garrison DPM  01/17/25  07:22 EST      I spent a total of 55 minutes on this patient encounter including preparation, review of medical, social, surgical hx, medications, labs, xrays review and interpretation, face to face care, evaluation, treatment, counseling, education, consultation with another provider, documentation, and answering patient questions regarding the plan noted above.

## 2025-01-17 NOTE — PLAN OF CARE
"Assessment: Tomas Song presents with functional mobility impairments which indicate the need for skilled intervention. Attempted stair training using B crutches, as pt has two steps to enter home without handrails. Pt unable to successfully clear single step using B crutches while maintaining NWB through LLE, despite significant assistance for upright support. Pt not able to access home in current medical condition and has very limited support at home, as he lives alone. Updating recommendation to SNF to address stair negotiation and continue to gain functional independence with weight bearing restrictions. Will continue to follow and progress as tolerated.     Plan/Recommendations:   If medically appropriate, Moderate Intensity Therapy recommended post-acute care. This is recommended as therapy feels the patient would require 3-4 days per week and wouldn't tolerate \"3 hour daily\" rehab intensity. SNF would be the preferred choice. If the patient does not agree to SNF, arrange HH or OP depending on home bound status. If patient is medically complex, consider LTACH. Pt requires no DME at discharge.     Pt desires Skilled Rehab placement at discharge. Pt cooperative; agreeable to therapeutic recommendations and plan of care.     "

## 2025-01-17 NOTE — PROGRESS NOTES
Infectious Diseases Progress Note      LOS: 3 days   Patient Care Team:  Ty Gao DO as PCP - General (Internal Medicine)    Chief Complaint: Left foot wound    Subjective       The patient has been afebrile for the last 24 hours.  The patient is on room air, hemodynamically stable, and is tolerating antimicrobial therapy.  No new complaints      Review of Systems:   Review of Systems   Constitutional: Negative.    HENT: Negative.     Eyes: Negative.    Respiratory: Negative.     Cardiovascular: Negative.    Gastrointestinal: Negative.    Endocrine: Negative.    Genitourinary: Negative.    Musculoskeletal: Negative.    Skin:  Positive for wound.   Neurological: Negative.    Psychiatric/Behavioral: Negative.     All other systems reviewed and are negative.       Objective     Vital Signs  Temp:  [98 °F (36.7 °C)-98.5 °F (36.9 °C)] 98 °F (36.7 °C)  Heart Rate:  [] 91  Resp:  [16] 16  BP: (116-127)/(73-87) 127/87    Physical Exam:  Physical Exam  Vitals and nursing note reviewed.   Constitutional:       General: He is not in acute distress.     Appearance: Normal appearance. He is well-developed and normal weight. He is not diaphoretic.   HENT:      Head: Normocephalic and atraumatic.   Eyes:      Conjunctiva/sclera: Conjunctivae normal.      Pupils: Pupils are equal, round, and reactive to light.   Cardiovascular:      Rate and Rhythm: Normal rate and regular rhythm.      Heart sounds: Normal heart sounds, S1 normal and S2 normal.   Pulmonary:      Effort: Pulmonary effort is normal. No respiratory distress.      Breath sounds: Normal breath sounds. No stridor. No wheezing or rales.   Abdominal:      General: Bowel sounds are normal. There is no distension.      Palpations: Abdomen is soft. There is no mass.      Tenderness: There is no abdominal tenderness. There is no guarding.   Musculoskeletal:         General: No deformity. Normal range of motion.      Cervical back: Neck supple.   Skin:      General: Skin is warm and dry.      Coloration: Skin is not pale.      Findings: No erythema or rash.      Comments: Dressings on both feet   Neurological:      Mental Status: He is alert and oriented to person, place, and time.      Cranial Nerves: No cranial nerve deficit.   Psychiatric:         Mood and Affect: Mood normal.          Results Review:    I have reviewed all clinical data, test, lab, and imaging results.     Radiology  No Radiology Exams Resulted Within Past 24 Hours    Cardiology    Laboratory    Results from last 7 days   Lab Units 01/16/25  0004 01/15/25  0024 01/14/25 0418 01/13/25  1226   WBC 10*3/mm3 7.84 9.93 5.51 5.97   HEMOGLOBIN g/dL 9.6* 9.7* 9.4* 9.7*   HEMATOCRIT % 31.5* 31.6* 30.0* 31.7*   PLATELETS 10*3/mm3 402 393 353 410     Results from last 7 days   Lab Units 01/16/25  0004 01/15/25  0024 01/14/25 0418 01/13/25  1226   SODIUM mmol/L 134* 136 137 139   POTASSIUM mmol/L 3.7 4.7 4.2 4.0   CHLORIDE mmol/L 98 100 104 99   CO2 mmol/L 29.5* 27.9 25.0 30.6*   BUN mg/dL 16 14 11 12   CREATININE mg/dL 0.81 0.83 0.69* 0.96   GLUCOSE mg/dL 248* 312* 186* 180*   ALBUMIN g/dL  --   --   --  3.5   BILIRUBIN mg/dL  --   --   --  0.2   ALK PHOS U/L  --   --   --  104   AST (SGOT) U/L  --   --   --  20   ALT (SGPT) U/L  --   --   --  19   CALCIUM mg/dL 9.2 9.5 9.0 9.5         Results from last 7 days   Lab Units 01/13/25  1226   SED RATE mm/hr 40*         Microbiology   Microbiology Results (last 10 days)       Procedure Component Value - Date/Time    Tissue / Bone Culture - Bone, Foot, Left [[id number]]  (Abnormal) Collected: 01/14/25 9544    Lab Status: Preliminary result Specimen: Bone from Foot, Left Updated: 01/16/25 0892     Tissue Culture Scant growth (1+) Escherichia coli      Rare growth Proteus mirabilis      Scant growth (1+) Gram Positive Cocci     Gram Stain Rare (1+) WBCs per low power field      Rare (1+) Gram positive cocci in pairs    Narrative:      Refer to previous wound culture  collected on 01/13/2025 1203 for MICs      Tissue / Bone Culture - Tissue, Foot, Left [[id number]]  (Abnormal) Collected: 01/14/25 1645    Lab Status: Preliminary result Specimen: Tissue from Foot, Left Updated: 01/16/25 0845     Tissue Culture Scant growth (1+) Escherichia coli      Rare growth Proteus mirabilis     Gram Stain Moderate (3+) WBCs per low power field      Moderate (3+) Gram negative bacilli      Moderate (3+) Gram positive cocci    Narrative:      Refer to previous wound culture collected on 01/13/2025 1203 for MICs      MRSA Screen, PCR (Inpatient) - Swab, Nares [[id number]]  (Abnormal) Collected: 01/13/25 1422    Lab Status: Final result Specimen: Swab from Nares Updated: 01/13/25 1547     MRSA PCR MRSA Detected    Narrative:      The negative predictive value of this diagnostic test is high and should only be used to consider de-escalating anti-MRSA therapy. A positive result may indicate colonization with MRSA and must be correlated clinically.    Blood Culture - Blood, Arm, Right [[id number]]  (Normal) Collected: 01/13/25 1226    Lab Status: Preliminary result Specimen: Blood from Arm, Right Updated: 01/16/25 1245     Blood Culture No growth at 3 days    Wound Culture - Wound, Foot, Left [[id number]]  (Abnormal)  (Susceptibility) Collected: 01/13/25 1203    Lab Status: Preliminary result Specimen: Wound from Foot, Left Updated: 01/16/25 0844     Wound Culture Light growth (2+) Escherichia coli      Light growth (2+) Streptococcus agalactiae (Group B)     Comment:   This organism is considered to be universally susceptible to penicillin.  No further antibiotic testing will be performed. If Clindamycin or Erythromycin is the drug of choice, notify the laboratory within 7 days to request susceptibility testing.         Rare growth Proteus mirabilis      Scant growth (1+) Gram Positive Cocci     Comment:   Appended report. These results have been appended to a previously final verified report.         Gram Stain Many (4+) WBCs per low power field      Moderate (3+) Gram positive cocci in pairs, chains and clusters      Few (2+) Gram negative bacilli      Few (2+) Gram positive bacilli    Susceptibility        Escherichia coli      JOE      Amikacin Susceptible      Amoxicillin + Clavulanate Susceptible      Ampicillin Resistant      Ampicillin + Sulbactam Resistant      Cefazolin (Non Urine) Intermediate      Cefepime Susceptible      Ceftazidime Susceptible      Ceftriaxone Susceptible      Gentamicin Resistant      Levofloxacin Susceptible      Piperacillin + Tazobactam Susceptible      Tetracycline Resistant      Tobramycin Intermediate      Trimethoprim + Sulfamethoxazole Resistant                       Susceptibility        Proteus mirabilis      JOE      Amoxicillin + Clavulanate Intermediate      Ampicillin Resistant      Ampicillin + Sulbactam Susceptible      Cefazolin (Non Urine) Intermediate      Cefepime Susceptible      Ceftazidime Susceptible      Ceftriaxone Susceptible      Gentamicin Susceptible      Levofloxacin Susceptible      Piperacillin + Tazobactam Susceptible      Tetracycline Resistant      Trimethoprim + Sulfamethoxazole Susceptible                       Susceptibility Comments       Escherichia coli    With the exception of urinary-sourced infections, aminoglycosides should not be used as monotherapy.      Proteus mirabilis    With the exception of urinary-sourced infections, aminoglycosides should not be used as monotherapy.               Blood Culture - Blood, Arm, Right [[id number]]  (Normal) Collected: 01/13/25 1201    Lab Status: Preliminary result Specimen: Blood from Arm, Right Updated: 01/16/25 1215     Blood Culture No growth at 3 days            Medication Review:       Schedule Meds  amLODIPine, 10 mg, Oral, Daily  buprenorphine, 8 mg, Sublingual, TID  cefepime, 2,000 mg, Intravenous, Q8H  gabapentin, 1,200 mg, Oral, Q12H  hydroCHLOROthiazide, 25 mg, Oral, Daily  insulin  glargine, 30 Units, Subcutaneous, BID  insulin lispro, 2-9 Units, Subcutaneous, 4x Daily AC & at Bedtime  losartan, 100 mg, Oral, Daily  vancomycin, 1,000 mg, Intravenous, Q8H  vitamin D3, 5,000 Units, Oral, Daily        Infusion Meds  Pharmacy to dose vancomycin,         PRN Meds    acetaminophen **OR** acetaminophen **OR** acetaminophen    atropine    senna-docusate sodium **AND** polyethylene glycol **AND** bisacodyl **AND** bisacodyl    Calcium Replacement - Follow Nurse / BPA Driven Protocol    dextrose    dextrose    diphenhydrAMINE    diphenhydrAMINE    ePHEDrine Sulfate (Pressors)    glucagon (human recombinant)    hydrALAZINE    HYDROmorphone    HYDROmorphone    ipratropium-albuterol    ketorolac    labetalol    lidocaine (cardiac)    Magnesium Standard Dose Replacement - Follow Nurse / BPA Driven Protocol    melatonin    naloxone    ondansetron ODT **OR** ondansetron    ondansetron    oxyCODONE    oxyCODONE    oxyCODONE    Pharmacy to dose vancomycin    Phosphorus Replacement - Follow Nurse / BPA Driven Protocol    Potassium Replacement - Follow Nurse / BPA Driven Protocol        Assessment & Plan       Antimicrobial Therapy   1.  IV vancomycin        2.  IV cefepime        3.        4.        5.          Assessment     Left diabetic foot with extensive infection in the left first MTP joint.  MRI showed abscess, tenosynovitis and osteomyelitis of the first metatarsal head.  Initial culture growing E. coli, gram-positive cocci, group B streptococcus and Proteus mirabilis.  Patient status post incision and drainage with bone biopsy on 1/14/2024.  Purulent drainage noted and interoperative cultures growing E. coli, Proteus mirabilis and a gram-positive cocci.     Right diabetic foot with osteomyelitis involving the right large toe.  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   Patient notes to remote drug use but denies IV drug abuse..  HIV screen was negative.  Drug screen positive for THC, methamphetamines, amphetamines benzodiazepines and buprenorphine.  Of note patient does have Adderall on his home medication left    Hepatitis C infection.  Diagnosed this admission.       Plan     Continue V vancomycin-ask pharmacy to monitor and dose  Continue IV cefepime 2 g every 8 hours  Waiting on wound cultures  Waiting on bone biopsies  Hepatitis C RNA PCR and genotype-pending  Continue supportive care  A.m. labs  Patient is a high risk for limb loss  Podiatry planning another I&D on the left foot tomorrow  Patient will need to get his blood sugars under control to heal this infection and avoid future infections    Latonya Marie, APRN  01/16/25  19:14 EST    Note is dictated utilizing voice recognition software/Dragon

## 2025-01-17 NOTE — PROGRESS NOTES
Infectious Diseases Progress Note      LOS: 4 days   Patient Care Team:  Ty Gao DO as PCP - General (Internal Medicine)    Chief Complaint: Left foot wound    Subjective       The patient has been afebrile for the last 24 hours.  The patient is on room air, hemodynamically stable, and is tolerating antimicrobial therapy.  No new complaints.  Currently in the chair.      Review of Systems:   Review of Systems   Constitutional: Negative.    HENT: Negative.     Eyes: Negative.    Respiratory: Negative.     Cardiovascular: Negative.    Gastrointestinal: Negative.    Endocrine: Negative.    Genitourinary: Negative.    Musculoskeletal: Negative.    Skin:  Positive for wound.   Neurological: Negative.    Psychiatric/Behavioral: Negative.     All other systems reviewed and are negative.       Objective     Vital Signs  Temp:  [97.7 °F (36.5 °C)-98.8 °F (37.1 °C)] 98.5 °F (36.9 °C)  Heart Rate:  [] 100  Resp:  [8-17] 13  BP: ()/(46-86) 99/54    Physical Exam:  Physical Exam  Vitals and nursing note reviewed.   Constitutional:       General: He is not in acute distress.     Appearance: Normal appearance. He is well-developed and normal weight. He is not diaphoretic.   HENT:      Head: Normocephalic and atraumatic.   Eyes:      Conjunctiva/sclera: Conjunctivae normal.      Pupils: Pupils are equal, round, and reactive to light.   Cardiovascular:      Rate and Rhythm: Normal rate and regular rhythm.      Heart sounds: Normal heart sounds, S1 normal and S2 normal.   Pulmonary:      Effort: Pulmonary effort is normal. No respiratory distress.      Breath sounds: Normal breath sounds. No stridor. No wheezing or rales.   Abdominal:      General: Bowel sounds are normal. There is no distension.      Palpations: Abdomen is soft. There is no mass.      Tenderness: There is no abdominal tenderness. There is no guarding.   Musculoskeletal:         General: No deformity. Normal range of motion.      Cervical  back: Neck supple.   Skin:     General: Skin is warm and dry.      Coloration: Skin is not pale.      Findings: No erythema or rash.      Comments: Dressings on both feet   Neurological:      Mental Status: He is alert and oriented to person, place, and time.      Cranial Nerves: No cranial nerve deficit.   Psychiatric:         Mood and Affect: Mood normal.          Results Review:    I have reviewed all clinical data, test, lab, and imaging results.     Radiology  No Radiology Exams Resulted Within Past 24 Hours    Cardiology    Laboratory    Results from last 7 days   Lab Units 01/17/25  0537 01/16/25  0004 01/15/25  0024 01/14/25  0418 01/13/25  1226   WBC 10*3/mm3 6.76 7.84 9.93 5.51 5.97   HEMOGLOBIN g/dL 10.2* 9.6* 9.7* 9.4* 9.7*   HEMATOCRIT % 33.1* 31.5* 31.6* 30.0* 31.7*   PLATELETS 10*3/mm3 396 402 393 353 410     Results from last 7 days   Lab Units 01/17/25  0537 01/16/25  0004 01/15/25  0024 01/14/25  0418 01/13/25  1226   SODIUM mmol/L 138 134* 136 137 139   POTASSIUM mmol/L 4.1 3.7 4.7 4.2 4.0   CHLORIDE mmol/L 100 98 100 104 99   CO2 mmol/L 29.7* 29.5* 27.9 25.0 30.6*   BUN mg/dL 14 16 14 11 12   CREATININE mg/dL 0.76 0.81 0.83 0.69* 0.96   GLUCOSE mg/dL 280* 248* 312* 186* 180*   ALBUMIN g/dL  --   --   --   --  3.5   BILIRUBIN mg/dL  --   --   --   --  0.2   ALK PHOS U/L  --   --   --   --  104   AST (SGOT) U/L  --   --   --   --  20   ALT (SGPT) U/L  --   --   --   --  19   CALCIUM mg/dL 9.6 9.2 9.5 9.0 9.5         Results from last 7 days   Lab Units 01/13/25  1226   SED RATE mm/hr 40*         Microbiology   Microbiology Results (last 10 days)       Procedure Component Value - Date/Time    Anaerobic Culture - Bone, Foot, Left [[id number]]  (Normal) Collected: 01/14/25 1651    Lab Status: Preliminary result Specimen: Bone from Foot, Left Updated: 01/17/25 1511     Anaerobic Culture No anaerobes isolated at 3 days    Tissue / Bone Culture - Bone, Foot, Left [[id number]]  (Abnormal) Collected:  01/14/25 1651    Lab Status: Final result Specimen: Bone from Foot, Left Updated: 01/17/25 0813     Tissue Culture Scant growth (1+) Escherichia coli      Rare growth Proteus mirabilis      Scant growth (1+) Streptococcus agalactiae (Group B)      Staphylococcus aureus, MRSA     Comment:   Methicillin resistant Staphylococcus aureus, Patient may be an isolation risk.        Gram Stain Rare (1+) WBCs per low power field      Rare (1+) Gram positive cocci in pairs    Narrative:      Refer to previous wound culture collected on 01/13/2025 1203 for MICs      Anaerobic Culture - Tissue, Foot, Left [[id number]]  (Normal) Collected: 01/14/25 1645    Lab Status: Preliminary result Specimen: Tissue from Foot, Left Updated: 01/17/25 1511     Anaerobic Culture No anaerobes isolated at 3 days    Tissue / Bone Culture - Tissue, Foot, Left [[id number]]  (Abnormal) Collected: 01/14/25 1645    Lab Status: Final result Specimen: Tissue from Foot, Left Updated: 01/17/25 0812     Tissue Culture Scant growth (1+) Escherichia coli      Rare growth Proteus mirabilis     Gram Stain Moderate (3+) WBCs per low power field      Moderate (3+) Gram negative bacilli      Moderate (3+) Gram positive cocci    Narrative:      Refer to previous wound culture collected on 01/13/2025 1203 for MICs      MRSA Screen, PCR (Inpatient) - Swab, Nares [[id number]]  (Abnormal) Collected: 01/13/25 1422    Lab Status: Final result Specimen: Swab from Nares Updated: 01/13/25 1547     MRSA PCR MRSA Detected    Narrative:      The negative predictive value of this diagnostic test is high and should only be used to consider de-escalating anti-MRSA therapy. A positive result may indicate colonization with MRSA and must be correlated clinically.    Blood Culture - Blood, Arm, Right [[id number]]  (Normal) Collected: 01/13/25 1226    Lab Status: Preliminary result Specimen: Blood from Arm, Right Updated: 01/17/25 1245     Blood Culture No growth at 4 days    Wound Culture  - Wound, Foot, Left [[id number]]  (Abnormal)  (Susceptibility) Collected: 01/13/25 1203    Lab Status: Edited Result - FINAL Specimen: Wound from Foot, Left Updated: 01/17/25 0897     Wound Culture Light growth (2+) Escherichia coli      Light growth (2+) Streptococcus agalactiae (Group B)     Comment:   This organism is considered to be universally susceptible to penicillin.  No further antibiotic testing will be performed. If Clindamycin or Erythromycin is the drug of choice, notify the laboratory within 7 days to request susceptibility testing.         Rare growth Proteus mirabilis      Scant growth (1+) Staphylococcus aureus, MRSA     Comment:   Considering site of infection and appropriate dosing, oxacillin (or cefoxitin) results can be applied to cefazolin, nafcillin, ampicillin/sulbactam, dicloxacillin, amoxicillin/clavulanate, cephalexin, and cefpodoxime.        Gram Stain Many (4+) WBCs per low power field      Moderate (3+) Gram positive cocci in pairs, chains and clusters      Few (2+) Gram negative bacilli      Few (2+) Gram positive bacilli    Susceptibility        Escherichia coli      JOE      Amikacin Susceptible      Amoxicillin + Clavulanate Susceptible      Ampicillin Resistant      Ampicillin + Sulbactam Resistant      Cefazolin (Non Urine) Intermediate      Cefepime Susceptible      Ceftazidime Susceptible      Ceftriaxone Susceptible      Gentamicin Resistant      Levofloxacin Susceptible      Piperacillin + Tazobactam Susceptible      Tetracycline Resistant      Tobramycin Intermediate      Trimethoprim + Sulfamethoxazole Resistant                       Susceptibility        Proteus mirabilis      JOE      Amoxicillin + Clavulanate Intermediate      Ampicillin Resistant      Ampicillin + Sulbactam Susceptible      Cefazolin (Non Urine) Intermediate      Cefepime Susceptible      Ceftazidime Susceptible      Ceftriaxone Susceptible      Gentamicin Susceptible      Levofloxacin Susceptible       Piperacillin + Tazobactam Susceptible      Tetracycline Resistant      Trimethoprim + Sulfamethoxazole Susceptible                       Susceptibility        Staphylococcus aureus, MRSA      JOE      Clindamycin Susceptible      Erythromycin Susceptible      Oxacillin Resistant      Rifampin Susceptible      Tetracycline Susceptible      Trimethoprim + Sulfamethoxazole Susceptible      Vancomycin Susceptible                       Susceptibility Comments       Escherichia coli    With the exception of urinary-sourced infections, aminoglycosides should not be used as monotherapy.      Proteus mirabilis    With the exception of urinary-sourced infections, aminoglycosides should not be used as monotherapy.               Blood Culture - Blood, Arm, Right [[id number]]  (Normal) Collected: 01/13/25 1201    Lab Status: Preliminary result Specimen: Blood from Arm, Right Updated: 01/17/25 1215     Blood Culture No growth at 4 days            Medication Review:       Schedule Meds  amLODIPine, 10 mg, Oral, Daily  buprenorphine, 8 mg, Sublingual, TID  cefepime, 2,000 mg, Intravenous, Q8H  gabapentin, 1,200 mg, Oral, Q12H  hydroCHLOROthiazide, 25 mg, Oral, Daily  insulin glargine, 30 Units, Subcutaneous, BID  insulin lispro, 2-9 Units, Subcutaneous, 4x Daily AC & at Bedtime  losartan, 100 mg, Oral, Daily  vancomycin, 1,000 mg, Intravenous, Q8H  vitamin D3, 5,000 Units, Oral, Daily        Infusion Meds  Pharmacy to dose vancomycin,         PRN Meds    acetaminophen **OR** acetaminophen **OR** acetaminophen    atropine    atropine    senna-docusate sodium **AND** polyethylene glycol **AND** bisacodyl **AND** bisacodyl    Calcium Replacement - Follow Nurse / BPA Driven Protocol    dextrose    dextrose    diphenhydrAMINE    diphenhydrAMINE    diphenhydrAMINE    diphenhydrAMINE    ePHEDrine Sulfate (Pressors)    ePHEDrine Sulfate (Pressors)    flumazenil    glucagon (human recombinant)    hydrALAZINE    hydrALAZINE    HYDROmorphone     HYDROmorphone    HYDROmorphone    HYDROmorphone    ipratropium-albuterol    ipratropium-albuterol    ketorolac    labetalol    labetalol    lidocaine (cardiac)    lidocaine (cardiac)    Magnesium Standard Dose Replacement - Follow Nurse / BPA Driven Protocol    melatonin    naloxone    naloxone    ondansetron ODT **OR** ondansetron    ondansetron    ondansetron    oxyCODONE    oxyCODONE    oxyCODONE    oxyCODONE    oxyCODONE    Pharmacy to dose vancomycin    Phosphorus Replacement - Follow Nurse / BPA Driven Protocol    Potassium Replacement - Follow Nurse / BPA Driven Protocol        Assessment & Plan       Antimicrobial Therapy   1.  IV vancomycin        2.  IV cefepime        3.        4.        5.          Assessment     Left diabetic foot with extensive infection in the left first MTP joint.  MRI showed abscess, tenosynovitis and osteomyelitis of the first metatarsal head.  Initial culture growing E. coli, methicillin resistant Staphylococcus aureus, group B streptococcus and Proteus mirabilis.  Patient status post incision and drainage with bone biopsy on 1/14/2024.  Purulent drainage noted and interoperative cultures growing E. coli, Proteus mirabilis and methicillin resistant Staphylococcus aureus.  Bone biopsy was positive for osteomyelitis.  Status post second incision and drainage to the left foot on 1/17/2024-purulence still found.  Patient continued to refuse partial ray amputation     Right diabetic foot with osteomyelitis involving the right large toe.  Status post incision and drainage with bone biopsy on 1/15/2025.  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  Patient notes to remote drug use but denies IV drug abuse..  HIV screen was negative.  Drug screen positive for THC, methamphetamines, amphetamines benzodiazepines and buprenorphine.  Of note patient does have Adderall on his home  medication left    Hepatitis C infection.  Diagnosed this admission.       Plan     Case discussed with podiatry.  Patient still not amenable to left first partial ray amputation.  Podiatry planning to recheck the patient on Monday to see if any further surgical intervention is needed    Continue V vancomycin-ask pharmacy to monitor and dose  Discontinue IV cefepime   Start IV Rocephin 2 g every 24 hours  Patient will need 6 weeks of IV antibiotics from surgery on 1/17/2025-last day on 2/27/25  Hepatitis C RNA PCR and genotype-pending  Continue supportive care  A.m. labs    Would recommend rehab for patient for IV antibiotics and wound care  Case discussed with RN  Patient is a high risk for limb loss  Patient will need to get his blood sugars under control to heal this infection and avoid future infections    Latonya Marie, APRN  01/17/25  15:51 EST    Note is dictated utilizing voice recognition software/Dragon

## 2025-01-17 NOTE — THERAPY TREATMENT NOTE
"Subjective: Pt agreeable to therapeutic plan of care.    Objective:     Precautions - NWB LLE; WBAT RLE with post op shoe    Bed mobility - Supine<>sitting IND    Transfers - STS Supervision using RW. Cued on safety, hand placement, and maintaining NWB through LLE.     Ambulation - 20 feet Supervision and with rolling walker. Pt able to maintain NWB through LLE well while ambulating on level ground. R post op shoe donned.     Stairs - Attempted stair training using B crutches, as pt has two steps to enter home without handrails. Pt unable to successfully clear single step using B crutches while maintaining NWB through LLE, despite significant assistance for upright support.     Therapeutic Exercise - 20 Reps B LE AROM unsupported sitting / EOB    Vitals: WNL    Pain: 7 VAS   Location: B feet   Intervention for pain: Repositioned, Increased Activity, and Therapeutic Presence    Education: Provided education on the importance of mobility in the acute care setting, Verbal/Tactile Cues, Transfer Training, Gait Training, and WB'ing status    Assessment: Tomas Song presents with functional mobility impairments which indicate the need for skilled intervention. Attempted stair training using B crutches, as pt has two steps to enter home without handrails. Pt unable to successfully clear single step using B crutches while maintaining NWB through LLE, despite significant assistance for upright support. Pt not able to access home in current medical condition and has very limited support at home, as he lives alone. Updating recommendation to SNF to address stair negotiation and continue to gain functional independence with weight bearing restrictions. Will continue to follow and progress as tolerated.     Plan/Recommendations:   If medically appropriate, Moderate Intensity Therapy recommended post-acute care. This is recommended as therapy feels the patient would require 3-4 days per week and wouldn't tolerate \"3 hour " "daily\" rehab intensity. SNF would be the preferred choice. If the patient does not agree to SNF, arrange HH or OP depending on home bound status. If patient is medically complex, consider LTACH. Pt requires no DME at discharge.     Pt desires Skilled Rehab placement at discharge. Pt cooperative; agreeable to therapeutic recommendations and plan of care.         Basic Mobility 6-click:  Rollin = Total, A lot = 2, A little = 3; 4 = None  Supine>Sit:   1 = Total, A lot = 2, A little = 3; 4 = None   Sit>Stand with arms:  1 = Total, A lot = 2, A little = 3; 4 = None  Bed>Chair:   1 = Total, A lot = 2, A little = 3; 4 = None  Ambulate in room:  1 = Total, A lot = 2, A little = 3; 4 = None  3-5 Steps with railin = Total, A lot = 2, A little = 3; 4 = None  Score: 20    Modified [place]: N/A = No pre-op stroke/TIA    Post-Tx Position: Up in Chair, Alarms activated, and Call light and personal items within reach  PPE: gloves    Therapy Charges for Today       Code Description Service Date Service Provider Modifiers Qty    82852597713 HC PT THERAPEUTIC ACT EA 15 MIN 2025 Keon Jordan, PTA GP 1    28630953486 HC GAIT TRAINING EA 15 MIN 2025 Keon Jordan, PTA GP 1    99572431437 HC PT THERAPEUTIC ACT EA 15 MIN 2025 Keon Jordan, PTA GP 2    42120716015 HC GAIT TRAINING EA 15 MIN 2025 Keon Jordan, PTA GP 1    56533875711 HC PT THER PROC EA 15 MIN 2025 Keon Jordan, PTA GP 1           PT Charges       Row Name 25 1512             Time Calculation    Start Time 1310  -UN      Stop Time 1349  -UN      Time Calculation (min) 39 min  -UN      PT Received On 25  -UN      PT - Next Appointment 25  -UN         Time Calculation- PT    Total Timed Code Minutes- PT 39 minute(s)  -UN                User Key  (r) = Recorded By, (t) = Taken By, (c) = Cosigned By      Initials Name Provider Type    UN Nikki, " Keon MARTINEZ, PTA Physical Therapist Assistant

## 2025-01-17 NOTE — CASE MANAGEMENT/SOCIAL WORK
Continued Stay Note  [facility]     Patient Name: Tomas Song  MRN: [id number]  Today's Date: 1/17/2025    Admit Date: 1/13/2025    Plan: From home alone.  Referral to [facility] skilled pending acceptance. Will need precert and PASRR, OT PT evals   Discharge Plan       Row Name 01/17/25 1339       Plan    Plan From home alone.  Referral to [facility] skilled pending acceptance. Will need precert and PASRR, OT PT evals    Plan Comments DC barriers:  01/14/25  INCISION AND DRAINAGE WOUND BILATERAL FEET , IV vanc, cefipime, wound culture pending bone culture pending 1/17/25 I & D left footm NWB left lower ext  WBAT  right lower ext with surgical shoe,   Pending OT eval    Patient lives alone so will need snf for IV abx and wound care.  Patient choices for snf are [facility] and [facility].  Referral sent to [facility] pending acceptance.                 Expected Discharge Date and Time       Expected Discharge Date Expected Discharge Time    Jan 19, 2025             Margie Drake RN    SIPS 1  [email or web address]  Office [phone]  Cell [phone]

## 2025-01-17 NOTE — OP NOTE
Operative Report    PATIENT NAME:   Tomas Song  YOB: 1980  MEDICAL RECORD #: [id number]     DATE OF PROCEDURE: 01/17/25     SURGEON(S): Surgeons and Role:     * Susan Garrison DPM - Primary     PREOPERATIVE DIAGNOSIS: Left foot abscess  Left foot diabetic foot ulcer and cellulitis  Left foot osteomyelitis          POSTOPERATIVE DIAGNOSIS:  Left foot abscess  Left foot diabetic foot ulcer and cellulitis  Left foot osteomyelitis          OPERATIVE PROCEDURE:  INCISION AND DRAINAGE FOOT:  Left     ANESTHESIA:  LMA with local block using 10 mL of 1% Lidocaine preoperatively     HEMOSTASIS: PAT @ 250 mmHg x 17 minutes    IMPLANTS: * No implants in log *    INJECTABLES: None    PATHOLOGY: none    DRAINS: None     COMPLICATIONS: None    ESTIMATED BLOOD LOSS: minimal    INDICATIONS FOR PROCEDURE:  This is a 44 year old male patient presenting with wound and abscess to the left plantar foot. He had I&D performed 2 days ago with remaining purulence. Recommended repeat I&D to assist with source control. They have failed conservative treatment. After discussing all potential risks, benefits, complications, and alternatives, they elected to proceed with surgical intervention.         DESCRIPTION OF PROCEDURE:  The patient was brought to the operating room and was placed on the operating room table in the supine position.  A timeout was performed to identify the patient, surgical site, and procedure. All OR staff and surgeons were in agreement. A pneumatic ankle tourniquet was then placed over the patient's left ankle.  Following IV sedation, local anesthesia was obtained using 10 mL of 1% Lidocaine plain. The foot was then scrubbed, prepped and draped in the usual aseptic manner. The tourniquet was then inflated to 250 mmHg.      Attention was directed to the left foot where prior incison was lightly closed with 3 sutures in place. The sutures were removed.The incision site was opened with hemostat  and approximately 5 mL of purulence was expelled from the foot. None viable tissues were removed utilizing rongeur and #15 blade deep to the level of the bone. The sesamoid bones were easily palpated. The index ulcer sub 1st metatarsal head was noted to measure 1.5 cm x 1.5 cm and a proximal wound measuring 0.5 cm x 0.5 cm. An ellipse was taken from the prior wounds to remodel for closure. Once all purulence has been expressed and nonviable tissue removed, the surgical site was flushed.      The surgical site was flushed with copious amount of vancomycin impregnated sterile saline. The incision was reapproximated with 2-0 prolene suture in retention fashion. Iodoform packing was placed in the incision. The surgical site was dressed with betadine soaked gauze, clean gauze, Kerlix, and ACE bandage. The tourniquet was then deflated for a total time of 17 minutes and prompt hyperemic response was noted.      The patient tolerated the procedure well and was transferred to the recovery room with all vital signs stable and vascular status intact to bilateral feet. Following postoperative monitoring, the patient will return to the floor. Patient will followed up on tomorrow.      POSTOPERATIVE PLAN:   1) Keep surgical dressings clean, dry, and intact. OK to reinforce if needed.  2) Elevate operative feet above the level of the heart as often as possible.   3) Weightbearing Status: Nonweightbearing to Left lower extremity, WBAT with surgical shoe RLE  4) Ortho ID for antibiotic recommendations.

## 2025-01-17 NOTE — BRIEF OP NOTE
INCISION AND DRAINAGE WOUND  Progress Note    Tomas Song  1/17/2025    Pre-op Diagnosis:   Left foot abscess  Left foot diabetic foot ulcer and cellulitis  Left foot osteomyelitis       Post-Op Diagnosis Codes:   Left foot abscess  Left foot diabetic foot ulcer and cellulitis  Left foot osteomyelitis    Procedure/CPT® Codes:        Procedure(s):  INCISION AND DRAINAGE FOOT              Surgeon(s):  Susan Garrison DPM    Anesthesia: General    Staff:   Circulator: Vicky Burk RN  Scrub Person: Autumn Hawkins         Estimated Blood Loss: minimal    Urine Voided: * No values recorded between 1/17/2025  7:26 AM and 1/17/2025  8:11 AM *    Specimens:                None          Drains: * No LDAs found *    Findings: approximately 5 mL purulence expressed from plantar medial forefoot. Placed retention sutures and packing.         Complications: None          Susan Garrison DPM     Date: 1/17/2025  Time: 08:16 EST

## 2025-01-17 NOTE — PLAN OF CARE
Goal Outcome Evaluation:  Plan of Care Reviewed With: patient        Progress: improving  Outcome Evaluation: Patient is stable. minimal complaints of pain. See MAR. Dressing change completed by MD. Patient to be NPO at midnight for closure of Left foot tomorrow per MD order. Patient educated on not using Vape pin while in hospital. Safety measures in place. call light within reach. Patient able to  make needs known. Plan of care ongoing.                         Patient is stable. I & D today. Tolerated well. Continued IV abx. See MAR. Per MD order. Safety measures in place. Call  light within reach.patient able to make needs known.Plan of care ongoing.

## 2025-01-17 NOTE — DISCHARGE PLACEMENT REQUEST
"Tomas Honeycutt (44 y.o. Male)       Date of Birth   1980    Social Security Number       Address   [address] [address] [place] [address]    Home Phone   [phone]    MRN   [id number]       [place]   None    Marital Status   Single                            Admission Date   1/13/25    Admission Type   Emergency    Admitting Provider   Aldair Lind MD    Attending Provider   Aldair Lind MD    Department, Room/Bed   [facility] SURGICAL INPATIENT, 4118/1       Discharge Date       Discharge Disposition       Discharge Destination                                 Attending Provider: Aldair Lind MD    Allergies: Bactrim [Sulfamethoxazole-trimethoprim]    Isolation: Contact   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)   Code Status: CPR    Ht: 185.4 cm (73\")   Wt: 95.5 kg (210 lb 7 oz)    Admission Cmt: None   Principal Problem: Osteomyelitis of both feet [M86.9]                   Active Insurance as of 1/13/2025       Primary Coverage       Payor Plan Insurance Group Employer/Plan Group    [id number]       Payor Plan Address Payor Plan Phone Number Payor Plan Fax Number Effective Dates    MAIL STOP:   4/8/2020 - None Entered    [address] [address]       [facility] [address]         Subscriber Name Subscriber Birth Date Member ID       TOMAS HONEYCUTT 1980 [id number]                     Emergency Contacts        (Rel.) Home Phone Work Phone Mobile Phone    NICHOLE LEWIS (Significant Other) -- -- [phone]                "

## 2025-01-17 NOTE — CASE MANAGEMENT/SOCIAL WORK
Continued Stay Note  [facility]     Patient Name: Tomas Song  MRN: [id number]  Today's Date: 1/17/2025    Admit Date: 1/13/2025    Plan: From home alone.  Referral to [facility] skilled pending acceptance. Will need precert and PASRR .   Discharge Plan       Row Name 01/17/25 1635       Plan    Plan From home alone.  Referral to [facility] skilled pending acceptance. Will need precert and PASRR .    Plan Comments DC barriers: 01/14/25 INCISION AND DRAINAGE WOUND BILATERAL FEET , IV vanc, cefipime, wound culture pending bone culture pending 1/17/25 I & D left footm NWB left lower ext WBAT right lower ext with surgical shoe, Pending OT eval Patient lives alone so will need snf for IV abx and wound care. Patient choices for snf are [place], [place] and [facility]. Referral sent to [facility] pending acceptance [place] unable to accept.  Referral sent to [facility]                           Expected Discharge Date and Time       Expected Discharge Date Expected Discharge Time    Jan 19, 2025             Margie Drake RN    SIPS 1  [email or web address]  Office [phone]  Cell [phone]

## 2025-01-17 NOTE — PROGRESS NOTES
"Pharmacy Antimicrobial Dosing Service    Subjective:  Tomas Song is a 44 y.o.male admitted with osteomyelitis. Pharmacy has been consulted to dose Vancomycin for possible bone and joint infection.    PMH: Hypertension, type 2 diabetes, diabetic neuropathy, Hx of methamphetamine use      Assessment/Plan    1. Day #5 Vancomycin: Goal -600 mcg*h/mL.   Patient on maintenance dose of vancomycin 1000 mg IV q8h (~10.5 mg/kg ABW).  Scr appears stable and UOP adequate.   Vancomycin trough was drawn on 1/17 @ 05:37 (~8 hours post-dose), giving a value of 10.7 mcg/mL. This gives a predicted steady state AUC of 444 mg/L*hr  Will continue maintenance dose.  Will schedule next vancomycin level on 1/21 or sooner if clinically indicated.     2. Day #4 Cefepime: 2000mg IV q8h for estCrCl > 60 mL/min.    Will continue to monitor drug levels, renal function, culture and sensitivities, and patient clinical status.       Objective:  Relevant clinical data and objective history reviewed:  185.4 cm (73\")   95.5 kg (210 lb 7 oz)   Ideal body weight: 79.9 kg (176 lb 2.4 oz)  Adjusted ideal body weight: 86.1 kg (189 lb 13.8 oz)  Body mass index is 27.76 kg/m².    Results from last 7 days   Lab Units 01/17/25 0537 01/15/25  1345 01/15/25  1017   VANCOMYCIN PK mcg/mL  --   --  17.10*   VANCOMYCIN TR mcg/mL 10.70 12.70  --      Results from last 7 days   Lab Units 01/17/25 0537 01/16/25  0004 01/15/25  0024   CREATININE mg/dL 0.76 0.81 0.83     Estimated Creatinine Clearance: 164.1 mL/min (by C-G formula based on SCr of 0.76 mg/dL).  I/O last 3 completed shifts:  In: 560 [P.O.:560]  Out: 1550 [Urine:1550]    Results from last 7 days   Lab Units 01/17/25 0537 01/16/25  0004 01/15/25  0024   WBC 10*3/mm3 6.76 7.84 9.93     Temperature    01/17/25 0815 01/17/25 0900 01/17/25 0917   Temp: 98.8 °F (37.1 °C) 97.9 °F (36.6 °C) 97.7 °F (36.5 °C)     Baseline culture/source/susceptibility:  Microbiology Results (last 10 days)       " Procedure Component Value - Date/Time    Tissue / Bone Culture - Bone, Foot, Left [[id number]]  (Abnormal) Collected: 01/14/25 1651    Lab Status: Final result Specimen: Bone from Foot, Left Updated: 01/17/25 0813     Tissue Culture Scant growth (1+) Escherichia coli      Rare growth Proteus mirabilis      Scant growth (1+) Streptococcus agalactiae (Group B)      Staphylococcus aureus, MRSA     Comment:   Methicillin resistant Staphylococcus aureus, Patient may be an isolation risk.        Gram Stain Rare (1+) WBCs per low power field      Rare (1+) Gram positive cocci in pairs    Narrative:      Refer to previous wound culture collected on 01/13/2025 1203 for MICs      Tissue / Bone Culture - Tissue, Foot, Left [[id number]]  (Abnormal) Collected: 01/14/25 1645    Lab Status: Final result Specimen: Tissue from Foot, Left Updated: 01/17/25 0812     Tissue Culture Scant growth (1+) Escherichia coli      Rare growth Proteus mirabilis     Gram Stain Moderate (3+) WBCs per low power field      Moderate (3+) Gram negative bacilli      Moderate (3+) Gram positive cocci    Narrative:      Refer to previous wound culture collected on 01/13/2025 1203 for MICs      MRSA Screen, PCR (Inpatient) - Swab, Nares [[id number]]  (Abnormal) Collected: 01/13/25 1422    Lab Status: Final result Specimen: Swab from Nares Updated: 01/13/25 1547     MRSA PCR MRSA Detected    Narrative:      The negative predictive value of this diagnostic test is high and should only be used to consider de-escalating anti-MRSA therapy. A positive result may indicate colonization with MRSA and must be correlated clinically.    Blood Culture - Blood, Arm, Right [[id number]]  (Normal) Collected: 01/13/25 1226    Lab Status: Preliminary result Specimen: Blood from Arm, Right Updated: 01/16/25 1245     Blood Culture No growth at 3 days    Wound Culture - Wound, Foot, Left [[id number]]  (Abnormal)  (Susceptibility) Collected: 01/13/25 1203    Lab Status: Edited Result  - FINAL Specimen: Wound from Foot, Left Updated: 01/17/25 0811     Wound Culture Light growth (2+) Escherichia coli      Light growth (2+) Streptococcus agalactiae (Group B)     Comment:   This organism is considered to be universally susceptible to penicillin.  No further antibiotic testing will be performed. If Clindamycin or Erythromycin is the drug of choice, notify the laboratory within 7 days to request susceptibility testing.         Rare growth Proteus mirabilis      Scant growth (1+) Staphylococcus aureus, MRSA     Comment:   Considering site of infection and appropriate dosing, oxacillin (or cefoxitin) results can be applied to cefazolin, nafcillin, ampicillin/sulbactam, dicloxacillin, amoxicillin/clavulanate, cephalexin, and cefpodoxime.        Gram Stain Many (4+) WBCs per low power field      Moderate (3+) Gram positive cocci in pairs, chains and clusters      Few (2+) Gram negative bacilli      Few (2+) Gram positive bacilli    Susceptibility        Escherichia coli      JOE      Amikacin 4 ug/ml Susceptible      Amoxicillin + Clavulanate 8 ug/ml Susceptible      Ampicillin >=32 ug/ml Resistant      Ampicillin + Sulbactam >=32 ug/ml Resistant      Cefazolin (Non Urine) 4 ug/ml Intermediate      Cefepime <=0.12 ug/ml Susceptible      Ceftazidime <=0.5 ug/ml Susceptible      Ceftriaxone <=0.25 ug/ml Susceptible      Gentamicin >=16 ug/ml Resistant      Levofloxacin <=0.12 ug/ml Susceptible      Piperacillin + Tazobactam <=4 ug/ml Susceptible      Tetracycline >=16 ug/ml Resistant      Tobramycin 8 ug/ml Intermediate      Trimethoprim + Sulfamethoxazole >=320 ug/ml Resistant                       Susceptibility        Proteus mirabilis      JOE      Amoxicillin + Clavulanate 16 ug/ml Intermediate      Ampicillin <=2 ug/ml Resistant      Ampicillin + Sulbactam <=2 ug/ml Susceptible      Cefazolin (Non Urine) 4 ug/ml Intermediate      Cefepime <=0.12 ug/ml Susceptible      Ceftazidime <=0.5 ug/ml  Susceptible      Ceftriaxone <=0.25 ug/ml Susceptible      Gentamicin <=1 ug/ml Susceptible      Levofloxacin <=0.12 ug/ml Susceptible      Piperacillin + Tazobactam <=4 ug/ml Susceptible      Tetracycline >=16 ug/ml Resistant      Trimethoprim + Sulfamethoxazole <=20 ug/ml Susceptible                       Susceptibility        Staphylococcus aureus, MRSA      JOE      Clindamycin 0.25 ug/ml Susceptible      Erythromycin <=0.25 ug/ml Susceptible      Oxacillin 0.5 ug/ml Resistant      Rifampin <=0.5 ug/ml Susceptible      Tetracycline <=1 ug/ml Susceptible      Trimethoprim + Sulfamethoxazole <=10 ug/ml Susceptible      Vancomycin 1 ug/ml Susceptible                       Susceptibility Comments       Escherichia coli    With the exception of urinary-sourced infections, aminoglycosides should not be used as monotherapy.      Proteus mirabilis    With the exception of urinary-sourced infections, aminoglycosides should not be used as monotherapy.               Blood Culture - Blood, Arm, Right [[id number]]  (Normal) Collected: 01/13/25 1201    Lab Status: Preliminary result Specimen: Blood from Arm, Right Updated: 01/16/25 1215     Blood Culture No growth at 3 days          Juan M Mendoza RPH  01/17/25 09:38 EST

## 2025-01-17 NOTE — PLAN OF CARE
Goal Outcome Evaluation:  Plan of Care Reviewed With: patient        Progress: no change  Outcome Evaluation: Pt VSS, NPO for surgery later today, CHG done and consent signed,Pt is able to make needs known, IV antibiotics, Call light in reach, Plan on going

## 2025-01-18 LAB
ANION GAP SERPL CALCULATED.3IONS-SCNC: 12.6 MMOL/L (ref 5–15)
BACTERIA SPEC AEROBE CULT: NORMAL
BACTERIA SPEC AEROBE CULT: NORMAL
BASOPHILS # BLD AUTO: 0.05 10*3/MM3 (ref 0–0.2)
BASOPHILS # BLD AUTO: 0.07 10*3/MM3 (ref 0–0.2)
BASOPHILS NFR BLD AUTO: 0.6 % (ref 0–1.5)
BASOPHILS NFR BLD AUTO: 0.7 % (ref 0–1.5)
BUN SERPL-MCNC: 23 MG/DL (ref 6–20)
BUN/CREAT SERPL: 25.8 (ref 7–25)
CALCIUM SPEC-SCNC: 9.5 MG/DL (ref 8.6–10.5)
CHLORIDE SERPL-SCNC: 102 MMOL/L (ref 98–107)
CK SERPL-CCNC: 25 U/L (ref 20–200)
CO2 SERPL-SCNC: 26.4 MMOL/L (ref 22–29)
CREAT SERPL-MCNC: 0.89 MG/DL (ref 0.76–1.27)
DEPRECATED RDW RBC AUTO: 41.6 FL (ref 37–54)
DEPRECATED RDW RBC AUTO: 43.8 FL (ref 37–54)
EGFRCR SERPLBLD CKD-EPI 2021: 107 ML/MIN/1.73
EOSINOPHIL # BLD AUTO: 0.24 10*3/MM3 (ref 0–0.4)
EOSINOPHIL # BLD AUTO: 0.54 10*3/MM3 (ref 0–0.4)
EOSINOPHIL NFR BLD AUTO: 2.2 % (ref 0.3–6.2)
EOSINOPHIL NFR BLD AUTO: 8 % (ref 0.3–6.2)
ERYTHROCYTE [DISTWIDTH] IN BLOOD BY AUTOMATED COUNT: 13.7 % (ref 12.3–15.4)
ERYTHROCYTE [DISTWIDTH] IN BLOOD BY AUTOMATED COUNT: 14.2 % (ref 12.3–15.4)
GLUCOSE BLDC GLUCOMTR-MCNC: 216 MG/DL (ref 70–105)
GLUCOSE BLDC GLUCOMTR-MCNC: 238 MG/DL (ref 70–105)
GLUCOSE BLDC GLUCOMTR-MCNC: 315 MG/DL (ref 70–105)
GLUCOSE BLDC GLUCOMTR-MCNC: 369 MG/DL (ref 70–105)
GLUCOSE SERPL-MCNC: 256 MG/DL (ref 65–99)
HCT VFR BLD AUTO: 34.4 % (ref 37.5–51)
HCT VFR BLD AUTO: 34.7 % (ref 37.5–51)
HGB BLD-MCNC: 10.3 G/DL (ref 13–17.7)
HGB BLD-MCNC: 10.5 G/DL (ref 13–17.7)
IMM GRANULOCYTES # BLD AUTO: 0.03 10*3/MM3 (ref 0–0.05)
IMM GRANULOCYTES # BLD AUTO: 0.06 10*3/MM3 (ref 0–0.05)
IMM GRANULOCYTES NFR BLD AUTO: 0.4 % (ref 0–0.5)
IMM GRANULOCYTES NFR BLD AUTO: 0.5 % (ref 0–0.5)
LYMPHOCYTES # BLD AUTO: 2.23 10*3/MM3 (ref 0.7–3.1)
LYMPHOCYTES # BLD AUTO: 2.54 10*3/MM3 (ref 0.7–3.1)
LYMPHOCYTES NFR BLD AUTO: 23.1 % (ref 19.6–45.3)
LYMPHOCYTES NFR BLD AUTO: 32.9 % (ref 19.6–45.3)
MCH RBC QN AUTO: 25.7 PG (ref 26.6–33)
MCH RBC QN AUTO: 25.9 PG (ref 26.6–33)
MCHC RBC AUTO-ENTMCNC: 29.7 G/DL (ref 31.5–35.7)
MCHC RBC AUTO-ENTMCNC: 30.5 G/DL (ref 31.5–35.7)
MCV RBC AUTO: 84.9 FL (ref 79–97)
MCV RBC AUTO: 86.5 FL (ref 79–97)
MONOCYTES # BLD AUTO: 0.49 10*3/MM3 (ref 0.1–0.9)
MONOCYTES # BLD AUTO: 0.75 10*3/MM3 (ref 0.1–0.9)
MONOCYTES NFR BLD AUTO: 6.8 % (ref 5–12)
MONOCYTES NFR BLD AUTO: 7.2 % (ref 5–12)
NEUTROPHILS NFR BLD AUTO: 3.43 10*3/MM3 (ref 1.7–7)
NEUTROPHILS NFR BLD AUTO: 50.8 % (ref 42.7–76)
NEUTROPHILS NFR BLD AUTO: 66.8 % (ref 42.7–76)
NEUTROPHILS NFR BLD AUTO: 7.35 10*3/MM3 (ref 1.7–7)
NRBC BLD AUTO-RTO: 0 /100 WBC (ref 0–0.2)
NRBC BLD AUTO-RTO: 0 /100 WBC (ref 0–0.2)
PLATELET # BLD AUTO: 381 10*3/MM3 (ref 140–450)
PLATELET # BLD AUTO: 433 10*3/MM3 (ref 140–450)
PMV BLD AUTO: 9.1 FL (ref 6–12)
PMV BLD AUTO: 9.2 FL (ref 6–12)
POTASSIUM SERPL-SCNC: 4.1 MMOL/L (ref 3.5–5.2)
RBC # BLD AUTO: 4.01 10*6/MM3 (ref 4.14–5.8)
RBC # BLD AUTO: 4.05 10*6/MM3 (ref 4.14–5.8)
SODIUM SERPL-SCNC: 141 MMOL/L (ref 136–145)
WBC NRBC COR # BLD AUTO: 11.01 10*3/MM3 (ref 3.4–10.8)
WBC NRBC COR # BLD AUTO: 6.77 10*3/MM3 (ref 3.4–10.8)

## 2025-01-18 PROCEDURE — 82550 ASSAY OF CK (CPK): CPT | Performed by: NURSE PRACTITIONER

## 2025-01-18 PROCEDURE — 85025 COMPLETE CBC W/AUTO DIFF WBC: CPT | Performed by: NURSE PRACTITIONER

## 2025-01-18 PROCEDURE — 97110 THERAPEUTIC EXERCISES: CPT

## 2025-01-18 PROCEDURE — 82948 REAGENT STRIP/BLOOD GLUCOSE: CPT

## 2025-01-18 PROCEDURE — 80048 BASIC METABOLIC PNL TOTAL CA: CPT | Performed by: NURSE PRACTITIONER

## 2025-01-18 PROCEDURE — 25010000002 VANCOMYCIN 1 G RECONSTITUTED SOLUTION 1 EACH VIAL: Performed by: NURSE PRACTITIONER

## 2025-01-18 PROCEDURE — 97116 GAIT TRAINING THERAPY: CPT

## 2025-01-18 PROCEDURE — 63710000001 INSULIN LISPRO (HUMAN) PER 5 UNITS

## 2025-01-18 PROCEDURE — 25010000002 CEFTRIAXONE PER 250 MG: Performed by: NURSE PRACTITIONER

## 2025-01-18 PROCEDURE — 25810000003 SODIUM CHLORIDE 0.9 % SOLUTION 250 ML FLEX CONT: Performed by: NURSE PRACTITIONER

## 2025-01-18 PROCEDURE — 63710000001 INSULIN GLARGINE PER 5 UNITS

## 2025-01-18 RX ADMIN — INSULIN LISPRO 4 UNITS: 100 INJECTION, SOLUTION INTRAVENOUS; SUBCUTANEOUS at 17:27

## 2025-01-18 RX ADMIN — Medication 5000 UNITS: at 08:58

## 2025-01-18 RX ADMIN — INSULIN LISPRO 4 UNITS: 100 INJECTION, SOLUTION INTRAVENOUS; SUBCUTANEOUS at 08:58

## 2025-01-18 RX ADMIN — BUPRENORPHINE HCL 8 MG: 8 TABLET SUBLINGUAL at 21:13

## 2025-01-18 RX ADMIN — CEFTRIAXONE SODIUM 2000 MG: 2 INJECTION, POWDER, FOR SOLUTION INTRAMUSCULAR; INTRAVENOUS at 17:27

## 2025-01-18 RX ADMIN — VANCOMYCIN HYDROCHLORIDE 1000 MG: 1 INJECTION, POWDER, LYOPHILIZED, FOR SOLUTION INTRAVENOUS at 21:13

## 2025-01-18 RX ADMIN — INSULIN GLARGINE 30 UNITS: 100 INJECTION, SOLUTION SUBCUTANEOUS at 21:13

## 2025-01-18 RX ADMIN — INSULIN LISPRO 8 UNITS: 100 INJECTION, SOLUTION INTRAVENOUS; SUBCUTANEOUS at 12:53

## 2025-01-18 RX ADMIN — OXYCODONE HYDROCHLORIDE 10 MG: 5 TABLET ORAL at 08:58

## 2025-01-18 RX ADMIN — BUPRENORPHINE HCL 8 MG: 8 TABLET SUBLINGUAL at 11:12

## 2025-01-18 RX ADMIN — INSULIN LISPRO 7 UNITS: 100 INJECTION, SOLUTION INTRAVENOUS; SUBCUTANEOUS at 21:13

## 2025-01-18 RX ADMIN — GABAPENTIN 1200 MG: 400 CAPSULE ORAL at 08:58

## 2025-01-18 RX ADMIN — HYDROCHLOROTHIAZIDE 25 MG: 25 TABLET ORAL at 08:58

## 2025-01-18 RX ADMIN — BUPRENORPHINE HCL 8 MG: 8 TABLET SUBLINGUAL at 16:02

## 2025-01-18 RX ADMIN — GABAPENTIN 1200 MG: 400 CAPSULE ORAL at 21:13

## 2025-01-18 RX ADMIN — AMLODIPINE BESYLATE 10 MG: 5 TABLET ORAL at 08:58

## 2025-01-18 RX ADMIN — INSULIN GLARGINE 30 UNITS: 100 INJECTION, SOLUTION SUBCUTANEOUS at 08:58

## 2025-01-18 RX ADMIN — VANCOMYCIN HYDROCHLORIDE 1000 MG: 1 INJECTION, POWDER, LYOPHILIZED, FOR SOLUTION INTRAVENOUS at 16:01

## 2025-01-18 RX ADMIN — VANCOMYCIN HYDROCHLORIDE 1000 MG: 1 INJECTION, POWDER, LYOPHILIZED, FOR SOLUTION INTRAVENOUS at 05:05

## 2025-01-18 RX ADMIN — LOSARTAN POTASSIUM 100 MG: 50 TABLET, FILM COATED ORAL at 08:58

## 2025-01-18 NOTE — THERAPY TREATMENT NOTE
"Subjective: Pt agreeable to therapeutic plan of care.    Objective:     Precautions - NWB LLE, WBAT RLE with sx shoe    Bed mobility - Independent  Transfers - Supervision and with rolling walker  Ambulation - 30 feet Supervision and with rolling walker    Therapeutic Exercise - 15 Reps B LE AROM supported sitting / chair    Vitals: WNL    Pain:  pain L foot after MD changed dressing  Intervention for pain: Repositioned and Therapeutic Presence    Education: Provided education on the importance of mobility in the acute care setting, Verbal/Tactile Cues, Transfer Training, Gait Training, and WB'ing status    Assessment: Tomas Song presents with functional mobility impairments which indicate the need for skilled intervention. Tolerating session today without incident. Pt good at maintaining NWB LLE. Will continue to follow and progress as tolerated.     Plan/Recommendations:   If medically appropriate, Moderate Intensity Therapy recommended post-acute care. This is recommended as therapy feels the patient would require 3-4 days per week and wouldn't tolerate \"3 hour daily\" rehab intensity. SNF would be the preferred choice. If the patient does not agree to SNF, arrange HH or OP depending on home bound status. If patient is medically complex, consider LTACH. Pt requires no DME at discharge.     Pt desires Skilled Rehab placement at discharge. Pt cooperative; agreeable to therapeutic recommendations and plan of care.         Basic Mobility 6-click:  Rollin = Total, A lot = 2, A little = 3; 4 = None  Supine>Sit:   1 = Total, A lot = 2, A little = 3; 4 = None   Sit>Stand with arms:  1 = Total, A lot = 2, A little = 3; 4 = None  Bed>Chair:   1 = Total, A lot = 2, A little = 3; 4 = None  Ambulate in room:  1 = Total, A lot = 2, A little = 3; 4 = None  3-5 Steps with railin = Total, A lot = 2, A little = 3; 4 = None  Score: 20    Post-Tx Position: Up in Chair, Alarms activated, and Call light and " personal items within reach  PPE: gloves and gown    Therapy Charges for Today       Code Description Service Date Service Provider Modifiers Qty    53584221041 HC GAIT TRAINING EA 15 MIN 1/18/2025 Sofiya Steven PTA GP 1    27543881449 HC PT THER PROC EA 15 MIN 1/18/2025 Sofiya Steven PTA GP 1           PT Charges       Row Name 01/18/25 1153             Time Calculation    Start Time 0909  -      Stop Time 0926  -      Time Calculation (min) 17 min  -      PT Received On 01/18/25  -      PT - Next Appointment 01/20/25  -         Time Calculation- PT    Total Timed Code Minutes- PT 17 minute(s)  -                User Key  (r) = Recorded By, (t) = Taken By, (c) = Cosigned By      Initials Name Provider Type    Sofiya Richards PTA Physical Therapist Assistant
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
## 2025-01-18 NOTE — PROGRESS NOTES
LOS: 5 days   Patient Care Team:  Ty Gao DO as PCP - General (Internal Medicine)    Chief Complaint: Bilateral foot osteomyelitis    Subjective     Interval History:     Patient Complaints: Patient is POD #1 s/p incision and drainage of left foot abscess.  Patient relates mild tenderness to the surgical site although improved and controlled from previous days.  Patient Denies: Patient denies any nausea, vomiting, fevers, chills, shortness of breath.  History taken from: patient    Review of Systems:   Pertinent positives in HPI.    Objective     Vital Signs  Temp:  [97.6 °F (36.4 °C)-98.5 °F (36.9 °C)] 97.6 °F (36.4 °C)  Heart Rate:  [] 97  Resp:  [13-16] 16  BP: ()/(54-71) 113/70    Physical Exam:  General: Alert and oriented x 3  Vascular: DP and PT pulses faintly palpable bilaterally.  CFT less than 5 seconds to all digits bilaterally.  Moderate nonpitting edema noted to the left foot.  Musculoskeletal: Mild pain on palpation to the left submetatarsal 1 surgical site.  Neuro: Protective sensation diminished bilaterally.  Sensation intact to light touch bilaterally.  Derm: Left foot submetatarsal 1 surgical site with residual purulence present.  Wound edges are macerated.  Sutures intact with slight reapproximation at this time.    Labs:  Results from last 7 days   Lab Units 01/18/25  0520   WBC 10*3/mm3 11.01*   HEMOGLOBIN g/dL 10.5*   HEMATOCRIT % 34.4*   PLATELETS 10*3/mm3 381     Results from last 7 days   Lab Units 01/18/25  0520   SODIUM mmol/L 141   POTASSIUM mmol/L 4.1   CHLORIDE mmol/L 102   CO2 mmol/L 26.4   BUN mg/dL 23*   CREATININE mg/dL 0.89   GLUCOSE mg/dL 256*   CALCIUM mg/dL 9.5     Glucose   Date Value Ref Range Status   01/18/2025 256 (H) 65 - 99 mg/dL Final   01/17/2025 280 (H) 65 - 99 mg/dL Final   01/16/2025 248 (H) 65 - 99 mg/dL Final     Results from last 7 days   Lab Units 01/13/25  1226   SED RATE mm/hr 40*     Results from last 7 days   Lab Units  "01/13/25  1226   CRP mg/dL 5.29*         No components found for: \"HBA1C\"    Imaging:   Imaging Results (All)       Procedure Component Value Units Date/Time    MRI Foot Right Without Contrast [[id number]] Collected: 01/13/25 2238     Updated: 01/13/25 2250    Narrative:      MRI FOOT RIGHT WO CONTRAST    Date of Exam: 1/13/2025 7:35 PM EST    Indication: Right hallux osteomyelitis.     Comparison: Toe radiographs 1/13/2025    Technique:  Routine multiplanar/multisequence sequence images of the right foot were obtained without contrast administration.      Findings:  Bones: There is marrow edema of the first proximal and distal phalanges with associated loss of normal T1 marrow and cortical signal along the medial aspect of the first interphalangeal joint. No additional areas of abnormal marrow edema. No suspicious   osseous lesions. Small effusion of the first metatarsophalangeal joint. Small to moderate effusion in the first interphalangeal joint.    Soft tissues: Lisfranc ligament is intact. Plantar plates appear intact. Visualized tendons appear grossly intact. Diffuse subcutaneous edema. Atrophy of the intrinsic foot muscles. There is soft tissue wound along the medial aspect of the first toe with   underlying lobulated fluid which may reflect small abscesses. Small mild intermetatarsal bursal fluid in the first, second and third interspaces.      Impression:      Impression:  1.Soft tissue wound along the medial aspect of the first toe with underlying lobulated fluid which may reflect small abscesses.  2.Marrow edema of the first proximal and distal phalanges with associated loss of normal T1 marrow and cortical signal along the medial aspect of the first interphalangeal joint. These findings are suspicious for osteomyelitis. There is also adjacent   small to moderate effusion in the first interphalangeal joint concerning for septic arthritis.        Electronically Signed: Jacob Kebede MD    1/13/2025 " 10:48 PM EST    Workstation ID: [id number]    MRI Foot Left Without Contrast [[id number]] Collected: 01/13/25 2212     Updated: 01/13/25 2224    Narrative:      MRI FOOT LEFT WO CONTRAST    Date of Exam: 1/13/2025 7:35 PM EST    Indication: Evaluate abcess and osteomyelitis.     Comparison: One 1325 foot radiographs    Technique:  Routine multiplanar/multisequence sequence images of the left foot were obtained without contrast administration.      Findings:  Bones and joints: Marrow edema and loss of normal T1 cortical and marrow signal with erosions of the first metatarsal head as well as the base of the first proximal phalanx. There is associated lobulated small joint effusion at the first   metatarsophalangeal joint. There is marrow edema seen throughout the remainder of the first metatarsal and the first proximal phalanx. Minimal marrow edema seen along the first distal phalanx. No additional areas of abnormal marrow edema. No suspicious   osseous lesions.    Soft tissues: Soft tissue wound along the plantar medial forefoot just below the first metatarsophalangeal joint. There are associated lobulated fluid seen deep to the wound which may reflect small abscesses. There is involvement of the flexor houses   longus tendon at this location as well with mild associated tenosynovitis. Remaining tendons appear grossly intact. Lisfranc ligament is intact. The plantar plates of the second through fifth digits appear intact. Diffuse subcutaneous edema.      Impression:      Impression:  1.Soft tissue wound along the plantar medial forefoot just below the first metatarsophalangeal joint. There are associated lobulated fluid collections deep to the wound which may reflect small abscesses. There is involvement of the flexor hallucis longus   tendon at this location as well with mild associated tenosynovitis.  2.Marrow edema and loss of normal T1 cortical and marrow signal with erosions of the first metatarsal head as well as  the base of the first proximal phalanx. There is associated lobulated small joint effusion at the first metatarsophalangeal joint. These   findings are concerning for septic arthritis with osteomyelitis of the first metatarsal head and base of the first proximal phalanx. There is reactive marrow edema seen along the remainder of the first proximal phalanx and first metatarsal.        Electronically Signed: Jacob Kebede MD    1/13/2025 10:22 PM EST    Workstation ID: [id number]    XR Foot 3+ View Left [[id number]] Collected: 01/13/25 1239     Updated: 01/13/25 1246    Narrative:      XR FOOT 3+ VW LEFT, XR TOE 2+ VW RIGHT    Date of Exam: 1/13/2025 12:02 PM EST    Indication: Diabetic wound infection involving the left foot in the right great toe.    Comparison: None available.    Findings:  Right great toe: There is a cortical destruction and periostitis involving the distal phalanx, especially along the medial border and also involving the medial aspect of the head of the proximal phalanx. The findings are consistent with osteomyelitis in   the appropriate clinical setting. There is a pathologic fracture secondary to the osteomyelitis involving the medial aspect of the head of the proximal phalanx. There is soft tissue swelling. There is a relatively deep ulcer along the medial aspect of   the right great toe at the site of the bony destructive changes. There are incidental arthritic changes involving the IP joint and first MTP joint.    Left foot: There is cortical destruction, osteolysis, and periosteal reaction involving the distal shaft and head of the first metatarsal bone as well as the adjacent base of the proximal phalanx. There also may be some osteolytic changes in the base of   the distal phalanx of the left great toe. This is consistent with radiographic changes of osteomyelitis. There is soft tissue swelling involving mainly the left great toe. Shallow ulcers are suggested along the plantar aspect of  the medial left forefoot   in the location of the radiographic changes of osteomyelitis. There are minor arthritic changes involving the inner-phalangeal joints of the digits.      Impression:      Impression:  1.Radiographic changes of osteomyelitis involving the distal phalanx of the right great toe with a pathologic fracture secondary to the osteomyelitis involving the medial aspect of the head of the proximal phalanx.  2.Radiographic changes of osteomyelitis involving the distal shaft and head of the left first metatarsal bone as well as the adjacent base of the proximal phalanx. There also may be some osteolytic changes in the base of the distal phalanx of the left   great toe representing osteomyelitis.  3.Soft tissue swelling. There is a relatively deep ulcer along the medial aspect of the right great toe at the site of the bony destructive changes.  4.Soft tissue swelling and shallow ulcers along the medial aspect of the left forefoot at the site of the bony destructive changes.        Electronically Signed: Agapito Rick MD    1/13/2025 12:44 PM EST    Workstation ID: [id number]    XR Toe 2+ View Right [[id number]] Collected: 01/13/25 1239     Updated: 01/13/25 1246    Narrative:      XR FOOT 3+ VW LEFT, XR TOE 2+ VW RIGHT    Date of Exam: 1/13/2025 12:02 PM EST    Indication: Diabetic wound infection involving the left foot in the right great toe.    Comparison: None available.    Findings:  Right great toe: There is a cortical destruction and periostitis involving the distal phalanx, especially along the medial border and also involving the medial aspect of the head of the proximal phalanx. The findings are consistent with osteomyelitis in   the appropriate clinical setting. There is a pathologic fracture secondary to the osteomyelitis involving the medial aspect of the head of the proximal phalanx. There is soft tissue swelling. There is a relatively deep ulcer along the medial aspect of   the right great toe  "at the site of the bony destructive changes. There are incidental arthritic changes involving the IP joint and first MTP joint.    Left foot: There is cortical destruction, osteolysis, and periosteal reaction involving the distal shaft and head of the first metatarsal bone as well as the adjacent base of the proximal phalanx. There also may be some osteolytic changes in the base of   the distal phalanx of the left great toe. This is consistent with radiographic changes of osteomyelitis. There is soft tissue swelling involving mainly the left great toe. Shallow ulcers are suggested along the plantar aspect of the medial left forefoot   in the location of the radiographic changes of osteomyelitis. There are minor arthritic changes involving the inner-phalangeal joints of the digits.      Impression:      Impression:  1.Radiographic changes of osteomyelitis involving the distal phalanx of the right great toe with a pathologic fracture secondary to the osteomyelitis involving the medial aspect of the head of the proximal phalanx.  2.Radiographic changes of osteomyelitis involving the distal shaft and head of the left first metatarsal bone as well as the adjacent base of the proximal phalanx. There also may be some osteolytic changes in the base of the distal phalanx of the left   great toe representing osteomyelitis.  3.Soft tissue swelling. There is a relatively deep ulcer along the medial aspect of the right great toe at the site of the bony destructive changes.  4.Soft tissue swelling and shallow ulcers along the medial aspect of the left forefoot at the site of the bony destructive changes.        Electronically Signed: Agapito Rick MD    1/13/2025 12:44 PM EST    Workstation ID: [id number]            Cultures:   Wound culture: No results found for: \"WOUNDCX\"  Blood culture: No results found for: \"BLOODCX\"     Results Review:     I reviewed the patient's new clinical results.      Assessment & Plan   - POD #1 s/p " incision and drainage left foot abscess  - Osteomyelitis, left first metatarsal/hallux  - Abscess, left foot  - Osteomyelitis, right hallux    Plan:  - Patient was examined and evaluated at bedside; vital signs stable at this time.  WBC 11; will continue to monitor at this time.  - Surgical site to the left foot with residual purulence; sutures intact at this time.  Redressed with Betadine wet-to-dry dressing.  - X-ray and MRI confirm osteomyelitis of the right hallux as well as left hallux proximal phalanx and first metatarsal head.  Patient continues to refuse surgical intervention in the form of amputation at this time.  Patient aware of the risks with delaying/refusing surgery.  Patient is at high risk for limb loss.  - ID recommendations appreciated; plan for 6 weeks of IV antibiotics due to patient's refusal to have surgery.  - Patient to remain nonweightbearing to the left lower extremity, WBAT to the right lower extremity in the surgical shoe.  - Podiatry will continue to follow at this time.      Gage Jones DPM  01/18/25  09:24 EST

## 2025-01-18 NOTE — PLAN OF CARE
Addended by: PAPITO MARIE on: 2/8/2022 03:12 PM     Modules accepted: Orders     Assessment: Tomas Song presents with functional mobility impairments which indicate the need for skilled intervention. Tolerating session today without incident. Pt good at maintaining NWB LLE. Will continue to follow and progress as tolerated.

## 2025-01-18 NOTE — PROGRESS NOTES
Infectious Diseases Progress Note      LOS: 5 days   Patient Care Team:  Ty Gao DO as PCP - General (Internal Medicine)    Chief Complaint: Left foot wound    Subjective       The patient has been afebrile for the last 24 hours.  The patient is on room air, hemodynamically stable, and is tolerating antimicrobial therapy.  No new complaints.     Review of Systems:   Review of Systems   Constitutional: Negative.    HENT: Negative.     Eyes: Negative.    Respiratory: Negative.     Cardiovascular: Negative.    Gastrointestinal: Negative.    Endocrine: Negative.    Genitourinary: Negative.    Musculoskeletal: Negative.    Skin:  Positive for wound.   Neurological: Negative.    Psychiatric/Behavioral: Negative.     All other systems reviewed and are negative.       Objective     Vital Signs  Temp:  [97.6 °F (36.4 °C)-98.5 °F (36.9 °C)] 98.5 °F (36.9 °C)  Heart Rate:  [] 89  Resp:  [14-16] 14  BP: ()/(60-71) 120/69    Physical Exam:  Physical Exam  Vitals and nursing note reviewed.   Constitutional:       General: He is not in acute distress.     Appearance: Normal appearance. He is well-developed and normal weight. He is not diaphoretic.   HENT:      Head: Normocephalic and atraumatic.   Eyes:      Conjunctiva/sclera: Conjunctivae normal.      Pupils: Pupils are equal, round, and reactive to light.   Cardiovascular:      Rate and Rhythm: Normal rate and regular rhythm.      Heart sounds: Normal heart sounds, S1 normal and S2 normal.   Pulmonary:      Effort: Pulmonary effort is normal. No respiratory distress.      Breath sounds: Normal breath sounds. No stridor. No wheezing or rales.   Abdominal:      General: Bowel sounds are normal. There is no distension.      Palpations: Abdomen is soft. There is no mass.      Tenderness: There is no abdominal tenderness. There is no guarding.   Musculoskeletal:         General: No deformity. Normal range of motion.      Cervical back: Neck supple.   Skin:      General: Skin is warm and dry.      Coloration: Skin is not pale.      Findings: No erythema or rash.      Comments: Dressings on both feet   Neurological:      Mental Status: He is alert and oriented to person, place, and time.      Cranial Nerves: No cranial nerve deficit.   Psychiatric:         Mood and Affect: Mood normal.          Results Review:    I have reviewed all clinical data, test, lab, and imaging results.     Radiology  No Radiology Exams Resulted Within Past 24 Hours    Cardiology    Laboratory    Results from last 7 days   Lab Units 01/18/25 0520 01/17/25 0537 01/16/25  0004 01/15/25  0024 01/14/25 0418 01/13/25  1226   WBC 10*3/mm3 11.01* 6.76 7.84 9.93 5.51 5.97   HEMOGLOBIN g/dL 10.5* 10.2* 9.6* 9.7* 9.4* 9.7*   HEMATOCRIT % 34.4* 33.1* 31.5* 31.6* 30.0* 31.7*   PLATELETS 10*3/mm3 381 396 402 393 353 410     Results from last 7 days   Lab Units 01/18/25 0520 01/17/25 0537 01/16/25  0004 01/15/25  0024 01/14/25 0418 01/13/25  1226   SODIUM mmol/L 141 138 134* 136 137 139   POTASSIUM mmol/L 4.1 4.1 3.7 4.7 4.2 4.0   CHLORIDE mmol/L 102 100 98 100 104 99   CO2 mmol/L 26.4 29.7* 29.5* 27.9 25.0 30.6*   BUN mg/dL 23* 14 16 14 11 12   CREATININE mg/dL 0.89 0.76 0.81 0.83 0.69* 0.96   GLUCOSE mg/dL 256* 280* 248* 312* 186* 180*   ALBUMIN g/dL  --   --   --   --   --  3.5   BILIRUBIN mg/dL  --   --   --   --   --  0.2   ALK PHOS U/L  --   --   --   --   --  104   AST (SGOT) U/L  --   --   --   --   --  20   ALT (SGPT) U/L  --   --   --   --   --  19   CALCIUM mg/dL 9.5 9.6 9.2 9.5 9.0 9.5     Results from last 7 days   Lab Units 01/18/25  0520   CK TOTAL U/L 25     Results from last 7 days   Lab Units 01/13/25  1226   SED RATE mm/hr 40*         Microbiology   Microbiology Results (last 10 days)       Procedure Component Value - Date/Time    Anaerobic Culture - Bone, Foot, Left [[id number]]  (Normal) Collected: 01/14/25 1651    Lab Status: Preliminary result Specimen: Bone from Foot, Left Updated:  01/17/25 1511     Anaerobic Culture No anaerobes isolated at 3 days    Tissue / Bone Culture - Bone, Foot, Left [[id number]]  (Abnormal) Collected: 01/14/25 1651    Lab Status: Final result Specimen: Bone from Foot, Left Updated: 01/17/25 0813     Tissue Culture Scant growth (1+) Escherichia coli      Rare growth Proteus mirabilis      Scant growth (1+) Streptococcus agalactiae (Group B)      Staphylococcus aureus, MRSA     Comment:   Methicillin resistant Staphylococcus aureus, Patient may be an isolation risk.        Gram Stain Rare (1+) WBCs per low power field      Rare (1+) Gram positive cocci in pairs    Narrative:      Refer to previous wound culture collected on 01/13/2025 1203 for MICs      Anaerobic Culture - Tissue, Foot, Left [[id number]]  (Normal) Collected: 01/14/25 1645    Lab Status: Preliminary result Specimen: Tissue from Foot, Left Updated: 01/17/25 1511     Anaerobic Culture No anaerobes isolated at 3 days    Tissue / Bone Culture - Tissue, Foot, Left [[id number]]  (Abnormal) Collected: 01/14/25 1645    Lab Status: Final result Specimen: Tissue from Foot, Left Updated: 01/17/25 0812     Tissue Culture Scant growth (1+) Escherichia coli      Rare growth Proteus mirabilis     Gram Stain Moderate (3+) WBCs per low power field      Moderate (3+) Gram negative bacilli      Moderate (3+) Gram positive cocci    Narrative:      Refer to previous wound culture collected on 01/13/2025 1203 for MICs      MRSA Screen, PCR (Inpatient) - Swab, Nares [[id number]]  (Abnormal) Collected: 01/13/25 1422    Lab Status: Final result Specimen: Swab from Nares Updated: 01/13/25 1547     MRSA PCR MRSA Detected    Narrative:      The negative predictive value of this diagnostic test is high and should only be used to consider de-escalating anti-MRSA therapy. A positive result may indicate colonization with MRSA and must be correlated clinically.    Blood Culture - Blood, Arm, Right [[id number]]  (Normal) Collected: 01/13/25  1226    Lab Status: Final result Specimen: Blood from Arm, Right Updated: 01/18/25 1245     Blood Culture No growth at 5 days    Wound Culture - Wound, Foot, Left [[id number]]  (Abnormal)  (Susceptibility) Collected: 01/13/25 1203    Lab Status: Edited Result - FINAL Specimen: Wound from Foot, Left Updated: 01/17/25 0811     Wound Culture Light growth (2+) Escherichia coli      Light growth (2+) Streptococcus agalactiae (Group B)     Comment:   This organism is considered to be universally susceptible to penicillin.  No further antibiotic testing will be performed. If Clindamycin or Erythromycin is the drug of choice, notify the laboratory within 7 days to request susceptibility testing.         Rare growth Proteus mirabilis      Scant growth (1+) Staphylococcus aureus, MRSA     Comment:   Considering site of infection and appropriate dosing, oxacillin (or cefoxitin) results can be applied to cefazolin, nafcillin, ampicillin/sulbactam, dicloxacillin, amoxicillin/clavulanate, cephalexin, and cefpodoxime.        Gram Stain Many (4+) WBCs per low power field      Moderate (3+) Gram positive cocci in pairs, chains and clusters      Few (2+) Gram negative bacilli      Few (2+) Gram positive bacilli    Susceptibility        Escherichia coli      JOE      Amikacin Susceptible      Amoxicillin + Clavulanate Susceptible      Ampicillin Resistant      Ampicillin + Sulbactam Resistant      Cefazolin (Non Urine) Intermediate      Cefepime Susceptible      Ceftazidime Susceptible      Ceftriaxone Susceptible      Gentamicin Resistant      Levofloxacin Susceptible      Piperacillin + Tazobactam Susceptible      Tetracycline Resistant      Tobramycin Intermediate      Trimethoprim + Sulfamethoxazole Resistant                       Susceptibility        Proteus mirabilis      JOE      Amoxicillin + Clavulanate Intermediate      Ampicillin Resistant      Ampicillin + Sulbactam Susceptible      Cefazolin (Non Urine) Intermediate       Cefepime Susceptible      Ceftazidime Susceptible      Ceftriaxone Susceptible      Gentamicin Susceptible      Levofloxacin Susceptible      Piperacillin + Tazobactam Susceptible      Tetracycline Resistant      Trimethoprim + Sulfamethoxazole Susceptible                       Susceptibility        Staphylococcus aureus, MRSA      JOE      Clindamycin Susceptible      Erythromycin Susceptible      Oxacillin Resistant      Rifampin Susceptible      Tetracycline Susceptible      Trimethoprim + Sulfamethoxazole Susceptible      Vancomycin Susceptible                       Susceptibility Comments       Escherichia coli    With the exception of urinary-sourced infections, aminoglycosides should not be used as monotherapy.      Proteus mirabilis    With the exception of urinary-sourced infections, aminoglycosides should not be used as monotherapy.               Blood Culture - Blood, Arm, Right [[id number]]  (Normal) Collected: 01/13/25 1201    Lab Status: Final result Specimen: Blood from Arm, Right Updated: 01/18/25 1216     Blood Culture No growth at 5 days            Medication Review:       Schedule Meds  amLODIPine, 10 mg, Oral, Daily  buprenorphine, 8 mg, Sublingual, TID  cefTRIAXone, 2,000 mg, Intravenous, Q24H  gabapentin, 1,200 mg, Oral, Q12H  hydroCHLOROthiazide, 25 mg, Oral, Daily  insulin glargine, 30 Units, Subcutaneous, BID  insulin lispro, 2-9 Units, Subcutaneous, 4x Daily AC & at Bedtime  losartan, 100 mg, Oral, Daily  vancomycin, 1,000 mg, Intravenous, Q8H  vitamin D3, 5,000 Units, Oral, Daily        Infusion Meds  Pharmacy to dose vancomycin,         PRN Meds    acetaminophen **OR** acetaminophen **OR** acetaminophen    atropine    atropine    senna-docusate sodium **AND** polyethylene glycol **AND** bisacodyl **AND** bisacodyl    Calcium Replacement - Follow Nurse / BPA Driven Protocol    dextrose    dextrose    diphenhydrAMINE    diphenhydrAMINE    diphenhydrAMINE    diphenhydrAMINE    ePHEDrine  Sulfate (Pressors)    ePHEDrine Sulfate (Pressors)    flumazenil    glucagon (human recombinant)    hydrALAZINE    hydrALAZINE    HYDROmorphone    HYDROmorphone    HYDROmorphone    HYDROmorphone    ipratropium-albuterol    ipratropium-albuterol    ketorolac    labetalol    labetalol    lidocaine (cardiac)    lidocaine (cardiac)    Magnesium Standard Dose Replacement - Follow Nurse / BPA Driven Protocol    melatonin    naloxone    naloxone    ondansetron ODT **OR** ondansetron    ondansetron    ondansetron    oxyCODONE    oxyCODONE    oxyCODONE    oxyCODONE    Pharmacy to dose vancomycin    Phosphorus Replacement - Follow Nurse / BPA Driven Protocol    Potassium Replacement - Follow Nurse / BPA Driven Protocol        Assessment & Plan       Antimicrobial Therapy   1.  IV vancomycin        2.  IV ceftriaxone        3.        4.        5.          Assessment     Left diabetic foot with extensive infection in the left first MTP joint.  MRI showed abscess, tenosynovitis and osteomyelitis of the first metatarsal head.  Initial culture growing E. coli, methicillin resistant Staphylococcus aureus, group B streptococcus and Proteus mirabilis.  Patient status post incision and drainage with bone biopsy on 1/14/2024.  Purulent drainage noted and interoperative cultures growing E. coli, Proteus mirabilis and methicillin resistant Staphylococcus aureus.  Bone biopsy was positive for osteomyelitis.  Status post second incision and drainage to the left foot on 1/17/2024-purulence still found.  Patient continued to refuse partial ray amputation     Right diabetic foot with osteomyelitis involving the right large toe.  Status post incision and drainage with bone biopsy on 1/15/2025.  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  Patient notes to remote drug use but denies IV drug abuse..  HIV screen was negative.  Drug screen  positive for THC, methamphetamines, amphetamines benzodiazepines and buprenorphine.  Of note patient does have Adderall on his home medication left    Hepatitis C infection.  Diagnosed this admission.       Plan     Case discussed with podiatry.  Patient still not amenable to left first partial ray amputation.  Podiatry planning to recheck the patient on Monday to see if any further surgical intervention is needed    Continue V vancomycin-ask pharmacy to monitor and dose  Continue IV Rocephin 2 g every 24 hours  Patient will need 6 weeks of IV antibiotics from surgery on 1/17/2025-last day on 2/27/25  Hepatitis C RNA PCR and genotype-pending  Continue supportive care  A.m. labs    Would recommend rehab for patient for IV antibiotics and wound care  Case discussed with RN at bedside  Patient is a high risk for limb loss  Patient will need to get his blood sugars under control to heal this infection and avoid future infections    Latonya Marie, APRN  01/18/25  13:14 EST    Note is dictated utilizing voice recognition software/Dragon

## 2025-01-18 NOTE — PLAN OF CARE
Problem: Adult Inpatient Plan of Care  Goal: Plan of Care Review  Outcome: Progressing  Flowsheets (Taken 1/18/2025 1334)  Progress: no change  Plan of Care Reviewed With: patient  Goal: Patient-Specific Goal (Individualized)  Outcome: Progressing  Goal: Absence of Hospital-Acquired Illness or Injury  Outcome: Progressing  Intervention: Identify and Manage Fall Risk  Description: Perform standard risk assessment on admission using a validated tool or comprehensive approach appropriate to the patient; reassess fall risk frequently, with change in status or transfer to another level of care.  Communicate risk to interprofessional healthcare team; ensure fall risk visible cue.  Determine need for increased observation, equipment and environmental modification, as well as use of supportive, nonskid footwear.  Adjust safety measures to individual needs and identified risk factors.  Reinforce the importance of active participation with fall risk prevention, safety, and physical activity with the patient and family.  Perform regular intentional rounding to assess need for position change, pain assessment and personal needs, including assistance with toileting.  Recent Flowsheet Documentation  Taken 1/18/2025 1200 by Kaci Rodriguez RN  Safety Promotion/Fall Prevention: safety round/check completed  Taken 1/18/2025 1000 by Kaci Rodriguez, RN  Safety Promotion/Fall Prevention: safety round/check completed  Taken 1/18/2025 0801 by Kaci Rodriguez, RN  Safety Promotion/Fall Prevention: safety round/check completed  Intervention: Prevent Skin Injury  Description: Perform a screening for skin injury risk, such as pressure or moisture-associated skin damage on admission and at regular intervals throughout hospital stay.  Keep all areas of skin (especially folds) clean and dry.  Maintain adequate skin hydration.  Relieve and redistribute pressure and protect bony prominences and skin at risk for injury; implement measures  based on patient-specific risk factors.  Match turning and repositioning schedule to clinical condition.  Encourage weight shift frequently; assist with reposition if unable to complete independently.  Float heels off bed; avoid pressure on the Achilles tendon.  Keep skin free from extended contact with medical devices.  Optimize nutrition and hydration.  Encourage functional activity and mobility, as early as tolerated.  Use aids (e.g., slide boards, mechanical lift) during transfer.  Recent Flowsheet Documentation  Taken 1/18/2025 0801 by Kaci Rodriguez RN  Body Position: position changed independently  Intervention: Prevent and Manage VTE (Venous Thromboembolism) Risk  Description: Assess for VTE (venous thromboembolism) risk.  Promote early mobilization; encourage both active and passive leg exercises, if unable to ambulate.  Initiate and maintain compression or other therapy, as indicated, based on identified risk in accordance with organizational protocol and provider order.  Recognize the patient's individual risk for bleeding before initiating pharmacologic thromboprophylaxis.  Recent Flowsheet Documentation  Taken 1/18/2025 0801 by Kaci Rodriguez RN  VTE Prevention/Management: (educated)   bilateral   SCDs (sequential compression devices) off  Goal: Optimal Comfort and Wellbeing  Outcome: Progressing  Goal: Readiness for Transition of Care  Outcome: Progressing     Problem: Fall Injury Risk  Goal: Absence of Fall and Fall-Related Injury  Outcome: Progressing  Intervention: Identify and Manage Contributors  Description: Develop a fall prevention plan, considering patient-centered interventions and family/caregiver involvement; identify and address patient's facilitators and barriers.  Provide reorientation, appropriate sensory stimulation and routines with changes in mental status to decrease risk of fall.  Promote use of personal vision and auditory aids.  Assess assistance level required for safe and  effective self-care; provide support as needed, such as toileting and mobilization. For age 65 and older, implement timed toileting with assistance.  Encourage physical activity, such as performance of mobility and self-care at highest level of patient ability, multicomponent exercise program and provision of appropriate assistive devices.  If fall occurs, assess the severity of injury; implement fall injury protocol. Determine the cause and revise fall injury prevention plan.  Regularly review and advocate for medication adjustment to decrease fall risk; consider administration times, polypharmacy and age.  Balance adequate pain management with potential for oversedation.  Recent Flowsheet Documentation  Taken 1/18/2025 0801 by Kaci Rodriguez, RN  Medication Review/Management: medications reviewed  Intervention: Promote Injury-Free Environment  Description: Provide a safe, barrier-free environment that encourages independent activity.  Keep care area uncluttered and well-lighted.  Determine need for increased observation or monitoring.  Avoid use of devices that minimize mobility, such as restraints or indwelling urinary catheter.  Recent Flowsheet Documentation  Taken 1/18/2025 1200 by Kaci Rodriguez, RN  Safety Promotion/Fall Prevention: safety round/check completed  Taken 1/18/2025 1000 by Kaci Rodriguez, RN  Safety Promotion/Fall Prevention: safety round/check completed  Taken 1/18/2025 0801 by Kaci Rodriguez, RN  Safety Promotion/Fall Prevention: safety round/check completed     Problem: Surgery Nonspecified  Goal: Absence of Bleeding  Outcome: Progressing  Goal: Effective Bowel Elimination  Outcome: Progressing  Goal: Blood Glucose Level Within Target Range  Outcome: Progressing  Goal: Absence of Infection Signs and Symptoms  Outcome: Progressing   Goal Outcome Evaluation:  Plan of Care Reviewed With: patient        Progress: no change

## 2025-01-18 NOTE — PLAN OF CARE
Goal Outcome Evaluation:            Patient able to make needs known. Pain treated per MAR. VSS on room air. Ambulates independently to restroom. Call light in reach. Plan of care on going.

## 2025-01-19 LAB
ALBUMIN SERPL-MCNC: 3.6 G/DL (ref 3.5–5.2)
ALBUMIN/GLOB SERPL: 0.7 G/DL
ALP SERPL-CCNC: 100 U/L (ref 39–117)
ALT SERPL W P-5'-P-CCNC: 40 U/L (ref 1–41)
ANION GAP SERPL CALCULATED.3IONS-SCNC: 10.8 MMOL/L (ref 5–15)
ANION GAP SERPL CALCULATED.3IONS-SCNC: 11.7 MMOL/L (ref 5–15)
AST SERPL-CCNC: 22 U/L (ref 1–40)
BACTERIA SPEC ANAEROBE CULT: ABNORMAL
BASOPHILS # BLD AUTO: 0.06 10*3/MM3 (ref 0–0.2)
BASOPHILS NFR BLD AUTO: 0.8 % (ref 0–1.5)
BILIRUB SERPL-MCNC: 0.2 MG/DL (ref 0–1.2)
BUN SERPL-MCNC: 17 MG/DL (ref 6–20)
BUN SERPL-MCNC: 19 MG/DL (ref 6–20)
BUN/CREAT SERPL: 16.7 (ref 7–25)
BUN/CREAT SERPL: 19.8 (ref 7–25)
CALCIUM SPEC-SCNC: 9.5 MG/DL (ref 8.6–10.5)
CALCIUM SPEC-SCNC: 9.5 MG/DL (ref 8.6–10.5)
CHLORIDE SERPL-SCNC: 100 MMOL/L (ref 98–107)
CHLORIDE SERPL-SCNC: 104 MMOL/L (ref 98–107)
CO2 SERPL-SCNC: 26.3 MMOL/L (ref 22–29)
CO2 SERPL-SCNC: 28.2 MMOL/L (ref 22–29)
CREAT SERPL-MCNC: 0.96 MG/DL (ref 0.76–1.27)
CREAT SERPL-MCNC: 1.02 MG/DL (ref 0.76–1.27)
DEPRECATED RDW RBC AUTO: 43.7 FL (ref 37–54)
EGFRCR SERPLBLD CKD-EPI 2021: 91.8 ML/MIN/1.73
EGFRCR SERPLBLD CKD-EPI 2021: 98.7 ML/MIN/1.73
EOSINOPHIL # BLD AUTO: 0.87 10*3/MM3 (ref 0–0.4)
EOSINOPHIL NFR BLD AUTO: 11.7 % (ref 0.3–6.2)
ERYTHROCYTE [DISTWIDTH] IN BLOOD BY AUTOMATED COUNT: 14.1 % (ref 12.3–15.4)
GLOBULIN UR ELPH-MCNC: 5 GM/DL
GLUCOSE BLDC GLUCOMTR-MCNC: 117 MG/DL (ref 70–105)
GLUCOSE BLDC GLUCOMTR-MCNC: 172 MG/DL (ref 70–105)
GLUCOSE BLDC GLUCOMTR-MCNC: 197 MG/DL (ref 70–105)
GLUCOSE BLDC GLUCOMTR-MCNC: 225 MG/DL (ref 70–105)
GLUCOSE SERPL-MCNC: 216 MG/DL (ref 65–99)
GLUCOSE SERPL-MCNC: 280 MG/DL (ref 65–99)
HCT VFR BLD AUTO: 34.3 % (ref 37.5–51)
HGB BLD-MCNC: 10.3 G/DL (ref 13–17.7)
IMM GRANULOCYTES # BLD AUTO: 0.02 10*3/MM3 (ref 0–0.05)
IMM GRANULOCYTES NFR BLD AUTO: 0.3 % (ref 0–0.5)
LYMPHOCYTES # BLD AUTO: 2.24 10*3/MM3 (ref 0.7–3.1)
LYMPHOCYTES NFR BLD AUTO: 30.1 % (ref 19.6–45.3)
MCH RBC QN AUTO: 25.9 PG (ref 26.6–33)
MCHC RBC AUTO-ENTMCNC: 30 G/DL (ref 31.5–35.7)
MCV RBC AUTO: 86.4 FL (ref 79–97)
MONOCYTES # BLD AUTO: 0.5 10*3/MM3 (ref 0.1–0.9)
MONOCYTES NFR BLD AUTO: 6.7 % (ref 5–12)
NEUTROPHILS NFR BLD AUTO: 3.76 10*3/MM3 (ref 1.7–7)
NEUTROPHILS NFR BLD AUTO: 50.4 % (ref 42.7–76)
NRBC BLD AUTO-RTO: 0 /100 WBC (ref 0–0.2)
PLATELET # BLD AUTO: 383 10*3/MM3 (ref 140–450)
PMV BLD AUTO: 9.3 FL (ref 6–12)
POTASSIUM SERPL-SCNC: 3.8 MMOL/L (ref 3.5–5.2)
POTASSIUM SERPL-SCNC: 4.1 MMOL/L (ref 3.5–5.2)
PROT SERPL-MCNC: 8.6 G/DL (ref 6–8.5)
RBC # BLD AUTO: 3.97 10*6/MM3 (ref 4.14–5.8)
SODIUM SERPL-SCNC: 138 MMOL/L (ref 136–145)
SODIUM SERPL-SCNC: 143 MMOL/L (ref 136–145)
VANCOMYCIN TROUGH SERPL-MCNC: 12.6 MCG/ML (ref 5–20)
WBC NRBC COR # BLD AUTO: 7.45 10*3/MM3 (ref 3.4–10.8)

## 2025-01-19 PROCEDURE — 82948 REAGENT STRIP/BLOOD GLUCOSE: CPT

## 2025-01-19 PROCEDURE — 25010000002 VANCOMYCIN 1 G RECONSTITUTED SOLUTION 1 EACH VIAL: Performed by: NURSE PRACTITIONER

## 2025-01-19 PROCEDURE — 80202 ASSAY OF VANCOMYCIN: CPT | Performed by: INTERNAL MEDICINE

## 2025-01-19 PROCEDURE — 25010000002 CEFTRIAXONE PER 250 MG: Performed by: NURSE PRACTITIONER

## 2025-01-19 PROCEDURE — 63710000001 INSULIN LISPRO (HUMAN) PER 5 UNITS

## 2025-01-19 PROCEDURE — 63710000001 INSULIN GLARGINE PER 5 UNITS

## 2025-01-19 PROCEDURE — 80053 COMPREHEN METABOLIC PANEL: CPT | Performed by: INTERNAL MEDICINE

## 2025-01-19 PROCEDURE — 25810000003 SODIUM CHLORIDE 0.9 % SOLUTION 250 ML FLEX CONT: Performed by: NURSE PRACTITIONER

## 2025-01-19 PROCEDURE — 85025 COMPLETE CBC W/AUTO DIFF WBC: CPT | Performed by: INTERNAL MEDICINE

## 2025-01-19 RX ORDER — METRONIDAZOLE 500 MG/1
500 TABLET ORAL EVERY 8 HOURS SCHEDULED
Status: DISCONTINUED | OUTPATIENT
Start: 2025-01-19 | End: 2025-01-24 | Stop reason: HOSPADM

## 2025-01-19 RX ADMIN — VANCOMYCIN HYDROCHLORIDE 1000 MG: 1 INJECTION, POWDER, LYOPHILIZED, FOR SOLUTION INTRAVENOUS at 14:39

## 2025-01-19 RX ADMIN — BUPRENORPHINE HCL 8 MG: 8 TABLET SUBLINGUAL at 10:06

## 2025-01-19 RX ADMIN — INSULIN GLARGINE 30 UNITS: 100 INJECTION, SOLUTION SUBCUTANEOUS at 10:07

## 2025-01-19 RX ADMIN — Medication 5000 UNITS: at 10:07

## 2025-01-19 RX ADMIN — BUPRENORPHINE HCL 8 MG: 8 TABLET SUBLINGUAL at 21:38

## 2025-01-19 RX ADMIN — METRONIDAZOLE 500 MG: 500 TABLET ORAL at 21:38

## 2025-01-19 RX ADMIN — VANCOMYCIN HYDROCHLORIDE 1000 MG: 1 INJECTION, POWDER, LYOPHILIZED, FOR SOLUTION INTRAVENOUS at 21:37

## 2025-01-19 RX ADMIN — HYDROCHLOROTHIAZIDE 25 MG: 25 TABLET ORAL at 10:07

## 2025-01-19 RX ADMIN — OXYCODONE HYDROCHLORIDE 10 MG: 5 TABLET ORAL at 13:58

## 2025-01-19 RX ADMIN — VANCOMYCIN HYDROCHLORIDE 1000 MG: 1 INJECTION, POWDER, LYOPHILIZED, FOR SOLUTION INTRAVENOUS at 05:53

## 2025-01-19 RX ADMIN — LOSARTAN POTASSIUM 100 MG: 50 TABLET, FILM COATED ORAL at 10:07

## 2025-01-19 RX ADMIN — INSULIN LISPRO 2 UNITS: 100 INJECTION, SOLUTION INTRAVENOUS; SUBCUTANEOUS at 13:43

## 2025-01-19 RX ADMIN — OXYCODONE HYDROCHLORIDE 10 MG: 5 TABLET ORAL at 19:20

## 2025-01-19 RX ADMIN — GABAPENTIN 1200 MG: 400 CAPSULE ORAL at 21:38

## 2025-01-19 RX ADMIN — BUPRENORPHINE HCL 8 MG: 8 TABLET SUBLINGUAL at 14:49

## 2025-01-19 RX ADMIN — METRONIDAZOLE 500 MG: 500 TABLET ORAL at 16:48

## 2025-01-19 RX ADMIN — CEFTRIAXONE SODIUM 2000 MG: 2 INJECTION, POWDER, FOR SOLUTION INTRAMUSCULAR; INTRAVENOUS at 17:33

## 2025-01-19 RX ADMIN — GABAPENTIN 1200 MG: 400 CAPSULE ORAL at 10:07

## 2025-01-19 RX ADMIN — INSULIN LISPRO 4 UNITS: 100 INJECTION, SOLUTION INTRAVENOUS; SUBCUTANEOUS at 21:37

## 2025-01-19 RX ADMIN — INSULIN GLARGINE 30 UNITS: 100 INJECTION, SOLUTION SUBCUTANEOUS at 21:37

## 2025-01-19 RX ADMIN — INSULIN LISPRO 2 UNITS: 100 INJECTION, SOLUTION INTRAVENOUS; SUBCUTANEOUS at 17:33

## 2025-01-19 RX ADMIN — OXYCODONE HYDROCHLORIDE 10 MG: 5 TABLET ORAL at 07:08

## 2025-01-19 RX ADMIN — AMLODIPINE BESYLATE 10 MG: 5 TABLET ORAL at 10:07

## 2025-01-19 NOTE — PLAN OF CARE
Goal Outcome Evaluation:  Plan of Care Reviewed With: patient           Outcome Evaluation: Patient alert and orientd, VSS, waiting for placement.  Call light in reach

## 2025-01-19 NOTE — PROGRESS NOTES
Infectious Diseases Progress Note      LOS: 6 days   Patient Care Team:  Ty Gao DO as PCP - General (Internal Medicine)    Chief Complaint: Left foot wound    Subjective       The patient has been afebrile for the last 24 hours.  The patient is on room air, hemodynamically stable, and is tolerating antimicrobial therapy.  No new complaints.     Review of Systems:   Review of Systems   Constitutional: Negative.    HENT: Negative.     Eyes: Negative.    Respiratory: Negative.     Cardiovascular: Negative.    Gastrointestinal: Negative.    Endocrine: Negative.    Genitourinary: Negative.    Musculoskeletal: Negative.    Skin:  Positive for wound.   Neurological: Negative.    Psychiatric/Behavioral: Negative.     All other systems reviewed and are negative.       Objective     Vital Signs  Temp:  [98 °F (36.7 °C)-98.3 °F (36.8 °C)] 98 °F (36.7 °C)  Heart Rate:  [] 83  Resp:  [16-18] 18  BP: (104-138)/(70-80) 132/78    Physical Exam:  Physical Exam  Vitals and nursing note reviewed.   Constitutional:       General: He is not in acute distress.     Appearance: Normal appearance. He is well-developed and normal weight. He is not diaphoretic.   HENT:      Head: Normocephalic and atraumatic.   Eyes:      Conjunctiva/sclera: Conjunctivae normal.      Pupils: Pupils are equal, round, and reactive to light.   Cardiovascular:      Rate and Rhythm: Normal rate and regular rhythm.      Heart sounds: Normal heart sounds, S1 normal and S2 normal.   Pulmonary:      Effort: Pulmonary effort is normal. No respiratory distress.      Breath sounds: Normal breath sounds. No stridor. No wheezing or rales.   Abdominal:      General: Bowel sounds are normal. There is no distension.      Palpations: Abdomen is soft. There is no mass.      Tenderness: There is no abdominal tenderness. There is no guarding.   Musculoskeletal:         General: No deformity. Normal range of motion.      Cervical back: Neck supple.   Skin:      General: Skin is warm and dry.      Coloration: Skin is not pale.      Findings: No erythema or rash.      Comments: Dressings on both feet   Neurological:      Mental Status: He is alert and oriented to person, place, and time.      Cranial Nerves: No cranial nerve deficit.   Psychiatric:         Mood and Affect: Mood normal.          Results Review:    I have reviewed all clinical data, test, lab, and imaging results.     Radiology  No Radiology Exams Resulted Within Past 24 Hours    Cardiology    Laboratory    Results from last 7 days   Lab Units 01/18/25  2314 01/18/25  0520 01/17/25  0537 01/16/25  0004 01/15/25  0024 01/14/25 0418 01/13/25  1226   WBC 10*3/mm3 6.77 11.01* 6.76 7.84 9.93 5.51 5.97   HEMOGLOBIN g/dL 10.3* 10.5* 10.2* 9.6* 9.7* 9.4* 9.7*   HEMATOCRIT % 34.7* 34.4* 33.1* 31.5* 31.6* 30.0* 31.7*   PLATELETS 10*3/mm3 433 381 396 402 393 353 410     Results from last 7 days   Lab Units 01/18/25  2314 01/18/25  0520 01/17/25  0537 01/16/25  0004 01/15/25  0024 01/14/25 0418 01/13/25  1226   SODIUM mmol/L 143 141 138 134* 136 137 139   POTASSIUM mmol/L 3.8 4.1 4.1 3.7 4.7 4.2 4.0   CHLORIDE mmol/L 104 102 100 98 100 104 99   CO2 mmol/L 28.2 26.4 29.7* 29.5* 27.9 25.0 30.6*   BUN mg/dL 19 23* 14 16 14 11 12   CREATININE mg/dL 0.96 0.89 0.76 0.81 0.83 0.69* 0.96   GLUCOSE mg/dL 216* 256* 280* 248* 312* 186* 180*   ALBUMIN g/dL  --   --   --   --   --   --  3.5   BILIRUBIN mg/dL  --   --   --   --   --   --  0.2   ALK PHOS U/L  --   --   --   --   --   --  104   AST (SGOT) U/L  --   --   --   --   --   --  20   ALT (SGPT) U/L  --   --   --   --   --   --  19   CALCIUM mg/dL 9.5 9.5 9.6 9.2 9.5 9.0 9.5     Results from last 7 days   Lab Units 01/18/25  0520   CK TOTAL U/L 25     Results from last 7 days   Lab Units 01/13/25  1226   SED RATE mm/hr 40*         Microbiology   Microbiology Results (last 10 days)       Procedure Component Value - Date/Time    Anaerobic Culture - Bone, Foot, Left [[id number]]   (Abnormal) Collected: 01/14/25 1651    Lab Status: Final result Specimen: Bone from Foot, Left Updated: 01/19/25 1405     Anaerobic Culture Bacteroides pyogenes      Prevotella denticola    Tissue / Bone Culture - Bone, Foot, Left [[id number]]  (Abnormal) Collected: 01/14/25 1651    Lab Status: Final result Specimen: Bone from Foot, Left Updated: 01/17/25 0813     Tissue Culture Scant growth (1+) Escherichia coli      Rare growth Proteus mirabilis      Scant growth (1+) Streptococcus agalactiae (Group B)      Staphylococcus aureus, MRSA     Comment:   Methicillin resistant Staphylococcus aureus, Patient may be an isolation risk.        Gram Stain Rare (1+) WBCs per low power field      Rare (1+) Gram positive cocci in pairs    Narrative:      Refer to previous wound culture collected on 01/13/2025 1203 for MICs      Anaerobic Culture - Tissue, Foot, Left [[id number]]  (Abnormal) Collected: 01/14/25 1645    Lab Status: Final result Specimen: Tissue from Foot, Left Updated: 01/19/25 1414     Anaerobic Culture Bacteroides pyogenes      Anaerococcus prevotii    Tissue / Bone Culture - Tissue, Foot, Left [[id number]]  (Abnormal) Collected: 01/14/25 1645    Lab Status: Final result Specimen: Tissue from Foot, Left Updated: 01/17/25 0812     Tissue Culture Scant growth (1+) Escherichia coli      Rare growth Proteus mirabilis     Gram Stain Moderate (3+) WBCs per low power field      Moderate (3+) Gram negative bacilli      Moderate (3+) Gram positive cocci    Narrative:      Refer to previous wound culture collected on 01/13/2025 1203 for MICs      MRSA Screen, PCR (Inpatient) - Swab, Nares [[id number]]  (Abnormal) Collected: 01/13/25 1422    Lab Status: Final result Specimen: Swab from Nares Updated: 01/13/25 1547     MRSA PCR MRSA Detected    Narrative:      The negative predictive value of this diagnostic test is high and should only be used to consider de-escalating anti-MRSA therapy. A positive result may indicate  colonization with MRSA and must be correlated clinically.    Blood Culture - Blood, Arm, Right [[id number]]  (Normal) Collected: 01/13/25 1226    Lab Status: Final result Specimen: Blood from Arm, Right Updated: 01/18/25 1245     Blood Culture No growth at 5 days    Wound Culture - Wound, Foot, Left [[id number]]  (Abnormal)  (Susceptibility) Collected: 01/13/25 1203    Lab Status: Edited Result - FINAL Specimen: Wound from Foot, Left Updated: 01/17/25 0811     Wound Culture Light growth (2+) Escherichia coli      Light growth (2+) Streptococcus agalactiae (Group B)     Comment:   This organism is considered to be universally susceptible to penicillin.  No further antibiotic testing will be performed. If Clindamycin or Erythromycin is the drug of choice, notify the laboratory within 7 days to request susceptibility testing.         Rare growth Proteus mirabilis      Scant growth (1+) Staphylococcus aureus, MRSA     Comment:   Considering site of infection and appropriate dosing, oxacillin (or cefoxitin) results can be applied to cefazolin, nafcillin, ampicillin/sulbactam, dicloxacillin, amoxicillin/clavulanate, cephalexin, and cefpodoxime.        Gram Stain Many (4+) WBCs per low power field      Moderate (3+) Gram positive cocci in pairs, chains and clusters      Few (2+) Gram negative bacilli      Few (2+) Gram positive bacilli    Susceptibility        Escherichia coli      JOE      Amikacin Susceptible      Amoxicillin + Clavulanate Susceptible      Ampicillin Resistant      Ampicillin + Sulbactam Resistant      Cefazolin (Non Urine) Intermediate      Cefepime Susceptible      Ceftazidime Susceptible      Ceftriaxone Susceptible      Gentamicin Resistant      Levofloxacin Susceptible      Piperacillin + Tazobactam Susceptible      Tetracycline Resistant      Tobramycin Intermediate      Trimethoprim + Sulfamethoxazole Resistant                       Susceptibility        Proteus mirabilis      JOE      Amoxicillin +  Clavulanate Intermediate      Ampicillin Resistant      Ampicillin + Sulbactam Susceptible      Cefazolin (Non Urine) Intermediate      Cefepime Susceptible      Ceftazidime Susceptible      Ceftriaxone Susceptible      Gentamicin Susceptible      Levofloxacin Susceptible      Piperacillin + Tazobactam Susceptible      Tetracycline Resistant      Trimethoprim + Sulfamethoxazole Susceptible                       Susceptibility        Staphylococcus aureus, MRSA      JOE      Clindamycin Susceptible      Erythromycin Susceptible      Oxacillin Resistant      Rifampin Susceptible      Tetracycline Susceptible      Trimethoprim + Sulfamethoxazole Susceptible      Vancomycin Susceptible                       Susceptibility Comments       Escherichia coli    With the exception of urinary-sourced infections, aminoglycosides should not be used as monotherapy.      Proteus mirabilis    With the exception of urinary-sourced infections, aminoglycosides should not be used as monotherapy.               Blood Culture - Blood, Arm, Right [[id number]]  (Normal) Collected: 01/13/25 1201    Lab Status: Final result Specimen: Blood from Arm, Right Updated: 01/18/25 1216     Blood Culture No growth at 5 days            Medication Review:       Schedule Meds  amLODIPine, 10 mg, Oral, Daily  buprenorphine, 8 mg, Sublingual, TID  cefTRIAXone, 2,000 mg, Intravenous, Q24H  gabapentin, 1,200 mg, Oral, Q12H  hydroCHLOROthiazide, 25 mg, Oral, Daily  insulin glargine, 30 Units, Subcutaneous, BID  insulin lispro, 2-9 Units, Subcutaneous, 4x Daily AC & at Bedtime  losartan, 100 mg, Oral, Daily  metroNIDAZOLE, 500 mg, Oral, Q8H  vancomycin, 1,000 mg, Intravenous, Q8H  vitamin D3, 5,000 Units, Oral, Daily        Infusion Meds  Pharmacy to dose vancomycin,         PRN Meds    acetaminophen **OR** acetaminophen **OR** acetaminophen    atropine    atropine    senna-docusate sodium **AND** polyethylene glycol **AND** bisacodyl **AND** bisacodyl     Calcium Replacement - Follow Nurse / BPA Driven Protocol    dextrose    dextrose    diphenhydrAMINE    diphenhydrAMINE    diphenhydrAMINE    diphenhydrAMINE    ePHEDrine Sulfate (Pressors)    ePHEDrine Sulfate (Pressors)    flumazenil    glucagon (human recombinant)    hydrALAZINE    hydrALAZINE    HYDROmorphone    HYDROmorphone    HYDROmorphone    HYDROmorphone    ipratropium-albuterol    ipratropium-albuterol    labetalol    labetalol    lidocaine (cardiac)    lidocaine (cardiac)    Magnesium Standard Dose Replacement - Follow Nurse / BPA Driven Protocol    melatonin    naloxone    naloxone    ondansetron ODT **OR** ondansetron    ondansetron    ondansetron    oxyCODONE    oxyCODONE    oxyCODONE    oxyCODONE    Pharmacy to dose vancomycin    Phosphorus Replacement - Follow Nurse / BPA Driven Protocol    Potassium Replacement - Follow Nurse / BPA Driven Protocol        Assessment & Plan       Antimicrobial Therapy   1.  IV vancomycin        2.  IV ceftriaxone        3.        4.        5.          Assessment     Left diabetic foot with extensive infection in the left first MTP joint.  MRI showed abscess, tenosynovitis and osteomyelitis of the first metatarsal head.  Initial culture growing E. coli, methicillin resistant Staphylococcus aureus, group B streptococcus and Proteus mirabilis.  Patient status post incision and drainage with bone biopsy on 1/14/2024.  Purulent drainage noted and interoperative cultures growing E. coli, Proteus mirabilis and methicillin resistant Staphylococcus aureus.  Bone biopsy was positive for osteomyelitis.  Status post second incision and drainage to the left foot on 1/17/2024-purulence still found.  Anaerobic cultures are growing Bacteroides and Anaerococcus  Patient continued to refuse partial ray amputation     Right diabetic foot with osteomyelitis involving the right large toe.  Status post incision and drainage with bone biopsy on 1/15/2025.  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  Patient notes to remote drug use but denies IV drug abuse..  HIV screen was negative.  Drug screen positive for THC, methamphetamines, amphetamines benzodiazepines and buprenorphine.  Of note patient does have Adderall on his home medication left    Hepatitis C infection.  Diagnosed this admission.       Plan     Case discussed with podiatry.  Patient still not amenable to left first partial ray amputation.  Podiatry planning to recheck the patient on Monday to see if any further surgical intervention is needed    Continue V vancomycin-ask pharmacy to monitor and dose  Continue IV Rocephin 2 g every 24 hours  Patient will need 6 weeks of IV antibiotics from surgery on 1/17/2025-last day on 2/27/25  Start p.o. Flagyl 500 mg every 8 hours for 6 weeks  Hepatitis C RNA PCR and genotype-pending  Continue supportive care  A.m. labs    Would recommend rehab for patient for IV antibiotics and wound care  Case discussed with RN at bedside  Patient is a high risk for limb loss  Patient will need to get his blood sugars under control to heal this infection and avoid future infections    Dayton Turcios MD  01/19/25  14:44 EST    Note is dictated utilizing voice recognition software/Dragon

## 2025-01-19 NOTE — PLAN OF CARE
Problem: Adult Inpatient Plan of Care  Goal: Plan of Care Review  Outcome: Progressing  Goal: Patient-Specific Goal (Individualized)  Outcome: Progressing  Goal: Absence of Hospital-Acquired Illness or Injury  Outcome: Progressing  Intervention: Identify and Manage Fall Risk  Recent Flowsheet Documentation  Taken 1/19/2025 0200 by Mley Fairbanks LPN  Safety Promotion/Fall Prevention: safety round/check completed  Taken 1/19/2025 0020 by Mely Fairbanks LPN  Safety Promotion/Fall Prevention: safety round/check completed  Taken 1/18/2025 2200 by Mely Fairbanks LPN  Safety Promotion/Fall Prevention: safety round/check completed  Taken 1/18/2025 2038 by Mely Fairbanks LPN  Safety Promotion/Fall Prevention: safety round/check completed  Taken 1/18/2025 1930 by Mely Fairbanks LPN  Safety Promotion/Fall Prevention: safety round/check completed  Intervention: Prevent Skin Injury  Recent Flowsheet Documentation  Taken 1/18/2025 1930 by Mely Fairbanks LPN  Skin Protection: transparent dressing maintained  Intervention: Prevent Infection  Recent Flowsheet Documentation  Taken 1/19/2025 0020 by Mely Fairbanks LPN  Infection Prevention:   visitors restricted/screened   single patient room provided   rest/sleep promoted   personal protective equipment utilized   hand hygiene promoted  Taken 1/18/2025 1930 by Mely Fairbanks LPN  Infection Prevention:   visitors restricted/screened   single patient room provided   personal protective equipment utilized   hand hygiene promoted   rest/sleep promoted  Goal: Optimal Comfort and Wellbeing  Outcome: Progressing  Intervention: Provide Person-Centered Care  Recent Flowsheet Documentation  Taken 1/18/2025 1930 by Mely Fairbanks LPN  Trust Relationship/Rapport:   care explained   choices provided   thoughts/feelings acknowledged   reassurance provided   questions encouraged   questions answered  Goal: Readiness for Transition of Care  Outcome: Progressing     Problem: Fall Injury Risk  Goal: Absence  of Fall and Fall-Related Injury  Outcome: Progressing  Intervention: Identify and Manage Contributors  Recent Flowsheet Documentation  Taken 1/19/2025 0020 by Mely Fairbanks LPN  Medication Review/Management: medications reviewed  Taken 1/18/2025 2200 by Mely Fairbanks LPN  Medication Review/Management: medications reviewed  Taken 1/18/2025 1930 by Mely Fairbanks LPN  Medication Review/Management: medications reviewed  Intervention: Promote Injury-Free Environment  Recent Flowsheet Documentation  Taken 1/19/2025 0200 by Mely Fairbanks LPN  Safety Promotion/Fall Prevention: safety round/check completed  Taken 1/19/2025 0020 by Mely Fairbanks LPN  Safety Promotion/Fall Prevention: safety round/check completed  Taken 1/18/2025 2200 by Mely Fairbanks LPN  Safety Promotion/Fall Prevention: safety round/check completed  Taken 1/18/2025 2038 by Mely Fairbanks LPN  Safety Promotion/Fall Prevention: safety round/check completed  Taken 1/18/2025 1930 by Mely Fairbanks LPN  Safety Promotion/Fall Prevention: safety round/check completed     Problem: Surgery Nonspecified  Goal: Absence of Bleeding  Outcome: Progressing  Intervention: Monitor and Manage Bleeding  Recent Flowsheet Documentation  Taken 1/18/2025 1930 by Mely Fairbanks LPN  Bleeding Management: dressing monitored  Goal: Effective Bowel Elimination  Outcome: Progressing  Goal: Blood Glucose Level Within Target Range  Outcome: Progressing  Goal: Absence of Infection Signs and Symptoms  Outcome: Progressing  Intervention: Prevent or Manage Infection  Recent Flowsheet Documentation  Taken 1/18/2025 1930 by Mely Fairbanks LPN  Isolation Precautions:   contact   precautions maintained   Goal Outcome Evaluation:  No changes.

## 2025-01-19 NOTE — PROGRESS NOTES
"Pharmacy Antimicrobial Dosing Service    Subjective:  Tomas Song is a 44 y.o.male admitted with osteomyelitis. Pharmacy has been consulted to dose Vancomycin for possible bone and joint infection.    PMH: Hypertension, type 2 diabetes, diabetic neuropathy, Hx of methamphetamine use      Assessment/Plan    1. Day #7 Vancomycin: Goal -600 mcg*h/mL.   Patient on maintenance dose of vancomycin 1000 mg IV q8h (~10.5 mg/kg ABW).  Scr appears stable and UOP adequate.   Vancomycin trough was drawn on 1/19 @ 13:52 (~ 8 hours post-dose), giving a value of 12.6 mcg/mL. This gives a predicted steady state AUC of 476 mg/L*hr  Will continue maintenance dose.  Will schedule next vancomycin level on 1/24 or sooner if clinically indicated.     2. Day #3 Ceftriaxone: Ceftriaxone 2 g IV q24h    Will continue to monitor drug levels, renal function, culture and sensitivities, and patient clinical status.       Objective:  Relevant clinical data and objective history reviewed:  185.4 cm (73\")   95.5 kg (210 lb 7 oz)   Ideal body weight: 79.9 kg (176 lb 2.4 oz)  Adjusted ideal body weight: 86.1 kg (189 lb 13.8 oz)  Body mass index is 27.76 kg/m².    Results from last 7 days   Lab Units 01/19/25  1352 01/17/25  0537 01/15/25  1345 01/15/25  1017   VANCOMYCIN PK mcg/mL  --   --   --  17.10*   VANCOMYCIN TR mcg/mL 12.60 10.70 12.70  --      Results from last 7 days   Lab Units 01/18/25 2314 01/18/25  0520 01/17/25  0537   CREATININE mg/dL 0.96 0.89 0.76     Estimated Creatinine Clearance: 129.9 mL/min (by C-G formula based on SCr of 0.96 mg/dL).  I/O last 3 completed shifts:  In: 1260 [P.O.:1260]  Out: 800 [Urine:800]    Results from last 7 days   Lab Units 01/18/25  2314 01/18/25  0520 01/17/25  0537   WBC 10*3/mm3 6.77 11.01* 6.76     Temperature    01/18/25 2038 01/19/25 0446 01/19/25 1221   Temp: 98.2 °F (36.8 °C) 98.3 °F (36.8 °C) 98 °F (36.7 °C)     Baseline culture/source/susceptibility:  Microbiology Results (last 10 " days)       Procedure Component Value - Date/Time    Anaerobic Culture - Bone, Foot, Left [[id number]]  (Abnormal) Collected: 01/14/25 1651    Lab Status: Final result Specimen: Bone from Foot, Left Updated: 01/19/25 1405     Anaerobic Culture Bacteroides pyogenes      Prevotella denticola    Tissue / Bone Culture - Bone, Foot, Left [[id number]]  (Abnormal) Collected: 01/14/25 1651    Lab Status: Final result Specimen: Bone from Foot, Left Updated: 01/17/25 0813     Tissue Culture Scant growth (1+) Escherichia coli      Rare growth Proteus mirabilis      Scant growth (1+) Streptococcus agalactiae (Group B)      Staphylococcus aureus, MRSA     Comment:   Methicillin resistant Staphylococcus aureus, Patient may be an isolation risk.        Gram Stain Rare (1+) WBCs per low power field      Rare (1+) Gram positive cocci in pairs    Narrative:      Refer to previous wound culture collected on 01/13/2025 1203 for MICs      Anaerobic Culture - Tissue, Foot, Left [[id number]]  (Abnormal) Collected: 01/14/25 1645    Lab Status: Final result Specimen: Tissue from Foot, Left Updated: 01/19/25 1414     Anaerobic Culture Bacteroides pyogenes      Anaerococcus prevotii    Tissue / Bone Culture - Tissue, Foot, Left [[id number]]  (Abnormal) Collected: 01/14/25 1645    Lab Status: Final result Specimen: Tissue from Foot, Left Updated: 01/17/25 0812     Tissue Culture Scant growth (1+) Escherichia coli      Rare growth Proteus mirabilis     Gram Stain Moderate (3+) WBCs per low power field      Moderate (3+) Gram negative bacilli      Moderate (3+) Gram positive cocci    Narrative:      Refer to previous wound culture collected on 01/13/2025 1203 for MICs      MRSA Screen, PCR (Inpatient) - Swab, Nares [[id number]]  (Abnormal) Collected: 01/13/25 1422    Lab Status: Final result Specimen: Swab from Nares Updated: 01/13/25 1547     MRSA PCR MRSA Detected    Narrative:      The negative predictive value of this diagnostic test is high and  should only be used to consider de-escalating anti-MRSA therapy. A positive result may indicate colonization with MRSA and must be correlated clinically.    Blood Culture - Blood, Arm, Right [[id number]]  (Normal) Collected: 01/13/25 1226    Lab Status: Final result Specimen: Blood from Arm, Right Updated: 01/18/25 1245     Blood Culture No growth at 5 days    Wound Culture - Wound, Foot, Left [[id number]]  (Abnormal)  (Susceptibility) Collected: 01/13/25 1203    Lab Status: Edited Result - FINAL Specimen: Wound from Foot, Left Updated: 01/17/25 0811     Wound Culture Light growth (2+) Escherichia coli      Light growth (2+) Streptococcus agalactiae (Group B)     Comment:   This organism is considered to be universally susceptible to penicillin.  No further antibiotic testing will be performed. If Clindamycin or Erythromycin is the drug of choice, notify the laboratory within 7 days to request susceptibility testing.         Rare growth Proteus mirabilis      Scant growth (1+) Staphylococcus aureus, MRSA     Comment:   Considering site of infection and appropriate dosing, oxacillin (or cefoxitin) results can be applied to cefazolin, nafcillin, ampicillin/sulbactam, dicloxacillin, amoxicillin/clavulanate, cephalexin, and cefpodoxime.        Gram Stain Many (4+) WBCs per low power field      Moderate (3+) Gram positive cocci in pairs, chains and clusters      Few (2+) Gram negative bacilli      Few (2+) Gram positive bacilli    Susceptibility        Escherichia coli      JOE      Amikacin 4 ug/ml Susceptible      Amoxicillin + Clavulanate 8 ug/ml Susceptible      Ampicillin >=32 ug/ml Resistant      Ampicillin + Sulbactam >=32 ug/ml Resistant      Cefazolin (Non Urine) 4 ug/ml Intermediate      Cefepime <=0.12 ug/ml Susceptible      Ceftazidime <=0.5 ug/ml Susceptible      Ceftriaxone <=0.25 ug/ml Susceptible      Gentamicin >=16 ug/ml Resistant      Levofloxacin <=0.12 ug/ml Susceptible      Piperacillin + Tazobactam  <=4 ug/ml Susceptible      Tetracycline >=16 ug/ml Resistant      Tobramycin 8 ug/ml Intermediate      Trimethoprim + Sulfamethoxazole >=320 ug/ml Resistant                       Susceptibility        Proteus mirabilis      JOE      Amoxicillin + Clavulanate 16 ug/ml Intermediate      Ampicillin <=2 ug/ml Resistant      Ampicillin + Sulbactam <=2 ug/ml Susceptible      Cefazolin (Non Urine) 4 ug/ml Intermediate      Cefepime <=0.12 ug/ml Susceptible      Ceftazidime <=0.5 ug/ml Susceptible      Ceftriaxone <=0.25 ug/ml Susceptible      Gentamicin <=1 ug/ml Susceptible      Levofloxacin <=0.12 ug/ml Susceptible      Piperacillin + Tazobactam <=4 ug/ml Susceptible      Tetracycline >=16 ug/ml Resistant      Trimethoprim + Sulfamethoxazole <=20 ug/ml Susceptible                       Susceptibility        Staphylococcus aureus, MRSA      JOE      Clindamycin 0.25 ug/ml Susceptible      Erythromycin <=0.25 ug/ml Susceptible      Oxacillin 0.5 ug/ml Resistant      Rifampin <=0.5 ug/ml Susceptible      Tetracycline <=1 ug/ml Susceptible      Trimethoprim + Sulfamethoxazole <=10 ug/ml Susceptible      Vancomycin 1 ug/ml Susceptible                       Susceptibility Comments       Escherichia coli    With the exception of urinary-sourced infections, aminoglycosides should not be used as monotherapy.      Proteus mirabilis    With the exception of urinary-sourced infections, aminoglycosides should not be used as monotherapy.               Blood Culture - Blood, Arm, Right [[id number]]  (Normal) Collected: 01/13/25 1201    Lab Status: Final result Specimen: Blood from Arm, Right Updated: 01/18/25 1216     Blood Culture No growth at 5 days          Juan M Mendoza RPH  01/19/25 15:05 EST
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
## 2025-01-20 LAB
BACTERIA SPEC AEROBE CULT: ABNORMAL
BASOPHILS # BLD AUTO: 0.06 10*3/MM3 (ref 0–0.2)
BASOPHILS NFR BLD AUTO: 1 % (ref 0–1.5)
DEPRECATED RDW RBC AUTO: 43.4 FL (ref 37–54)
EOSINOPHIL # BLD AUTO: 0.78 10*3/MM3 (ref 0–0.4)
EOSINOPHIL NFR BLD AUTO: 12.8 % (ref 0.3–6.2)
ERYTHROCYTE [DISTWIDTH] IN BLOOD BY AUTOMATED COUNT: 14.1 % (ref 12.3–15.4)
GLUCOSE BLDC GLUCOMTR-MCNC: 174 MG/DL (ref 70–105)
GLUCOSE BLDC GLUCOMTR-MCNC: 185 MG/DL (ref 70–105)
GLUCOSE BLDC GLUCOMTR-MCNC: 227 MG/DL (ref 70–105)
GLUCOSE BLDC GLUCOMTR-MCNC: 236 MG/DL (ref 70–105)
GRAM STN SPEC: ABNORMAL
HCT VFR BLD AUTO: 32 % (ref 37.5–51)
HCV GENTYP SERPL NAA+PROBE: NORMAL
HCV RNA SERPL NAA+PROBE-ACNC: NORMAL IU/ML
HGB BLD-MCNC: 9.7 G/DL (ref 13–17.7)
IMM GRANULOCYTES # BLD AUTO: 0.02 10*3/MM3 (ref 0–0.05)
IMM GRANULOCYTES NFR BLD AUTO: 0.3 % (ref 0–0.5)
LYMPHOCYTES # BLD AUTO: 1.89 10*3/MM3 (ref 0.7–3.1)
LYMPHOCYTES NFR BLD AUTO: 31.1 % (ref 19.6–45.3)
MCH RBC QN AUTO: 25.9 PG (ref 26.6–33)
MCHC RBC AUTO-ENTMCNC: 30.3 G/DL (ref 31.5–35.7)
MCV RBC AUTO: 85.3 FL (ref 79–97)
MONOCYTES # BLD AUTO: 0.51 10*3/MM3 (ref 0.1–0.9)
MONOCYTES NFR BLD AUTO: 8.4 % (ref 5–12)
NEUTROPHILS NFR BLD AUTO: 2.82 10*3/MM3 (ref 1.7–7)
NEUTROPHILS NFR BLD AUTO: 46.4 % (ref 42.7–76)
NRBC BLD AUTO-RTO: 0 /100 WBC (ref 0–0.2)
PLATELET # BLD AUTO: 312 10*3/MM3 (ref 140–450)
PMV BLD AUTO: 9.4 FL (ref 6–12)
RBC # BLD AUTO: 3.75 10*6/MM3 (ref 4.14–5.8)
REF LAB TEST REF RANGE: NORMAL
WBC NRBC COR # BLD AUTO: 6.08 10*3/MM3 (ref 3.4–10.8)

## 2025-01-20 PROCEDURE — 25810000003 SODIUM CHLORIDE 0.9 % SOLUTION 250 ML FLEX CONT: Performed by: NURSE PRACTITIONER

## 2025-01-20 PROCEDURE — 80048 BASIC METABOLIC PNL TOTAL CA: CPT | Performed by: NURSE PRACTITIONER

## 2025-01-20 PROCEDURE — 85025 COMPLETE CBC W/AUTO DIFF WBC: CPT | Performed by: NURSE PRACTITIONER

## 2025-01-20 PROCEDURE — 63710000001 INSULIN GLARGINE PER 5 UNITS

## 2025-01-20 PROCEDURE — 82948 REAGENT STRIP/BLOOD GLUCOSE: CPT

## 2025-01-20 PROCEDURE — 97535 SELF CARE MNGMENT TRAINING: CPT

## 2025-01-20 PROCEDURE — 25010000002 VANCOMYCIN 1 G RECONSTITUTED SOLUTION 1 EACH VIAL: Performed by: NURSE PRACTITIONER

## 2025-01-20 PROCEDURE — 25010000002 CEFTRIAXONE PER 250 MG: Performed by: NURSE PRACTITIONER

## 2025-01-20 PROCEDURE — 97116 GAIT TRAINING THERAPY: CPT

## 2025-01-20 PROCEDURE — 25010000002 ENOXAPARIN PER 10 MG: Performed by: STUDENT IN AN ORGANIZED HEALTH CARE EDUCATION/TRAINING PROGRAM

## 2025-01-20 PROCEDURE — 97530 THERAPEUTIC ACTIVITIES: CPT

## 2025-01-20 PROCEDURE — 63710000001 INSULIN LISPRO (HUMAN) PER 5 UNITS

## 2025-01-20 RX ORDER — ENOXAPARIN SODIUM 100 MG/ML
40 INJECTION SUBCUTANEOUS EVERY 24 HOURS
Status: DISCONTINUED | OUTPATIENT
Start: 2025-01-20 | End: 2025-01-24 | Stop reason: HOSPADM

## 2025-01-20 RX ADMIN — CEFTRIAXONE SODIUM 2000 MG: 2 INJECTION, POWDER, FOR SOLUTION INTRAMUSCULAR; INTRAVENOUS at 17:16

## 2025-01-20 RX ADMIN — METRONIDAZOLE 500 MG: 500 TABLET ORAL at 14:08

## 2025-01-20 RX ADMIN — VANCOMYCIN HYDROCHLORIDE 1000 MG: 1 INJECTION, POWDER, LYOPHILIZED, FOR SOLUTION INTRAVENOUS at 06:33

## 2025-01-20 RX ADMIN — INSULIN LISPRO 4 UNITS: 100 INJECTION, SOLUTION INTRAVENOUS; SUBCUTANEOUS at 21:23

## 2025-01-20 RX ADMIN — GABAPENTIN 1200 MG: 400 CAPSULE ORAL at 21:07

## 2025-01-20 RX ADMIN — LOSARTAN POTASSIUM 100 MG: 50 TABLET, FILM COATED ORAL at 08:15

## 2025-01-20 RX ADMIN — INSULIN LISPRO 4 UNITS: 100 INJECTION, SOLUTION INTRAVENOUS; SUBCUTANEOUS at 08:19

## 2025-01-20 RX ADMIN — INSULIN GLARGINE 30 UNITS: 100 INJECTION, SOLUTION SUBCUTANEOUS at 21:19

## 2025-01-20 RX ADMIN — INSULIN LISPRO 2 UNITS: 100 INJECTION, SOLUTION INTRAVENOUS; SUBCUTANEOUS at 17:17

## 2025-01-20 RX ADMIN — VANCOMYCIN HYDROCHLORIDE 1000 MG: 1 INJECTION, POWDER, LYOPHILIZED, FOR SOLUTION INTRAVENOUS at 21:08

## 2025-01-20 RX ADMIN — Medication 5000 UNITS: at 08:15

## 2025-01-20 RX ADMIN — BUPRENORPHINE HCL 8 MG: 8 TABLET SUBLINGUAL at 09:04

## 2025-01-20 RX ADMIN — METRONIDAZOLE 500 MG: 500 TABLET ORAL at 06:33

## 2025-01-20 RX ADMIN — ENOXAPARIN SODIUM 40 MG: 100 INJECTION SUBCUTANEOUS at 17:17

## 2025-01-20 RX ADMIN — METRONIDAZOLE 500 MG: 500 TABLET ORAL at 21:07

## 2025-01-20 RX ADMIN — HYDROCHLOROTHIAZIDE 25 MG: 25 TABLET ORAL at 08:15

## 2025-01-20 RX ADMIN — BUPRENORPHINE HCL 8 MG: 8 TABLET SUBLINGUAL at 21:07

## 2025-01-20 RX ADMIN — BUPRENORPHINE HCL 8 MG: 8 TABLET SUBLINGUAL at 14:08

## 2025-01-20 RX ADMIN — GABAPENTIN 1200 MG: 400 CAPSULE ORAL at 08:15

## 2025-01-20 RX ADMIN — OXYCODONE HYDROCHLORIDE 10 MG: 5 TABLET ORAL at 21:17

## 2025-01-20 RX ADMIN — VANCOMYCIN HYDROCHLORIDE 1000 MG: 1 INJECTION, POWDER, LYOPHILIZED, FOR SOLUTION INTRAVENOUS at 14:08

## 2025-01-20 RX ADMIN — INSULIN GLARGINE 30 UNITS: 100 INJECTION, SOLUTION SUBCUTANEOUS at 08:19

## 2025-01-20 RX ADMIN — AMLODIPINE BESYLATE 10 MG: 5 TABLET ORAL at 08:15

## 2025-01-20 RX ADMIN — INSULIN LISPRO 2 UNITS: 100 INJECTION, SOLUTION INTRAVENOUS; SUBCUTANEOUS at 12:09

## 2025-01-20 RX ADMIN — OXYCODONE HYDROCHLORIDE 10 MG: 5 TABLET ORAL at 08:26

## 2025-01-20 NOTE — PROGRESS NOTES
LOS: 7 days   Patient Care Team:  Ty Gao DO as PCP - General (Internal Medicine)    Chief Complaint: Bilateral foot osteomyelitis    Subjective     Interval History:     Patient Complaints: Patient is POD #3 s/p  repeat incision and drainage of left foot abscess.  Patient relates mild tenderness to the surgical site in the morning although improving from prior.  Patient Denies: Patient denies any nausea, vomiting, fevers, chills, shortness of breath.  History taken from: patient    Review of Systems:   Pertinent positives in HPI.    Objective     Vital Signs  Temp:  [97.9 °F (36.6 °C)-98 °F (36.7 °C)] 97.9 °F (36.6 °C)  Heart Rate:  [83-98] 87  Resp:  [16-18] 17  BP: (112-132)/(71-78) 130/76    Physical Exam:  General: Alert and oriented x 3  Vascular: DP and PT pulses faintly palpable bilaterally.  CFT less than 5 seconds to all digits bilaterally.  Moderate nonpitting edema noted to bilateral feet. Erythema and edema improving.  Musculoskeletal: Mild pain on palpation to the left first submetatarsal surgical site. No pain to the right hallux.  Neuro: Protective sensation diminished bilaterally.  Sensation intact to light touch bilaterally.  Derm: Left foot submetatarsal 1 surgical site with minimal residual purulence, improved from prior.  Wound edges are macerated.  Sutures intact with some reapproximation at this time. Right hallux with sutures intact, no drainage noted.     Labs:  Results from last 7 days   Lab Units 01/19/25  2250   WBC 10*3/mm3 7.45   HEMOGLOBIN g/dL 10.3*   HEMATOCRIT % 34.3*   PLATELETS 10*3/mm3 383     Results from last 7 days   Lab Units 01/19/25  2250   SODIUM mmol/L 138   POTASSIUM mmol/L 4.1   CHLORIDE mmol/L 100   CO2 mmol/L 26.3   BUN mg/dL 17   CREATININE mg/dL 1.02   GLUCOSE mg/dL 280*   CALCIUM mg/dL 9.5     Glucose   Date Value Ref Range Status   01/19/2025 280 (H) 65 - 99 mg/dL Final   01/18/2025 216 (H) 65 - 99 mg/dL Final   01/18/2025 256 (H) 65 - 99 mg/dL  "Final     Results from last 7 days   Lab Units 01/13/25  1226   SED RATE mm/hr 40*     Results from last 7 days   Lab Units 01/13/25  1226   CRP mg/dL 5.29*         No components found for: \"HBA1C\"    Imaging:   Imaging Results (All)       Procedure Component Value Units Date/Time    MRI Foot Right Without Contrast [[id number]] Collected: 01/13/25 2238     Updated: 01/13/25 2250    Narrative:      MRI FOOT RIGHT WO CONTRAST    Date of Exam: 1/13/2025 7:35 PM EST    Indication: Right hallux osteomyelitis.     Comparison: Toe radiographs 1/13/2025    Technique:  Routine multiplanar/multisequence sequence images of the right foot were obtained without contrast administration.      Findings:  Bones: There is marrow edema of the first proximal and distal phalanges with associated loss of normal T1 marrow and cortical signal along the medial aspect of the first interphalangeal joint. No additional areas of abnormal marrow edema. No suspicious   osseous lesions. Small effusion of the first metatarsophalangeal joint. Small to moderate effusion in the first interphalangeal joint.    Soft tissues: Lisfranc ligament is intact. Plantar plates appear intact. Visualized tendons appear grossly intact. Diffuse subcutaneous edema. Atrophy of the intrinsic foot muscles. There is soft tissue wound along the medial aspect of the first toe with   underlying lobulated fluid which may reflect small abscesses. Small mild intermetatarsal bursal fluid in the first, second and third interspaces.      Impression:      Impression:  1.Soft tissue wound along the medial aspect of the first toe with underlying lobulated fluid which may reflect small abscesses.  2.Marrow edema of the first proximal and distal phalanges with associated loss of normal T1 marrow and cortical signal along the medial aspect of the first interphalangeal joint. These findings are suspicious for osteomyelitis. There is also adjacent   small to moderate effusion in the first " interphalangeal joint concerning for septic arthritis.        Electronically Signed: Jacob Kebede MD    1/13/2025 10:48 PM EST    Workstation ID: [id number]    MRI Foot Left Without Contrast [[id number]] Collected: 01/13/25 2212     Updated: 01/13/25 2224    Narrative:      MRI FOOT LEFT WO CONTRAST    Date of Exam: 1/13/2025 7:35 PM EST    Indication: Evaluate abcess and osteomyelitis.     Comparison: One 1325 foot radiographs    Technique:  Routine multiplanar/multisequence sequence images of the left foot were obtained without contrast administration.      Findings:  Bones and joints: Marrow edema and loss of normal T1 cortical and marrow signal with erosions of the first metatarsal head as well as the base of the first proximal phalanx. There is associated lobulated small joint effusion at the first   metatarsophalangeal joint. There is marrow edema seen throughout the remainder of the first metatarsal and the first proximal phalanx. Minimal marrow edema seen along the first distal phalanx. No additional areas of abnormal marrow edema. No suspicious   osseous lesions.    Soft tissues: Soft tissue wound along the plantar medial forefoot just below the first metatarsophalangeal joint. There are associated lobulated fluid seen deep to the wound which may reflect small abscesses. There is involvement of the flexor houses   longus tendon at this location as well with mild associated tenosynovitis. Remaining tendons appear grossly intact. Lisfranc ligament is intact. The plantar plates of the second through fifth digits appear intact. Diffuse subcutaneous edema.      Impression:      Impression:  1.Soft tissue wound along the plantar medial forefoot just below the first metatarsophalangeal joint. There are associated lobulated fluid collections deep to the wound which may reflect small abscesses. There is involvement of the flexor hallucis longus   tendon at this location as well with mild associated  tenosynovitis.  2.Marrow edema and loss of normal T1 cortical and marrow signal with erosions of the first metatarsal head as well as the base of the first proximal phalanx. There is associated lobulated small joint effusion at the first metatarsophalangeal joint. These   findings are concerning for septic arthritis with osteomyelitis of the first metatarsal head and base of the first proximal phalanx. There is reactive marrow edema seen along the remainder of the first proximal phalanx and first metatarsal.        Electronically Signed: Jacob Kebede MD    1/13/2025 10:22 PM EST    Workstation ID: [id number]    XR Foot 3+ View Left [[id number]] Collected: 01/13/25 1239     Updated: 01/13/25 1246    Narrative:      XR FOOT 3+ VW LEFT, XR TOE 2+ VW RIGHT    Date of Exam: 1/13/2025 12:02 PM EST    Indication: Diabetic wound infection involving the left foot in the right great toe.    Comparison: None available.    Findings:  Right great toe: There is a cortical destruction and periostitis involving the distal phalanx, especially along the medial border and also involving the medial aspect of the head of the proximal phalanx. The findings are consistent with osteomyelitis in   the appropriate clinical setting. There is a pathologic fracture secondary to the osteomyelitis involving the medial aspect of the head of the proximal phalanx. There is soft tissue swelling. There is a relatively deep ulcer along the medial aspect of   the right great toe at the site of the bony destructive changes. There are incidental arthritic changes involving the IP joint and first MTP joint.    Left foot: There is cortical destruction, osteolysis, and periosteal reaction involving the distal shaft and head of the first metatarsal bone as well as the adjacent base of the proximal phalanx. There also may be some osteolytic changes in the base of   the distal phalanx of the left great toe. This is consistent with radiographic changes of  osteomyelitis. There is soft tissue swelling involving mainly the left great toe. Shallow ulcers are suggested along the plantar aspect of the medial left forefoot   in the location of the radiographic changes of osteomyelitis. There are minor arthritic changes involving the inner-phalangeal joints of the digits.      Impression:      Impression:  1.Radiographic changes of osteomyelitis involving the distal phalanx of the right great toe with a pathologic fracture secondary to the osteomyelitis involving the medial aspect of the head of the proximal phalanx.  2.Radiographic changes of osteomyelitis involving the distal shaft and head of the left first metatarsal bone as well as the adjacent base of the proximal phalanx. There also may be some osteolytic changes in the base of the distal phalanx of the left   great toe representing osteomyelitis.  3.Soft tissue swelling. There is a relatively deep ulcer along the medial aspect of the right great toe at the site of the bony destructive changes.  4.Soft tissue swelling and shallow ulcers along the medial aspect of the left forefoot at the site of the bony destructive changes.        Electronically Signed: Agapito Rick MD    1/13/2025 12:44 PM EST    Workstation ID: [id number]    XR Toe 2+ View Right [[id number]] Collected: 01/13/25 1239     Updated: 01/13/25 1246    Narrative:      XR FOOT 3+ VW LEFT, XR TOE 2+ VW RIGHT    Date of Exam: 1/13/2025 12:02 PM EST    Indication: Diabetic wound infection involving the left foot in the right great toe.    Comparison: None available.    Findings:  Right great toe: There is a cortical destruction and periostitis involving the distal phalanx, especially along the medial border and also involving the medial aspect of the head of the proximal phalanx. The findings are consistent with osteomyelitis in   the appropriate clinical setting. There is a pathologic fracture secondary to the osteomyelitis involving the medial aspect of the  head of the proximal phalanx. There is soft tissue swelling. There is a relatively deep ulcer along the medial aspect of   the right great toe at the site of the bony destructive changes. There are incidental arthritic changes involving the IP joint and first MTP joint.    Left foot: There is cortical destruction, osteolysis, and periosteal reaction involving the distal shaft and head of the first metatarsal bone as well as the adjacent base of the proximal phalanx. There also may be some osteolytic changes in the base of   the distal phalanx of the left great toe. This is consistent with radiographic changes of osteomyelitis. There is soft tissue swelling involving mainly the left great toe. Shallow ulcers are suggested along the plantar aspect of the medial left forefoot   in the location of the radiographic changes of osteomyelitis. There are minor arthritic changes involving the inner-phalangeal joints of the digits.      Impression:      Impression:  1.Radiographic changes of osteomyelitis involving the distal phalanx of the right great toe with a pathologic fracture secondary to the osteomyelitis involving the medial aspect of the head of the proximal phalanx.  2.Radiographic changes of osteomyelitis involving the distal shaft and head of the left first metatarsal bone as well as the adjacent base of the proximal phalanx. There also may be some osteolytic changes in the base of the distal phalanx of the left   great toe representing osteomyelitis.  3.Soft tissue swelling. There is a relatively deep ulcer along the medial aspect of the right great toe at the site of the bony destructive changes.  4.Soft tissue swelling and shallow ulcers along the medial aspect of the left forefoot at the site of the bony destructive changes.        Electronically Signed: Agapito Rick MD    1/13/2025 12:44 PM EST    Workstation ID: [id number]            Cultures:   E. coli, methicillin resistant Staphylococcus aureus, group B  streptococcus and Proteus mirabilis .     Bacteroides and Anaerococcus      Results Review:     I reviewed the patient's new clinical results.      Assessment & Plan   - POD #3 s/p repeat incision and drainage left foot abscess. POD #6 R hallux and left foot I&D.  - Osteomyelitis, left first metatarsal/hallux  - Abscess, left foot  - Osteomyelitis, right hallux    Plan:  - Patient was examined and evaluated at bedside; vital signs stable at this time.  WBC 7.45 yesterday.  - Surgical site to the left foot with residual purulence, improved from prior; sutures intact at this time.  Redressed with Betadine dressing. Recommend Left foot dressing change daily.   - X-ray and MRI confirm osteomyelitis of the right hallux as well as left hallux proximal phalanx and first metatarsal head.  Patient continues to refuse surgical intervention in the form of amputation at this time.  Patient aware of the risks with delaying/refusing surgery.  Patient is at high risk for limb loss. Discussion held today regarding worsening signs of infection and to present to the ED if these happen. He understands the risks being limb/life threatening.   - ID recommendations appreciated; plan for 6 weeks of IV antibiotics due to patient's refusal to have amputation. Currently Vanc/Cefipime/flagyl.   - Patient to remain nonweightbearing to the left lower extremity, WBAT to the right lower extremity in the surgical shoe.  - No future plans for OR at this time.   - OK to d/c from podiatry perspective. Patient awaiting bed for SNF placement.     Podiatry discharge recs:  - WB status: NWB LLE, WBAT RLE with surgical shoe.   - Dressings: Right foot - adaptic, gauze, kerlix, ACE every other day. Left foot - betadine, gauze, ABD pad, Kerlix, ACE - every day.   - Abx: per ID recommendation.   - Follow-up: Outpatient follow up 1/27/25    Podiatry will continue to follow while in-house      Susan Garrison DPM  01/20/25  09:16 EST    I spent a total of 60  minutes on this patient encounter including preparation, review of medical, social, surgical hx, medications, labs, xrays review and interpretation, face to face care, evaluation, treatment, counseling, education, consultation with another provider, documentation, and answering patient questions regarding the plan noted above.

## 2025-01-20 NOTE — PLAN OF CARE
"Goal Outcome Evaluation:      Assessment: Tomas Song presents with ADL impairments affecting function including coordination, endurance / activity tolerance, pain, and strength. Demonstrated functioning below baseline abilities indicate the need for continued skilled intervention while inpatient. Pt generally requires CGA/min A for functional mobility and standing balance (supported and unsupported). Maintains Wbing precautions well and demonstrates fair to good endurance/activity tolerance. Min/mod A for LB dressing. Tolerating session today without incident. Will continue to follow and progress as tolerated.     Plan/Recommendations:   Moderate Intensity Therapy recommended post-acute care. This is recommended as therapy feels the patient would require 3-4 days per week and wouldn't tolerate \"3 hour daily\" rehab intensity. SNF would be the preferred choice. If the patient does not agree to SNF, arrange HH or OP depending on home bound status. If patient is medically complex, consider LTACH..     Pt desires Skilled Rehab placement at discharge. Pt cooperative; agreeable to therapeutic recommendations and plan of care.   "

## 2025-01-20 NOTE — THERAPY TREATMENT NOTE
Subjective: Pt agreeable to therapeutic plan of care.  Cognition: oriented to Person, Place, Time, and Situation, arousal/alertness: Alert, Attentive, and Inquisitive, safety/judgement: good, and awareness of deficits: good awareness of safety precautions and good awareness of deficits    Objective:     Precautions - weight-bearing restriction, WBAT RLE, NWB LLE    Bed Mobility: SBA and CGA   Functional Transfers: SBA and CGA     Balance: unsupported and standing CGA and Min-A  Functional Ambulation: CGA, with rolling walker, and utilizing compensatory strategy    Pt up with nursing going to the bathroom when OT arrived. Pt able to complete functional mobility while maintaining Wbing restrictions and using FWW with CGA. Completed toileting followed by g/h standing at sink with CGA to min A for unsupport standing balance while completing oral care and washing face.     Toileting: SBA and CGA  ADL Position: unsupported sitting  ADL Comments: Completed toileting from seated on commode with SBA/CGA and good safety awareness.     Grooming: CGA and Min-A  ADL Position: unsupported standing and at sink  ADL Comments: CGA/[place] for unsupport standing with pt completing hygiene with set up A    Vitals: WNL    Pain: 1 VAS  Location: foot/ankle  Interventions for pain: Repositioned, Increased Activity, and Therapeutic Presence  Education: Provided education on the importance of mobility in the acute care setting, Verbal/Tactile Cues, and ADL training      Assessment: Tomas Song presents with ADL impairments affecting function including coordination, endurance / activity tolerance, pain, and strength. Demonstrated functioning below baseline abilities indicate the need for continued skilled intervention while inpatient. Pt generally requires CGA/min A for functional mobility and standing balance (supported and unsupported). Maintains Wbing precautions well and demonstrates fair to good endurance/activity tolerance.  "Min/mod A for LB dressing. Tolerating session today without incident. Will continue to follow and progress as tolerated.     Plan/Recommendations:   Moderate Intensity Therapy recommended post-acute care. This is recommended as therapy feels the patient would require 3-4 days per week and wouldn't tolerate \"3 hour daily\" rehab intensity. SNF would be the preferred choice. If the patient does not agree to SNF, arrange HH or OP depending on home bound status. If patient is medically complex, consider LTACH..     Pt desires Skilled Rehab placement at discharge. Pt cooperative; agreeable to therapeutic recommendations and plan of care.     Modified [place]: N/A = No pre-op stroke/TIA    Post-Tx Position: Call light and personal items within reach sitting EOB  PPE: gloves and surgical mask    Therapy Charges for Today       Code Description Service Date Service Provider Modifiers Qty    [id number] HC OT SELF CARE/MGMT/TRAIN EA 15 MIN 1/20/2025 Edilma Singh OT GO 1           Time Calculation- OT       Row Name 01/20/25 1536             Time Calculation- OT    OT Start Time 1454  -KT      OT Stop Time 1507  -KT      OT Time Calculation (min) 13 min  -KT      Total Timed Code Minutes- OT 13 minute(s)  -KT      OT Received On 01/20/25  -KT      OT - Next Appointment 01/22/25  -MIGNON                User Key  (r) = Recorded By, (t) = Taken By, (c) = Cosigned By      Initials Name Provider Type    Edilma Mcnally OT Occupational Therapist                    "

## 2025-01-20 NOTE — PROGRESS NOTES
"    [facility] MEDICINE SERVICE  DAILY PROGRESS NOTE    NAME: Tomas Song  : 1980  MRN: [id number]      LOS: 7 days     PROVIDER OF SERVICE: Antoinette Mednia MD    Chief Complaint: Osteomyelitis of both feet    Subjective:     History of Present Illness: Tomas Song is a 44 y.o. male with a CMH of type 2 diabetes mellitus, HTN, HLD, ADHD, opioid use disorder (on Subutex) who presented to [facility] on 2025 with left foot pain.  Patient reports approximately 3 months ago he \"ripped off callus\" on the left foot.  However he reports that approximately 3 weeks ago, he started to notice pain, erythema, purulent drainage and foul odor to left foot.  He reports constant pain to the left foot that is exacerbated with weightbearing with intermittent sharp spasms.  Patient also reports remote accidental injury with sledgehammer to left foot.  Patient also reports wound to right great toe but does not recall how that occurred.  Patient also endorses chills but otherwise denies fever, nausea, vomiting.  Patient reports blood glucose has been controlled and is compliant with insulin/diet.     On ED evaluation, patient was noted to be slightly tachycardic with heart rate of 109, otherwise HDS, afebrile.  Labs are pertinent for WBC 5.9, hemoglobin 9.7, CRP 5.2, ESR 40.  A1c was noted to be 9.1.  Wound cultures obtained in ED, pending.  X-ray of left foot revealed osteomyelitis involving distal shaft and head of the left first metatarsal bone as well as adjacent base of proximal phalanx.  X-ray of right foot revealed changes of osteomyelitis involving the distal phalanx of the right great toe with pathological fracture secondary to osteomyelitis of the limiting medial aspect of the head of the proximal phalanx.  Patient started on vancomycin in ED.  Hospital service to admit for further evaluation management.     Interval History:      -Patient seen and evaluated at bedside. No " complaints this morning. Pain controlled. Patient not amenable to amputation at this time but with plans for I&D in OR this afternoon.   1/15 patient seen and examined in bed no acute distress, vital signs stable, discussed with RN.  Awaiting cultures.  1/16/25 patient seen and examined.  No acute distress, no new complaints, for surgery in a.m.  Cough tolerating antibiotics, discussed with RN.  pt will need less  than 30 days in SNF   1/17 seen in bed NAD, no new complaints, DW RN vss  1/18 seen in bed NAD, No new complaints, tolerating abx, DW RN Awaiting placement.  1/19 seen and examined, in bed no acute  distress,  pain well controled, no new complaints, vss,  1/20: Denies any new complaints    Plan: From home alone. Referral to [facility] skilled pending acceptance. Will need precert and PASRR .     Treatment plan discussed with patient. All questions addressed.     Review of Systems:   Denies fevers, chills  Denies chest pain, edema  Denies shortness of breath, cough  Denies nausea, vomiting, diarrhea  Denies dysuria, hematuria    Objective:     Vital Signs  Temp:  [97.9 °F (36.6 °C)] 97.9 °F (36.6 °C)  Heart Rate:  [84-98] 84  Resp:  [10-17] 10  BP: (112-139)/(71-79) 139/79   Body mass index is 27.76 kg/m².    Physical Exam   General: No acute distress  Neuro: AAOx3, no FND  CV: RRR, no peripheral edema  Pulm: CTAB, no increased work of breathing  Abd: Soft, nontender, nondistended  Skin: Bilateral foot wounds with minimal residual purulence from left fourth submetatarsal surgical side, wound edges are macerated and sutures are intact.  Psych: Appropriate mood and affect    Scheduled Meds   amLODIPine, 10 mg, Oral, Daily  buprenorphine, 8 mg, Sublingual, TID  cefTRIAXone, 2,000 mg, Intravenous, Q24H  enoxaparin, 40 mg, Subcutaneous, Q24H  gabapentin, 1,200 mg, Oral, Q12H  hydroCHLOROthiazide, 25 mg, Oral, Daily  insulin glargine, 30 Units, Subcutaneous, BID  insulin lispro, 2-9 Units, Subcutaneous, 4x  Daily AC & at Bedtime  losartan, 100 mg, Oral, Daily  metroNIDAZOLE, 500 mg, Oral, Q8H  vancomycin, 1,000 mg, Intravenous, Q8H  vitamin D3, 5,000 Units, Oral, Daily       PRN Meds     acetaminophen **OR** acetaminophen **OR** acetaminophen    senna-docusate sodium **AND** polyethylene glycol **AND** bisacodyl **AND** bisacodyl    Calcium Replacement - Follow Nurse / BPA Driven Protocol    dextrose    dextrose    flumazenil    glucagon (human recombinant)    Magnesium Standard Dose Replacement - Follow Nurse / BPA Driven Protocol    melatonin    naloxone    ondansetron ODT **OR** ondansetron    oxyCODONE    oxyCODONE    oxyCODONE    Pharmacy to dose vancomycin    Phosphorus Replacement - Follow Nurse / BPA Driven Protocol    Potassium Replacement - Follow Nurse / BPA Driven Protocol   Infusions  Pharmacy to dose vancomycin,           Diagnostic Data    Results from last 7 days   Lab Units 01/19/25  2250   WBC 10*3/mm3 7.45   HEMOGLOBIN g/dL 10.3*   HEMATOCRIT % 34.3*   PLATELETS 10*3/mm3 383   GLUCOSE mg/dL 280*   CREATININE mg/dL 1.02   BUN mg/dL 17   SODIUM mmol/L 138   POTASSIUM mmol/L 4.1   AST (SGOT) U/L 22   ALT (SGPT) U/L 40   ALK PHOS U/L 100   BILIRUBIN mg/dL 0.2   ANION GAP mmol/L 11.7       No radiology results for the last day    Interval results reviewed.  Scheduled Meds:amLODIPine, 10 mg, Oral, Daily  buprenorphine, 8 mg, Sublingual, TID  cefTRIAXone, 2,000 mg, Intravenous, Q24H  enoxaparin, 40 mg, Subcutaneous, Q24H  gabapentin, 1,200 mg, Oral, Q12H  hydroCHLOROthiazide, 25 mg, Oral, Daily  insulin glargine, 30 Units, Subcutaneous, BID  insulin lispro, 2-9 Units, Subcutaneous, 4x Daily AC & at Bedtime  losartan, 100 mg, Oral, Daily  metroNIDAZOLE, 500 mg, Oral, Q8H  vancomycin, 1,000 mg, Intravenous, Q8H  vitamin D3, 5,000 Units, Oral, Daily      Continuous Infusions:Pharmacy to dose vancomycin,       PRN Meds:.  acetaminophen **OR** acetaminophen **OR** acetaminophen    senna-docusate sodium **AND**  polyethylene glycol **AND** bisacodyl **AND** bisacodyl    Calcium Replacement - Follow Nurse / BPA Driven Protocol    dextrose    dextrose    flumazenil    glucagon (human recombinant)    Magnesium Standard Dose Replacement - Follow Nurse / BPA Driven Protocol    melatonin    naloxone    ondansetron ODT **OR** ondansetron    oxyCODONE    oxyCODONE    oxyCODONE    Pharmacy to dose vancomycin    Phosphorus Replacement - Follow Nurse / BPA Driven Protocol    Potassium Replacement - Follow Nurse / BPA Driven Protocol   Assessment/Plan:     Osteomyelitis of right great toe  Osteomyelitis of left foot  Left foot diabetic ulcer  - Podiatry consulted, appreciate recs  - ID consulted, appreciate recs-Patient will need 6 weeks of IV antibiotics from surgery on 1/17/2025-last day on 2/27/25   - Continue vancomycin, rocephin  -Continue p.o. Flagyl 500 mg every 8 hours for 6 weeks  - Analgesia, avoid opiates as able  - Wound care consulted  - s/p I&D   - Recommendations for amputation; however, patient not amenable at this time and would like to discuss alternative options  -No further intervention planned by podiatry  -Podiatry discharge instructions are added to DI.    Type 2 diabetes mellitus  - Continue lantus  - SSI, hypoglycemia protocol, CCD  - Plan for diabetic educator prior to discharge    Anemia  - No overt s/sx bleeding  - In setting of acute infection  - Repeat CBC in AM    Hypertension  - Continue amlodipine, HCTZ, losartan    Hyperlipidemia  - Continue fenofibrate    Opioid use disorder  - Continue subutex    ADHD  - Holding home meds during admission    Treatment plan discussed with nursing staff.     VTE Prophylaxis:  Pharmacologic VTE prophylaxis orders are present.    Holding pharmacological ppx given surgical plans; not amendable to SCDs given bilateral lower extremity wounds.     Code status is   Code Status and Medical Interventions: CPR (Attempt to Resuscitate); Full Support   Ordered at: 01/13/25 5633      Code Status (Patient has no pulse and is not breathing):    CPR (Attempt to Resuscitate)     Medical Interventions (Patient has pulse or is breathing):    Full Support       Plan for disposition: Rehab 1/21/25 pending pre-CERT    Barriers to discharge: Pending safe disposition, skilled rehab placement    Time: 35+ minutes     Signature: Electronically signed by Antoinette Medina MD, 01/20/25, 14:12 EST.  [facility] Hospitalist Team

## 2025-01-20 NOTE — PLAN OF CARE
Goal Outcome Evaluation:           Progress: improving          Pt VSS, able to make needs known, working well with PT, dressing changed per podiatry, patient in no pain at the moment, awaiting placement. Plan of care ongoing.

## 2025-01-20 NOTE — THERAPY TREATMENT NOTE
"Subjective: Pt agreeable to therapeutic plan of care.    Objective:     Precautions - LLE NWB; RLE WBAT w/ sx shoe    Bed mobility - Independent  Transfers - Supervision  Ambulation - 40 feet Supervision and with rolling walker      Education: Provided education on the importance of mobility in the acute care setting, Verbal/Tactile Cues, Transfer Training, Gait Training, Energy conservation strategies, and Post-Op Precautions    Assessment: Tomas Song presents with functional mobility impairments which indicate the need for skilled intervention. Tolerating session today without incident. Will continue to follow and progress as tolerated.     Plan/Recommendations:   If medically appropriate, Moderate Intensity Therapy recommended post-acute care. This is recommended as therapy feels the patient would require 3-4 days per week and wouldn't tolerate \"3 hour daily\" rehab intensity. SNF would be the preferred choice. If the patient does not agree to SNF, arrange HH or OP depending on home bound status. If patient is medically complex, consider LTACH. Pt requires no DME at discharge.     Pt desires Skilled Rehab placement at discharge. Pt cooperative; agreeable to therapeutic recommendations and plan of care.         Basic Mobility 6-click:  Rollin = Total, A lot = 2, A little = 3; 4 = None  Supine>Sit:   1 = Total, A lot = 2, A little = 3; 4 = None   Sit>Stand with arms:  1 = Total, A lot = 2, A little = 3; 4 = None  Bed>Chair:   1 = Total, A lot = 2, A little = 3; 4 = None  Ambulate in room:  1 = Total, A lot = 2, A little = 3; 4 = None  3-5 Steps with railin = Total, A lot = 2, A little = 3; 4 = None  Score: 18    Modified [place]: N/A = No pre-op stroke/TIA    Post-Tx Position: Up in Chair and Call light and personal items within reach  PPE: gloves and gown    Therapy Charges for Today       Code Description Service Date Service Provider Modifiers Qty    74225616743 HC GAIT TRAINING EA 15 MIN " 1/20/2025 Leigh Pulido PTA GP 1    41205900104 HC PT THERAPEUTIC ACT EA 15 MIN 1/20/2025 Leigh Pulido PTA GP 1           PT Charges       Row Name 01/20/25 1208             Time Calculation    Start Time 1150  -CC      Stop Time 1208  -CC      Time Calculation (min) 18 min  -CC      PT Received On 01/20/25  -CC      PT - Next Appointment 01/21/25  -CC         Time Calculation- PT    Total Timed Code Minutes- PT 18 minute(s)  -CC                User Key  (r) = Recorded By, (t) = Taken By, (c) = Cosigned By      Initials Name Provider Type    CC Leigh Pulido PTA Physical Therapist Assistant

## 2025-01-20 NOTE — DISCHARGE INSTRUCTIONS
Podiatry discharge recs:  - WB status: NWB LLE, WBAT RLE with surgical shoe.   - Dressings: Right foot - adaptic, gauze, kerlix, ACE every other day. Left foot - betadine, gauze, ABD pad, Kerlix, ACE - every day.   - Abx: per ID recommendation.   - Follow-up: Outpatient follow up 1/27/25

## 2025-01-20 NOTE — PROGRESS NOTES
Infectious Diseases Progress Note      LOS: 7 days   Patient Care Team:  Ty Gao DO as PCP - General (Internal Medicine)    Chief Complaint: Left foot wound    Subjective       The patient has been afebrile for the last 24 hours.  The patient is on room air, hemodynamically stable, and is tolerating antimicrobial therapy.  No new complaints.     Review of Systems:   Review of Systems   Constitutional: Negative.    HENT: Negative.     Eyes: Negative.    Respiratory: Negative.     Cardiovascular: Negative.    Gastrointestinal: Negative.    Endocrine: Negative.    Genitourinary: Negative.    Musculoskeletal: Negative.    Skin:  Positive for wound.   Neurological: Negative.    Psychiatric/Behavioral: Negative.     All other systems reviewed and are negative.       Objective     Vital Signs  Temp:  [97.9 °F (36.6 °C)] 97.9 °F (36.6 °C)  Heart Rate:  [84-98] 84  Resp:  [10-17] 10  BP: (112-139)/(71-79) 139/79    Physical Exam:  Physical Exam  Vitals and nursing note reviewed.   Constitutional:       General: He is not in acute distress.     Appearance: Normal appearance. He is well-developed and normal weight. He is not diaphoretic.   HENT:      Head: Normocephalic and atraumatic.   Eyes:      Conjunctiva/sclera: Conjunctivae normal.      Pupils: Pupils are equal, round, and reactive to light.   Cardiovascular:      Rate and Rhythm: Normal rate and regular rhythm.      Heart sounds: Normal heart sounds, S1 normal and S2 normal.   Pulmonary:      Effort: Pulmonary effort is normal. No respiratory distress.      Breath sounds: Normal breath sounds. No stridor. No wheezing or rales.   Abdominal:      General: Bowel sounds are normal. There is no distension.      Palpations: Abdomen is soft. There is no mass.      Tenderness: There is no abdominal tenderness. There is no guarding.   Musculoskeletal:         General: No deformity. Normal range of motion.      Cervical back: Neck supple.   Skin:     General: Skin  is warm and dry.      Coloration: Skin is not pale.      Findings: No erythema or rash.      Comments: Dressings on both feet   Neurological:      Mental Status: He is alert and oriented to person, place, and time.      Cranial Nerves: No cranial nerve deficit.   Psychiatric:         Mood and Affect: Mood normal.          Results Review:    I have reviewed all clinical data, test, lab, and imaging results.     Radiology  No Radiology Exams Resulted Within Past 24 Hours    Cardiology    Laboratory    Results from last 7 days   Lab Units 01/19/25 2250 01/18/25 2314 01/18/25 0520 01/17/25  0537 01/16/25  0004 01/15/25  0024 01/14/25  0418   WBC 10*3/mm3 7.45 6.77 11.01* 6.76 7.84 9.93 5.51   HEMOGLOBIN g/dL 10.3* 10.3* 10.5* 10.2* 9.6* 9.7* 9.4*   HEMATOCRIT % 34.3* 34.7* 34.4* 33.1* 31.5* 31.6* 30.0*   PLATELETS 10*3/mm3 383 433 381 396 402 393 353     Results from last 7 days   Lab Units 01/19/25 2250 01/18/25 2314 01/18/25 0520 01/17/25  0537 01/16/25  0004 01/15/25  0024 01/14/25  0418   SODIUM mmol/L 138 143 141 138 134* 136 137   POTASSIUM mmol/L 4.1 3.8 4.1 4.1 3.7 4.7 4.2   CHLORIDE mmol/L 100 104 102 100 98 100 104   CO2 mmol/L 26.3 28.2 26.4 29.7* 29.5* 27.9 25.0   BUN mg/dL 17 19 23* 14 16 14 11   CREATININE mg/dL 1.02 0.96 0.89 0.76 0.81 0.83 0.69*   GLUCOSE mg/dL 280* 216* 256* 280* 248* 312* 186*   ALBUMIN g/dL 3.6  --   --   --   --   --   --    BILIRUBIN mg/dL 0.2  --   --   --   --   --   --    ALK PHOS U/L 100  --   --   --   --   --   --    AST (SGOT) U/L 22  --   --   --   --   --   --    ALT (SGPT) U/L 40  --   --   --   --   --   --    CALCIUM mg/dL 9.5 9.5 9.5 9.6 9.2 9.5 9.0     Results from last 7 days   Lab Units 01/18/25  0520   CK TOTAL U/L 25               Microbiology   Microbiology Results (last 10 days)       Procedure Component Value - Date/Time    Anaerobic Culture - Bone, Foot, Left [[id number]]  (Abnormal) Collected: 01/14/25 7718    Lab Status: Final result Specimen: Bone from  Foot, Left Updated: 01/19/25 1405     Anaerobic Culture Bacteroides pyogenes      Prevotella denticola    Tissue / Bone Culture - Bone, Foot, Left [[id number]]  (Abnormal) Collected: 01/14/25 1651    Lab Status: Final result Specimen: Bone from Foot, Left Updated: 01/17/25 0813     Tissue Culture Scant growth (1+) Escherichia coli      Rare growth Proteus mirabilis      Scant growth (1+) Streptococcus agalactiae (Group B)      Staphylococcus aureus, MRSA     Comment:   Methicillin resistant Staphylococcus aureus, Patient may be an isolation risk.        Gram Stain Rare (1+) WBCs per low power field      Rare (1+) Gram positive cocci in pairs    Narrative:      Refer to previous wound culture collected on 01/13/2025 1203 for MICs      Anaerobic Culture - Tissue, Foot, Left [[id number]]  (Abnormal) Collected: 01/14/25 1645    Lab Status: Final result Specimen: Tissue from Foot, Left Updated: 01/19/25 1414     Anaerobic Culture Bacteroides pyogenes      Anaerococcus prevotii    Tissue / Bone Culture - Tissue, Foot, Left [[id number]]  (Abnormal) Collected: 01/14/25 1645    Lab Status: Final result Specimen: Tissue from Foot, Left Updated: 01/17/25 0812     Tissue Culture Scant growth (1+) Escherichia coli      Rare growth Proteus mirabilis     Gram Stain Moderate (3+) WBCs per low power field      Moderate (3+) Gram negative bacilli      Moderate (3+) Gram positive cocci    Narrative:      Refer to previous wound culture collected on 01/13/2025 1203 for MICs      MRSA Screen, PCR (Inpatient) - Swab, Nares [[id number]]  (Abnormal) Collected: 01/13/25 1422    Lab Status: Final result Specimen: Swab from Nares Updated: 01/13/25 1547     MRSA PCR MRSA Detected    Narrative:      The negative predictive value of this diagnostic test is high and should only be used to consider de-escalating anti-MRSA therapy. A positive result may indicate colonization with MRSA and must be correlated clinically.    Blood Culture - Blood, Arm,  Right [[id number]]  (Normal) Collected: 01/13/25 1226    Lab Status: Final result Specimen: Blood from Arm, Right Updated: 01/18/25 1245     Blood Culture No growth at 5 days    Wound Culture - Wound, Foot, Left [[id number]]  (Abnormal)  (Susceptibility) Collected: 01/13/25 1203    Lab Status: Edited Result - FINAL Specimen: Wound from Foot, Left Updated: 01/17/25 0811     Wound Culture Light growth (2+) Escherichia coli      Light growth (2+) Streptococcus agalactiae (Group B)     Comment:   This organism is considered to be universally susceptible to penicillin.  No further antibiotic testing will be performed. If Clindamycin or Erythromycin is the drug of choice, notify the laboratory within 7 days to request susceptibility testing.         Rare growth Proteus mirabilis      Scant growth (1+) Staphylococcus aureus, MRSA     Comment:   Considering site of infection and appropriate dosing, oxacillin (or cefoxitin) results can be applied to cefazolin, nafcillin, ampicillin/sulbactam, dicloxacillin, amoxicillin/clavulanate, cephalexin, and cefpodoxime.        Gram Stain Many (4+) WBCs per low power field      Moderate (3+) Gram positive cocci in pairs, chains and clusters      Few (2+) Gram negative bacilli      Few (2+) Gram positive bacilli    Susceptibility        Escherichia coli      JOE      Amikacin Susceptible      Amoxicillin + Clavulanate Susceptible      Ampicillin Resistant      Ampicillin + Sulbactam Resistant      Cefazolin (Non Urine) Intermediate      Cefepime Susceptible      Ceftazidime Susceptible      Ceftriaxone Susceptible      Gentamicin Resistant      Levofloxacin Susceptible      Piperacillin + Tazobactam Susceptible      Tetracycline Resistant      Tobramycin Intermediate      Trimethoprim + Sulfamethoxazole Resistant                       Susceptibility        Proteus mirabilis      JOE      Amoxicillin + Clavulanate Intermediate      Ampicillin Resistant      Ampicillin + Sulbactam  Susceptible      Cefazolin (Non Urine) Intermediate      Cefepime Susceptible      Ceftazidime Susceptible      Ceftriaxone Susceptible      Gentamicin Susceptible      Levofloxacin Susceptible      Piperacillin + Tazobactam Susceptible      Tetracycline Resistant      Trimethoprim + Sulfamethoxazole Susceptible                       Susceptibility        Staphylococcus aureus, MRSA      JOE      Clindamycin Susceptible      Erythromycin Susceptible      Oxacillin Resistant      Rifampin Susceptible      Tetracycline Susceptible      Trimethoprim + Sulfamethoxazole Susceptible      Vancomycin Susceptible                       Susceptibility Comments       Escherichia coli    With the exception of urinary-sourced infections, aminoglycosides should not be used as monotherapy.      Proteus mirabilis    With the exception of urinary-sourced infections, aminoglycosides should not be used as monotherapy.               Blood Culture - Blood, Arm, Right [[id number]]  (Normal) Collected: 01/13/25 1201    Lab Status: Final result Specimen: Blood from Arm, Right Updated: 01/18/25 1216     Blood Culture No growth at 5 days            Medication Review:       Schedule Meds  amLODIPine, 10 mg, Oral, Daily  buprenorphine, 8 mg, Sublingual, TID  cefTRIAXone, 2,000 mg, Intravenous, Q24H  enoxaparin, 40 mg, Subcutaneous, Q24H  gabapentin, 1,200 mg, Oral, Q12H  hydroCHLOROthiazide, 25 mg, Oral, Daily  insulin glargine, 30 Units, Subcutaneous, BID  insulin lispro, 2-9 Units, Subcutaneous, 4x Daily AC & at Bedtime  losartan, 100 mg, Oral, Daily  metroNIDAZOLE, 500 mg, Oral, Q8H  vancomycin, 1,000 mg, Intravenous, Q8H  vitamin D3, 5,000 Units, Oral, Daily        Infusion Meds  Pharmacy to dose vancomycin,         PRN Meds    acetaminophen **OR** acetaminophen **OR** acetaminophen    senna-docusate sodium **AND** polyethylene glycol **AND** bisacodyl **AND** bisacodyl    Calcium Replacement - Follow Nurse / BPA Driven Protocol     dextrose    dextrose    flumazenil    glucagon (human recombinant)    Magnesium Standard Dose Replacement - Follow Nurse / BPA Driven Protocol    melatonin    naloxone    ondansetron ODT **OR** ondansetron    oxyCODONE    oxyCODONE    oxyCODONE    Pharmacy to dose vancomycin    Phosphorus Replacement - Follow Nurse / BPA Driven Protocol    Potassium Replacement - Follow Nurse / BPA Driven Protocol        Assessment & Plan       Antimicrobial Therapy   1.  P.o. Flagyl        2.  IV ceftriaxone        3.        4.        5.          Assessment     Left diabetic foot with extensive infection in the left first MTP joint.  MRI showed abscess, tenosynovitis and osteomyelitis of the first metatarsal head.  Initial culture growing E. coli, methicillin resistant Staphylococcus aureus, group B streptococcus and Proteus mirabilis.  Patient status post incision and drainage with bone biopsy on 1/14/2024.  Purulent drainage noted and interoperative cultures growing E. coli, Proteus mirabilis and methicillin resistant Staphylococcus aureus.  Bone biopsy was positive for osteomyelitis.  Status post second incision and drainage to the left foot on 1/17/2024-purulence still found.  Anaerobic cultures are growing Bacteroides pyogenes and Anaerococcus prevotii, and Provetella denticola   Patient continued to refuse partial ray amputation     Right diabetic foot with osteomyelitis involving the right large toe.  Status post incision and drainage with bone biopsy on 1/15/2025.  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  Patient notes to remote drug use but denies IV drug abuse..  HIV screen was negative.  Drug screen positive for THC, methamphetamines, amphetamines benzodiazepines and buprenorphine.  Of note patient does have Adderall on his home medication left    Hepatitis C infection.  Diagnosed this admission.  Hepatitis C  RNA was undetectable       Plan      Continue V vancomycin-ask pharmacy to monitor and dose  Continue IV Rocephin 2 g every 24 hours  Patient will need 6 weeks of IV antibiotics from surgery on 1/17/2025-last day on 2/27/25  Continue p.o. Flagyl 500 mg every 8 hours for 6 weeks  Continue supportive care  Okay to discharge from Infectious Disease standpoint    Please fax all post discharge lab results, imaging studies and correspondence to this fax number [phone]  For any question or concern please contact our service number [phone]    Would recommend rehab for patient for IV antibiotics and wound care  Patient is a high risk for limb loss  Patient will need to get his blood sugars under control to heal this infection and avoid future infections    Latonya Marie, JESSICA  01/20/25  14:01 EST    Note is dictated utilizing voice recognition software/Dragon

## 2025-01-20 NOTE — PLAN OF CARE
Goal Outcome Evaluation:      A&OX4. Pain treated per MAR. Ambulates independently.Call light in reach. Family at bedside. Plan of care on going.

## 2025-01-20 NOTE — TREATMENT PLAN
Objective:     Precautions - LLE NWB; RLE WBAT w/ sx shoe    Bed mobility - Independent  Transfers - Supervision  Ambulation - 40 feet Supervision and with rolling walker      Assessment: Tomas Song presents with functional mobility impairments which indicate the need for skilled intervention. Tolerating session today without incident. Will continue to follow and progress as tolerated.

## 2025-01-21 LAB
ANION GAP SERPL CALCULATED.3IONS-SCNC: 7.8 MMOL/L (ref 5–15)
BUN SERPL-MCNC: 29 MG/DL (ref 6–20)
BUN/CREAT SERPL: 26.4 (ref 7–25)
CALCIUM SPEC-SCNC: 9.3 MG/DL (ref 8.6–10.5)
CHLORIDE SERPL-SCNC: 102 MMOL/L (ref 98–107)
CO2 SERPL-SCNC: 30.2 MMOL/L (ref 22–29)
CREAT SERPL-MCNC: 1.1 MG/DL (ref 0.76–1.27)
EGFRCR SERPLBLD CKD-EPI 2021: 83.8 ML/MIN/1.73
GLUCOSE BLDC GLUCOMTR-MCNC: 196 MG/DL (ref 70–105)
GLUCOSE BLDC GLUCOMTR-MCNC: 223 MG/DL (ref 70–105)
GLUCOSE BLDC GLUCOMTR-MCNC: 241 MG/DL (ref 70–105)
GLUCOSE BLDC GLUCOMTR-MCNC: 274 MG/DL (ref 70–105)
GLUCOSE SERPL-MCNC: 257 MG/DL (ref 65–99)
POTASSIUM SERPL-SCNC: 4.7 MMOL/L (ref 3.5–5.2)
SODIUM SERPL-SCNC: 140 MMOL/L (ref 136–145)

## 2025-01-21 PROCEDURE — 82565 ASSAY OF CREATININE: CPT | Performed by: STUDENT IN AN ORGANIZED HEALTH CARE EDUCATION/TRAINING PROGRAM

## 2025-01-21 PROCEDURE — 25010000002 ENOXAPARIN PER 10 MG: Performed by: STUDENT IN AN ORGANIZED HEALTH CARE EDUCATION/TRAINING PROGRAM

## 2025-01-21 PROCEDURE — 97116 GAIT TRAINING THERAPY: CPT

## 2025-01-21 PROCEDURE — 97110 THERAPEUTIC EXERCISES: CPT

## 2025-01-21 PROCEDURE — 63710000001 INSULIN GLARGINE PER 5 UNITS

## 2025-01-21 PROCEDURE — 63710000001 INSULIN LISPRO (HUMAN) PER 5 UNITS: Performed by: STUDENT IN AN ORGANIZED HEALTH CARE EDUCATION/TRAINING PROGRAM

## 2025-01-21 PROCEDURE — 82948 REAGENT STRIP/BLOOD GLUCOSE: CPT

## 2025-01-21 PROCEDURE — 63710000001 INSULIN LISPRO (HUMAN) PER 5 UNITS

## 2025-01-21 PROCEDURE — 25010000002 VANCOMYCIN 1 G RECONSTITUTED SOLUTION 1 EACH VIAL: Performed by: NURSE PRACTITIONER

## 2025-01-21 PROCEDURE — 25010000002 CEFTRIAXONE PER 250 MG: Performed by: NURSE PRACTITIONER

## 2025-01-21 PROCEDURE — 25810000003 SODIUM CHLORIDE 0.9 % SOLUTION 250 ML FLEX CONT: Performed by: NURSE PRACTITIONER

## 2025-01-21 PROCEDURE — 97530 THERAPEUTIC ACTIVITIES: CPT

## 2025-01-21 PROCEDURE — 63710000001 INSULIN GLARGINE PER 5 UNITS: Performed by: STUDENT IN AN ORGANIZED HEALTH CARE EDUCATION/TRAINING PROGRAM

## 2025-01-21 RX ORDER — INSULIN LISPRO 100 [IU]/ML
4 INJECTION, SOLUTION INTRAVENOUS; SUBCUTANEOUS
Status: DISCONTINUED | OUTPATIENT
Start: 2025-01-21 | End: 2025-01-22

## 2025-01-21 RX ORDER — INSULIN GLARGINE 100 [IU]/ML
35 INJECTION, SOLUTION SUBCUTANEOUS 2 TIMES DAILY
Status: DISCONTINUED | OUTPATIENT
Start: 2025-01-21 | End: 2025-01-22

## 2025-01-21 RX ADMIN — INSULIN GLARGINE 30 UNITS: 100 INJECTION, SOLUTION SUBCUTANEOUS at 08:40

## 2025-01-21 RX ADMIN — ACETAMINOPHEN 650 MG: 325 TABLET, FILM COATED ORAL at 12:48

## 2025-01-21 RX ADMIN — HYDROCHLOROTHIAZIDE 25 MG: 25 TABLET ORAL at 08:39

## 2025-01-21 RX ADMIN — METRONIDAZOLE 500 MG: 500 TABLET ORAL at 05:39

## 2025-01-21 RX ADMIN — BUPRENORPHINE HCL 8 MG: 8 TABLET SUBLINGUAL at 08:39

## 2025-01-21 RX ADMIN — AMLODIPINE BESYLATE 10 MG: 5 TABLET ORAL at 08:39

## 2025-01-21 RX ADMIN — ENOXAPARIN SODIUM 40 MG: 100 INJECTION SUBCUTANEOUS at 16:40

## 2025-01-21 RX ADMIN — INSULIN LISPRO 4 UNITS: 100 INJECTION, SOLUTION INTRAVENOUS; SUBCUTANEOUS at 08:40

## 2025-01-21 RX ADMIN — GABAPENTIN 1200 MG: 400 CAPSULE ORAL at 20:56

## 2025-01-21 RX ADMIN — OXYCODONE HYDROCHLORIDE 5 MG: 5 TABLET ORAL at 16:40

## 2025-01-21 RX ADMIN — VANCOMYCIN HYDROCHLORIDE 1000 MG: 1 INJECTION, POWDER, LYOPHILIZED, FOR SOLUTION INTRAVENOUS at 05:39

## 2025-01-21 RX ADMIN — OXYCODONE HYDROCHLORIDE 5 MG: 5 TABLET ORAL at 21:02

## 2025-01-21 RX ADMIN — VANCOMYCIN HYDROCHLORIDE 1000 MG: 1 INJECTION, POWDER, LYOPHILIZED, FOR SOLUTION INTRAVENOUS at 14:50

## 2025-01-21 RX ADMIN — INSULIN LISPRO 6 UNITS: 100 INJECTION, SOLUTION INTRAVENOUS; SUBCUTANEOUS at 16:40

## 2025-01-21 RX ADMIN — INSULIN GLARGINE 35 UNITS: 100 INJECTION, SOLUTION SUBCUTANEOUS at 21:02

## 2025-01-21 RX ADMIN — LOSARTAN POTASSIUM 100 MG: 50 TABLET, FILM COATED ORAL at 08:39

## 2025-01-21 RX ADMIN — INSULIN LISPRO 4 UNITS: 100 INJECTION, SOLUTION INTRAVENOUS; SUBCUTANEOUS at 14:31

## 2025-01-21 RX ADMIN — INSULIN LISPRO 4 UNITS: 100 INJECTION, SOLUTION INTRAVENOUS; SUBCUTANEOUS at 21:02

## 2025-01-21 RX ADMIN — INSULIN LISPRO 4 UNITS: 100 INJECTION, SOLUTION INTRAVENOUS; SUBCUTANEOUS at 17:56

## 2025-01-21 RX ADMIN — METRONIDAZOLE 500 MG: 500 TABLET ORAL at 22:14

## 2025-01-21 RX ADMIN — METRONIDAZOLE 500 MG: 500 TABLET ORAL at 14:50

## 2025-01-21 RX ADMIN — GABAPENTIN 1200 MG: 400 CAPSULE ORAL at 08:39

## 2025-01-21 RX ADMIN — BUPRENORPHINE HCL 8 MG: 8 TABLET SUBLINGUAL at 20:56

## 2025-01-21 RX ADMIN — VANCOMYCIN HYDROCHLORIDE 1000 MG: 1 INJECTION, POWDER, LYOPHILIZED, FOR SOLUTION INTRAVENOUS at 22:14

## 2025-01-21 RX ADMIN — Medication 5000 UNITS: at 08:39

## 2025-01-21 RX ADMIN — CEFTRIAXONE SODIUM 2000 MG: 2 INJECTION, POWDER, FOR SOLUTION INTRAMUSCULAR; INTRAVENOUS at 17:56

## 2025-01-21 RX ADMIN — INSULIN LISPRO 2 UNITS: 100 INJECTION, SOLUTION INTRAVENOUS; SUBCUTANEOUS at 12:26

## 2025-01-21 RX ADMIN — BUPRENORPHINE HCL 8 MG: 8 TABLET SUBLINGUAL at 14:50

## 2025-01-21 NOTE — PLAN OF CARE
"Assessment: Tomas Song presents with functional mobility impairments which indicate the need for skilled intervention. Tolerating session today without incident. Pt able to negotiate two steps with B crutches and single curb step using RW with Min-Mod A this date. Will continue to follow and progress as tolerated.     Plan/Recommendations:   If medically appropriate, Moderate Intensity Therapy recommended post-acute care. This is recommended as therapy feels the patient would require 3-4 days per week and wouldn't tolerate \"3 hour daily\" rehab intensity. SNF would be the preferred choice. If the patient does not agree to SNF, arrange HH or OP depending on home bound status. If patient is medically complex, consider LTACH. Pt requires no DME at discharge.     Pt desires Skilled Rehab placement at discharge. Pt cooperative; agreeable to therapeutic recommendations and plan of care.     "

## 2025-01-21 NOTE — PROGRESS NOTES
"    [facility] MEDICINE SERVICE  DAILY PROGRESS NOTE    NAME: Tomas Song  : 1980  MRN: [id number]      LOS: 8 days     PROVIDER OF SERVICE: Antoinette Medina MD    Chief Complaint: Osteomyelitis of both feet    Subjective:     History of Present Illness: Tomas Song is a 44 y.o. male with a CMH of type 2 diabetes mellitus, HTN, HLD, ADHD, opioid use disorder (on Subutex) who presented to [facility] on 2025 with left foot pain.  Patient reports approximately 3 months ago he \"ripped off callus\" on the left foot.  However he reports that approximately 3 weeks ago, he started to notice pain, erythema, purulent drainage and foul odor to left foot.  He reports constant pain to the left foot that is exacerbated with weightbearing with intermittent sharp spasms.  Patient also reports remote accidental injury with sledgehammer to left foot.  Patient also reports wound to right great toe but does not recall how that occurred.  Patient also endorses chills but otherwise denies fever, nausea, vomiting.  Patient reports blood glucose has been controlled and is compliant with insulin/diet.     On ED evaluation, patient was noted to be slightly tachycardic with heart rate of 109, otherwise HDS, afebrile.  Labs are pertinent for WBC 5.9, hemoglobin 9.7, CRP 5.2, ESR 40.  A1c was noted to be 9.1.  Wound cultures obtained in ED, pending.  X-ray of left foot revealed osteomyelitis involving distal shaft and head of the left first metatarsal bone as well as adjacent base of proximal phalanx.  X-ray of right foot revealed changes of osteomyelitis involving the distal phalanx of the right great toe with pathological fracture secondary to osteomyelitis of the limiting medial aspect of the head of the proximal phalanx.  Patient started on vancomycin in ED.  Hospital service to admit for further evaluation management.     Interval History:      -Patient seen and evaluated at bedside. No " complaints this morning. Pain controlled. Patient not amenable to amputation at this time but with plans for I&D in OR this afternoon.   1/15 patient seen and examined in bed no acute distress, vital signs stable, discussed with RN.  Awaiting cultures.  1/16/25 patient seen and examined.  No acute distress, no new complaints, for surgery in a.m.  Cough tolerating antibiotics, discussed with RN.  pt will need less  than 30 days in SNF   1/17 seen in bed NAD, no new complaints, DW RN vss  1/18 seen in bed NAD, No new complaints, tolerating abx, DW RN Awaiting placement.  1/19 seen and examined, in bed no acute  distress,  pain well controled, no new complaints, vss,  1/20: Denies any new complaints  1/21: POC glucose uncontrolled, insulin adjusted. Denies any other complaints    Plan: From home alone. Referral to [facility] skilled pending acceptance. Will need precert and PASRR .     Treatment plan discussed with patient. All questions addressed.     Review of Systems:   Denies fevers, chills  Denies chest pain, edema  Denies shortness of breath, cough  Denies nausea, vomiting, diarrhea  Denies dysuria, hematuria    Objective:     Vital Signs  Temp:  [97.8 °F (36.6 °C)-98.2 °F (36.8 °C)] 97.8 °F (36.6 °C)  Heart Rate:  [79-92] 79  Resp:  [13-14] 13  BP: (102-123)/(57-80) 117/72   Body mass index is 27.76 kg/m².    Physical Exam   General: No acute distress  Neuro: AAOx3, no FND  CV: RRR, no peripheral edema  Pulm: CTAB, no increased work of breathing  Abd: Soft, nontender, nondistended  Skin: Bilateral foot wounds with minimal residual purulence from left fourth submetatarsal surgical side, wound edges are macerated and sutures are intact.  Psych: Appropriate mood and affect    Scheduled Meds   amLODIPine, 10 mg, Oral, Daily  buprenorphine, 8 mg, Sublingual, TID  cefTRIAXone, 2,000 mg, Intravenous, Q24H  enoxaparin, 40 mg, Subcutaneous, Q24H  gabapentin, 1,200 mg, Oral, Q12H  hydroCHLOROthiazide, 25 mg, Oral,  Daily  insulin glargine, 35 Units, Subcutaneous, BID  insulin lispro, 2-9 Units, Subcutaneous, 4x Daily AC & at Bedtime  insulin lispro, 4 Units, Subcutaneous, TID With Meals  losartan, 100 mg, Oral, Daily  metroNIDAZOLE, 500 mg, Oral, Q8H  vancomycin, 1,000 mg, Intravenous, Q8H  vitamin D3, 5,000 Units, Oral, Daily       PRN Meds     acetaminophen **OR** acetaminophen **OR** acetaminophen    senna-docusate sodium **AND** polyethylene glycol **AND** bisacodyl **AND** bisacodyl    Calcium Replacement - Follow Nurse / BPA Driven Protocol    dextrose    dextrose    flumazenil    glucagon (human recombinant)    Magnesium Standard Dose Replacement - Follow Nurse / BPA Driven Protocol    melatonin    naloxone    ondansetron ODT **OR** ondansetron    oxyCODONE    oxyCODONE    oxyCODONE    Pharmacy to dose vancomycin    Pharmacy to dose vancomycin    Phosphorus Replacement - Follow Nurse / BPA Driven Protocol    Potassium Replacement - Follow Nurse / BPA Driven Protocol   Infusions  Pharmacy to dose vancomycin,   Pharmacy to dose vancomycin,           Diagnostic Data    Results from last 7 days   Lab Units 01/20/25  2349 01/19/25  2250   WBC 10*3/mm3 6.08 7.45   HEMOGLOBIN g/dL 9.7* 10.3*   HEMATOCRIT % 32.0* 34.3*   PLATELETS 10*3/mm3 312 383   GLUCOSE mg/dL 257* 280*   CREATININE mg/dL 1.10 1.02   BUN mg/dL 29* 17   SODIUM mmol/L 140 138   POTASSIUM mmol/L 4.7 4.1   AST (SGOT) U/L  --  22   ALT (SGPT) U/L  --  40   ALK PHOS U/L  --  100   BILIRUBIN mg/dL  --  0.2   ANION GAP mmol/L 7.8 11.7       No radiology results for the last day    Interval results reviewed.  Scheduled Meds:amLODIPine, 10 mg, Oral, Daily  buprenorphine, 8 mg, Sublingual, TID  cefTRIAXone, 2,000 mg, Intravenous, Q24H  enoxaparin, 40 mg, Subcutaneous, Q24H  gabapentin, 1,200 mg, Oral, Q12H  hydroCHLOROthiazide, 25 mg, Oral, Daily  insulin glargine, 35 Units, Subcutaneous, BID  insulin lispro, 2-9 Units, Subcutaneous, 4x Daily AC & at Bedtime  insulin  lispro, 4 Units, Subcutaneous, TID With Meals  losartan, 100 mg, Oral, Daily  metroNIDAZOLE, 500 mg, Oral, Q8H  vancomycin, 1,000 mg, Intravenous, Q8H  vitamin D3, 5,000 Units, Oral, Daily      Continuous Infusions:Pharmacy to dose vancomycin,   Pharmacy to dose vancomycin,       PRN Meds:.  acetaminophen **OR** acetaminophen **OR** acetaminophen    senna-docusate sodium **AND** polyethylene glycol **AND** bisacodyl **AND** bisacodyl    Calcium Replacement - Follow Nurse / BPA Driven Protocol    dextrose    dextrose    flumazenil    glucagon (human recombinant)    Magnesium Standard Dose Replacement - Follow Nurse / BPA Driven Protocol    melatonin    naloxone    ondansetron ODT **OR** ondansetron    oxyCODONE    oxyCODONE    oxyCODONE    Pharmacy to dose vancomycin    Pharmacy to dose vancomycin    Phosphorus Replacement - Follow Nurse / BPA Driven Protocol    Potassium Replacement - Follow Nurse / BPA Driven Protocol   Assessment/Plan:     Osteomyelitis of right great toe  Osteomyelitis of left foot  Left foot diabetic ulcer  - Podiatry consulted, appreciate recs  - ID consulted, appreciate recs-Patient will need 6 weeks of IV antibiotics from surgery on 1/17/2025-last day on 2/27/25   - Continue vancomycin, rocephin  -Continue p.o. Flagyl 500 mg every 8 hours for 6 weeks  - Analgesia, avoid opiates as able  - Wound care consulted  - s/p I&D   - Recommendations for amputation; however, patient not amenable at this time and would like to discuss alternative options  -No further intervention planned by podiatry  -Podiatry discharge instructions are added to DI.    Type 2 diabetes mellitus  - Continue lantus, increase glargine to 35 units BID and add insulin lispor 4 units TID with meals   - SSI, hypoglycemia protocol, CCD  - Plan for diabetic educator prior to discharge    Anemia  - No overt s/sx bleeding  - In setting of acute infection  - monitor CBC    Hypertension  - Continue amlodipine, HCTZ,  losartan    Hyperlipidemia  - Continue fenofibrate    Opioid use disorder  - Continue subutex    ADHD  - Holding home meds during admission    Treatment plan discussed with nursing staff.     VTE Prophylaxis:  Pharmacologic VTE prophylaxis orders are present.    Holding pharmacological ppx given surgical plans; not amendable to SCDs given bilateral lower extremity wounds.     Code status is   Code Status and Medical Interventions: CPR (Attempt to Resuscitate); Full Support   Ordered at: 01/13/25 1314     Code Status (Patient has no pulse and is not breathing):    CPR (Attempt to Resuscitate)     Medical Interventions (Patient has pulse or is breathing):    Full Support       Plan for disposition: Rehab 1/22/25 pending pre-CERT    Barriers to discharge: Pending safe disposition, skilled rehab placement    Time: 35+ minutes     Signature: Electronically signed by Antoinette Medina MD, 01/21/25, 12:15 EST.  [facility]ist [facility]

## 2025-01-21 NOTE — PROGRESS NOTES
Infectious Diseases Progress Note      LOS: 8 days   Patient Care Team:  Ty Gao DO as PCP - General (Internal Medicine)    Chief Complaint: Left foot wound    Subjective       The patient has been afebrile for the last 24 hours.  The patient is on room air, hemodynamically stable, and is tolerating antimicrobial therapy.  No new complaints.  Patient currently working with physical therapy    Review of Systems:   Review of Systems   Constitutional: Negative.    HENT: Negative.     Eyes: Negative.    Respiratory: Negative.     Cardiovascular: Negative.    Gastrointestinal: Negative.    Endocrine: Negative.    Genitourinary: Negative.    Musculoskeletal: Negative.    Skin:  Positive for wound.   Neurological: Negative.    Psychiatric/Behavioral: Negative.     All other systems reviewed and are negative.       Objective     Vital Signs  Temp:  [97.8 °F (36.6 °C)-98.2 °F (36.8 °C)] 97.8 °F (36.6 °C)  Heart Rate:  [79-92] 79  Resp:  [13-14] 13  BP: (102-123)/(57-80) 117/72    Physical Exam:  Physical Exam  Vitals and nursing note reviewed.   Constitutional:       General: He is not in acute distress.     Appearance: Normal appearance. He is well-developed and normal weight. He is not diaphoretic.   HENT:      Head: Normocephalic and atraumatic.   Eyes:      Conjunctiva/sclera: Conjunctivae normal.      Pupils: Pupils are equal, round, and reactive to light.   Cardiovascular:      Rate and Rhythm: Normal rate and regular rhythm.      Heart sounds: Normal heart sounds, S1 normal and S2 normal.   Pulmonary:      Effort: Pulmonary effort is normal. No respiratory distress.      Breath sounds: Normal breath sounds. No stridor. No wheezing or rales.   Abdominal:      General: Bowel sounds are normal. There is no distension.      Palpations: Abdomen is soft. There is no mass.      Tenderness: There is no abdominal tenderness. There is no guarding.   Musculoskeletal:         General: No deformity. Normal range of  motion.      Cervical back: Neck supple.   Skin:     General: Skin is warm and dry.      Coloration: Skin is not pale.      Findings: No erythema or rash.      Comments: Dressings on both feet   Neurological:      Mental Status: He is alert and oriented to person, place, and time.      Cranial Nerves: No cranial nerve deficit.   Psychiatric:         Mood and Affect: Mood normal.          Results Review:    I have reviewed all clinical data, test, lab, and imaging results.     Radiology  No Radiology Exams Resulted Within Past 24 Hours    Cardiology    Laboratory    Results from last 7 days   Lab Units 01/20/25 2349 01/19/25 2250 01/18/25 2314 01/18/25 0520 01/17/25  0537 01/16/25  0004 01/15/25  0024   WBC 10*3/mm3 6.08 7.45 6.77 11.01* 6.76 7.84 9.93   HEMOGLOBIN g/dL 9.7* 10.3* 10.3* 10.5* 10.2* 9.6* 9.7*   HEMATOCRIT % 32.0* 34.3* 34.7* 34.4* 33.1* 31.5* 31.6*   PLATELETS 10*3/mm3 312 383 433 381 396 402 393     Results from last 7 days   Lab Units 01/20/25 2349 01/19/25 2250 01/18/25 2314 01/18/25 0520 01/17/25 0537 01/16/25  0004 01/15/25  0024   SODIUM mmol/L 140 138 143 141 138 134* 136   POTASSIUM mmol/L 4.7 4.1 3.8 4.1 4.1 3.7 4.7   CHLORIDE mmol/L 102 100 104 102 100 98 100   CO2 mmol/L 30.2* 26.3 28.2 26.4 29.7* 29.5* 27.9   BUN mg/dL 29* 17 19 23* 14 16 14   CREATININE mg/dL 1.10 1.02 0.96 0.89 0.76 0.81 0.83   GLUCOSE mg/dL 257* 280* 216* 256* 280* 248* 312*   ALBUMIN g/dL  --  3.6  --   --   --   --   --    BILIRUBIN mg/dL  --  0.2  --   --   --   --   --    ALK PHOS U/L  --  100  --   --   --   --   --    AST (SGOT) U/L  --  22  --   --   --   --   --    ALT (SGPT) U/L  --  40  --   --   --   --   --    CALCIUM mg/dL 9.3 9.5 9.5 9.5 9.6 9.2 9.5     Results from last 7 days   Lab Units 01/18/25  0520   CK TOTAL U/L 25               Microbiology   Microbiology Results (last 10 days)       Procedure Component Value - Date/Time    Anaerobic Culture - Bone, Foot, Left [[id number]]  (Abnormal)  Collected: 01/14/25 1651    Lab Status: Final result Specimen: Bone from Foot, Left Updated: 01/19/25 1405     Anaerobic Culture Bacteroides pyogenes      Prevotella denticola    Tissue / Bone Culture - Bone, Foot, Left [[id number]]  (Abnormal) Collected: 01/14/25 1651    Lab Status: Final result Specimen: Bone from Foot, Left Updated: 01/17/25 0813     Tissue Culture Scant growth (1+) Escherichia coli      Rare growth Proteus mirabilis      Scant growth (1+) Streptococcus agalactiae (Group B)      Staphylococcus aureus, MRSA     Comment:   Methicillin resistant Staphylococcus aureus, Patient may be an isolation risk.        Gram Stain Rare (1+) WBCs per low power field      Rare (1+) Gram positive cocci in pairs    Narrative:      Refer to previous wound culture collected on 01/13/2025 1203 for MICs      Anaerobic Culture - Tissue, Foot, Left [[id number]]  (Abnormal) Collected: 01/14/25 1645    Lab Status: Final result Specimen: Tissue from Foot, Left Updated: 01/19/25 1414     Anaerobic Culture Bacteroides pyogenes      Anaerococcus prevotii    Tissue / Bone Culture - Tissue, Foot, Left [[id number]]  (Abnormal) Collected: 01/14/25 1645    Lab Status: Final result Specimen: Tissue from Foot, Left Updated: 01/17/25 0812     Tissue Culture Scant growth (1+) Escherichia coli      Rare growth Proteus mirabilis     Gram Stain Moderate (3+) WBCs per low power field      Moderate (3+) Gram negative bacilli      Moderate (3+) Gram positive cocci    Narrative:      Refer to previous wound culture collected on 01/13/2025 1203 for MICs      MRSA Screen, PCR (Inpatient) - Swab, Nares [[id number]]  (Abnormal) Collected: 01/13/25 1422    Lab Status: Final result Specimen: Swab from Nares Updated: 01/13/25 1547     MRSA PCR MRSA Detected    Narrative:      The negative predictive value of this diagnostic test is high and should only be used to consider de-escalating anti-MRSA therapy. A positive result may indicate colonization with  MRSA and must be correlated clinically.    Blood Culture - Blood, Arm, Right [[id number]]  (Normal) Collected: 01/13/25 1226    Lab Status: Final result Specimen: Blood from Arm, Right Updated: 01/18/25 1245     Blood Culture No growth at 5 days    Wound Culture - Wound, Foot, Left [[id number]]  (Abnormal)  (Susceptibility) Collected: 01/13/25 1203    Lab Status: Edited Result - FINAL Specimen: Wound from Foot, Left Updated: 01/20/25 1406     Wound Culture Light growth (2+) Escherichia coli      Light growth (2+) Streptococcus agalactiae (Group B)     Comment:   This organism is considered to be universally susceptible to penicillin.  No further antibiotic testing will be performed. If Clindamycin or Erythromycin is the drug of choice, notify the laboratory within 7 days to request susceptibility testing.         Rare growth Proteus mirabilis      Scant growth (1+) Staphylococcus aureus, MRSA     Comment:   Considering site of infection and appropriate dosing, oxacillin (or cefoxitin) results can be applied to cefazolin, nafcillin, ampicillin/sulbactam, dicloxacillin, amoxicillin/clavulanate, cephalexin, and cefpodoxime.        Gram Stain Many (4+) WBCs per low power field      Moderate (3+) Gram positive cocci in pairs, chains and clusters      Few (2+) Gram negative bacilli      Few (2+) Gram positive bacilli    Susceptibility        Escherichia coli      JOE      Amikacin Susceptible      Amoxicillin + Clavulanate Susceptible      Ampicillin Resistant      Ampicillin + Sulbactam Resistant      Cefazolin (Non Urine) Intermediate      Cefepime Susceptible      Ceftazidime Susceptible      Ceftriaxone Susceptible      Gentamicin Resistant      Levofloxacin Susceptible      Piperacillin + Tazobactam Susceptible      Tetracycline Resistant      Tobramycin Intermediate      Trimethoprim + Sulfamethoxazole Resistant                       Susceptibility        Proteus mirabilis      JOE      Amoxicillin + Clavulanate  Intermediate      Ampicillin Resistant      Ampicillin + Sulbactam Susceptible      Cefazolin (Non Urine) Intermediate      Cefepime Susceptible      Ceftazidime Susceptible      Ceftriaxone Susceptible      Gentamicin Susceptible      Levofloxacin Susceptible      Piperacillin + Tazobactam Susceptible      Tetracycline Resistant      Trimethoprim + Sulfamethoxazole Susceptible                       Susceptibility        Staphylococcus aureus, MRSA      JOE      Clindamycin Susceptible      Daptomycin Susceptible (C)  [1]       Erythromycin Susceptible      Oxacillin Resistant      Rifampin Susceptible      Tetracycline Susceptible      Trimethoprim + Sulfamethoxazole Susceptible      Vancomycin Susceptible                   [1]  Appended report. These results have been appended to a previously final verified report.                Susceptibility Comments       Escherichia coli    With the exception of urinary-sourced infections, aminoglycosides should not be used as monotherapy.      Proteus mirabilis    With the exception of urinary-sourced infections, aminoglycosides should not be used as monotherapy.      Staphylococcus aureus, MRSA    Latonya from [facility] requested Daptomycin.               Blood Culture - Blood, Arm, Right [[id number]]  (Normal) Collected: 01/13/25 1201    Lab Status: Final result Specimen: Blood from Arm, Right Updated: 01/18/25 1216     Blood Culture No growth at 5 days            Medication Review:       Schedule Meds  amLODIPine, 10 mg, Oral, Daily  buprenorphine, 8 mg, Sublingual, TID  cefTRIAXone, 2,000 mg, Intravenous, Q24H  enoxaparin, 40 mg, Subcutaneous, Q24H  gabapentin, 1,200 mg, Oral, Q12H  hydroCHLOROthiazide, 25 mg, Oral, Daily  insulin glargine, 35 Units, Subcutaneous, BID  insulin lispro, 2-9 Units, Subcutaneous, 4x Daily AC & at Bedtime  insulin lispro, 4 Units, Subcutaneous, TID With Meals  losartan, 100 mg, Oral, Daily  metroNIDAZOLE, 500 mg, Oral, Q8H  vancomycin, 1,000  mg, Intravenous, Q8H  vitamin D3, 5,000 Units, Oral, Daily        Infusion Meds  Pharmacy to dose vancomycin,         PRN Meds    acetaminophen **OR** acetaminophen **OR** acetaminophen    senna-docusate sodium **AND** polyethylene glycol **AND** bisacodyl **AND** bisacodyl    Calcium Replacement - Follow Nurse / BPA Driven Protocol    dextrose    dextrose    flumazenil    glucagon (human recombinant)    Magnesium Standard Dose Replacement - Follow Nurse / BPA Driven Protocol    melatonin    naloxone    ondansetron ODT **OR** ondansetron    oxyCODONE    oxyCODONE    oxyCODONE    Pharmacy to dose vancomycin    Phosphorus Replacement - Follow Nurse / BPA Driven Protocol    Potassium Replacement - Follow Nurse / BPA Driven Protocol        Assessment & Plan       Antimicrobial Therapy   1.  P.o. Flagyl        2.  IV ceftriaxone        3.        4.        5.          Assessment     Left diabetic foot with extensive infection in the left first MTP joint.  MRI showed abscess, tenosynovitis and osteomyelitis of the first metatarsal head.  Initial culture growing E. coli, methicillin resistant Staphylococcus aureus, group B streptococcus and Proteus mirabilis.  Patient status post incision and drainage with bone biopsy on 1/14/2024.  Purulent drainage noted and interoperative cultures growing E. coli, Proteus mirabilis and methicillin resistant Staphylococcus aureus.  Bone biopsy was positive for osteomyelitis.  Status post second incision and drainage to the left foot on 1/17/2024-purulence still found.  Anaerobic cultures are growing Bacteroides pyogenes and Anaerococcus prevotii, and Provetella denticola   Patient continued to refuse partial ray amputation     Right diabetic foot with osteomyelitis involving the right large toe.  Status post incision and drainage with bone biopsy on 1/15/2025.  Biopsy was negative for osteomyelitis     Diabetes mellitus type 1 with A1c of 9.1     History of IV methamphetamine abuse per  care everywhere records.  Patient notes to remote drug use but denies IV drug abuse..  HIV screen was negative.  Drug screen positive for THC, methamphetamines, amphetamines benzodiazepines and buprenorphine.  Of note patient does have Adderall on his home medication left    Hepatitis C infection.  Diagnosed this admission.  Hepatitis C RNA was undetectable       Plan      Continue V vancomycin.  Currently pharmacy following and dosing.  Recommend to keep trough between 15 and 20  Continue IV Rocephin 2 g every 24 hours  Patient will need 6 weeks of IV antibiotics from surgery on 1/17/2025-last day on 2/27/25  Continue p.o. Flagyl 500 mg every 8 hours for 6 weeks  Continue supportive care  Okay to discharge from Infectious Disease standpoint  Not much more to add from infectious disease standpoint-we will sign off at this time-please call with any questions.    Please fax all post discharge lab results, imaging studies and correspondence to this fax number [phone]  For any question or concern please contact our service number [phone]    Would recommend rehab for patient for IV antibiotics and wound care  Patient is a high risk for limb loss  Patient will need to get his blood sugars under control to heal this infection and avoid future infections    Latonya Marie, APRN  01/21/25  16:38 EST    Note is dictated utilizing voice recognition software/Dragon

## 2025-01-21 NOTE — THERAPY TREATMENT NOTE
"Subjective: Pt agreeable to therapeutic plan of care.    Objective:     Precautions - LLE NWB; RLE WBAT w/ sx shoe     Bed mobility - Independent    Transfers - Supervision and with rolling walker    Ambulation - 15 feet + 40 feet CGA and with rolling walker  2x5 feet Min A using B Crutches. Pt able to maintain NWB well through LLE    Stairs - 2 Steps Mod A using B Crutches ascending backwards and descending forwards. PTA providing visual demonstration on safety prior to attempting steps. Pt able to maintain NWB well through LLE.   Single Curb Step Min A using RW ascending backwards and descending forwards. Pt able to maintain NWB well through LLE    Therapeutic Exercise - 15 Reps B LE AROM unsupported sitting / EOB    Vitals: WNL    Pain: 0 VAS   Location:   Intervention for pain: N/A    Education: Provided education on the importance of mobility in the acute care setting, Verbal/Tactile Cues, Transfer Training, and Gait Training    Assessment: Tomas Song presents with functional mobility impairments which indicate the need for skilled intervention. Tolerating session today without incident. Pt able to negotiate two steps with B crutches and single curb step using RW with Min-Mod A this date. Will continue to follow and progress as tolerated.     Plan/Recommendations:   If medically appropriate, Moderate Intensity Therapy recommended post-acute care. This is recommended as therapy feels the patient would require 3-4 days per week and wouldn't tolerate \"3 hour daily\" rehab intensity. SNF would be the preferred choice. If the patient does not agree to SNF, arrange HH or OP depending on home bound status. If patient is medically complex, consider LTACH. Pt requires no DME at discharge.     Pt desires Skilled Rehab placement at discharge. Pt cooperative; agreeable to therapeutic recommendations and plan of care.         Basic Mobility 6-click:  Rollin = Total, A lot = 2, A little = 3; 4 = " None  Supine>Sit:   1 = Total, A lot = 2, A little = 3; 4 = None   Sit>Stand with arms:  1 = Total, A lot = 2, A little = 3; 4 = None  Bed>Chair:   1 = Total, A lot = 2, A little = 3; 4 = None  Ambulate in room:  1 = Total, A lot = 2, A little = 3; 4 = None  3-5 Steps with railin = Total, A lot = 2, A little = 3; 4 = None  Score: 21    Modified Mars: N/A = No pre-op stroke/TIA    Post-Tx Position: Up in Chair, Alarms activated, and Call light and personal items within reach  PPE: gloves    Therapy Charges for Today       Code Description Service Date Service Provider Modifiers Qty    97785663684 HC PT THERAPEUTIC ACT EA 15 MIN 2025 Keon Jordan PTA GP 1    41391985821 HC GAIT TRAINING EA 15 MIN 2025 Keon Jordan PTA GP 1    39735592516 HC PT THER PROC EA 15 MIN 2025 Keon Jordan PTA GP 1           PT Charges       Row Name 25 1613             Time Calculation    Start Time 1330  -UN      Stop Time 1354  -UN      Time Calculation (min) 24 min  -UN      PT Received On 25  -UN      PT - Next Appointment 25  -UN         Time Calculation- PT    Total Timed Code Minutes- PT 24 minute(s)  -UN                User Key  (r) = Recorded By, (t) = Taken By, (c) = Cosigned By      Initials Name Provider Type    Keon Shell PTA Physical Therapist Assistant

## 2025-01-21 NOTE — PAYOR COMM NOTE
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 Blood, Arm, Right [[id number]]  (Normal) Collected: 01/13/25 1201    Lab Status: Final result Specimen: Blood from Arm, Right Updated: 01/18/25 1216     Blood Culture No growth at 5 days            Medication Review:       Schedule Meds  amLODIPine, 10 mg, Oral, Daily  buprenorphine, 8 mg, Sublingual, TID  cefTRIAXone, 2,000 mg, Intravenous, Q24H  enoxaparin, 40 mg, Subcutaneous, Q24H  gabapentin, 1,200 mg, Oral, Q12H  hydroCHLOROthiazide, 25 mg, Oral, Daily  insulin glargine, 30 Units, Subcutaneous, BID  insulin lispro, 2-9 Units, Subcutaneous, 4x Daily AC & at Bedtime  losartan, 100 mg, Oral, Daily  metroNIDAZOLE, 500 mg, Oral, Q8H  vancomycin, 1,000 mg, Intravenous, Q8H  vitamin D3, 5,000 Units, Oral, Daily        Infusion Meds  Pharmacy to dose vancomycin,         PRN Meds    acetaminophen **OR** acetaminophen **OR** acetaminophen    senna-docusate sodium **AND** polyethylene glycol **AND** bisacodyl **AND** bisacodyl    Calcium Replacement - Follow Nurse / BPA  Driven Protocol    dextrose    dextrose    flumazenil    glucagon (human recombinant)    Magnesium Standard Dose Replacement - Follow Nurse / BPA Driven Protocol    melatonin    naloxone    ondansetron ODT **OR** ondansetron    oxyCODONE    oxyCODONE    oxyCODONE    Pharmacy to dose vancomycin    Phosphorus Replacement - Follow Nurse / BPA Driven Protocol    Potassium Replacement - Follow Nurse / BPA Driven Protocol        Assessment & Plan       Antimicrobial Therapy   1.  P.o. Flagyl        2.  IV ceftriaxone        3.        4.        5.          Assessment     Left diabetic foot with extensive infection in the left first MTP joint.  MRI showed abscess, tenosynovitis and osteomyelitis of the first metatarsal head.  Initial culture growing E. coli, methicillin resistant Staphylococcus aureus, group B streptococcus and Proteus mirabilis.  Patient status post incision and drainage with bone biopsy on 1/14/2024.  Purulent drainage noted and interoperative cultures growing E. coli, Proteus mirabilis and methicillin resistant Staphylococcus aureus.  Bone biopsy was positive for osteomyelitis.  Status post second incision and drainage to the left foot on 1/17/2024-purulence still found.  Anaerobic cultures are growing Bacteroides pyogenes and Anaerococcus prevotii, and Provetella denticola   Patient continued to refuse partial ray amputation     Right diabetic foot with osteomyelitis involving the right large toe.  Status post incision and drainage with bone biopsy on 1/15/2025.  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  Patient notes to remote drug use but denies IV drug abuse..  HIV screen was negative.  Drug screen positive for THC, methamphetamines, amphetamines benzodiazepines and buprenorphine.  Of note patient does have Adderall on his home medication left    Hepatitis C infection.  Diagnosed this  admission.  Hepatitis C RNA was undetectable       Plan      Continue V vancomycin-ask pharmacy to monitor and dose  Continue IV Rocephin 2 g every 24 hours  Patient will need 6 weeks of IV antibiotics from surgery on 1/17/2025-last day on 2/27/25  Continue p.o. Flagyl 500 mg every 8 hours for 6 weeks  Continue supportive care  Okay to discharge from Infectious Disease standpoint    Please fax all post discharge lab results, imaging studies and correspondence to this fax number [phone]  For any question or concern please contact our service number [phone]    Would recommend rehab for patient for IV antibiotics and wound care  Patient is a high risk for limb loss  Patient will need to get his blood sugars under control to heal this infection and avoid future infections    Latonya Marie, APRN  01/20/25  14:01 EST    Note is dictated utilizing voice recognition software/Dragon    Electronically signed by Dayton Turcios MD at 01/21/25 6490       Susan Garrison DPM at 01/20/25 0970               LOS: 7 days   Patient Care Team:  Ty Gao DO as PCP - General (Internal Medicine)    Chief Complaint: Bilateral foot osteomyelitis    Subjective     Interval History:     Patient Complaints: Patient is POD #3 s/p  repeat incision and drainage of left foot abscess.  Patient relates mild tenderness to the surgical site in the morning although improving from prior.  Patient Denies: Patient denies any nausea, vomiting, fevers, chills, shortness of breath.  History taken from: patient    Review of Systems:   Pertinent positives in HPI.    Objective     Vital Signs  Temp:  [97.9 °F (36.6 °C)-98 °F (36.7 °C)] 97.9 °F (36.6 °C)  Heart Rate:  [83-98] 87  Resp:  [16-18] 17  BP: (112-132)/(71-78) 130/76    Physical Exam:  General: Alert and oriented x 3  Vascular: DP and PT pulses faintly palpable bilaterally.  CFT less than 5 seconds to all digits bilaterally.  Moderate nonpitting edema noted to  "bilateral feet. Erythema and edema improving.  Musculoskeletal: Mild pain on palpation to the left first submetatarsal surgical site. No pain to the right hallux.  Neuro: Protective sensation diminished bilaterally.  Sensation intact to light touch bilaterally.  Derm: Left foot submetatarsal 1 surgical site with minimal residual purulence, improved from prior.  Wound edges are macerated.  Sutures intact with some reapproximation at this time. Right hallux with sutures intact, no drainage noted.     Labs:  Results from last 7 days   Lab Units 01/19/25  2250   WBC 10*3/mm3 7.45   HEMOGLOBIN g/dL 10.3*   HEMATOCRIT % 34.3*   PLATELETS 10*3/mm3 383     Results from last 7 days   Lab Units 01/19/25  2250   SODIUM mmol/L 138   POTASSIUM mmol/L 4.1   CHLORIDE mmol/L 100   CO2 mmol/L 26.3   BUN mg/dL 17   CREATININE mg/dL 1.02   GLUCOSE mg/dL 280*   CALCIUM mg/dL 9.5     Glucose   Date Value Ref Range Status   01/19/2025 280 (H) 65 - 99 mg/dL Final   01/18/2025 216 (H) 65 - 99 mg/dL Final   01/18/2025 256 (H) 65 - 99 mg/dL Final     Results from last 7 days   Lab Units 01/13/25  1226   SED RATE mm/hr 40*     Results from last 7 days   Lab Units 01/13/25  1226   CRP mg/dL 5.29*         No components found for: \"HBA1C\"    Imaging:   Imaging Results (All)       Procedure Component Value Units Date/Time    MRI Foot Right Without Contrast [[id number]] Collected: 01/13/25 2238     Updated: 01/13/25 2250    Narrative:      MRI FOOT RIGHT WO CONTRAST    Date of Exam: 1/13/2025 7:35 PM EST    Indication: Right hallux osteomyelitis.     Comparison: Toe radiographs 1/13/2025    Technique:  Routine multiplanar/multisequence sequence images of the right foot were obtained without contrast administration.      Findings:  Bones: There is marrow edema of the first proximal and distal phalanges with associated loss of normal T1 marrow and cortical signal along the medial aspect of the first interphalangeal joint. No additional areas of " abnormal marrow edema. No suspicious   osseous lesions. Small effusion of the first metatarsophalangeal joint. Small to moderate effusion in the first interphalangeal joint.    Soft tissues: Lisfranc ligament is intact. Plantar plates appear intact. Visualized tendons appear grossly intact. Diffuse subcutaneous edema. Atrophy of the intrinsic foot muscles. There is soft tissue wound along the medial aspect of the first toe with   underlying lobulated fluid which may reflect small abscesses. Small mild intermetatarsal bursal fluid in the first, second and third interspaces.      Impression:      Impression:  1.Soft tissue wound along the medial aspect of the first toe with underlying lobulated fluid which may reflect small abscesses.  2.Marrow edema of the first proximal and distal phalanges with associated loss of normal T1 marrow and cortical signal along the medial aspect of the first interphalangeal joint. These findings are suspicious for osteomyelitis. There is also adjacent   small to moderate effusion in the first interphalangeal joint concerning for septic arthritis.        Electronically Signed: Jacob Kebede MD    1/13/2025 10:48 PM EST    Workstation ID: [id number]    MRI Foot Left Without Contrast [[id number]] Collected: 01/13/25 2212     Updated: 01/13/25 2224    Narrative:      MRI FOOT LEFT WO CONTRAST    Date of Exam: 1/13/2025 7:35 PM EST    Indication: Evaluate abcess and osteomyelitis.     Comparison: One 1325 foot radiographs    Technique:  Routine multiplanar/multisequence sequence images of the left foot were obtained without contrast administration.      Findings:  Bones and joints: Marrow edema and loss of normal T1 cortical and marrow signal with erosions of the first metatarsal head as well as the base of the first proximal phalanx. There is associated lobulated small joint effusion at the first   metatarsophalangeal joint. There is marrow edema seen throughout the remainder of the first  metatarsal and the first proximal phalanx. Minimal marrow edema seen along the first distal phalanx. No additional areas of abnormal marrow edema. No suspicious   osseous lesions.    Soft tissues: Soft tissue wound along the plantar medial forefoot just below the first metatarsophalangeal joint. There are associated lobulated fluid seen deep to the wound which may reflect small abscesses. There is involvement of the flexor houses   longus tendon at this location as well with mild associated tenosynovitis. Remaining tendons appear grossly intact. Lisfranc ligament is intact. The plantar plates of the second through fifth digits appear intact. Diffuse subcutaneous edema.      Impression:      Impression:  1.Soft tissue wound along the plantar medial forefoot just below the first metatarsophalangeal joint. There are associated lobulated fluid collections deep to the wound which may reflect small abscesses. There is involvement of the flexor hallucis longus   tendon at this location as well with mild associated tenosynovitis.  2.Marrow edema and loss of normal T1 cortical and marrow signal with erosions of the first metatarsal head as well as the base of the first proximal phalanx. There is associated lobulated small joint effusion at the first metatarsophalangeal joint. These   findings are concerning for septic arthritis with osteomyelitis of the first metatarsal head and base of the first proximal phalanx. There is reactive marrow edema seen along the remainder of the first proximal phalanx and first metatarsal.        Electronically Signed: Jacob Kebede MD    1/13/2025 10:22 PM EST    Workstation ID: [id number]    XR Foot 3+ View Left [[id number]] Collected: 01/13/25 1239     Updated: 01/13/25 1246    Narrative:      XR FOOT 3+ VW LEFT, XR TOE 2+ VW RIGHT    Date of Exam: 1/13/2025 12:02 PM EST    Indication: Diabetic wound infection involving the left foot in the right great toe.    Comparison: None  available.    Findings:  Right great toe: There is a cortical destruction and periostitis involving the distal phalanx, especially along the medial border and also involving the medial aspect of the head of the proximal phalanx. The findings are consistent with osteomyelitis in   the appropriate clinical setting. There is a pathologic fracture secondary to the osteomyelitis involving the medial aspect of the head of the proximal phalanx. There is soft tissue swelling. There is a relatively deep ulcer along the medial aspect of   the right great toe at the site of the bony destructive changes. There are incidental arthritic changes involving the IP joint and first MTP joint.    Left foot: There is cortical destruction, osteolysis, and periosteal reaction involving the distal shaft and head of the first metatarsal bone as well as the adjacent base of the proximal phalanx. There also may be some osteolytic changes in the base of   the distal phalanx of the left great toe. This is consistent with radiographic changes of osteomyelitis. There is soft tissue swelling involving mainly the left great toe. Shallow ulcers are suggested along the plantar aspect of the medial left forefoot   in the location of the radiographic changes of osteomyelitis. There are minor arthritic changes involving the inner-phalangeal joints of the digits.      Impression:      Impression:  1.Radiographic changes of osteomyelitis involving the distal phalanx of the right great toe with a pathologic fracture secondary to the osteomyelitis involving the medial aspect of the head of the proximal phalanx.  2.Radiographic changes of osteomyelitis involving the distal shaft and head of the left first metatarsal bone as well as the adjacent base of the proximal phalanx. There also may be some osteolytic changes in the base of the distal phalanx of the left   great toe representing osteomyelitis.  3.Soft tissue swelling. There is a relatively deep ulcer  along the medial aspect of the right great toe at the site of the bony destructive changes.  4.Soft tissue swelling and shallow ulcers along the medial aspect of the left forefoot at the site of the bony destructive changes.        Electronically Signed: Agapito Rick MD    1/13/2025 12:44 PM EST    Workstation ID: [id number]    XR Toe 2+ View Right [[id number]] Collected: 01/13/25 1239     Updated: 01/13/25 1246    Narrative:      XR FOOT 3+ VW LEFT, XR TOE 2+ VW RIGHT    Date of Exam: 1/13/2025 12:02 PM EST    Indication: Diabetic wound infection involving the left foot in the right great toe.    Comparison: None available.    Findings:  Right great toe: There is a cortical destruction and periostitis involving the distal phalanx, especially along the medial border and also involving the medial aspect of the head of the proximal phalanx. The findings are consistent with osteomyelitis in   the appropriate clinical setting. There is a pathologic fracture secondary to the osteomyelitis involving the medial aspect of the head of the proximal phalanx. There is soft tissue swelling. There is a relatively deep ulcer along the medial aspect of   the right great toe at the site of the bony destructive changes. There are incidental arthritic changes involving the IP joint and first MTP joint.    Left foot: There is cortical destruction, osteolysis, and periosteal reaction involving the distal shaft and head of the first metatarsal bone as well as the adjacent base of the proximal phalanx. There also may be some osteolytic changes in the base of   the distal phalanx of the left great toe. This is consistent with radiographic changes of osteomyelitis. There is soft tissue swelling involving mainly the left great toe. Shallow ulcers are suggested along the plantar aspect of the medial left forefoot   in the location of the radiographic changes of osteomyelitis. There are minor arthritic changes involving the inner-phalangeal joints  of the digits.      Impression:      Impression:  1.Radiographic changes of osteomyelitis involving the distal phalanx of the right great toe with a pathologic fracture secondary to the osteomyelitis involving the medial aspect of the head of the proximal phalanx.  2.Radiographic changes of osteomyelitis involving the distal shaft and head of the left first metatarsal bone as well as the adjacent base of the proximal phalanx. There also may be some osteolytic changes in the base of the distal phalanx of the left   great toe representing osteomyelitis.  3.Soft tissue swelling. There is a relatively deep ulcer along the medial aspect of the right great toe at the site of the bony destructive changes.  4.Soft tissue swelling and shallow ulcers along the medial aspect of the left forefoot at the site of the bony destructive changes.        Electronically Signed: Agapito Rick MD    1/13/2025 12:44 PM EST    Workstation ID: [id number]            Cultures:   E. coli, methicillin resistant Staphylococcus aureus, group B streptococcus and Proteus mirabilis .     Bacteroides and Anaerococcus      Results Review:     I reviewed the patient's new clinical results.      Assessment & Plan   - POD #3 s/p repeat incision and drainage left foot abscess. POD #6 R hallux and left foot I&D.  - Osteomyelitis, left first metatarsal/hallux  - Abscess, left foot  - Osteomyelitis, right hallux    Plan:  - Patient was examined and evaluated at bedside; vital signs stable at this time.  WBC 7.45 yesterday.  - Surgical site to the left foot with residual purulence, improved from prior; sutures intact at this time.  Redressed with Betadine dressing. Recommend Left foot dressing change daily.   - X-ray and MRI confirm osteomyelitis of the right hallux as well as left hallux proximal phalanx and first metatarsal head.  Patient continues to refuse surgical intervention in the form of amputation at this time.  Patient aware of the risks with  delaying/refusing surgery.  Patient is at high risk for limb loss. Discussion held today regarding worsening signs of infection and to present to the ED if these happen. He understands the risks being limb/life threatening.   - ID recommendations appreciated; plan for 6 weeks of IV antibiotics due to patient's refusal to have amputation. Currently Vanc/Cefipime/flagyl.   - Patient to remain nonweightbearing to the left lower extremity, WBAT to the right lower extremity in the surgical shoe.  - No future plans for OR at this time.   - OK to d/c from podiatry perspective. Patient awaiting bed for SNF placement.     Podiatry discharge recs:  - WB status: NWB LLE, WBAT RLE with surgical shoe.   - Dressings: Right foot - adaptic, gauze, kerlix, ACE every other day. Left foot - betadine, gauze, ABD pad, Kerlix, ACE - every day.   - Abx: per ID recommendation.   - Follow-up: Outpatient follow up 1/27/25    Podiatry will continue to follow while in-house      Susan Garrison DPM  01/20/25  09:16 EST    I spent a total of 60 minutes on this patient encounter including preparation, review of medical, social, surgical hx, medications, labs, xrays review and interpretation, face to face care, evaluation, treatment, counseling, education, consultation with another provider, documentation, and answering patient questions regarding the plan noted above.         Electronically signed by Susan Garrison DPM at 01/20/25 0927       Dayton Turcios MD at 01/19/25 1444          Infectious Diseases Progress Note      LOS: 6 days   Patient Care Team:  Ty Gao DO as PCP - General (Internal Medicine)    Chief Complaint: Left foot wound    Subjective       The patient has been afebrile for the last 24 hours.  The patient is on room air, hemodynamically stable, and is tolerating antimicrobial therapy.  No new complaints.     Review of Systems:   Review of Systems   Constitutional: Negative.    HENT: Negative.     Eyes:  Negative.    Respiratory: Negative.     Cardiovascular: Negative.    Gastrointestinal: Negative.    Endocrine: Negative.    Genitourinary: Negative.    Musculoskeletal: Negative.    Skin:  Positive for wound.   Neurological: Negative.    Psychiatric/Behavioral: Negative.     All other systems reviewed and are negative.       Objective     Vital Signs  Temp:  [98 °F (36.7 °C)-98.3 °F (36.8 °C)] 98 °F (36.7 °C)  Heart Rate:  [] 83  Resp:  [16-18] 18  BP: (104-138)/(70-80) 132/78    Physical Exam:  Physical Exam  Vitals and nursing note reviewed.   Constitutional:       General: He is not in acute distress.     Appearance: Normal appearance. He is well-developed and normal weight. He is not diaphoretic.   HENT:      Head: Normocephalic and atraumatic.   Eyes:      Conjunctiva/sclera: Conjunctivae normal.      Pupils: Pupils are equal, round, and reactive to light.   Cardiovascular:      Rate and Rhythm: Normal rate and regular rhythm.      Heart sounds: Normal heart sounds, S1 normal and S2 normal.   Pulmonary:      Effort: Pulmonary effort is normal. No respiratory distress.      Breath sounds: Normal breath sounds. No stridor. No wheezing or rales.   Abdominal:      General: Bowel sounds are normal. There is no distension.      Palpations: Abdomen is soft. There is no mass.      Tenderness: There is no abdominal tenderness. There is no guarding.   Musculoskeletal:         General: No deformity. Normal range of motion.      Cervical back: Neck supple.   Skin:     General: Skin is warm and dry.      Coloration: Skin is not pale.      Findings: No erythema or rash.      Comments: Dressings on both feet   Neurological:      Mental Status: He is alert and oriented to person, place, and time.      Cranial Nerves: No cranial nerve deficit.   Psychiatric:         Mood and Affect: Mood normal.          Results Review:    I have reviewed all clinical data, test, lab, and imaging results.     Radiology  No Radiology Exams  Resulted Within Past 24 Hours    Cardiology    Laboratory    Results from last 7 days   Lab Units 01/18/25  2314 01/18/25  0520 01/17/25  0537 01/16/25  0004 01/15/25  0024 01/14/25  0418 01/13/25  1226   WBC 10*3/mm3 6.77 11.01* 6.76 7.84 9.93 5.51 5.97   HEMOGLOBIN g/dL 10.3* 10.5* 10.2* 9.6* 9.7* 9.4* 9.7*   HEMATOCRIT % 34.7* 34.4* 33.1* 31.5* 31.6* 30.0* 31.7*   PLATELETS 10*3/mm3 433 381 396 402 393 353 410     Results from last 7 days   Lab Units 01/18/25  2314 01/18/25  0520 01/17/25  0537 01/16/25  0004 01/15/25  0024 01/14/25  0418 01/13/25  1226   SODIUM mmol/L 143 141 138 134* 136 137 139   POTASSIUM mmol/L 3.8 4.1 4.1 3.7 4.7 4.2 4.0   CHLORIDE mmol/L 104 102 100 98 100 104 99   CO2 mmol/L 28.2 26.4 29.7* 29.5* 27.9 25.0 30.6*   BUN mg/dL 19 23* 14 16 14 11 12   CREATININE mg/dL 0.96 0.89 0.76 0.81 0.83 0.69* 0.96   GLUCOSE mg/dL 216* 256* 280* 248* 312* 186* 180*   ALBUMIN g/dL  --   --   --   --   --   --  3.5   BILIRUBIN mg/dL  --   --   --   --   --   --  0.2   ALK PHOS U/L  --   --   --   --   --   --  104   AST (SGOT) U/L  --   --   --   --   --   --  20   ALT (SGPT) U/L  --   --   --   --   --   --  19   CALCIUM mg/dL 9.5 9.5 9.6 9.2 9.5 9.0 9.5     Results from last 7 days   Lab Units 01/18/25  0520   CK TOTAL U/L 25     Results from last 7 days   Lab Units 01/13/25  1226   SED RATE mm/hr 40*         Microbiology   Microbiology Results (last 10 days)       Procedure Component Value - Date/Time    Anaerobic Culture - Bone, Foot, Left [[id number]]  (Abnormal) Collected: 01/14/25 1651    Lab Status: Final result Specimen: Bone from Foot, Left Updated: 01/19/25 1405     Anaerobic Culture Bacteroides pyogenes      Prevotella denticola    Tissue / Bone Culture - Bone, Foot, Left [[id number]]  (Abnormal) Collected: 01/14/25 1651    Lab Status: Final result Specimen: Bone from Foot, Left Updated: 01/17/25 0813     Tissue Culture Scant growth (1+) Escherichia coli      Rare growth Proteus mirabilis       Scant growth (1+) Streptococcus agalactiae (Group B)      Staphylococcus aureus, MRSA     Comment:   Methicillin resistant Staphylococcus aureus, Patient may be an isolation risk.        Gram Stain Rare (1+) WBCs per low power field      Rare (1+) Gram positive cocci in pairs    Narrative:      Refer to previous wound culture collected on 01/13/2025 1203 for MICs      Anaerobic Culture - Tissue, Foot, Left [[id number]]  (Abnormal) Collected: 01/14/25 1645    Lab Status: Final result Specimen: Tissue from Foot, Left Updated: 01/19/25 1414     Anaerobic Culture Bacteroides pyogenes      Anaerococcus prevotii    Tissue / Bone Culture - Tissue, Foot, Left [[id number]]  (Abnormal) Collected: 01/14/25 1645    Lab Status: Final result Specimen: Tissue from Foot, Left Updated: 01/17/25 0812     Tissue Culture Scant growth (1+) Escherichia coli      Rare growth Proteus mirabilis     Gram Stain Moderate (3+) WBCs per low power field      Moderate (3+) Gram negative bacilli      Moderate (3+) Gram positive cocci    Narrative:      Refer to previous wound culture collected on 01/13/2025 1203 for MICs      MRSA Screen, PCR (Inpatient) - Swab, Nares [[id number]]  (Abnormal) Collected: 01/13/25 1422    Lab Status: Final result Specimen: Swab from Nares Updated: 01/13/25 1547     MRSA PCR MRSA Detected    Narrative:      The negative predictive value of this diagnostic test is high and should only be used to consider de-escalating anti-MRSA therapy. A positive result may indicate colonization with MRSA and must be correlated clinically.    Blood Culture - Blood, Arm, Right [[id number]]  (Normal) Collected: 01/13/25 1226    Lab Status: Final result Specimen: Blood from Arm, Right Updated: 01/18/25 1245     Blood Culture No growth at 5 days    Wound Culture - Wound, Foot, Left [[id number]]  (Abnormal)  (Susceptibility) Collected: 01/13/25 1203    Lab Status: Edited Result - FINAL Specimen: Wound from Foot, Left Updated: 01/17/25 0811      Wound Culture Light growth (2+) Escherichia coli      Light growth (2+) Streptococcus agalactiae (Group B)     Comment:   This organism is considered to be universally susceptible to penicillin.  No further antibiotic testing will be performed. If Clindamycin or Erythromycin is the drug of choice, notify the laboratory within 7 days to request susceptibility testing.         Rare growth Proteus mirabilis      Scant growth (1+) Staphylococcus aureus, MRSA     Comment:   Considering site of infection and appropriate dosing, oxacillin (or cefoxitin) results can be applied to cefazolin, nafcillin, ampicillin/sulbactam, dicloxacillin, amoxicillin/clavulanate, cephalexin, and cefpodoxime.        Gram Stain Many (4+) WBCs per low power field      Moderate (3+) Gram positive cocci in pairs, chains and clusters      Few (2+) Gram negative bacilli      Few (2+) Gram positive bacilli    Susceptibility        Escherichia coli      JOE      Amikacin Susceptible      Amoxicillin + Clavulanate Susceptible      Ampicillin Resistant      Ampicillin + Sulbactam Resistant      Cefazolin (Non Urine) Intermediate      Cefepime Susceptible      Ceftazidime Susceptible      Ceftriaxone Susceptible      Gentamicin Resistant      Levofloxacin Susceptible      Piperacillin + Tazobactam Susceptible      Tetracycline Resistant      Tobramycin Intermediate      Trimethoprim + Sulfamethoxazole Resistant                       Susceptibility        Proteus mirabilis      JOE      Amoxicillin + Clavulanate Intermediate      Ampicillin Resistant      Ampicillin + Sulbactam Susceptible      Cefazolin (Non Urine) Intermediate      Cefepime Susceptible      Ceftazidime Susceptible      Ceftriaxone Susceptible      Gentamicin Susceptible      Levofloxacin Susceptible      Piperacillin + Tazobactam Susceptible      Tetracycline Resistant      Trimethoprim + Sulfamethoxazole Susceptible                       Susceptibility        Staphylococcus aureus,  MRSA      JOE      Clindamycin Susceptible      Erythromycin Susceptible      Oxacillin Resistant      Rifampin Susceptible      Tetracycline Susceptible      Trimethoprim + Sulfamethoxazole Susceptible      Vancomycin Susceptible                       Susceptibility Comments       Escherichia coli    With the exception of urinary-sourced infections, aminoglycosides should not be used as monotherapy.      Proteus mirabilis    With the exception of urinary-sourced infections, aminoglycosides should not be used as monotherapy.               Blood Culture - Blood, Arm, Right [[id number]]  (Normal) Collected: 01/13/25 1201    Lab Status: Final result Specimen: Blood from Arm, Right Updated: 01/18/25 1216     Blood Culture No growth at 5 days            Medication Review:       Schedule Meds  amLODIPine, 10 mg, Oral, Daily  buprenorphine, 8 mg, Sublingual, TID  cefTRIAXone, 2,000 mg, Intravenous, Q24H  gabapentin, 1,200 mg, Oral, Q12H  hydroCHLOROthiazide, 25 mg, Oral, Daily  insulin glargine, 30 Units, Subcutaneous, BID  insulin lispro, 2-9 Units, Subcutaneous, 4x Daily AC & at Bedtime  losartan, 100 mg, Oral, Daily  metroNIDAZOLE, 500 mg, Oral, Q8H  vancomycin, 1,000 mg, Intravenous, Q8H  vitamin D3, 5,000 Units, Oral, Daily        Infusion Meds  Pharmacy to dose vancomycin,         PRN Meds    acetaminophen **OR** acetaminophen **OR** acetaminophen    atropine    atropine    senna-docusate sodium **AND** polyethylene glycol **AND** bisacodyl **AND** bisacodyl    Calcium Replacement - Follow Nurse / BPA Driven Protocol    dextrose    dextrose    diphenhydrAMINE    diphenhydrAMINE    diphenhydrAMINE    diphenhydrAMINE    ePHEDrine Sulfate (Pressors)    ePHEDrine Sulfate (Pressors)    flumazenil    glucagon (human recombinant)    hydrALAZINE    hydrALAZINE    HYDROmorphone    HYDROmorphone    HYDROmorphone    HYDROmorphone    ipratropium-albuterol    ipratropium-albuterol    labetalol    labetalol    lidocaine (cardiac)     lidocaine (cardiac)    Magnesium Standard Dose Replacement - Follow Nurse / BPA Driven Protocol    melatonin    naloxone    naloxone    ondansetron ODT **OR** ondansetron    ondansetron    ondansetron    oxyCODONE    oxyCODONE    oxyCODONE    oxyCODONE    Pharmacy to dose vancomycin    Phosphorus Replacement - Follow Nurse / BPA Driven Protocol    Potassium Replacement - Follow Nurse / BPA Driven Protocol        Assessment & Plan       Antimicrobial Therapy   1.  IV vancomycin        2.  IV ceftriaxone        3.        4.        5.          Assessment     Left diabetic foot with extensive infection in the left first MTP joint.  MRI showed abscess, tenosynovitis and osteomyelitis of the first metatarsal head.  Initial culture growing E. coli, methicillin resistant Staphylococcus aureus, group B streptococcus and Proteus mirabilis.  Patient status post incision and drainage with bone biopsy on 1/14/2024.  Purulent drainage noted and interoperative cultures growing E. coli, Proteus mirabilis and methicillin resistant Staphylococcus aureus.  Bone biopsy was positive for osteomyelitis.  Status post second incision and drainage to the left foot on 1/17/2024-purulence still found.  Anaerobic cultures are growing Bacteroides and Anaerococcus  Patient continued to refuse partial ray amputation     Right diabetic foot with osteomyelitis involving the right large toe.  Status post incision and drainage with bone biopsy on 1/15/2025.  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  Patient notes to remote drug use but denies IV drug abuse..  HIV screen was negative.  Drug screen positive for THC, methamphetamines, amphetamines benzodiazepines and buprenorphine.  Of note patient does have Adderall on his home medication left    Hepatitis C infection.  Diagnosed this admission.       Plan     Case discussed with podiatry.   "Patient still not amenable to left first partial ray amputation.  Podiatry planning to recheck the patient on Monday to see if any further surgical intervention is needed    Continue V vancomycin-ask pharmacy to monitor and dose  Continue IV Rocephin 2 g every 24 hours  Patient will need 6 weeks of IV antibiotics from surgery on 2025-last day on 25  Start p.o. Flagyl 500 mg every 8 hours for 6 weeks  Hepatitis C RNA PCR and genotype-pending  Continue supportive care  A.m. labs    Would recommend rehab for patient for IV antibiotics and wound care  Case discussed with RN at bedside  Patient is a high risk for limb loss  Patient will need to get his blood sugars under control to heal this infection and avoid future infections    Dayton Turcios MD  25  14:44 EST    Note is dictated utilizing voice recognition software/Dragon    Electronically signed by Dayton Turcios MD at 25 1445       Aldair Lind MD at 25 1311              [facility] MEDICINE SERVICE  DAILY PROGRESS NOTE    NAME: Tomas Song  : 1980  MRN: [id number]      LOS: 6 days     PROVIDER OF SERVICE: Aldair Lind MD    Chief Complaint: Osteomyelitis of both feet    Subjective:     History of Present Illness: Tomas Song is a 44 y.o. male with a CMH of type 2 diabetes mellitus, HTN, HLD, ADHD, opioid use disorder (on Subutex) who presented to [facility] on 2025 with left foot pain.  Patient reports approximately 3 months ago he \"ripped off callus\" on the left foot.  However he reports that approximately 3 weeks ago, he started to notice pain, erythema, purulent drainage and foul odor to left foot.  He reports constant pain to the left foot that is exacerbated with weightbearing with intermittent sharp spasms.  Patient also reports remote accidental injury with sledgehammer to left foot.  Patient also reports wound to right great toe but does not recall how that occurred.  " Patient also endorses chills but otherwise denies fever, nausea, vomiting.  Patient reports blood glucose has been controlled and is compliant with insulin/diet.     On ED evaluation, patient was noted to be slightly tachycardic with heart rate of 109, otherwise HDS, afebrile.  Labs are pertinent for WBC 5.9, hemoglobin 9.7, CRP 5.2, ESR 40.  A1c was noted to be 9.1.  Wound cultures obtained in ED, pending.  X-ray of left foot revealed osteomyelitis involving distal shaft and head of the left first metatarsal bone as well as adjacent base of proximal phalanx.  X-ray of right foot revealed changes of osteomyelitis involving the distal phalanx of the right great toe with pathological fracture secondary to osteomyelitis of the limiting medial aspect of the head of the proximal phalanx.  Patient started on vancomycin in ED.  Hospital service to admit for further evaluation management.     Interval History:      1/14-Patient seen and evaluated at bedside. No complaints this morning. Pain controlled. Patient not amenable to amputation at this time but with plans for I&D in OR this afternoon.   1/15 patient seen and examined in bed no acute distress, vital signs stable, discussed with RN.  Awaiting cultures.  1/16/25 patient seen and examined.  No acute distress, no new complaints, for surgery in a.m.  Cough tolerating antibiotics, discussed with RN.  pt will need less  than 30 days in SNF   1/17 seen in bed NAD, no new complaints, DW RN vss  1/18 seen in bed NAD, No new complaints, tolerating abx, DW RN Awaiting placement.  1/19 seen and examined, in bed no acute  distress,  pain well controled, no new complaints, vss,    Plan: From home alone. Referral to [facility] skilled pending acceptance. Will need precert and PASRR .     Treatment plan discussed with patient. All questions addressed.     Review of Systems:   Denies fevers, chills  Denies chest pain, edema  Denies shortness of breath, cough  Denies nausea,  vomiting, diarrhea  Denies dysuria, hematuria    Objective:     Vital Signs  Temp:  [98 °F (36.7 °C)-98.3 °F (36.8 °C)] 98 °F (36.7 °C)  Heart Rate:  [] 83  Resp:  [16-18] 18  BP: (104-138)/(70-80) 132/78   Body mass index is 27.76 kg/m².    Physical Exam   General: No acute distress  Neuro: AAOx3, no FND  CV: RRR, no peripheral edema  Pulm: CTAB, no increased work of breathing  Abd: Soft, nontender, nondistended  Skin: Bilateral foot wounds noted with bandages on left foot, purulent discharge noted; skin well perfused and pink.  Psych: Appropriate mood and affect    Scheduled Meds   amLODIPine, 10 mg, Oral, Daily  buprenorphine, 8 mg, Sublingual, TID  cefTRIAXone, 2,000 mg, Intravenous, Q24H  gabapentin, 1,200 mg, Oral, Q12H  hydroCHLOROthiazide, 25 mg, Oral, Daily  insulin glargine, 30 Units, Subcutaneous, BID  insulin lispro, 2-9 Units, Subcutaneous, 4x Daily AC & at Bedtime  losartan, 100 mg, Oral, Daily  vancomycin, 1,000 mg, Intravenous, Q8H  vitamin D3, 5,000 Units, Oral, Daily       PRN Meds     acetaminophen **OR** acetaminophen **OR** acetaminophen    atropine    atropine    senna-docusate sodium **AND** polyethylene glycol **AND** bisacodyl **AND** bisacodyl    Calcium Replacement - Follow Nurse / BPA Driven Protocol    dextrose    dextrose    diphenhydrAMINE    diphenhydrAMINE    diphenhydrAMINE    diphenhydrAMINE    ePHEDrine Sulfate (Pressors)    ePHEDrine Sulfate (Pressors)    flumazenil    glucagon (human recombinant)    hydrALAZINE    hydrALAZINE    HYDROmorphone    HYDROmorphone    HYDROmorphone    HYDROmorphone    ipratropium-albuterol    ipratropium-albuterol    labetalol    labetalol    lidocaine (cardiac)    lidocaine (cardiac)    Magnesium Standard Dose Replacement - Follow Nurse / BPA Driven Protocol    melatonin    naloxone    naloxone    ondansetron ODT **OR** ondansetron    ondansetron    ondansetron    oxyCODONE    oxyCODONE    oxyCODONE    oxyCODONE    Pharmacy to dose vancomycin     Phosphorus Replacement - Follow Nurse / BPA Driven Protocol    Potassium Replacement - Follow Nurse / BPA Driven Protocol   Infusions  Pharmacy to dose vancomycin,           Diagnostic Data    Results from last 7 days   Lab Units 01/18/25  2314 01/14/25  0418 01/13/25  1226   WBC 10*3/mm3 6.77   < > 5.97   HEMOGLOBIN g/dL 10.3*   < > 9.7*   HEMATOCRIT % 34.7*   < > 31.7*   PLATELETS 10*3/mm3 433   < > 410   GLUCOSE mg/dL 216*   < > 180*   CREATININE mg/dL 0.96   < > 0.96   BUN mg/dL 19   < > 12   SODIUM mmol/L 143   < > 139   POTASSIUM mmol/L 3.8   < > 4.0   AST (SGOT) U/L  --   --  20   ALT (SGPT) U/L  --   --  19   ALK PHOS U/L  --   --  104   BILIRUBIN mg/dL  --   --  0.2   ANION GAP mmol/L 10.8   < > 9.4    < > = values in this interval not displayed.       No radiology results for the last day    Interval results reviewed.  Scheduled Meds:amLODIPine, 10 mg, Oral, Daily  buprenorphine, 8 mg, Sublingual, TID  cefTRIAXone, 2,000 mg, Intravenous, Q24H  gabapentin, 1,200 mg, Oral, Q12H  hydroCHLOROthiazide, 25 mg, Oral, Daily  insulin glargine, 30 Units, Subcutaneous, BID  insulin lispro, 2-9 Units, Subcutaneous, 4x Daily AC & at Bedtime  losartan, 100 mg, Oral, Daily  vancomycin, 1,000 mg, Intravenous, Q8H  vitamin D3, 5,000 Units, Oral, Daily      Continuous Infusions:Pharmacy to dose vancomycin,       PRN Meds:.  acetaminophen **OR** acetaminophen **OR** acetaminophen    atropine    atropine    senna-docusate sodium **AND** polyethylene glycol **AND** bisacodyl **AND** bisacodyl    Calcium Replacement - Follow Nurse / BPA Driven Protocol    dextrose    dextrose    diphenhydrAMINE    diphenhydrAMINE    diphenhydrAMINE    diphenhydrAMINE    ePHEDrine Sulfate (Pressors)    ePHEDrine Sulfate (Pressors)    flumazenil    glucagon (human recombinant)    hydrALAZINE    hydrALAZINE    HYDROmorphone    HYDROmorphone    HYDROmorphone    HYDROmorphone    ipratropium-albuterol    ipratropium-albuterol    labetalol     labetalol    lidocaine (cardiac)    lidocaine (cardiac)    Magnesium Standard Dose Replacement - Follow Nurse / BPA Driven Protocol    melatonin    naloxone    naloxone    ondansetron ODT **OR** ondansetron    ondansetron    ondansetron    oxyCODONE    oxyCODONE    oxyCODONE    oxyCODONE    Pharmacy to dose vancomycin    Phosphorus Replacement - Follow Nurse / BPA Driven Protocol    Potassium Replacement - Follow Nurse / BPA Driven Protocol   Assessment/Plan:     Osteomyelitis of right great toe  Osteomyelitis of left foot  Left foot diabetic ulcer  - Podiatry consulted, appreciate recs  - ID consulted, appreciate recs-Patient will need 6 weeks of IV antibiotics from surgery on 1/17/2025-last day on 2/27/25   - Continue vancomycin, rocephin  - Analgesia, avoid opiates as able  - Wound care consulted  - s/p I&D   - Recommendations for amputation; however, patient not amenable at this time and would like to discuss alternative options  Podiatry planning to recheck the patient on Monday to see if any further surgical intervention is needed     Type 2 diabetes mellitus  - Continue lantus  - SSI, hypoglycemia protocol, CCD  - Plan for diabetic educator prior to discharge    Anemia  - No overt s/sx bleeding  - In setting of acute infection  - Repeat CBC in AM    Hypertension  - Continue amlodipine, HCTZ, losartan    Hyperlipidemia  - Continue fenofibrate    Opioid use disorder  - Continue subutex    ADHD  - Holding home meds during admission    Treatment plan discussed with nursing staff.     VTE Prophylaxis:  No VTE prophylaxis order currently exists.    Holding pharmacological ppx given surgical plans; not amendable to SCDs given bilateral lower extremity wounds.     Code status is   Code Status and Medical Interventions: CPR (Attempt to Resuscitate); Full Support   Ordered at: 01/13/25 1314     Code Status (Patient has no pulse and is not breathing):    CPR (Attempt to Resuscitate)     Medical Interventions (Patient  has pulse or is breathing):    Full Support       Plan for disposition: Home 1/17/25 pending clinical course    Barriers to discharge: OR today, ID consult pending, cultures pending, IV abx    Time: 35+ minutes     Signature: Electronically signed by Aldair Lind MD, 01/19/25, 13:11 EST.  [facility]      Electronically signed by Aldair Lind MD at 01/19/25 5698

## 2025-01-21 NOTE — PLAN OF CARE
Goal Outcome Evaluation:         Patient alert and oriented x 4. Able to make needs known. Pain treated per medications on the MAR. Personal items and call light in reach. Plan of care is ongoing.

## 2025-01-21 NOTE — PLAN OF CARE
Goal Outcome Evaluation:           Progress: improving          Pt VSS, able to make needs known, pain controlled per MAR, work order put in for call light, waiting for placement. Plan of care ongoing.

## 2025-01-22 LAB
CREAT SERPL-MCNC: 0.87 MG/DL (ref 0.76–1.27)
EGFRCR SERPLBLD CKD-EPI 2021: 107.8 ML/MIN/1.73
GLUCOSE BLDC GLUCOMTR-MCNC: 204 MG/DL (ref 70–105)
GLUCOSE BLDC GLUCOMTR-MCNC: 265 MG/DL (ref 70–105)
GLUCOSE BLDC GLUCOMTR-MCNC: 266 MG/DL (ref 70–105)
GLUCOSE BLDC GLUCOMTR-MCNC: 351 MG/DL (ref 70–105)

## 2025-01-22 PROCEDURE — 97110 THERAPEUTIC EXERCISES: CPT

## 2025-01-22 PROCEDURE — 63710000001 INSULIN LISPRO (HUMAN) PER 5 UNITS

## 2025-01-22 PROCEDURE — 82948 REAGENT STRIP/BLOOD GLUCOSE: CPT

## 2025-01-22 PROCEDURE — 63710000001 INSULIN LISPRO (HUMAN) PER 5 UNITS: Performed by: STUDENT IN AN ORGANIZED HEALTH CARE EDUCATION/TRAINING PROGRAM

## 2025-01-22 PROCEDURE — 97530 THERAPEUTIC ACTIVITIES: CPT

## 2025-01-22 PROCEDURE — 25010000002 ENOXAPARIN PER 10 MG: Performed by: STUDENT IN AN ORGANIZED HEALTH CARE EDUCATION/TRAINING PROGRAM

## 2025-01-22 PROCEDURE — 25810000003 SODIUM CHLORIDE 0.9 % SOLUTION 250 ML FLEX CONT: Performed by: NURSE PRACTITIONER

## 2025-01-22 PROCEDURE — 25010000002 VANCOMYCIN 1 G RECONSTITUTED SOLUTION 1 EACH VIAL: Performed by: NURSE PRACTITIONER

## 2025-01-22 PROCEDURE — 97116 GAIT TRAINING THERAPY: CPT

## 2025-01-22 PROCEDURE — 63710000001 INSULIN GLARGINE PER 5 UNITS: Performed by: STUDENT IN AN ORGANIZED HEALTH CARE EDUCATION/TRAINING PROGRAM

## 2025-01-22 PROCEDURE — 25010000002 CEFTRIAXONE PER 250 MG: Performed by: NURSE PRACTITIONER

## 2025-01-22 RX ORDER — INSULIN LISPRO 100 [IU]/ML
7 INJECTION, SOLUTION INTRAVENOUS; SUBCUTANEOUS
Status: DISCONTINUED | OUTPATIENT
Start: 2025-01-22 | End: 2025-01-23

## 2025-01-22 RX ORDER — INSULIN GLARGINE 100 [IU]/ML
40 INJECTION, SOLUTION SUBCUTANEOUS 2 TIMES DAILY
Status: DISCONTINUED | OUTPATIENT
Start: 2025-01-22 | End: 2025-01-24 | Stop reason: HOSPADM

## 2025-01-22 RX ORDER — OXYCODONE HYDROCHLORIDE 5 MG/1
5 TABLET ORAL EVERY 4 HOURS PRN
Status: DISCONTINUED | OUTPATIENT
Start: 2025-01-22 | End: 2025-01-24 | Stop reason: HOSPADM

## 2025-01-22 RX ADMIN — LOSARTAN POTASSIUM 100 MG: 50 TABLET, FILM COATED ORAL at 08:17

## 2025-01-22 RX ADMIN — INSULIN LISPRO 4 UNITS: 100 INJECTION, SOLUTION INTRAVENOUS; SUBCUTANEOUS at 08:22

## 2025-01-22 RX ADMIN — BUPRENORPHINE HCL 8 MG: 8 TABLET SUBLINGUAL at 14:15

## 2025-01-22 RX ADMIN — INSULIN GLARGINE 35 UNITS: 100 INJECTION, SOLUTION SUBCUTANEOUS at 08:22

## 2025-01-22 RX ADMIN — HYDROCHLOROTHIAZIDE 25 MG: 25 TABLET ORAL at 08:17

## 2025-01-22 RX ADMIN — INSULIN LISPRO 6 UNITS: 100 INJECTION, SOLUTION INTRAVENOUS; SUBCUTANEOUS at 07:35

## 2025-01-22 RX ADMIN — AMLODIPINE BESYLATE 10 MG: 5 TABLET ORAL at 08:17

## 2025-01-22 RX ADMIN — INSULIN LISPRO 7 UNITS: 100 INJECTION, SOLUTION INTRAVENOUS; SUBCUTANEOUS at 12:04

## 2025-01-22 RX ADMIN — METRONIDAZOLE 500 MG: 500 TABLET ORAL at 06:12

## 2025-01-22 RX ADMIN — INSULIN LISPRO 8 UNITS: 100 INJECTION, SOLUTION INTRAVENOUS; SUBCUTANEOUS at 20:21

## 2025-01-22 RX ADMIN — Medication 5000 UNITS: at 08:18

## 2025-01-22 RX ADMIN — INSULIN LISPRO 4 UNITS: 100 INJECTION, SOLUTION INTRAVENOUS; SUBCUTANEOUS at 11:50

## 2025-01-22 RX ADMIN — METRONIDAZOLE 500 MG: 500 TABLET ORAL at 14:15

## 2025-01-22 RX ADMIN — INSULIN GLARGINE 40 UNITS: 100 INJECTION, SOLUTION SUBCUTANEOUS at 20:21

## 2025-01-22 RX ADMIN — VANCOMYCIN HYDROCHLORIDE 1000 MG: 1 INJECTION, POWDER, LYOPHILIZED, FOR SOLUTION INTRAVENOUS at 14:15

## 2025-01-22 RX ADMIN — OXYCODONE HYDROCHLORIDE 5 MG: 5 TABLET ORAL at 14:23

## 2025-01-22 RX ADMIN — ENOXAPARIN SODIUM 40 MG: 100 INJECTION SUBCUTANEOUS at 17:04

## 2025-01-22 RX ADMIN — BUPRENORPHINE HCL 8 MG: 8 TABLET SUBLINGUAL at 08:18

## 2025-01-22 RX ADMIN — INSULIN LISPRO 6 UNITS: 100 INJECTION, SOLUTION INTRAVENOUS; SUBCUTANEOUS at 17:04

## 2025-01-22 RX ADMIN — OXYCODONE HYDROCHLORIDE 5 MG: 5 TABLET ORAL at 20:20

## 2025-01-22 RX ADMIN — VANCOMYCIN HYDROCHLORIDE 1000 MG: 1 INJECTION, POWDER, LYOPHILIZED, FOR SOLUTION INTRAVENOUS at 22:00

## 2025-01-22 RX ADMIN — GABAPENTIN 1200 MG: 400 CAPSULE ORAL at 20:20

## 2025-01-22 RX ADMIN — BUPRENORPHINE HCL 8 MG: 8 TABLET SUBLINGUAL at 20:21

## 2025-01-22 RX ADMIN — CEFTRIAXONE SODIUM 2000 MG: 2 INJECTION, POWDER, FOR SOLUTION INTRAMUSCULAR; INTRAVENOUS at 18:29

## 2025-01-22 RX ADMIN — GABAPENTIN 1200 MG: 400 CAPSULE ORAL at 08:18

## 2025-01-22 RX ADMIN — OXYCODONE HYDROCHLORIDE 5 MG: 5 TABLET ORAL at 09:10

## 2025-01-22 RX ADMIN — VANCOMYCIN HYDROCHLORIDE 1000 MG: 1 INJECTION, POWDER, LYOPHILIZED, FOR SOLUTION INTRAVENOUS at 06:12

## 2025-01-22 RX ADMIN — METRONIDAZOLE 500 MG: 500 TABLET ORAL at 22:00

## 2025-01-22 NOTE — DISCHARGE INSTR - OTHER ORDERS
For rental assistance, contact the [facility] Office at [address], [place] [address], phone [phone].  For utility assistance, contact [employer] at [email or web address], phone [phone], option 1.

## 2025-01-22 NOTE — PROGRESS NOTES
"    [facility] MEDICINE SERVICE  DAILY PROGRESS NOTE    NAME: Tomas Song  : 1980  MRN: [id number]      LOS: 9 days     PROVIDER OF SERVICE: Antoinette Medina MD    Chief Complaint: Osteomyelitis of both feet    Subjective:     History of Present Illness: Tomas Song is a 44 y.o. male with a CMH of type 2 diabetes mellitus, HTN, HLD, ADHD, opioid use disorder (on Subutex) who presented to [facility] on 2025 with left foot pain.  Patient reports approximately 3 months ago he \"ripped off callus\" on the left foot.  However he reports that approximately 3 weeks ago, he started to notice pain, erythema, purulent drainage and foul odor to left foot.  He reports constant pain to the left foot that is exacerbated with weightbearing with intermittent sharp spasms.  Patient also reports remote accidental injury with sledgehammer to left foot.  Patient also reports wound to right great toe but does not recall how that occurred.  Patient also endorses chills but otherwise denies fever, nausea, vomiting.  Patient reports blood glucose has been controlled and is compliant with insulin/diet.     On ED evaluation, patient was noted to be slightly tachycardic with heart rate of 109, otherwise HDS, afebrile.  Labs are pertinent for WBC 5.9, hemoglobin 9.7, CRP 5.2, ESR 40.  A1c was noted to be 9.1.  Wound cultures obtained in ED, pending.  X-ray of left foot revealed osteomyelitis involving distal shaft and head of the left first metatarsal bone as well as adjacent base of proximal phalanx.  X-ray of right foot revealed changes of osteomyelitis involving the distal phalanx of the right great toe with pathological fracture secondary to osteomyelitis of the limiting medial aspect of the head of the proximal phalanx.  Patient started on vancomycin in ED.  Hospital service to admit for further evaluation management.     Interval History:      -Patient seen and evaluated at bedside. No " complaints this morning. Pain controlled. Patient not amenable to amputation at this time but with plans for I&D in OR this afternoon.   1/15 patient seen and examined in bed no acute distress, vital signs stable, discussed with RN.  Awaiting cultures.  1/16/25 patient seen and examined.  No acute distress, no new complaints, for surgery in a.m.  Cough tolerating antibiotics, discussed with RN.  pt will need less  than 30 days in SNF   1/17 seen in bed NAD, no new complaints, DW RN vss  1/18 seen in bed NAD, No new complaints, tolerating abx, DW RN Awaiting placement.  1/19 seen and examined, in bed no acute  distress,  pain well controled, no new complaints, vss,  1/20: Denies any new complaints  1/21: POC glucose uncontrolled, insulin adjusted. Denies any other complaints  1/22: POC glucose remain uncontrolled, insulin adjusted. Denies any other complaints    Plan: From home alone. Referral to [facility] skilled pending acceptance. Will need precert and PASRR .     Treatment plan discussed with patient. All questions addressed.     Review of Systems:   Denies fevers, chills  Denies chest pain, edema  Denies shortness of breath, cough  Denies nausea, vomiting, diarrhea  Denies dysuria, hematuria    Objective:     Vital Signs  Temp:  [97.7 °F (36.5 °C)-98 °F (36.7 °C)] 98 °F (36.7 °C)  Heart Rate:  [79-91] 91  Resp:  [13] 13  BP: (114-134)/(65-83) 114/65   Body mass index is 27.76 kg/m².    Physical Exam   General: No acute distress  Neuro: AAOx3, no FND  CV: RRR, no peripheral edema  Pulm: CTAB, no increased work of breathing  Abd: Soft, nontender, nondistended  Skin: Bilateral foot wounds with minimal residual purulence from left fourth submetatarsal surgical side, wound edges are macerated and sutures are intact.  Psych: Appropriate mood and affect    Scheduled Meds   amLODIPine, 10 mg, Oral, Daily  buprenorphine, 8 mg, Sublingual, TID  cefTRIAXone, 2,000 mg, Intravenous, Q24H  enoxaparin, 40 mg, Subcutaneous,  Q24H  gabapentin, 1,200 mg, Oral, Q12H  hydroCHLOROthiazide, 25 mg, Oral, Daily  insulin glargine, 40 Units, Subcutaneous, BID  insulin lispro, 2-9 Units, Subcutaneous, 4x Daily AC & at Bedtime  insulin lispro, 7 Units, Subcutaneous, TID With Meals  losartan, 100 mg, Oral, Daily  metroNIDAZOLE, 500 mg, Oral, Q8H  vancomycin, 1,000 mg, Intravenous, Q8H  vitamin D3, 5,000 Units, Oral, Daily       PRN Meds     acetaminophen **OR** acetaminophen **OR** acetaminophen    senna-docusate sodium **AND** polyethylene glycol **AND** bisacodyl **AND** bisacodyl    Calcium Replacement - Follow Nurse / BPA Driven Protocol    dextrose    dextrose    flumazenil    glucagon (human recombinant)    Magnesium Standard Dose Replacement - Follow Nurse / BPA Driven Protocol    melatonin    naloxone    ondansetron ODT **OR** ondansetron    oxyCODONE    Pharmacy to dose vancomycin    Phosphorus Replacement - Follow Nurse / BPA Driven Protocol    Potassium Replacement - Follow Nurse / BPA Driven Protocol   Infusions  Pharmacy to dose vancomycin,           Diagnostic Data    Results from last 7 days   Lab Units 01/21/25  2326 01/20/25  2349 01/19/25  2250   WBC 10*3/mm3  --  6.08 7.45   HEMOGLOBIN g/dL  --  9.7* 10.3*   HEMATOCRIT %  --  32.0* 34.3*   PLATELETS 10*3/mm3  --  312 383   GLUCOSE mg/dL  --  257* 280*   CREATININE mg/dL 0.87 1.10 1.02   BUN mg/dL  --  29* 17   SODIUM mmol/L  --  140 138   POTASSIUM mmol/L  --  4.7 4.1   AST (SGOT) U/L  --   --  22   ALT (SGPT) U/L  --   --  40   ALK PHOS U/L  --   --  100   BILIRUBIN mg/dL  --   --  0.2   ANION GAP mmol/L  --  7.8 11.7       No radiology results for the last day    Interval results reviewed.  Scheduled Meds:amLODIPine, 10 mg, Oral, Daily  buprenorphine, 8 mg, Sublingual, TID  cefTRIAXone, 2,000 mg, Intravenous, Q24H  enoxaparin, 40 mg, Subcutaneous, Q24H  gabapentin, 1,200 mg, Oral, Q12H  hydroCHLOROthiazide, 25 mg, Oral, Daily  insulin glargine, 40 Units, Subcutaneous,  BID  insulin lispro, 2-9 Units, Subcutaneous, 4x Daily AC & at Bedtime  insulin lispro, 7 Units, Subcutaneous, TID With Meals  losartan, 100 mg, Oral, Daily  metroNIDAZOLE, 500 mg, Oral, Q8H  vancomycin, 1,000 mg, Intravenous, Q8H  vitamin D3, 5,000 Units, Oral, Daily      Continuous Infusions:Pharmacy to dose vancomycin,       PRN Meds:.  acetaminophen **OR** acetaminophen **OR** acetaminophen    senna-docusate sodium **AND** polyethylene glycol **AND** bisacodyl **AND** bisacodyl    Calcium Replacement - Follow Nurse / BPA Driven Protocol    dextrose    dextrose    flumazenil    glucagon (human recombinant)    Magnesium Standard Dose Replacement - Follow Nurse / BPA Driven Protocol    melatonin    naloxone    ondansetron ODT **OR** ondansetron    oxyCODONE    Pharmacy to dose vancomycin    Phosphorus Replacement - Follow Nurse / BPA Driven Protocol    Potassium Replacement - Follow Nurse / BPA Driven Protocol   Assessment/Plan:     Osteomyelitis of right great toe  Osteomyelitis of left foot  Left foot diabetic ulcer  - Podiatry consulted, appreciate recs  - ID consulted, appreciate recs-Patient will need 6 weeks of IV antibiotics from surgery on 1/17/2025-last day on 2/27/25   - Continue vancomycin, rocephin  -Continue p.o. Flagyl 500 mg every 8 hours for 6 weeks  - Analgesia, avoid opiates as able  - Wound care consulted  - s/p I&D   - Recommendations for amputation; however, patient not amenable at this time and would like to discuss alternative options  -No further intervention planned by podiatry  -Podiatry discharge instructions are added to DI.    Type 2 diabetes mellitus  - Continue lantus, increase glargine to 40 units BID and add insulin lispor 7 units TID with meals   - SSI, hypoglycemia protocol, CCD  - Plan for diabetic educator prior to discharge    Anemia  - No overt s/sx bleeding  - In setting of acute infection  - monitor CBC    Hypertension  - Continue amlodipine, HCTZ,  losartan    Hyperlipidemia  - Continue fenofibrate    Opioid use disorder  - Continue subutex    ADHD  - Holding home meds during admission    Treatment plan discussed with nursing staff.     VTE Prophylaxis:  Pharmacologic VTE prophylaxis orders are present.    Holding pharmacological ppx given surgical plans; not amendable to SCDs given bilateral lower extremity wounds.     Code status is   Code Status and Medical Interventions: CPR (Attempt to Resuscitate); Full Support   Ordered at: 01/13/25 1314     Code Status (Patient has no pulse and is not breathing):    CPR (Attempt to Resuscitate)     Medical Interventions (Patient has pulse or is breathing):    Full Support       Plan for disposition: Rehab 1/23/25 pending pre-CERT    Barriers to discharge: Pending safe disposition, skilled rehab placement    Time: 35+ minutes     Signature: Electronically signed by Antoinette Medina MD, 01/22/25, 10:33 EST.  [facility] Hospitalist Team

## 2025-01-22 NOTE — PLAN OF CARE
Goal Outcome Evaluation:      Patient alert and oriented x 4. Able to make needs known. Patient educated on importance of not vaping in the room. Dressings changed on both feet, tolerated well. Pain treated per medications on the MAR. Personal items and call light in reach. Plan of care ongoing.

## 2025-01-22 NOTE — NURSING NOTE
Patient repeatedly educated on not smoking vape in hospital. Nurse found 2 more vapes in patients room, patient offered nicotine patch, patient declined. Vapes moved to other side of the room. Another nurse went into room and noticed 1 vape on the table and the other missing. Patient asked, 2 vapes confiscated. Patient again educated on not smoking in hospital.

## 2025-01-22 NOTE — THERAPY TREATMENT NOTE
"Subjective: Pt agreeable to therapeutic plan of care.    Objective:     Precautions - LLE NWB; RLE WBAT w/ sx shoe     Bed mobility - Independent    Transfers - Mod I using RW  Supervision using B crutches. Cued on how to safely handle crutches while sitting/standing.   Pt able to maintain NWB well through LLE.     Ambulation - 200 feet + 2x5 feet SBA-CGA using B crutches on level ground. Cued to slow down.   2x20 feet Mod I using RW.   Pt able to maintain NWB well through LLE    Stairs - 2 steps CGA using B crutches by ascending backwards and descending forwards.     Therapeutic Exercise - 20 Reps B LE AROM unsupported sitting / EOB    Vitals: WNL    Pain: 0 VAS   Location:   Intervention for pain: N/A    Education: Provided education on the importance of mobility in the acute care setting, Verbal/Tactile Cues, Transfer Training, Gait Training, and WB'ing status    Assessment: Tomas Song presents with functional mobility impairments which indicate the need for skilled intervention. Tolerating session today without incident. Pt progressing well with gait training utilizing B crutches to ambulate on level ground and negotiate two steps to enter home. Updating recommendation to Home with HHPT from PT standpoint. Pt may need to d/c to SNF for medications, IV Abx. Will continue to follow and progress as tolerated.     Plan/Recommendations:   If medically appropriate, Low Intensity Therapy recommended post-acute care - This is recommended as therapy feels this patient would require 2-3 visits per week. OP or HH would be the best option depending on patient's home bound status. Consider, if the patient has other  \"skilled\" needs such as wounds, IV antibiotics, etc. Combined with \"low intensity\" could also equate to a SNF. If patient is medically complex, consider LTAC. Pt requires rolling walker at discharge.     Pt desires Home with Home Health vs SNF at discharge. Pt cooperative; agreeable to therapeutic " recommendations and plan of care.     Post-Tx Position: Up in Chair and Call light and personal items within reach  PPE: gloves    Therapy Charges for Today       Code Description Service Date Service Provider Modifiers Qty    63654381188 HC PT THERAPEUTIC ACT EA 15 MIN 1/21/2025 Keon Jordan, PTA GP 1    34915964903 HC GAIT TRAINING EA 15 MIN 1/21/2025 Keon Jordan, PTA GP 1    03233233756 HC PT THER PROC EA 15 MIN 1/21/2025 Keon Jordan, PTA GP 1    23547549486 HC PT THERAPEUTIC ACT EA 15 MIN 1/22/2025 Keon Jordan, PTA GP 1    78330557617 HC GAIT TRAINING EA 15 MIN 1/22/2025 Keon Jordan, PTA GP 1    89480099619 HC PT THER PROC EA 15 MIN 1/22/2025 Keon Jordan, PTA GP 1           PT Charges       Row Name 01/22/25 1624             Time Calculation    Start Time 1530  -UN      Stop Time 1559  -UN      Time Calculation (min) 29 min  -UN      PT Received On 01/22/25  -UN      PT - Next Appointment 01/23/25  -UN         Time Calculation- PT    Total Timed Code Minutes- PT 29 minute(s)  -UN                User Key  (r) = Recorded By, (t) = Taken By, (c) = Cosigned By      Initials Name Provider Type    UN Keon Jordan PTA Physical Therapist Assistant

## 2025-01-22 NOTE — PLAN OF CARE
"Assessment: Tomas Song presents with functional mobility impairments which indicate the need for skilled intervention. Tolerating session today without incident. Pt progressing well with gait training utilizing B crutches to ambulate on level ground and negotiate two steps to enter home. Updating recommendation to Home with HHPT from PT standpoint. Pt may need to d/c to SNF for medications, IV Abx. Will continue to follow and progress as tolerated.     Plan/Recommendations:   If medically appropriate, Low Intensity Therapy recommended post-acute care - This is recommended as therapy feels this patient would require 2-3 visits per week. OP or HH would be the best option depending on patient's home bound status. Consider, if the patient has other  \"skilled\" needs such as wounds, IV antibiotics, etc. Combined with \"low intensity\" could also equate to a SNF. If patient is medically complex, consider LTAC. Pt requires rolling walker at discharge.     Pt desires Home with Home Health vs SNF at discharge. Pt cooperative; agreeable to therapeutic recommendations and plan of care.     "

## 2025-01-22 NOTE — CASE MANAGEMENT/SOCIAL WORK
Continued Stay Note   Kevin     Patient Name: Tomas Song  MRN: [id number]  Today's Date: 1/22/2025    Admit Date: 1/13/2025    Plan: Referral to [facility] skilled pending acceptance.  Will need precert  PASRR  approved.   Discharge Plan       Row Name 01/22/25 1144       Plan    Plan Referral to [facility] skilled pending acceptance.  Will need precert  PASRR  approved.    Plan Comments Spoke with patient again regarding snf choices.  Patient is now agreed to [facility].  Spoke with liamariangel Estrada about this and she is checking with facility.   Patient also agreeable for referral to be sent to [facility] if [facility] unable to take.                    Expected Discharge Date and Time       Expected Discharge Date Expected Discharge Time    Jan 22, 2025             Margie Drake RN    SIPS 1  [email or web address]  Office [phone]  Cell [phone]

## 2025-01-23 ENCOUNTER — READMISSION MANAGEMENT (OUTPATIENT)
Dept: CALL CENTER | Facility: HOSPITAL | Age: 45
End: 2025-01-23
Payer: MEDICAID

## 2025-01-23 VITALS
OXYGEN SATURATION: 98 % | TEMPERATURE: 97.7 F | WEIGHT: 210.44 LBS | HEART RATE: 111 BPM | HEIGHT: 73 IN | DIASTOLIC BLOOD PRESSURE: 90 MMHG | RESPIRATION RATE: 17 BRPM | SYSTOLIC BLOOD PRESSURE: 139 MMHG | BODY MASS INDEX: 27.89 KG/M2

## 2025-01-23 LAB
CK SERPL-CCNC: 33 U/L (ref 20–200)
CREAT SERPL-MCNC: 0.82 MG/DL (ref 0.76–1.27)
EGFRCR SERPLBLD CKD-EPI 2021: 109.7 ML/MIN/1.73
GLUCOSE BLDC GLUCOMTR-MCNC: 177 MG/DL (ref 70–105)
GLUCOSE BLDC GLUCOMTR-MCNC: 199 MG/DL (ref 70–105)
GLUCOSE BLDC GLUCOMTR-MCNC: 202 MG/DL (ref 70–105)

## 2025-01-23 PROCEDURE — 82948 REAGENT STRIP/BLOOD GLUCOSE: CPT

## 2025-01-23 PROCEDURE — 63710000001 INSULIN GLARGINE PER 5 UNITS: Performed by: STUDENT IN AN ORGANIZED HEALTH CARE EDUCATION/TRAINING PROGRAM

## 2025-01-23 PROCEDURE — 25010000002 DAPTOMYCIN PER 1 MG: Performed by: NURSE PRACTITIONER

## 2025-01-23 PROCEDURE — 25810000003 SODIUM CHLORIDE 0.9 % SOLUTION 250 ML FLEX CONT: Performed by: NURSE PRACTITIONER

## 2025-01-23 PROCEDURE — 25010000002 VANCOMYCIN 1 G RECONSTITUTED SOLUTION 1 EACH VIAL: Performed by: NURSE PRACTITIONER

## 2025-01-23 PROCEDURE — 63710000001 INSULIN LISPRO (HUMAN) PER 5 UNITS

## 2025-01-23 PROCEDURE — 82550 ASSAY OF CK (CPK): CPT | Performed by: NURSE PRACTITIONER

## 2025-01-23 PROCEDURE — 63710000001 INSULIN LISPRO (HUMAN) PER 5 UNITS: Performed by: STUDENT IN AN ORGANIZED HEALTH CARE EDUCATION/TRAINING PROGRAM

## 2025-01-23 PROCEDURE — 25010000002 ENOXAPARIN PER 10 MG: Performed by: STUDENT IN AN ORGANIZED HEALTH CARE EDUCATION/TRAINING PROGRAM

## 2025-01-23 PROCEDURE — 82565 ASSAY OF CREATININE: CPT | Performed by: STUDENT IN AN ORGANIZED HEALTH CARE EDUCATION/TRAINING PROGRAM

## 2025-01-23 RX ORDER — ACETAMINOPHEN 325 MG/1
650 TABLET ORAL EVERY 4 HOURS PRN
Qty: 60 TABLET | Refills: 0 | Status: SHIPPED | OUTPATIENT
Start: 2025-01-23 | End: 2025-02-22

## 2025-01-23 RX ORDER — AMOXICILLIN 250 MG
2 CAPSULE ORAL 2 TIMES DAILY PRN
Qty: 60 TABLET | Refills: 0 | Status: SHIPPED | OUTPATIENT
Start: 2025-01-23 | End: 2025-02-22

## 2025-01-23 RX ORDER — INSULIN LISPRO 100 [IU]/ML
12 INJECTION, SOLUTION INTRAVENOUS; SUBCUTANEOUS
Status: DISCONTINUED | OUTPATIENT
Start: 2025-01-23 | End: 2025-01-24 | Stop reason: HOSPADM

## 2025-01-23 RX ORDER — METRONIDAZOLE 500 MG/1
500 TABLET ORAL EVERY 8 HOURS SCHEDULED
Qty: 105 TABLET | Refills: 0 | Status: SHIPPED | OUTPATIENT
Start: 2025-01-23 | End: 2025-02-27

## 2025-01-23 RX ADMIN — BUPRENORPHINE HCL 8 MG: 8 TABLET SUBLINGUAL at 08:11

## 2025-01-23 RX ADMIN — Medication 5000 UNITS: at 08:10

## 2025-01-23 RX ADMIN — ACETAMINOPHEN 650 MG: 325 TABLET, FILM COATED ORAL at 15:45

## 2025-01-23 RX ADMIN — HYDROCHLOROTHIAZIDE 25 MG: 25 TABLET ORAL at 08:10

## 2025-01-23 RX ADMIN — METRONIDAZOLE 500 MG: 500 TABLET ORAL at 13:55

## 2025-01-23 RX ADMIN — VANCOMYCIN HYDROCHLORIDE 1000 MG: 1 INJECTION, POWDER, LYOPHILIZED, FOR SOLUTION INTRAVENOUS at 13:55

## 2025-01-23 RX ADMIN — OXYCODONE HYDROCHLORIDE 5 MG: 5 TABLET ORAL at 08:22

## 2025-01-23 RX ADMIN — INSULIN LISPRO 4 UNITS: 100 INJECTION, SOLUTION INTRAVENOUS; SUBCUTANEOUS at 08:09

## 2025-01-23 RX ADMIN — LOSARTAN POTASSIUM 100 MG: 50 TABLET, FILM COATED ORAL at 08:11

## 2025-01-23 RX ADMIN — BUPRENORPHINE HCL 8 MG: 8 TABLET SUBLINGUAL at 15:42

## 2025-01-23 RX ADMIN — INSULIN LISPRO 2 UNITS: 100 INJECTION, SOLUTION INTRAVENOUS; SUBCUTANEOUS at 17:27

## 2025-01-23 RX ADMIN — METRONIDAZOLE 500 MG: 500 TABLET ORAL at 05:54

## 2025-01-23 RX ADMIN — INSULIN LISPRO 7 UNITS: 100 INJECTION, SOLUTION INTRAVENOUS; SUBCUTANEOUS at 08:09

## 2025-01-23 RX ADMIN — ENOXAPARIN SODIUM 40 MG: 100 INJECTION SUBCUTANEOUS at 15:42

## 2025-01-23 RX ADMIN — INSULIN LISPRO 2 UNITS: 100 INJECTION, SOLUTION INTRAVENOUS; SUBCUTANEOUS at 11:57

## 2025-01-23 RX ADMIN — VANCOMYCIN HYDROCHLORIDE 1000 MG: 1 INJECTION, POWDER, LYOPHILIZED, FOR SOLUTION INTRAVENOUS at 05:54

## 2025-01-23 RX ADMIN — INSULIN GLARGINE 40 UNITS: 100 INJECTION, SOLUTION SUBCUTANEOUS at 08:10

## 2025-01-23 RX ADMIN — GABAPENTIN 1200 MG: 400 CAPSULE ORAL at 08:10

## 2025-01-23 RX ADMIN — INSULIN LISPRO 12 UNITS: 100 INJECTION, SOLUTION INTRAVENOUS; SUBCUTANEOUS at 11:57

## 2025-01-23 RX ADMIN — INSULIN LISPRO 12 UNITS: 100 INJECTION, SOLUTION INTRAVENOUS; SUBCUTANEOUS at 17:27

## 2025-01-23 RX ADMIN — AMLODIPINE BESYLATE 10 MG: 5 TABLET ORAL at 08:10

## 2025-01-23 RX ADMIN — DAPTOMYCIN 550 MG: 500 INJECTION, POWDER, LYOPHILIZED, FOR SOLUTION INTRAVENOUS at 17:28

## 2025-01-23 NOTE — CASE MANAGEMENT/SOCIAL WORK
Continued Stay Note  [facility]     Patient Name: Tomas Song  MRN: [id number]  Today's Date: 1/23/2025    Admit Date: 1/13/2025    Plan: Home. Set up with [facility] Ambulatory care for IV antibiotics.  Appointments set up ambulatory care.   Discharge Plan       Row Name 01/23/25 1744       Plan    Plan Home. Set up with [facility] Ambulatory care for IV antibiotics.  Appointments set up ambulatory care.    Patient/Family in Agreement with Plan yes    Plan Comments Patient to discharge home with plans to go to [facility] ambulatory care for IV antibiotics daily.  Patient agreeable to wound care and states he is comfortable with this.  PCP and Podiatry appointment set up for next week. Information on AVS.  Patient declining to go to skilled facility.  See SW note                   Expected Discharge Date and Time       Expected Discharge Date Expected Discharge Time    Jan 23, 2025             Margie Drake RN    SIPS 1  [email or web address]  Office [phone]  Cell [phone]

## 2025-01-23 NOTE — PROGRESS NOTES
"    [facility] MEDICINE SERVICE  DAILY PROGRESS NOTE    NAME: Tomas Song  : 1980  MRN: [id number]      LOS: 10 days     PROVIDER OF SERVICE: Antoinette Medina MD    Chief Complaint: Osteomyelitis of both feet    Subjective:     History of Present Illness: Tomas Song is a 44 y.o. male with a CMH of type 2 diabetes mellitus, HTN, HLD, ADHD, opioid use disorder (on Subutex) who presented to [facility] on 2025 with left foot pain.  Patient reports approximately 3 months ago he \"ripped off callus\" on the left foot.  However he reports that approximately 3 weeks ago, he started to notice pain, erythema, purulent drainage and foul odor to left foot.  He reports constant pain to the left foot that is exacerbated with weightbearing with intermittent sharp spasms.  Patient also reports remote accidental injury with sledgehammer to left foot.  Patient also reports wound to right great toe but does not recall how that occurred.  Patient also endorses chills but otherwise denies fever, nausea, vomiting.  Patient reports blood glucose has been controlled and is compliant with insulin/diet.     On ED evaluation, patient was noted to be slightly tachycardic with heart rate of 109, otherwise HDS, afebrile.  Labs are pertinent for WBC 5.9, hemoglobin 9.7, CRP 5.2, ESR 40.  A1c was noted to be 9.1.  Wound cultures obtained in ED, pending.  X-ray of left foot revealed osteomyelitis involving distal shaft and head of the left first metatarsal bone as well as adjacent base of proximal phalanx.  X-ray of right foot revealed changes of osteomyelitis involving the distal phalanx of the right great toe with pathological fracture secondary to osteomyelitis of the limiting medial aspect of the head of the proximal phalanx.  Patient started on vancomycin in ED.  Hospital service to admit for further evaluation management.     Interval History:      -Patient seen and evaluated at bedside. " No complaints this morning. Pain controlled. Patient not amenable to amputation at this time but with plans for I&D in OR this afternoon.   1/15 patient seen and examined in bed no acute distress, vital signs stable, discussed with RN.  Awaiting cultures.  1/16/25 patient seen and examined.  No acute distress, no new complaints, for surgery in a.m.  Cough tolerating antibiotics, discussed with RN.  pt will need less  than 30 days in SNF   1/17 seen in bed NAD, no new complaints, DW RN vss  1/18 seen in bed NAD, No new complaints, tolerating abx, DW RN Awaiting placement.  1/19 seen and examined, in bed no acute  distress,  pain well controled, no new complaints, vss,  1/20: Denies any new complaints  1/21: POC glucose uncontrolled, insulin adjusted. Denies any other complaints  1/22: POC glucose remain uncontrolled, insulin adjusted. Denies any other complaints  1/23: Denies any new complaint.  Awaiting safe disposition    Plan: From home alone. Referral to [facility] skilled pending acceptance. Will need precert and PASRR .     Treatment plan discussed with patient. All questions addressed.     Review of Systems:   Denies fevers, chills  Denies chest pain, edema  Denies shortness of breath, cough  Denies nausea, vomiting, diarrhea  Denies dysuria, hematuria    Objective:     Vital Signs  Temp:  [97.6 °F (36.4 °C)-97.9 °F (36.6 °C)] 97.8 °F (36.6 °C)  Heart Rate:  [86-96] 86  Resp:  [12-16] 16  BP: (117-136)/(72-78) 120/72   Body mass index is 27.76 kg/m².    Physical Exam   General: No acute distress  Neuro: AAOx3, no FND  CV: RRR, no peripheral edema  Pulm: CTAB, no increased work of breathing  Abd: Soft, nontender, nondistended  Skin: Bilateral foot wounds with minimal residual purulence from left fourth submetatarsal surgical side, wound edges are macerated and sutures are intact.  Psych: Appropriate mood and affect    Scheduled Meds   amLODIPine, 10 mg, Oral, Daily  buprenorphine, 8 mg, Sublingual,  TID  cefTRIAXone, 2,000 mg, Intravenous, Q24H  enoxaparin, 40 mg, Subcutaneous, Q24H  gabapentin, 1,200 mg, Oral, Q12H  hydroCHLOROthiazide, 25 mg, Oral, Daily  insulin glargine, 40 Units, Subcutaneous, BID  insulin lispro, 12 Units, Subcutaneous, TID With Meals  insulin lispro, 2-9 Units, Subcutaneous, 4x Daily AC & at Bedtime  losartan, 100 mg, Oral, Daily  metroNIDAZOLE, 500 mg, Oral, Q8H  vancomycin, 1,000 mg, Intravenous, Q8H  vitamin D3, 5,000 Units, Oral, Daily       PRN Meds     acetaminophen **OR** acetaminophen **OR** acetaminophen    senna-docusate sodium **AND** polyethylene glycol **AND** bisacodyl **AND** bisacodyl    Calcium Replacement - Follow Nurse / BPA Driven Protocol    dextrose    dextrose    flumazenil    glucagon (human recombinant)    Magnesium Standard Dose Replacement - Follow Nurse / BPA Driven Protocol    melatonin    naloxone    ondansetron ODT **OR** ondansetron    oxyCODONE    Pharmacy to dose vancomycin    Phosphorus Replacement - Follow Nurse / BPA Driven Protocol    Potassium Replacement - Follow Nurse / BPA Driven Protocol   Infusions  Pharmacy to dose vancomycin,           Diagnostic Data    Results from last 7 days   Lab Units 01/21/25  2326 01/20/25  2349 01/19/25  2250   WBC 10*3/mm3  --  6.08 7.45   HEMOGLOBIN g/dL  --  9.7* 10.3*   HEMATOCRIT %  --  32.0* 34.3*   PLATELETS 10*3/mm3  --  312 383   GLUCOSE mg/dL  --  257* 280*   CREATININE mg/dL 0.87 1.10 1.02   BUN mg/dL  --  29* 17   SODIUM mmol/L  --  140 138   POTASSIUM mmol/L  --  4.7 4.1   AST (SGOT) U/L  --   --  22   ALT (SGPT) U/L  --   --  40   ALK PHOS U/L  --   --  100   BILIRUBIN mg/dL  --   --  0.2   ANION GAP mmol/L  --  7.8 11.7       No radiology results for the last day    Interval results reviewed.  Scheduled Meds:amLODIPine, 10 mg, Oral, Daily  buprenorphine, 8 mg, Sublingual, TID  cefTRIAXone, 2,000 mg, Intravenous, Q24H  enoxaparin, 40 mg, Subcutaneous, Q24H  gabapentin, 1,200 mg, Oral,  Q12H  hydroCHLOROthiazide, 25 mg, Oral, Daily  insulin glargine, 40 Units, Subcutaneous, BID  insulin lispro, 12 Units, Subcutaneous, TID With Meals  insulin lispro, 2-9 Units, Subcutaneous, 4x Daily AC & at Bedtime  losartan, 100 mg, Oral, Daily  metroNIDAZOLE, 500 mg, Oral, Q8H  vancomycin, 1,000 mg, Intravenous, Q8H  vitamin D3, 5,000 Units, Oral, Daily      Continuous Infusions:Pharmacy to dose vancomycin,       PRN Meds:.  acetaminophen **OR** acetaminophen **OR** acetaminophen    senna-docusate sodium **AND** polyethylene glycol **AND** bisacodyl **AND** bisacodyl    Calcium Replacement - Follow Nurse / BPA Driven Protocol    dextrose    dextrose    flumazenil    glucagon (human recombinant)    Magnesium Standard Dose Replacement - Follow Nurse / BPA Driven Protocol    melatonin    naloxone    ondansetron ODT **OR** ondansetron    oxyCODONE    Pharmacy to dose vancomycin    Phosphorus Replacement - Follow Nurse / BPA Driven Protocol    Potassium Replacement - Follow Nurse / BPA Driven Protocol   Assessment/Plan:     Osteomyelitis of right great toe  Osteomyelitis of left foot  Left foot diabetic ulcer  - Podiatry consulted, appreciate recs  - ID consulted, appreciate recs-Patient will need 6 weeks of IV antibiotics from surgery on 1/17/2025-last day on 2/27/25   - Continue vancomycin, rocephin  -Continue p.o. Flagyl 500 mg every 8 hours for 6 weeks  - Analgesia, avoid opiates as able  - Wound care consulted  - s/p I&D   - Recommendations for amputation; however, patient not amenable at this time and would like to discuss alternative options  -No further intervention planned by podiatry  -Podiatry discharge instructions are added to DI.    Type 2 diabetes mellitus  - Continue lantus, increase glargine to 40 units BID and increase insulin lispro to 12 units 3 times daily with meals  - SSI, hypoglycemia protocol, CCD  - Plan for diabetic educator prior to discharge    Anemia  - No overt s/sx bleeding  - In  setting of acute infection  - monitor CBC    Hypertension  - Continue amlodipine, HCTZ, losartan    Hyperlipidemia  - Continue fenofibrate    Opioid use disorder  - Continue subutex    ADHD  - Holding home meds during admission    Treatment plan discussed with nursing staff.     VTE Prophylaxis:  Pharmacologic VTE prophylaxis orders are present.    Holding pharmacological ppx given surgical plans; not amendable to SCDs given bilateral lower extremity wounds.     Code status is   Code Status and Medical Interventions: CPR (Attempt to Resuscitate); Full Support   Ordered at: 01/13/25 1314     Code Status (Patient has no pulse and is not breathing):    CPR (Attempt to Resuscitate)     Medical Interventions (Patient has pulse or is breathing):    Full Support       Plan for disposition: Rehab 1/24/25 pending pre-CERT    Barriers to discharge: Pending safe disposition, skilled rehab placement    Time: 35+ minutes     Signature: Electronically signed by Antoinette Medina MD, 01/23/25, 11:00 EST.  [facility] Hospitalist Team

## 2025-01-23 NOTE — CONSULTS
"Nutrition Services    Patient Name: Tomas Song  YOB: 1980  MRN: [id number]  Admission date: 1/13/2025    Comment:    --Encourage PO intake     CLINICAL NUTRITION ASSESSMENT      Reason for Assessment 1/23: Length of stay      H&P      Past Medical History:   Diagnosis Date    Hypertension     Type 2 diabetes mellitus        Past Surgical History:   Procedure Laterality Date    INCISION AND DRAINAGE OF WOUND Bilateral 1/14/2025    Procedure: INCISION AND DRAINAGE WOUND BILATERAL FEET;  Surgeon: Susan Garrison DPM;  Location: [facility] MAIN OR;  Service: Podiatry;  Laterality: Bilateral;    INCISION AND DRAINAGE OF WOUND Left 1/17/2025    Procedure: INCISION AND DRAINAGE FOOT;  Surgeon: Susan Garrison DPM;  Location: [facility] MAIN OR;  Service: Podiatry;  Laterality: Left;        Current Problems   Osteomyelitis of right great toe  Osteomyelitis of left foot  Left foot diabetic ulcer  -Podiatry is following, s/p I&D  -Infectious disease is following   Type 2 diabetes mellitus   Anemia  Hypertension  Hyperlipidemia  Opioid use disorder  ADHD        Encounter Information        Trending Narrative     1/23: Pt was admitted with left foot pain. Pt has osteomyelitis - Podiatry and Infectious disease is following, s/p I&D. At my visit pt reports a good appetite/intake. Pt denies recent weight loss. NFPE completed and not consistent with nutrition diagnosis of malnutrition at this time using AND/ASPEN criteria       Anthropometrics        Current Height, Weight Height: 185.4 cm (73\")  Weight: 95.5 kg (210 lb 7 oz) (01/14/25 2206)       Usual Body Weight (UBW) Unknown        Trending Weight Hx     This admission: 1/23: 210# (obtained 1/14)              PTA: 1/23: No recent weight's are documented     Wt Readings from Last 30 Encounters:   01/14/25 2206 95.5 kg (210 lb 7 oz)   01/13/25 1136 90.7 kg (200 lb)   04/07/23 0527 82.7 kg (182 lb 5.1 oz)   04/06/23 1922 82.8 kg (182 lb 8.7 oz)   04/06/23 " 0930 86.2 kg (190 lb)   04/15/22 0834 89.4 kg (197 lb)   06/03/20 2322 98 kg (216 lb 0.8 oz)      BMI kg/m2 Body mass index is 27.76 kg/m².       Labs        Pertinent Labs Reviewed. Management per MD    Results from last 7 days   Lab Units 01/23/25  1049 01/21/25  2326 01/20/25  2349 01/19/25  2250 01/18/25  2314   SODIUM mmol/L  --   --  140 138 143   POTASSIUM mmol/L  --   --  4.7 4.1 3.8   CHLORIDE mmol/L  --   --  102 100 104   CO2 mmol/L  --   --  30.2* 26.3 28.2   BUN mg/dL  --   --  29* 17 19   CREATININE mg/dL 0.82 0.87 1.10 1.02 0.96   CALCIUM mg/dL  --   --  9.3 9.5 9.5   BILIRUBIN mg/dL  --   --   --  0.2  --    ALK PHOS U/L  --   --   --  100  --    ALT (SGPT) U/L  --   --   --  40  --    AST (SGOT) U/L  --   --   --  22  --    GLUCOSE mg/dL  --   --  257* 280* 216*     Results from last 7 days   Lab Units 01/20/25  2349   HEMOGLOBIN g/dL 9.7*   HEMATOCRIT % 32.0*     Lab Results   Component Value Date    HGBA1C 9.10 (H) 01/13/2025        Medications    Scheduled Medications amLODIPine, 10 mg, Oral, Daily  buprenorphine, 8 mg, Sublingual, TID  cefTRIAXone, 2,000 mg, Intravenous, Q24H  enoxaparin, 40 mg, Subcutaneous, Q24H  gabapentin, 1,200 mg, Oral, Q12H  hydroCHLOROthiazide, 25 mg, Oral, Daily  insulin glargine, 40 Units, Subcutaneous, BID  insulin lispro, 12 Units, Subcutaneous, TID With Meals  insulin lispro, 2-9 Units, Subcutaneous, 4x Daily AC & at Bedtime  losartan, 100 mg, Oral, Daily  metroNIDAZOLE, 500 mg, Oral, Q8H  vancomycin, 1,000 mg, Intravenous, Q8H  vitamin D3, 5,000 Units, Oral, Daily        Infusions Pharmacy to dose vancomycin,         PRN Medications   acetaminophen **OR** acetaminophen **OR** acetaminophen    senna-docusate sodium **AND** polyethylene glycol **AND** bisacodyl **AND** bisacodyl    Calcium Replacement - Follow Nurse / BPA Driven Protocol    dextrose    dextrose    flumazenil    glucagon (human recombinant)    Magnesium Standard Dose Replacement - Follow Nurse / BPA  Driven Protocol    melatonin    naloxone    ondansetron ODT **OR** ondansetron    oxyCODONE    Pharmacy to dose vancomycin    Phosphorus Replacement - Follow Nurse / BPA Driven Protocol    Potassium Replacement - Follow Nurse / BPA Driven Protocol     Physical Findings        Trending Physical   Appearance, NFPE 1/23: NFPE completed and not consistent with nutrition diagnosis of malnutrition at this time using AND/ASPEN criteria     --  Edema    1+ legs, ankles, feet    Bowel Function   BM 1/22, pt has stool softeners ordered PRN    Tubes   No feeding tube    Chewing/Swallowing   No issues reported    Skin   Foot wound    --  Current Nutrition Orders & Evaluation of Intake       Oral Nutrition     Food Allergies NKFA   Current PO Diet Diet: Diabetic; Consistent Carbohydrate; Fluid Consistency: Thin (IDDSI 0)   Supplement None ordered   PO Evaluation     Trending % PO Intake 1/23: 100%      Protein Requirements    EST Needs, Method, Wt used 100-120 g/day (1.2-1.5 g/kg using IBW)       Nutritional Risk Screening        NRS-2002 Score          Nutrition Diagnosis         Nutrition Dx Problem 1 Increased nutrient needs (protein) related to healing as evidenced by wound       Nutrition Dx Problem 2        Intervention Goal         Intervention Goal(s) To continue with % of meals      Nutrition Intervention        RD Action NFPE complete, encourage PO intake     Nutrition Prescription          Diet Prescription Diabetic    Supplement Prescription None ordered   --  Monitor/Evaluation        Monitor PO intake, Skin status         Electronically signed by:  Sera Perez RD  01/23/25 12:27 EST

## 2025-01-23 NOTE — PROGRESS NOTES
Infectious Diseases Progress Note      LOS: 10 days   Patient Care Team:  Ty Gao DO as PCP - General (Internal Medicine)    Chief Complaint: Left foot wound    Subjective       The patient has been afebrile for the last 24 hours.  The patient is on room air, hemodynamically stable, and is tolerating antimicrobial therapy.  No new complaints.      Review of Systems:   Review of Systems   Constitutional: Negative.    HENT: Negative.     Eyes: Negative.    Respiratory: Negative.     Cardiovascular: Negative.    Gastrointestinal: Negative.    Endocrine: Negative.    Genitourinary: Negative.    Musculoskeletal: Negative.    Skin:  Positive for wound.   Neurological: Negative.    Psychiatric/Behavioral: Negative.     All other systems reviewed and are negative.       Objective     Vital Signs  Temp:  [97.5 °F (36.4 °C)-97.8 °F (36.6 °C)] 97.5 °F (36.4 °C)  Heart Rate:  [] 113  Resp:  [12-18] 18  BP: (117-124)/(72-78) 124/76    Physical Exam:  Physical Exam  Vitals and nursing note reviewed.   Constitutional:       General: He is not in acute distress.     Appearance: Normal appearance. He is well-developed and normal weight. He is not diaphoretic.   HENT:      Head: Normocephalic and atraumatic.   Eyes:      Conjunctiva/sclera: Conjunctivae normal.      Pupils: Pupils are equal, round, and reactive to light.   Cardiovascular:      Rate and Rhythm: Normal rate and regular rhythm.      Heart sounds: Normal heart sounds, S1 normal and S2 normal.   Pulmonary:      Effort: Pulmonary effort is normal. No respiratory distress.      Breath sounds: Normal breath sounds. No stridor. No wheezing or rales.   Abdominal:      General: Bowel sounds are normal. There is no distension.      Palpations: Abdomen is soft. There is no mass.      Tenderness: There is no abdominal tenderness. There is no guarding.   Musculoskeletal:         General: No deformity. Normal range of motion.      Cervical back: Neck supple.    Skin:     General: Skin is warm and dry.      Coloration: Skin is not pale.      Findings: No erythema or rash.      Comments: Dressings on both feet   Neurological:      Mental Status: He is alert and oriented to person, place, and time.      Cranial Nerves: No cranial nerve deficit.   Psychiatric:         Mood and Affect: Mood normal.          Results Review:    I have reviewed all clinical data, test, lab, and imaging results.     Radiology  No Radiology Exams Resulted Within Past 24 Hours    Cardiology    Laboratory    Results from last 7 days   Lab Units 01/20/25 2349 01/19/25 2250 01/18/25 2314 01/18/25 0520 01/17/25  0537   WBC 10*3/mm3 6.08 7.45 6.77 11.01* 6.76   HEMOGLOBIN g/dL 9.7* 10.3* 10.3* 10.5* 10.2*   HEMATOCRIT % 32.0* 34.3* 34.7* 34.4* 33.1*   PLATELETS 10*3/mm3 312 383 433 381 396     Results from last 7 days   Lab Units 01/23/25  1049 01/21/25 2326 01/20/25 2349 01/19/25 2250 01/18/25 2314 01/18/25 0520 01/17/25  0537   SODIUM mmol/L  --   --  140 138 143 141 138   POTASSIUM mmol/L  --   --  4.7 4.1 3.8 4.1 4.1   CHLORIDE mmol/L  --   --  102 100 104 102 100   CO2 mmol/L  --   --  30.2* 26.3 28.2 26.4 29.7*   BUN mg/dL  --   --  29* 17 19 23* 14   CREATININE mg/dL 0.82 0.87 1.10 1.02 0.96 0.89 0.76   GLUCOSE mg/dL  --   --  257* 280* 216* 256* 280*   ALBUMIN g/dL  --   --   --  3.6  --   --   --    BILIRUBIN mg/dL  --   --   --  0.2  --   --   --    ALK PHOS U/L  --   --   --  100  --   --   --    AST (SGOT) U/L  --   --   --  22  --   --   --    ALT (SGPT) U/L  --   --   --  40  --   --   --    CALCIUM mg/dL  --   --  9.3 9.5 9.5 9.5 9.6     Results from last 7 days   Lab Units 01/23/25  1049   CK TOTAL U/L 33               Microbiology   Microbiology Results (last 10 days)       Procedure Component Value - Date/Time    Anaerobic Culture - Bone, Foot, Left [[id number]]  (Abnormal) Collected: 01/14/25 1817    Lab Status: Final result Specimen: Bone from Foot, Left Updated: 01/19/25  1405     Anaerobic Culture Bacteroides pyogenes      Prevotella denticola    Tissue / Bone Culture - Bone, Foot, Left [[id number]]  (Abnormal) Collected: 01/14/25 1651    Lab Status: Final result Specimen: Bone from Foot, Left Updated: 01/17/25 0813     Tissue Culture Scant growth (1+) Escherichia coli      Rare growth Proteus mirabilis      Scant growth (1+) Streptococcus agalactiae (Group B)      Staphylococcus aureus, MRSA     Comment:   Methicillin resistant Staphylococcus aureus, Patient may be an isolation risk.        Gram Stain Rare (1+) WBCs per low power field      Rare (1+) Gram positive cocci in pairs    Narrative:      Refer to previous wound culture collected on 01/13/2025 1203 for MICs      Anaerobic Culture - Tissue, Foot, Left [[id number]]  (Abnormal) Collected: 01/14/25 1645    Lab Status: Final result Specimen: Tissue from Foot, Left Updated: 01/19/25 1414     Anaerobic Culture Bacteroides pyogenes      Anaerococcus prevotii    Tissue / Bone Culture - Tissue, Foot, Left [[id number]]  (Abnormal) Collected: 01/14/25 1645    Lab Status: Final result Specimen: Tissue from Foot, Left Updated: 01/17/25 0812     Tissue Culture Scant growth (1+) Escherichia coli      Rare growth Proteus mirabilis     Gram Stain Moderate (3+) WBCs per low power field      Moderate (3+) Gram negative bacilli      Moderate (3+) Gram positive cocci    Narrative:      Refer to previous wound culture collected on 01/13/2025 1203 for MICs              Medication Review:       Schedule Meds  amLODIPine, 10 mg, Oral, Daily  buprenorphine, 8 mg, Sublingual, TID  cefTRIAXone, 2,000 mg, Intravenous, Q24H  enoxaparin, 40 mg, Subcutaneous, Q24H  gabapentin, 1,200 mg, Oral, Q12H  hydroCHLOROthiazide, 25 mg, Oral, Daily  insulin glargine, 40 Units, Subcutaneous, BID  insulin lispro, 12 Units, Subcutaneous, TID With Meals  insulin lispro, 2-9 Units, Subcutaneous, 4x Daily AC & at Bedtime  losartan, 100 mg, Oral, Daily  metroNIDAZOLE, 500 mg,  Oral, Q8H  vancomycin, 1,000 mg, Intravenous, Q8H  vitamin D3, 5,000 Units, Oral, Daily        Infusion Meds  Pharmacy to dose vancomycin,         PRN Meds    acetaminophen **OR** acetaminophen **OR** acetaminophen    senna-docusate sodium **AND** polyethylene glycol **AND** bisacodyl **AND** bisacodyl    Calcium Replacement - Follow Nurse / BPA Driven Protocol    dextrose    dextrose    flumazenil    glucagon (human recombinant)    Magnesium Standard Dose Replacement - Follow Nurse / BPA Driven Protocol    melatonin    naloxone    ondansetron ODT **OR** ondansetron    oxyCODONE    Pharmacy to dose vancomycin    Phosphorus Replacement - Follow Nurse / BPA Driven Protocol    Potassium Replacement - Follow Nurse / BPA Driven Protocol        Assessment & Plan       Antimicrobial Therapy   1.  P.o. Flagyl        2.  IV ceftriaxone        3.        4.        5.          Assessment     Left diabetic foot with extensive infection in the left first MTP joint.  MRI showed abscess, tenosynovitis and osteomyelitis of the first metatarsal head.  Initial culture growing E. coli, methicillin resistant Staphylococcus aureus, group B streptococcus and Proteus mirabilis.  Patient status post incision and drainage with bone biopsy on 1/14/2024.  Purulent drainage noted and interoperative cultures growing E. coli, Proteus mirabilis and methicillin resistant Staphylococcus aureus.  Bone biopsy was positive for osteomyelitis.  Status post second incision and drainage to the left foot on 1/17/2024-purulence still found.  Anaerobic cultures are growing Bacteroides pyogenes and Anaerococcus prevotii, and Provetella denticola   Patient continued to refuse partial ray amputation     Right diabetic foot with osteomyelitis involving the right large toe.  Status post incision and drainage with bone biopsy on 1/15/2025.  Biopsy was negative for osteomyelitis     Diabetes mellitus type 1 with A1c of 9.1     History of IV methamphetamine abuse per  care everywhere records.  Patient notes to remote drug use but denies IV drug abuse..  HIV screen was negative.  Drug screen positive for THC, methamphetamines, amphetamines benzodiazepines and buprenorphine.  Of note patient does have Adderall on his home medication left    Hepatitis C infection.  Diagnosed this admission.  Hepatitis C RNA was undetectable       Plan    Patient does not wish to go to rehab at this point.  Is agreeable to come to ambulatory care for daily antibiotics and to be a daily stick    Discontinue IV vancomycin  Start IV daptomycin 6 Mg per KG (550mg) 24 hours  Stat CPK-was appropriate for daptomycin use  Patient is not on statin-please do not start the patient on statin while on daptomycin  Continue IV Rocephin 2 g every 24 hours  Patient will need 6 weeks of IV antibiotics from surgery on 1/17/2025-last day on 2/27/25  Continue p.o. Flagyl 500 mg every 8 hours for 6 weeks  Weekly labs CBC, creatinine, sed rate and CPK times x 5 weeks  Continue supportive care  Okay to discharge from Infectious Disease standpoint  Not much more to add from infectious disease standpoint-we will sign off at this time-please call with any questions.  Ambulatory care orders have been placed    Please fax all post discharge lab results, imaging studies and correspondence to this fax number [phone]  For any question or concern please contact our service number [phone]      Patient is a high risk for limb loss  Patient will need to get his blood sugars under control to heal this infection and avoid future infections    Latonya Marie, APRN  01/23/25  15:28 EST    Note is dictated utilizing voice recognition software/Dragon

## 2025-01-23 NOTE — PLAN OF CARE
Goal Outcome Evaluation:         Patient alert and oriented x 4. Able to make needs known. Left foot dressing changed, tolerated well. Pain treated per medications on the MAR. Personal items and call light in reach. Plan of care is ongoing.

## 2025-01-23 NOTE — PLAN OF CARE
Goal Outcome Evaluation:  Plan of Care Reviewed With: patient        Progress: improving          Pt VSS, able to make needs known, awaiting placement, pain controlled per MAR. Plan of care ongoing.

## 2025-01-23 NOTE — DISCHARGE INSTR - APPOINTMENTS
January 27, 2025 1:45 Dr Susan Quach    [facility]                                         [id number] [place] [id number] [phone]

## 2025-01-23 NOTE — PAYOR COMM NOTE
"Clinical update for case# [id number]     Patient remains in acute care, insulin adjustments and continued IV antibiotic.   MD notes and clinical attached.   ----------  EXTENDED AUTHORIZATION PENDING:   PLEASE FAX OR CALL DETERMINATION TO CONTACT BELOW:       THANK YOU,    MALIK GouldN, RN  Utilization Review  [facility]  Phone: [phone]  Fax: [phone]      NPI: [id number]  Tax ID: [id number]      Tomas Honeycutt (44 y.o. Male)       Date of Birth   1980    Social Security Number       Address   [address] [place] [address] [place] [address]    Home Phone   [phone]    MRN   [id number]       U[place]   None    Marital Status   Single                            Admission Date   1/13/25    Admission Type   Emergency    Admitting Provider   Aldair Lind MD    Attending Provider   Antoinette Medina MD    Department, Room/Bed   [facility] SURGICAL INPATIENT, 4118/1       Discharge Date       Discharge Disposition       Discharge Destination                                 Attending Provider: Antoinette Medina MD    Allergies: Bactrim [Sulfamethoxazole-trimethoprim]    Isolation: Contact   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)   Code Status: CPR    Ht: 185.4 cm (73\")   Wt: 95.5 kg (210 lb 7 oz)    Admission Cmt: None   Principal Problem: Osteomyelitis of both feet [M86.9]                   Active Insurance as of 1/13/2025       Primary Coverage       Payor Plan Insurance Group Employer/Plan Group    [id number]       Payor Plan Address Payor Plan Phone Number Payor Plan Fax Number Effective Dates    MAIL STOP:   4/8/2020 - None Entered    [address] [address]       [facility] [address]         Subscriber Name Subscriber Birth Date Member ID       TOMAS HONEYCUTT 1980 [id number]                     Emergency Contacts        (Rel.) Home Phone Work Phone Mobile Phone    NICHOLE LEWIS (Significant " Other) -- -- [phone]              Vital Signs (last day)       Date/Time Temp Temp src Pulse Resp BP Patient Position SpO2    01/23/25 1136 97.5 (36.4) Oral 113 18 124/76 Sitting 98    01/23/25 0438 97.8 (36.6) Oral 86 16 120/72 -- 98    01/22/25 1954 97.6 (36.4) Oral 95 12 117/78 Sitting 100    01/22/25 1134 97.9 (36.6) Oral 96 16 136/78 Sitting 100    01/22/25 0817 -- -- -- -- 114/65 -- --    01/22/25 0447 98 (36.7) Oral 91 13 134/83 Lying 98          Current Facility-Administered Medications   Medication Dose Route Frequency Provider Last Rate Last Admin    acetaminophen (TYLENOL) tablet 650 mg  650 mg Oral Q4H PRN Meli, Susan L, DPM   650 mg at 01/21/25 1248    Or    acetaminophen (TYLENOL) 160 MG/5ML oral solution 650 mg  650 mg Oral Q4H PRN Meli, Susan L, DPM        Or    acetaminophen (TYLENOL) suppository 650 mg  650 mg Rectal Q4H PRN Meli, Susan L, DPM        amLODIPine (NORVASC) tablet 10 mg  10 mg Oral Daily Meli, Susan L, DPM   10 mg at 01/23/25 0810    sennosides-docusate (PERICOLACE) 8.6-50 MG per tablet 2 tablet  2 tablet Oral BID PRN Meli, Susan L, DPM   2 tablet at 01/16/25 0756    And    polyethylene glycol (MIRALAX) packet 17 g  17 g Oral Daily PRN Meli, Susan L, DPM        And    bisacodyl (DULCOLAX) EC tablet 5 mg  5 mg Oral Daily PRN Meli, Susan L, DPM        And    bisacodyl (DULCOLAX) suppository 10 mg  10 mg Rectal Daily PRN Meli, Susan L, DPM        buprenorphine (SUBUTEX) SL tablet 8 mg  8 mg Sublingual TID Meli, Susan L, DPM   8 mg at 01/23/25 0811    Calcium Replacement - Follow Nurse / BPA Driven Protocol   Not Applicable PRN Susan Garrison DPM        cefTRIAXone (ROCEPHIN) 2,000 mg in sodium chloride 0.9 % 100 mL MBP  2,000 mg Intravenous Q24H Latonya Marie, APRN 200 mL/hr at 01/22/25 1829 2,000 mg at 01/22/25 1829    dextrose (D50W) (25 g/50 mL) IV injection 25 g  25 g Intravenous Q15 Min PRN Susan Garrison DPM        dextrose  (GLUTOSE) oral gel 15 g  15 g Oral Q15 Min PRN Susan Garrison, DPM        Enoxaparin Sodium (LOVENOX) syringe 40 mg  40 mg Subcutaneous Q24H Antoinette Medina MD   40 mg at 01/22/25 1704    flumazenil (ROMAZICON) injection 0.2 mg  0.2 mg Intravenous PRN Susan Garrison, DPM        gabapentin (NEURONTIN) capsule 1,200 mg  1,200 mg Oral Q12H Susan Garrison, DPM   1,200 mg at 01/23/25 0810    glucagon (GLUCAGEN) injection 1 mg  1 mg Intramuscular Q15 Min PRN Susan Garrison DPM        hydroCHLOROthiazide tablet 25 mg  25 mg Oral Daily Susan Garrison, DPM   25 mg at 01/23/25 0810    insulin glargine (LANTUS, SEMGLEE) injection 40 Units  40 Units Subcutaneous BID Antoinette Medina MD   40 Units at 01/23/25 0810    insulin lispro (HUMALOG/ADMELOG) injection 12 Units  12 Units Subcutaneous TID With Meals Antoinette Medina MD   12 Units at 01/23/25 1157    insulin lispro (HUMALOG/ADMELOG) injection 2-9 Units  2-9 Units Subcutaneous 4x Daily AC & at Bedtime Susan Garrison DPM   2 Units at 01/23/25 1157    losartan (COZAAR) tablet 100 mg  100 mg Oral Daily Susan Garrison DPM   100 mg at 01/23/25 0811    Magnesium Standard Dose Replacement - Follow Nurse / BPA Driven Protocol   Not Applicable PRN Susan Garrison, DPM        melatonin tablet 5 mg  5 mg Oral Nightly PRN Susan Garrison DPM        metroNIDAZOLE (FLAGYL) tablet 500 mg  500 mg Oral Q8H Dayton Turcios MD   500 mg at 01/23/25 1355    naloxone (NARCAN) injection 0.4 mg  0.4 mg Intravenous PRN Susan Garrison, DPM        ondansetron ODT (ZOFRAN-ODT) disintegrating tablet 4 mg  4 mg Oral Q6H PRN Susan Garrison DPM        Or    ondansetron (ZOFRAN) injection 4 mg  4 mg Intravenous Q6H PRN Susan Garrison DPM        oxyCODONE (ROXICODONE) immediate release tablet 5 mg  5 mg Oral Q4H PRN Antoinette Medina MD   5 mg at 01/23/25 0822    Pharmacy to dose vancomycin   Not Applicable Continuous PRN Dayton Turcios,  MD        Phosphorus Replacement - Follow Nurse / BPA Driven Protocol   Not Applicable PRN Susan Garrison DPM        Potassium Replacement - Follow Nurse / BPA Driven Protocol   Not Applicable PRN Susan Garrison DPM        vancomycin (VANCOCIN) 1,000 mg in sodium chloride 0.9 % 250 mL IVPB-VTB  1,000 mg Intravenous Q8H Latonya Marie, APRN 250 mL/hr at 01/23/25 1355 1,000 mg at 01/23/25 1355    vitamin D3 capsule 5,000 Units  5,000 Units Oral Daily Susan Garrison DPM   5,000 Units at 01/23/25 0810     Lab Results (last 48 hours)       Procedure Component Value Units Date/Time    Creatinine Serum (kidney function) GFR component [[id number]]  (Normal) Collected: 01/23/25 1049    Specimen: Blood from Arm, Left Updated: 01/23/25 1204     Creatinine 0.82 mg/dL      eGFR 109.7 mL/min/1.73     Narrative:      GFR Categories in Chronic Kidney Disease (CKD)      GFR Category          GFR (mL/min/1.73)    Interpretation  G1                     90 or greater         Normal or high (1)  G2                      60-89                Mild decrease (1)  G3a                   45-59                Mild to moderate decrease  G3b                   30-44                Moderate to severe decrease  G4                    15-29                Severe decrease  G5                    14 or less           Kidney failure          (1)In the absence of evidence of kidney disease, neither GFR category G1 or G2 fulfill the criteria for CKD.    eGFR calculation 2021 CKD-EPI creatinine equation, which does not include race as a factor    POC Glucose 4x Daily Before Meals & at Bedtime [[id number]]  (Abnormal) Collected: 01/23/25 1141    Specimen: Blood Updated: 01/23/25 1142     Glucose 199 mg/dL      Comment: Serial Number: [id number]:  [id number]       POC Glucose 4x Daily Before Meals & at Bedtime [[id number]]  (Abnormal) Collected: 01/23/25 0738    Specimen: Blood Updated: 01/23/25 0739     Glucose 202 mg/dL      Comment:  Serial Number: [id number]:  [id number]       POC Glucose Once [[id number]]  (Abnormal) Collected: 01/22/25 1955    Specimen: Blood Updated: 01/22/25 1957     Glucose 351 mg/dL      Comment: Serial Number: [id number]:  [id number]       POC Glucose Once [[id number]]  (Abnormal) Collected: 01/22/25 1643    Specimen: Blood Updated: 01/22/25 1644     Glucose 265 mg/dL      Comment: Serial Number: [id number]:  [id number]       POC Glucose 4x Daily Before Meals & at Bedtime [[id number]]  (Abnormal) Collected: 01/22/25 1134    Specimen: Blood Updated: 01/22/25 1135     Glucose 204 mg/dL      Comment: Serial Number: [id number]:  [id number]       POC Glucose 4x Daily Before Meals & at Bedtime [[id number]]  (Abnormal) Collected: 01/22/25 0730    Specimen: Blood Updated: 01/22/25 0732     Glucose 266 mg/dL      Comment: Serial Number: [id number]:  [id number]       Creatinine Serum (kidney function) GFR component [[id number]]  (Normal) Collected: 01/21/25 2326    Specimen: Blood from Arm, Right Updated: 01/22/25 0016     Creatinine 0.87 mg/dL      eGFR 107.8 mL/min/1.73     Narrative:      GFR Categories in Chronic Kidney Disease (CKD)      GFR Category          GFR (mL/min/1.73)    Interpretation  G1                     90 or greater         Normal or high (1)  G2                      60-89                Mild decrease (1)  G3a                   45-59                Mild to moderate decrease  G3b                   30-44                Moderate to severe decrease  G4                    15-29                Severe decrease  G5                    14 or less           Kidney failure          (1)In the absence of evidence of kidney disease, neither GFR category G1 or G2 fulfill the criteria for CKD.    eGFR calculation 2021 CKD-EPI creatinine equation, which does not include race as a factor    POC Glucose Once [[id number]]  (Abnormal) Collected: 01/21/25 2053    Specimen: Blood Updated: 01/21/25 2054      "Glucose 223 mg/dL      Comment: Serial Number: [id number]:  [id number]       POC Glucose Once [[id number]]  (Abnormal) Collected: 25 1630    Specimen: Blood Updated: 25 1631     Glucose 274 mg/dL      Comment: Serial Number: [id number]:  [id number]                Physician Progress Notes (last 48 hours)        Antoinette Medina MD at 25 [address] [place] [address] MEDICINE SERVICE  DAILY PROGRESS NOTE    NAME: Tomas Song  : 1980  MRN: [id number]      LOS: 10 days     PROVIDER OF SERVICE: Antoinette Medina MD    Chief Complaint: Osteomyelitis of both feet    Subjective:     History of Present Illness: Tomas Song is a 44 y.o. male with a CMH of type 2 diabetes mellitus, HTN, HLD, ADHD, opioid use disorder (on Subutex) who presented to [facility] on 2025 with left foot pain.  Patient reports approximately 3 months ago he \"ripped off callus\" on the left foot.  However he reports that approximately 3 weeks ago, he started to notice pain, erythema, purulent drainage and foul odor to left foot.  He reports constant pain to the left foot that is exacerbated with weightbearing with intermittent sharp spasms.  Patient also reports remote accidental injury with sledgehammer to left foot.  Patient also reports wound to right great toe but does not recall how that occurred.  Patient also endorses chills but otherwise denies fever, nausea, vomiting.  Patient reports blood glucose has been controlled and is compliant with insulin/diet.     On ED evaluation, patient was noted to be slightly tachycardic with heart rate of 109, otherwise HDS, afebrile.  Labs are pertinent for WBC 5.9, hemoglobin 9.7, CRP 5.2, ESR 40.  A1c was noted to be 9.1.  Wound cultures obtained in ED, pending.  X-ray of left foot revealed osteomyelitis involving distal shaft and head of the left first metatarsal bone as well as adjacent base of proximal phalanx.  X-ray of " right foot revealed changes of osteomyelitis involving the distal phalanx of the right great toe with pathological fracture secondary to osteomyelitis of the limiting medial aspect of the head of the proximal phalanx.  Patient started on vancomycin in ED.  Hospital service to admit for further evaluation management.     Interval History:      1/14-Patient seen and evaluated at bedside. No complaints this morning. Pain controlled. Patient not amenable to amputation at this time but with plans for I&D in OR this afternoon.   1/15 patient seen and examined in bed no acute distress, vital signs stable, discussed with RN.  Awaiting cultures.  1/16/25 patient seen and examined.  No acute distress, no new complaints, for surgery in a.m.  Cough tolerating antibiotics, discussed with RN.  pt will need less  than 30 days in SNF   1/17 seen in bed NAD, no new complaints, DW RN vss  1/18 seen in bed NAD, No new complaints, tolerating abx, DW RN Awaiting placement.  1/19 seen and examined, in bed no acute  distress,  pain well controled, no new complaints, vss,  1/20: Denies any new complaints  1/21: POC glucose uncontrolled, insulin adjusted. Denies any other complaints  1/22: POC glucose remain uncontrolled, insulin adjusted. Denies any other complaints  1/23: Denies any new complaint.  Awaiting safe disposition    Plan: From home alone. Referral to [facility] skilled pending acceptance. Will need precert and PASRR .     Treatment plan discussed with patient. All questions addressed.     Review of Systems:   Denies fevers, chills  Denies chest pain, edema  Denies shortness of breath, cough  Denies nausea, vomiting, diarrhea  Denies dysuria, hematuria    Objective:     Vital Signs  Temp:  [97.6 °F (36.4 °C)-97.9 °F (36.6 °C)] 97.8 °F (36.6 °C)  Heart Rate:  [86-96] 86  Resp:  [12-16] 16  BP: (117-136)/(72-78) 120/72   Body mass index is 27.76 kg/m².    Physical Exam   General: No acute distress  Neuro: AAOx3, no FND  CV:  RRR, no peripheral edema  Pulm: CTAB, no increased work of breathing  Abd: Soft, nontender, nondistended  Skin: Bilateral foot wounds with minimal residual purulence from left fourth submetatarsal surgical side, wound edges are macerated and sutures are intact.  Psych: Appropriate mood and affect    Scheduled Meds   amLODIPine, 10 mg, Oral, Daily  buprenorphine, 8 mg, Sublingual, TID  cefTRIAXone, 2,000 mg, Intravenous, Q24H  enoxaparin, 40 mg, Subcutaneous, Q24H  gabapentin, 1,200 mg, Oral, Q12H  hydroCHLOROthiazide, 25 mg, Oral, Daily  insulin glargine, 40 Units, Subcutaneous, BID  insulin lispro, 12 Units, Subcutaneous, TID With Meals  insulin lispro, 2-9 Units, Subcutaneous, 4x Daily AC & at Bedtime  losartan, 100 mg, Oral, Daily  metroNIDAZOLE, 500 mg, Oral, Q8H  vancomycin, 1,000 mg, Intravenous, Q8H  vitamin D3, 5,000 Units, Oral, Daily       PRN Meds     acetaminophen **OR** acetaminophen **OR** acetaminophen    senna-docusate sodium **AND** polyethylene glycol **AND** bisacodyl **AND** bisacodyl    Calcium Replacement - Follow Nurse / BPA Driven Protocol    dextrose    dextrose    flumazenil    glucagon (human recombinant)    Magnesium Standard Dose Replacement - Follow Nurse / BPA Driven Protocol    melatonin    naloxone    ondansetron ODT **OR** ondansetron    oxyCODONE    Pharmacy to dose vancomycin    Phosphorus Replacement - Follow Nurse / BPA Driven Protocol    Potassium Replacement - Follow Nurse / BPA Driven Protocol   Infusions  Pharmacy to dose vancomycin,           Diagnostic Data    Results from last 7 days   Lab Units 01/21/25  2326 01/20/25  2349 01/19/25  2250   WBC 10*3/mm3  --  6.08 7.45   HEMOGLOBIN g/dL  --  9.7* 10.3*   HEMATOCRIT %  --  32.0* 34.3*   PLATELETS 10*3/mm3  --  312 383   GLUCOSE mg/dL  --  257* 280*   CREATININE mg/dL 0.87 1.10 1.02   BUN mg/dL  --  29* 17   SODIUM mmol/L  --  140 138   POTASSIUM mmol/L  --  4.7 4.1   AST (SGOT) U/L  --   --  22   ALT (SGPT) U/L  --   --   40   ALK PHOS U/L  --   --  100   BILIRUBIN mg/dL  --   --  0.2   ANION GAP mmol/L  --  7.8 11.7       No radiology results for the last day    Interval results reviewed.  Scheduled Meds:amLODIPine, 10 mg, Oral, Daily  buprenorphine, 8 mg, Sublingual, TID  cefTRIAXone, 2,000 mg, Intravenous, Q24H  enoxaparin, 40 mg, Subcutaneous, Q24H  gabapentin, 1,200 mg, Oral, Q12H  hydroCHLOROthiazide, 25 mg, Oral, Daily  insulin glargine, 40 Units, Subcutaneous, BID  insulin lispro, 12 Units, Subcutaneous, TID With Meals  insulin lispro, 2-9 Units, Subcutaneous, 4x Daily AC & at Bedtime  losartan, 100 mg, Oral, Daily  metroNIDAZOLE, 500 mg, Oral, Q8H  vancomycin, 1,000 mg, Intravenous, Q8H  vitamin D3, 5,000 Units, Oral, Daily      Continuous Infusions:Pharmacy to dose vancomycin,       PRN Meds:.  acetaminophen **OR** acetaminophen **OR** acetaminophen    senna-docusate sodium **AND** polyethylene glycol **AND** bisacodyl **AND** bisacodyl    Calcium Replacement - Follow Nurse / BPA Driven Protocol    dextrose    dextrose    flumazenil    glucagon (human recombinant)    Magnesium Standard Dose Replacement - Follow Nurse / BPA Driven Protocol    melatonin    naloxone    ondansetron ODT **OR** ondansetron    oxyCODONE    Pharmacy to dose vancomycin    Phosphorus Replacement - Follow Nurse / BPA Driven Protocol    Potassium Replacement - Follow Nurse / BPA Driven Protocol   Assessment/Plan:     Osteomyelitis of right great toe  Osteomyelitis of left foot  Left foot diabetic ulcer  - Podiatry consulted, appreciate recs  - ID consulted, appreciate recs-Patient will need 6 weeks of IV antibiotics from surgery on 1/17/2025-last day on 2/27/25   - Continue vancomycin, rocephin  -Continue p.o. Flagyl 500 mg every 8 hours for 6 weeks  - Analgesia, avoid opiates as able  - Wound care consulted  - s/p I&D   - Recommendations for amputation; however, patient not amenable at this time and would like to discuss alternative options  -No  further intervention planned by podiatry  -Podiatry discharge instructions are added to DI.    Type 2 diabetes mellitus  - Continue lantus, increase glargine to 40 units BID and increase insulin lispro to 12 units 3 times daily with meals  - SSI, hypoglycemia protocol, CCD  - Plan for diabetic educator prior to discharge    Anemia  - No overt s/sx bleeding  - In setting of acute infection  - monitor CBC    Hypertension  - Continue amlodipine, HCTZ, losartan    Hyperlipidemia  - Continue fenofibrate    Opioid use disorder  - Continue subutex    ADHD  - Holding home meds during admission    Treatment plan discussed with nursing staff.     VTE Prophylaxis:  Pharmacologic VTE prophylaxis orders are present.    Holding pharmacological ppx given surgical plans; not amendable to SCDs given bilateral lower extremity wounds.     Code status is   Code Status and Medical Interventions: CPR (Attempt to Resuscitate); Full Support   Ordered at: 25 1314     Code Status (Patient has no pulse and is not breathing):    CPR (Attempt to Resuscitate)     Medical Interventions (Patient has pulse or is breathing):    Full Support       Plan for disposition: Rehab 25 pending pre-CERT    Barriers to discharge: Pending safe disposition, skilled rehab placement    Time: 35+ minutes     Signature: Electronically signed by Antoinette Medina MD, 25, 11:00 EST.  [facility]      Electronically signed by Antoinette Medina MD at 25 1101       Antoinette Medina MD at 25 1033              [facility] MEDICINE SERVICE  DAILY PROGRESS NOTE    NAME: Tomas Song  : 1980  MRN: [id number]      LOS: 9 days     PROVIDER OF SERVICE: Antoinette Medina MD    Chief Complaint: Osteomyelitis of both feet    Subjective:     History of Present Illness: Tomas Song is a 44 y.o. male with a CMH of type 2 diabetes mellitus, HTN, HLD, ADHD, opioid use disorder (on  "Subutex) who presented to [facility] on 1/13/2025 with left foot pain.  Patient reports approximately 3 months ago he \"ripped off callus\" on the left foot.  However he reports that approximately 3 weeks ago, he started to notice pain, erythema, purulent drainage and foul odor to left foot.  He reports constant pain to the left foot that is exacerbated with weightbearing with intermittent sharp spasms.  Patient also reports remote accidental injury with sledgehammer to left foot.  Patient also reports wound to right great toe but does not recall how that occurred.  Patient also endorses chills but otherwise denies fever, nausea, vomiting.  Patient reports blood glucose has been controlled and is compliant with insulin/diet.     On ED evaluation, patient was noted to be slightly tachycardic with heart rate of 109, otherwise HDS, afebrile.  Labs are pertinent for WBC 5.9, hemoglobin 9.7, CRP 5.2, ESR 40.  A1c was noted to be 9.1.  Wound cultures obtained in ED, pending.  X-ray of left foot revealed osteomyelitis involving distal shaft and head of the left first metatarsal bone as well as adjacent base of proximal phalanx.  X-ray of right foot revealed changes of osteomyelitis involving the distal phalanx of the right great toe with pathological fracture secondary to osteomyelitis of the limiting medial aspect of the head of the proximal phalanx.  Patient started on vancomycin in ED.  Hospital service to admit for further evaluation management.     Interval History:      1/14-Patient seen and evaluated at bedside. No complaints this morning. Pain controlled. Patient not amenable to amputation at this time but with plans for I&D in OR this afternoon.   1/15 patient seen and examined in bed no acute distress, vital signs stable, discussed with RN.  Awaiting cultures.  1/16/25 patient seen and examined.  No acute distress, no new complaints, for surgery in a.m.  Cough tolerating antibiotics, discussed with " RN.  pt will need less  than 30 days in SNF   1/17 seen in bed NAD, no new complaints, MORRIS RN vss  1/18 seen in bed NAD, No new complaints, tolerating abx, MORRIS RN Awaiting placement.  1/19 seen and examined, in bed no acute  distress,  pain well controled, no new complaints, vss,  1/20: Denies any new complaints  1/21: POC glucose uncontrolled, insulin adjusted. Denies any other complaints  1/22: POC glucose remain uncontrolled, insulin adjusted. Denies any other complaints    Plan: From home alone. Referral to [facility] skilled pending acceptance. Will need precert and PASRR .     Treatment plan discussed with patient. All questions addressed.     Review of Systems:   Denies fevers, chills  Denies chest pain, edema  Denies shortness of breath, cough  Denies nausea, vomiting, diarrhea  Denies dysuria, hematuria    Objective:     Vital Signs  Temp:  [97.7 °F (36.5 °C)-98 °F (36.7 °C)] 98 °F (36.7 °C)  Heart Rate:  [79-91] 91  Resp:  [13] 13  BP: (114-134)/(65-83) 114/65   Body mass index is 27.76 kg/m².    Physical Exam   General: No acute distress  Neuro: AAOx3, no FND  CV: RRR, no peripheral edema  Pulm: CTAB, no increased work of breathing  Abd: Soft, nontender, nondistended  Skin: Bilateral foot wounds with minimal residual purulence from left fourth submetatarsal surgical side, wound edges are macerated and sutures are intact.  Psych: Appropriate mood and affect    Scheduled Meds   amLODIPine, 10 mg, Oral, Daily  buprenorphine, 8 mg, Sublingual, TID  cefTRIAXone, 2,000 mg, Intravenous, Q24H  enoxaparin, 40 mg, Subcutaneous, Q24H  gabapentin, 1,200 mg, Oral, Q12H  hydroCHLOROthiazide, 25 mg, Oral, Daily  insulin glargine, 40 Units, Subcutaneous, BID  insulin lispro, 2-9 Units, Subcutaneous, 4x Daily AC & at Bedtime  insulin lispro, 7 Units, Subcutaneous, TID With Meals  losartan, 100 mg, Oral, Daily  metroNIDAZOLE, 500 mg, Oral, Q8H  vancomycin, 1,000 mg, Intravenous, Q8H  vitamin D3, 5,000 Units, Oral,  Daily       PRN Meds     acetaminophen **OR** acetaminophen **OR** acetaminophen    senna-docusate sodium **AND** polyethylene glycol **AND** bisacodyl **AND** bisacodyl    Calcium Replacement - Follow Nurse / BPA Driven Protocol    dextrose    dextrose    flumazenil    glucagon (human recombinant)    Magnesium Standard Dose Replacement - Follow Nurse / BPA Driven Protocol    melatonin    naloxone    ondansetron ODT **OR** ondansetron    oxyCODONE    Pharmacy to dose vancomycin    Phosphorus Replacement - Follow Nurse / BPA Driven Protocol    Potassium Replacement - Follow Nurse / BPA Driven Protocol   Infusions  Pharmacy to dose vancomycin,           Diagnostic Data    Results from last 7 days   Lab Units 01/21/25  2326 01/20/25  2349 01/19/25  2250   WBC 10*3/mm3  --  6.08 7.45   HEMOGLOBIN g/dL  --  9.7* 10.3*   HEMATOCRIT %  --  32.0* 34.3*   PLATELETS 10*3/mm3  --  312 383   GLUCOSE mg/dL  --  257* 280*   CREATININE mg/dL 0.87 1.10 1.02   BUN mg/dL  --  29* 17   SODIUM mmol/L  --  140 138   POTASSIUM mmol/L  --  4.7 4.1   AST (SGOT) U/L  --   --  22   ALT (SGPT) U/L  --   --  40   ALK PHOS U/L  --   --  100   BILIRUBIN mg/dL  --   --  0.2   ANION GAP mmol/L  --  7.8 11.7       No radiology results for the last day    Interval results reviewed.  Scheduled Meds:amLODIPine, 10 mg, Oral, Daily  buprenorphine, 8 mg, Sublingual, TID  cefTRIAXone, 2,000 mg, Intravenous, Q24H  enoxaparin, 40 mg, Subcutaneous, Q24H  gabapentin, 1,200 mg, Oral, Q12H  hydroCHLOROthiazide, 25 mg, Oral, Daily  insulin glargine, 40 Units, Subcutaneous, BID  insulin lispro, 2-9 Units, Subcutaneous, 4x Daily AC & at Bedtime  insulin lispro, 7 Units, Subcutaneous, TID With Meals  losartan, 100 mg, Oral, Daily  metroNIDAZOLE, 500 mg, Oral, Q8H  vancomycin, 1,000 mg, Intravenous, Q8H  vitamin D3, 5,000 Units, Oral, Daily      Continuous Infusions:Pharmacy to dose vancomycin,       PRN Meds:.  acetaminophen **OR** acetaminophen **OR**  acetaminophen    senna-docusate sodium **AND** polyethylene glycol **AND** bisacodyl **AND** bisacodyl    Calcium Replacement - Follow Nurse / BPA Driven Protocol    dextrose    dextrose    flumazenil    glucagon (human recombinant)    Magnesium Standard Dose Replacement - Follow Nurse / BPA Driven Protocol    melatonin    naloxone    ondansetron ODT **OR** ondansetron    oxyCODONE    Pharmacy to dose vancomycin    Phosphorus Replacement - Follow Nurse / BPA Driven Protocol    Potassium Replacement - Follow Nurse / BPA Driven Protocol   Assessment/Plan:     Osteomyelitis of right great toe  Osteomyelitis of left foot  Left foot diabetic ulcer  - Podiatry consulted, appreciate recs  - ID consulted, appreciate recs-Patient will need 6 weeks of IV antibiotics from surgery on 1/17/2025-last day on 2/27/25   - Continue vancomycin, rocephin  -Continue p.o. Flagyl 500 mg every 8 hours for 6 weeks  - Analgesia, avoid opiates as able  - Wound care consulted  - s/p I&D   - Recommendations for amputation; however, patient not amenable at this time and would like to discuss alternative options  -No further intervention planned by podiatry  -Podiatry discharge instructions are added to DI.    Type 2 diabetes mellitus  - Continue lantus, increase glargine to 40 units BID and add insulin lispor 7 units TID with meals   - SSI, hypoglycemia protocol, CCD  - Plan for diabetic educator prior to discharge    Anemia  - No overt s/sx bleeding  - In setting of acute infection  - monitor CBC    Hypertension  - Continue amlodipine, HCTZ, losartan    Hyperlipidemia  - Continue fenofibrate    Opioid use disorder  - Continue subutex    ADHD  - Holding home meds during admission    Treatment plan discussed with nursing staff.     VTE Prophylaxis:  Pharmacologic VTE prophylaxis orders are present.    Holding pharmacological ppx given surgical plans; not amendable to SCDs given bilateral lower extremity wounds.     Code status is   Code Status  and Medical Interventions: CPR (Attempt to Resuscitate); Full Support   Ordered at: 01/13/25 1314     Code Status (Patient has no pulse and is not breathing):    CPR (Attempt to Resuscitate)     Medical Interventions (Patient has pulse or is breathing):    Full Support       Plan for disposition: Rehab 1/23/25 pending pre-CERT    Barriers to discharge: Pending safe disposition, skilled rehab placement    Time: 35+ minutes     Signature: Electronically signed by Antoinette Medina MD, 01/22/25, 10:33 EST.  [facility] Hospitalist Team      Electronically signed by Antoinette Medina MD at 01/22/25 1034       Susan Garrison DPM at 01/22/25 1030               LOS: 9 days   Patient Care Team:  Ty Gao DO as PCP - General (Internal Medicine)    Chief Complaint: Bilateral foot osteomyelitis    Subjective     Interval History:     Patient Complaints: Patient is POD #5 s/p  repeat incision and drainage of left foot abscess.  Patient reports pain is controlled.   Patient Denies: Patient denies any nausea, vomiting, fevers, chills, shortness of breath.  History taken from: patient    Review of Systems:   Pertinent positives in HPI.    Objective     Vital Signs  Temp:  [97.6 °F (36.4 °C)-98 °F (36.7 °C)] 97.6 °F (36.4 °C)  Heart Rate:  [91-96] 95  Resp:  [12-16] 12  BP: (114-136)/(65-83) 117/78    Physical Exam:  General: Alert and oriented x 3  Vascular: DP and PT pulses faintly palpable bilaterally.  CFT less than 5 seconds to all digits bilaterally.  Moderate nonpitting edema noted to bilateral feet. Erythema and edema improving.  Musculoskeletal: Mild pain on palpation to the left first submetatarsal surgical site. No pain to the right hallux.  Neuro: Protective sensation diminished bilaterally.  Sensation intact to light touch bilaterally.  Derm: Left foot submetatarsal 1 surgical site with scant residual purulence, improved from prior.  Wound edges are macerated.  Sutures intact with some  "reapproximation at this time. Right hallux with sutures intact, no drainage noted.     Labs:  Results from last 7 days   Lab Units 01/20/25  2349   WBC 10*3/mm3 6.08   HEMOGLOBIN g/dL 9.7*   HEMATOCRIT % 32.0*   PLATELETS 10*3/mm3 312     Results from last 7 days   Lab Units 01/21/25  2326 01/20/25  2349   SODIUM mmol/L  --  140   POTASSIUM mmol/L  --  4.7   CHLORIDE mmol/L  --  102   CO2 mmol/L  --  30.2*   BUN mg/dL  --  29*   CREATININE mg/dL 0.87 1.10   GLUCOSE mg/dL  --  257*   CALCIUM mg/dL  --  9.3     Glucose   Date Value Ref Range Status   01/20/2025 257 (H) 65 - 99 mg/dL Final                     No components found for: \"HBA1C\"    Imaging:   Imaging Results (All)       Procedure Component Value Units Date/Time    MRI Foot Right Without Contrast [[id number]] Collected: 01/13/25 2238     Updated: 01/13/25 2250    Narrative:      MRI FOOT RIGHT WO CONTRAST    Date of Exam: 1/13/2025 7:35 PM EST    Indication: Right hallux osteomyelitis.     Comparison: Toe radiographs 1/13/2025    Technique:  Routine multiplanar/multisequence sequence images of the right foot were obtained without contrast administration.      Findings:  Bones: There is marrow edema of the first proximal and distal phalanges with associated loss of normal T1 marrow and cortical signal along the medial aspect of the first interphalangeal joint. No additional areas of abnormal marrow edema. No suspicious   osseous lesions. Small effusion of the first metatarsophalangeal joint. Small to moderate effusion in the first interphalangeal joint.    Soft tissues: Lisfranc ligament is intact. Plantar plates appear intact. Visualized tendons appear grossly intact. Diffuse subcutaneous edema. Atrophy of the intrinsic foot muscles. There is soft tissue wound along the medial aspect of the first toe with   underlying lobulated fluid which may reflect small abscesses. Small mild intermetatarsal bursal fluid in the first, second and third interspaces.      " Impression:      Impression:  1.Soft tissue wound along the medial aspect of the first toe with underlying lobulated fluid which may reflect small abscesses.  2.Marrow edema of the first proximal and distal phalanges with associated loss of normal T1 marrow and cortical signal along the medial aspect of the first interphalangeal joint. These findings are suspicious for osteomyelitis. There is also adjacent   small to moderate effusion in the first interphalangeal joint concerning for septic arthritis.        Electronically Signed: Jacob Kebede MD    1/13/2025 10:48 PM EST    Workstation ID: [id number]    MRI Foot Left Without Contrast [[id number]] Collected: 01/13/25 2212     Updated: 01/13/25 2224    Narrative:      MRI FOOT LEFT WO CONTRAST    Date of Exam: 1/13/2025 7:35 PM EST    Indication: Evaluate abcess and osteomyelitis.     Comparison: One 1325 foot radiographs    Technique:  Routine multiplanar/multisequence sequence images of the left foot were obtained without contrast administration.      Findings:  Bones and joints: Marrow edema and loss of normal T1 cortical and marrow signal with erosions of the first metatarsal head as well as the base of the first proximal phalanx. There is associated lobulated small joint effusion at the first   metatarsophalangeal joint. There is marrow edema seen throughout the remainder of the first metatarsal and the first proximal phalanx. Minimal marrow edema seen along the first distal phalanx. No additional areas of abnormal marrow edema. No suspicious   osseous lesions.    Soft tissues: Soft tissue wound along the plantar medial forefoot just below the first metatarsophalangeal joint. There are associated lobulated fluid seen deep to the wound which may reflect small abscesses. There is involvement of the flexor houses   longus tendon at this location as well with mild associated tenosynovitis. Remaining tendons appear grossly intact. Lisfranc ligament is intact. The  plantar plates of the second through fifth digits appear intact. Diffuse subcutaneous edema.      Impression:      Impression:  1.Soft tissue wound along the plantar medial forefoot just below the first metatarsophalangeal joint. There are associated lobulated fluid collections deep to the wound which may reflect small abscesses. There is involvement of the flexor hallucis longus   tendon at this location as well with mild associated tenosynovitis.  2.Marrow edema and loss of normal T1 cortical and marrow signal with erosions of the first metatarsal head as well as the base of the first proximal phalanx. There is associated lobulated small joint effusion at the first metatarsophalangeal joint. These   findings are concerning for septic arthritis with osteomyelitis of the first metatarsal head and base of the first proximal phalanx. There is reactive marrow edema seen along the remainder of the first proximal phalanx and first metatarsal.        Electronically Signed: Jacob Kebede MD    1/13/2025 10:22 PM EST    Workstation ID: [id number]    XR Foot 3+ View Left [[id number]] Collected: 01/13/25 1239     Updated: 01/13/25 1246    Narrative:      XR FOOT 3+ VW LEFT, XR TOE 2+ VW RIGHT    Date of Exam: 1/13/2025 12:02 PM EST    Indication: Diabetic wound infection involving the left foot in the right great toe.    Comparison: None available.    Findings:  Right great toe: There is a cortical destruction and periostitis involving the distal phalanx, especially along the medial border and also involving the medial aspect of the head of the proximal phalanx. The findings are consistent with osteomyelitis in   the appropriate clinical setting. There is a pathologic fracture secondary to the osteomyelitis involving the medial aspect of the head of the proximal phalanx. There is soft tissue swelling. There is a relatively deep ulcer along the medial aspect of   the right great toe at the site of the bony destructive  changes. There are incidental arthritic changes involving the IP joint and first MTP joint.    Left foot: There is cortical destruction, osteolysis, and periosteal reaction involving the distal shaft and head of the first metatarsal bone as well as the adjacent base of the proximal phalanx. There also may be some osteolytic changes in the base of   the distal phalanx of the left great toe. This is consistent with radiographic changes of osteomyelitis. There is soft tissue swelling involving mainly the left great toe. Shallow ulcers are suggested along the plantar aspect of the medial left forefoot   in the location of the radiographic changes of osteomyelitis. There are minor arthritic changes involving the inner-phalangeal joints of the digits.      Impression:      Impression:  1.Radiographic changes of osteomyelitis involving the distal phalanx of the right great toe with a pathologic fracture secondary to the osteomyelitis involving the medial aspect of the head of the proximal phalanx.  2.Radiographic changes of osteomyelitis involving the distal shaft and head of the left first metatarsal bone as well as the adjacent base of the proximal phalanx. There also may be some osteolytic changes in the base of the distal phalanx of the left   great toe representing osteomyelitis.  3.Soft tissue swelling. There is a relatively deep ulcer along the medial aspect of the right great toe at the site of the bony destructive changes.  4.Soft tissue swelling and shallow ulcers along the medial aspect of the left forefoot at the site of the bony destructive changes.        Electronically Signed: Agapito Rick MD    1/13/2025 12:44 PM EST    Workstation ID: [id number]    XR Toe 2+ View Right [[id number]] Collected: 01/13/25 1239     Updated: 01/13/25 1246    Narrative:      XR FOOT 3+ VW LEFT, XR TOE 2+ VW RIGHT    Date of Exam: 1/13/2025 12:02 PM EST    Indication: Diabetic wound infection involving the left foot in the right great  toe.    Comparison: None available.    Findings:  Right great toe: There is a cortical destruction and periostitis involving the distal phalanx, especially along the medial border and also involving the medial aspect of the head of the proximal phalanx. The findings are consistent with osteomyelitis in   the appropriate clinical setting. There is a pathologic fracture secondary to the osteomyelitis involving the medial aspect of the head of the proximal phalanx. There is soft tissue swelling. There is a relatively deep ulcer along the medial aspect of   the right great toe at the site of the bony destructive changes. There are incidental arthritic changes involving the IP joint and first MTP joint.    Left foot: There is cortical destruction, osteolysis, and periosteal reaction involving the distal shaft and head of the first metatarsal bone as well as the adjacent base of the proximal phalanx. There also may be some osteolytic changes in the base of   the distal phalanx of the left great toe. This is consistent with radiographic changes of osteomyelitis. There is soft tissue swelling involving mainly the left great toe. Shallow ulcers are suggested along the plantar aspect of the medial left forefoot   in the location of the radiographic changes of osteomyelitis. There are minor arthritic changes involving the inner-phalangeal joints of the digits.      Impression:      Impression:  1.Radiographic changes of osteomyelitis involving the distal phalanx of the right great toe with a pathologic fracture secondary to the osteomyelitis involving the medial aspect of the head of the proximal phalanx.  2.Radiographic changes of osteomyelitis involving the distal shaft and head of the left first metatarsal bone as well as the adjacent base of the proximal phalanx. There also may be some osteolytic changes in the base of the distal phalanx of the left   great toe representing osteomyelitis.  3.Soft tissue swelling. There is  a relatively deep ulcer along the medial aspect of the right great toe at the site of the bony destructive changes.  4.Soft tissue swelling and shallow ulcers along the medial aspect of the left forefoot at the site of the bony destructive changes.        Electronically Signed: Agapito Rick MD    1/13/2025 12:44 PM EST    Workstation ID: [id number]            Cultures:   E. coli, methicillin resistant Staphylococcus aureus, group B streptococcus and Proteus mirabilis .     Bacteroides and Anaerococcus      Results Review:     I reviewed the patient's new clinical results.      Assessment & Plan   - POD #3 s/p repeat incision and drainage left foot abscess. POD #6 R hallux and left foot I&D.  - Osteomyelitis, left first metatarsal/hallux  - Abscess, left foot  - Osteomyelitis, right hallux    Plan:  - Patient was examined and evaluated at bedside; vital signs stable at this time.   - Surgical site to the left foot with minimal purulence, improved from prior; sutures intact at this time.  Redressed with Betadine dressing. Recommend Left foot dressing change daily.   - X-ray and MRI confirm osteomyelitis of the right hallux as well as left hallux proximal phalanx and first metatarsal head.  Patient continues to refuse surgical intervention in the form of amputation at this time.  Patient aware of the risks with delaying/refusing surgery.  Patient is at high risk for limb loss. Discussion held today regarding worsening signs of infection and to present to the ED if these happen. He understands the risks being limb/life threatening.   - ID recommendations appreciated; plan for 6 weeks of IV antibiotics due to patient's refusal to have amputation. Currently Vanc/Cefipime/flagyl.   - Patient to remain nonweightbearing to the left lower extremity, WBAT to the right lower extremity in the surgical shoe.  - No future plans for OR at this time.   - OK to d/c from podiatry perspective. Patient awaiting bed for SNF placement.      Podiatry discharge recs:  - WB status: NWB LLE, WBAT RLE with surgical shoe.   - Dressings: Right foot - adaptic, gauze, kerlix, ACE every other day. Left foot - betadine, gauze, ABD pad, Kerlix, ACE - every day.   - Abx: per ID recommendation.   - Follow-up: Outpatient follow up 1/27/25    Podiatry will continue to follow while in-house      Susan Garrison DPM  01/22/25  22:52 EST    I spent a total of 40 minutes on this patient encounter including preparation, review of medical, social, surgical hx, medications, labs, xrays review and interpretation, face to face care, evaluation, treatment, counseling, education, consultation with another provider, documentation, and answering patient questions regarding the plan noted above.         Electronically signed by Susan Garrison DPM at 01/22/25 2853       Dayton Turcios MD at 01/21/25 8952          Infectious Diseases Progress Note      LOS: 8 days   Patient Care Team:  Ty Gao DO as PCP - General (Internal Medicine)    Chief Complaint: Left foot wound    Subjective       The patient has been afebrile for the last 24 hours.  The patient is on room air, hemodynamically stable, and is tolerating antimicrobial therapy.  No new complaints.  Patient currently working with physical therapy    Review of Systems:   Review of Systems   Constitutional: Negative.    HENT: Negative.     Eyes: Negative.    Respiratory: Negative.     Cardiovascular: Negative.    Gastrointestinal: Negative.    Endocrine: Negative.    Genitourinary: Negative.    Musculoskeletal: Negative.    Skin:  Positive for wound.   Neurological: Negative.    Psychiatric/Behavioral: Negative.     All other systems reviewed and are negative.       Objective     Vital Signs  Temp:  [97.8 °F (36.6 °C)-98.2 °F (36.8 °C)] 97.8 °F (36.6 °C)  Heart Rate:  [79-92] 79  Resp:  [13-14] 13  BP: (102-123)/(57-80) 117/72    Physical Exam:  Physical Exam  Vitals and nursing note reviewed.    Constitutional:       General: He is not in acute distress.     Appearance: Normal appearance. He is well-developed and normal weight. He is not diaphoretic.   HENT:      Head: Normocephalic and atraumatic.   Eyes:      Conjunctiva/sclera: Conjunctivae normal.      Pupils: Pupils are equal, round, and reactive to light.   Cardiovascular:      Rate and Rhythm: Normal rate and regular rhythm.      Heart sounds: Normal heart sounds, S1 normal and S2 normal.   Pulmonary:      Effort: Pulmonary effort is normal. No respiratory distress.      Breath sounds: Normal breath sounds. No stridor. No wheezing or rales.   Abdominal:      General: Bowel sounds are normal. There is no distension.      Palpations: Abdomen is soft. There is no mass.      Tenderness: There is no abdominal tenderness. There is no guarding.   Musculoskeletal:         General: No deformity. Normal range of motion.      Cervical back: Neck supple.   Skin:     General: Skin is warm and dry.      Coloration: Skin is not pale.      Findings: No erythema or rash.      Comments: Dressings on both feet   Neurological:      Mental Status: He is alert and oriented to person, place, and time.      Cranial Nerves: No cranial nerve deficit.   Psychiatric:         Mood and Affect: Mood normal.          Results Review:    I have reviewed all clinical data, test, lab, and imaging results.     Radiology  No Radiology Exams Resulted Within Past 24 Hours    Cardiology    Laboratory    Results from last 7 days   Lab Units 01/20/25 2349 01/19/25 2250 01/18/25 2314 01/18/25 0520 01/17/25 0537 01/16/25  0004 01/15/25  0024   WBC 10*3/mm3 6.08 7.45 6.77 11.01* 6.76 7.84 9.93   HEMOGLOBIN g/dL 9.7* 10.3* 10.3* 10.5* 10.2* 9.6* 9.7*   HEMATOCRIT % 32.0* 34.3* 34.7* 34.4* 33.1* 31.5* 31.6*   PLATELETS 10*3/mm3 312 383 433 381 396 402 393     Results from last 7 days   Lab Units 01/20/25 2349 01/19/25 2250 01/18/25 2314 01/18/25 0520 01/17/25  0537 01/16/25  0004  01/15/25  0024   SODIUM mmol/L 140 138 143 141 138 134* 136   POTASSIUM mmol/L 4.7 4.1 3.8 4.1 4.1 3.7 4.7   CHLORIDE mmol/L 102 100 104 102 100 98 100   CO2 mmol/L 30.2* 26.3 28.2 26.4 29.7* 29.5* 27.9   BUN mg/dL 29* 17 19 23* 14 16 14   CREATININE mg/dL 1.10 1.02 0.96 0.89 0.76 0.81 0.83   GLUCOSE mg/dL 257* 280* 216* 256* 280* 248* 312*   ALBUMIN g/dL  --  3.6  --   --   --   --   --    BILIRUBIN mg/dL  --  0.2  --   --   --   --   --    ALK PHOS U/L  --  100  --   --   --   --   --    AST (SGOT) U/L  --  22  --   --   --   --   --    ALT (SGPT) U/L  --  40  --   --   --   --   --    CALCIUM mg/dL 9.3 9.5 9.5 9.5 9.6 9.2 9.5     Results from last 7 days   Lab Units 01/18/25  0520   CK TOTAL U/L 25               Microbiology   Microbiology Results (last 10 days)       Procedure Component Value - Date/Time    Anaerobic Culture - Bone, Foot, Left [[id number]]  (Abnormal) Collected: 01/14/25 1651    Lab Status: Final result Specimen: Bone from Foot, Left Updated: 01/19/25 1405     Anaerobic Culture Bacteroides pyogenes      Prevotella denticola    Tissue / Bone Culture - Bone, Foot, Left [[id number]]  (Abnormal) Collected: 01/14/25 1651    Lab Status: Final result Specimen: Bone from Foot, Left Updated: 01/17/25 0813     Tissue Culture Scant growth (1+) Escherichia coli      Rare growth Proteus mirabilis      Scant growth (1+) Streptococcus agalactiae (Group B)      Staphylococcus aureus, MRSA     Comment:   Methicillin resistant Staphylococcus aureus, Patient may be an isolation risk.        Gram Stain Rare (1+) WBCs per low power field      Rare (1+) Gram positive cocci in pairs    Narrative:      Refer to previous wound culture collected on 01/13/2025 1203 for MICs      Anaerobic Culture - Tissue, Foot, Left [[id number]]  (Abnormal) Collected: 01/14/25 1643    Lab Status: Final result Specimen: Tissue from Foot, Left Updated: 01/19/25 1419     Anaerobic Culture Bacteroides pyogenes      Anaerococcus prevotii     Tissue / Bone Culture - Tissue, Foot, Left [[id number]]  (Abnormal) Collected: 01/14/25 1645    Lab Status: Final result Specimen: Tissue from Foot, Left Updated: 01/17/25 0812     Tissue Culture Scant growth (1+) Escherichia coli      Rare growth Proteus mirabilis     Gram Stain Moderate (3+) WBCs per low power field      Moderate (3+) Gram negative bacilli      Moderate (3+) Gram positive cocci    Narrative:      Refer to previous wound culture collected on 01/13/2025 1203 for MICs      MRSA Screen, PCR (Inpatient) - Swab, Nares [[id number]]  (Abnormal) Collected: 01/13/25 1422    Lab Status: Final result Specimen: Swab from Nares Updated: 01/13/25 1547     MRSA PCR MRSA Detected    Narrative:      The negative predictive value of this diagnostic test is high and should only be used to consider de-escalating anti-MRSA therapy. A positive result may indicate colonization with MRSA and must be correlated clinically.    Blood Culture - Blood, Arm, Right [[id number]]  (Normal) Collected: 01/13/25 1226    Lab Status: Final result Specimen: Blood from Arm, Right Updated: 01/18/25 1245     Blood Culture No growth at 5 days    Wound Culture - Wound, Foot, Left [[id number]]  (Abnormal)  (Susceptibility) Collected: 01/13/25 1203    Lab Status: Edited Result - FINAL Specimen: Wound from Foot, Left Updated: 01/20/25 1406     Wound Culture Light growth (2+) Escherichia coli      Light growth (2+) Streptococcus agalactiae (Group B)     Comment:   This organism is considered to be universally susceptible to penicillin.  No further antibiotic testing will be performed. If Clindamycin or Erythromycin is the drug of choice, notify the laboratory within 7 days to request susceptibility testing.         Rare growth Proteus mirabilis      Scant growth (1+) Staphylococcus aureus, MRSA     Comment:   Considering site of infection and appropriate dosing, oxacillin (or cefoxitin) results can be applied to cefazolin, nafcillin,  ampicillin/sulbactam, dicloxacillin, amoxicillin/clavulanate, cephalexin, and cefpodoxime.        Gram Stain Many (4+) WBCs per low power field      Moderate (3+) Gram positive cocci in pairs, chains and clusters      Few (2+) Gram negative bacilli      Few (2+) Gram positive bacilli    Susceptibility        Escherichia coli      JOE      Amikacin Susceptible      Amoxicillin + Clavulanate Susceptible      Ampicillin Resistant      Ampicillin + Sulbactam Resistant      Cefazolin (Non Urine) Intermediate      Cefepime Susceptible      Ceftazidime Susceptible      Ceftriaxone Susceptible      Gentamicin Resistant      Levofloxacin Susceptible      Piperacillin + Tazobactam Susceptible      Tetracycline Resistant      Tobramycin Intermediate      Trimethoprim + Sulfamethoxazole Resistant                       Susceptibility        Proteus mirabilis      JOE      Amoxicillin + Clavulanate Intermediate      Ampicillin Resistant      Ampicillin + Sulbactam Susceptible      Cefazolin (Non Urine) Intermediate      Cefepime Susceptible      Ceftazidime Susceptible      Ceftriaxone Susceptible      Gentamicin Susceptible      Levofloxacin Susceptible      Piperacillin + Tazobactam Susceptible      Tetracycline Resistant      Trimethoprim + Sulfamethoxazole Susceptible                       Susceptibility        Staphylococcus aureus, MRSA      JOE      Clindamycin Susceptible      Daptomycin Susceptible (C)  [1]       Erythromycin Susceptible      Oxacillin Resistant      Rifampin Susceptible      Tetracycline Susceptible      Trimethoprim + Sulfamethoxazole Susceptible      Vancomycin Susceptible                   [1]  Appended report. These results have been appended to a previously final verified report.                Susceptibility Comments       Escherichia coli    With the exception of urinary-sourced infections, aminoglycosides should not be used as monotherapy.      Proteus mirabilis    With the exception of  urinary-sourced infections, aminoglycosides should not be used as monotherapy.      Staphylococcus aureus, MRSA    Latonya from [facility] requested Daptomycin.               Blood Culture - Blood, Arm, Right [[id number]]  (Normal) Collected: 01/13/25 1201    Lab Status: Final result Specimen: Blood from Arm, Right Updated: 01/18/25 1216     Blood Culture No growth at 5 days            Medication Review:       Schedule Meds  amLODIPine, 10 mg, Oral, Daily  buprenorphine, 8 mg, Sublingual, TID  cefTRIAXone, 2,000 mg, Intravenous, Q24H  enoxaparin, 40 mg, Subcutaneous, Q24H  gabapentin, 1,200 mg, Oral, Q12H  hydroCHLOROthiazide, 25 mg, Oral, Daily  insulin glargine, 35 Units, Subcutaneous, BID  insulin lispro, 2-9 Units, Subcutaneous, 4x Daily AC & at Bedtime  insulin lispro, 4 Units, Subcutaneous, TID With Meals  losartan, 100 mg, Oral, Daily  metroNIDAZOLE, 500 mg, Oral, Q8H  vancomycin, 1,000 mg, Intravenous, Q8H  vitamin D3, 5,000 Units, Oral, Daily        Infusion Meds  Pharmacy to dose vancomycin,         PRN Meds    acetaminophen **OR** acetaminophen **OR** acetaminophen    senna-docusate sodium **AND** polyethylene glycol **AND** bisacodyl **AND** bisacodyl    Calcium Replacement - Follow Nurse / BPA Driven Protocol    dextrose    dextrose    flumazenil    glucagon (human recombinant)    Magnesium Standard Dose Replacement - Follow Nurse / BPA Driven Protocol    melatonin    naloxone    ondansetron ODT **OR** ondansetron    oxyCODONE    oxyCODONE    oxyCODONE    Pharmacy to dose vancomycin    Phosphorus Replacement - Follow Nurse / BPA Driven Protocol    Potassium Replacement - Follow Nurse / BPA Driven Protocol        Assessment & Plan       Antimicrobial Therapy   1.  P.o. Flagyl        2.  IV ceftriaxone        3.        4.        5.          Assessment     Left diabetic foot with extensive infection in the left first MTP joint.  MRI showed abscess, tenosynovitis and osteomyelitis of the first metatarsal head.   Initial culture growing E. coli, methicillin resistant Staphylococcus aureus, group B streptococcus and Proteus mirabilis.  Patient status post incision and drainage with bone biopsy on 1/14/2024.  Purulent drainage noted and interoperative cultures growing E. coli, Proteus mirabilis and methicillin resistant Staphylococcus aureus.  Bone biopsy was positive for osteomyelitis.  Status post second incision and drainage to the left foot on 1/17/2024-purulence still found.  Anaerobic cultures are growing Bacteroides pyogenes and Anaerococcus prevotii, and Provetella denticola   Patient continued to refuse partial ray amputation     Right diabetic foot with osteomyelitis involving the right large toe.  Status post incision and drainage with bone biopsy on 1/15/2025.  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  Patient notes to remote drug use but denies IV drug abuse..  HIV screen was negative.  Drug screen positive for THC, methamphetamines, amphetamines benzodiazepines and buprenorphine.  Of note patient does have Adderall on his home medication left    Hepatitis C infection.  Diagnosed this admission.  Hepatitis C RNA was undetectable       Plan      Continue V vancomycin.  Currently pharmacy following and dosing.  Recommend to keep trough between 15 and 20  Continue IV Rocephin 2 g every 24 hours  Patient will need 6 weeks of IV antibiotics from surgery on 1/17/2025-last day on 2/27/25  Continue p.o. Flagyl 500 mg every 8 hours for 6 weeks  Continue supportive care  Okay to discharge from Infectious Disease standpoint  Not much more to add from infectious disease standpoint-we will sign off at this time-please call with any questions.    Please fax all post discharge lab results, imaging studies and correspondence to this fax number [phone]  For any question or concern please contact our service number  [phone]    Would recommend rehab for patient for IV antibiotics and wound care  Patient is a high risk for limb loss  Patient will need to get his blood sugars under control to heal this infection and avoid future infections    Latonya Marie, JESSICA  01/21/25  16:38 EST    Note is dictated utilizing voice recognition software/Dragon    Electronically signed by Dayton Turcios MD at 01/22/25 3379

## 2025-01-23 NOTE — PLAN OF CARE
Goal Outcome Evaluation:      Patient resting most of the day. Plans to DC today. Plan of care ongoing.

## 2025-01-23 NOTE — PROGRESS NOTES
"     LOS: 9 days   Patient Care Team:  Ty Gao DO as PCP - General (Internal Medicine)    Chief Complaint: Bilateral foot osteomyelitis    Subjective     Interval History:     Patient Complaints: Patient is POD #5 s/p  repeat incision and drainage of left foot abscess.  Patient reports pain is controlled.   Patient Denies: Patient denies any nausea, vomiting, fevers, chills, shortness of breath.  History taken from: patient    Review of Systems:   Pertinent positives in HPI.    Objective     Vital Signs  Temp:  [97.6 °F (36.4 °C)-98 °F (36.7 °C)] 97.6 °F (36.4 °C)  Heart Rate:  [91-96] 95  Resp:  [12-16] 12  BP: (114-136)/(65-83) 117/78    Physical Exam:  General: Alert and oriented x 3  Vascular: DP and PT pulses faintly palpable bilaterally.  CFT less than 5 seconds to all digits bilaterally.  Moderate nonpitting edema noted to bilateral feet. Erythema and edema improving.  Musculoskeletal: Mild pain on palpation to the left first submetatarsal surgical site. No pain to the right hallux.  Neuro: Protective sensation diminished bilaterally.  Sensation intact to light touch bilaterally.  Derm: Left foot submetatarsal 1 surgical site with scant residual purulence, improved from prior.  Wound edges are macerated.  Sutures intact with some reapproximation at this time. Right hallux with sutures intact, no drainage noted.     Labs:  Results from last 7 days   Lab Units 01/20/25  2349   WBC 10*3/mm3 6.08   HEMOGLOBIN g/dL 9.7*   HEMATOCRIT % 32.0*   PLATELETS 10*3/mm3 312     Results from last 7 days   Lab Units 01/21/25  2326 01/20/25  2349   SODIUM mmol/L  --  140   POTASSIUM mmol/L  --  4.7   CHLORIDE mmol/L  --  102   CO2 mmol/L  --  30.2*   BUN mg/dL  --  29*   CREATININE mg/dL 0.87 1.10   GLUCOSE mg/dL  --  257*   CALCIUM mg/dL  --  9.3     Glucose   Date Value Ref Range Status   01/20/2025 257 (H) 65 - 99 mg/dL Final                     No components found for: \"HBA1C\"    Imaging:   Imaging Results " (All)       Procedure Component Value Units Date/Time    MRI Foot Right Without Contrast [[id number]] Collected: 01/13/25 2238     Updated: 01/13/25 2250    Narrative:      MRI FOOT RIGHT WO CONTRAST    Date of Exam: 1/13/2025 7:35 PM EST    Indication: Right hallux osteomyelitis.     Comparison: Toe radiographs 1/13/2025    Technique:  Routine multiplanar/multisequence sequence images of the right foot were obtained without contrast administration.      Findings:  Bones: There is marrow edema of the first proximal and distal phalanges with associated loss of normal T1 marrow and cortical signal along the medial aspect of the first interphalangeal joint. No additional areas of abnormal marrow edema. No suspicious   osseous lesions. Small effusion of the first metatarsophalangeal joint. Small to moderate effusion in the first interphalangeal joint.    Soft tissues: Lisfranc ligament is intact. Plantar plates appear intact. Visualized tendons appear grossly intact. Diffuse subcutaneous edema. Atrophy of the intrinsic foot muscles. There is soft tissue wound along the medial aspect of the first toe with   underlying lobulated fluid which may reflect small abscesses. Small mild intermetatarsal bursal fluid in the first, second and third interspaces.      Impression:      Impression:  1.Soft tissue wound along the medial aspect of the first toe with underlying lobulated fluid which may reflect small abscesses.  2.Marrow edema of the first proximal and distal phalanges with associated loss of normal T1 marrow and cortical signal along the medial aspect of the first interphalangeal joint. These findings are suspicious for osteomyelitis. There is also adjacent   small to moderate effusion in the first interphalangeal joint concerning for septic arthritis.        Electronically Signed: Jacob Kebede MD    1/13/2025 10:48 PM EST    Workstation ID: [id number]    MRI Foot Left Without Contrast [[id number]] Collected: 01/13/25  2212     Updated: 01/13/25 2224    Narrative:      MRI FOOT LEFT WO CONTRAST    Date of Exam: 1/13/2025 7:35 PM EST    Indication: Evaluate abcess and osteomyelitis.     Comparison: One 1325 foot radiographs    Technique:  Routine multiplanar/multisequence sequence images of the left foot were obtained without contrast administration.      Findings:  Bones and joints: Marrow edema and loss of normal T1 cortical and marrow signal with erosions of the first metatarsal head as well as the base of the first proximal phalanx. There is associated lobulated small joint effusion at the first   metatarsophalangeal joint. There is marrow edema seen throughout the remainder of the first metatarsal and the first proximal phalanx. Minimal marrow edema seen along the first distal phalanx. No additional areas of abnormal marrow edema. No suspicious   osseous lesions.    Soft tissues: Soft tissue wound along the plantar medial forefoot just below the first metatarsophalangeal joint. There are associated lobulated fluid seen deep to the wound which may reflect small abscesses. There is involvement of the flexor houses   longus tendon at this location as well with mild associated tenosynovitis. Remaining tendons appear grossly intact. Lisfranc ligament is intact. The plantar plates of the second through fifth digits appear intact. Diffuse subcutaneous edema.      Impression:      Impression:  1.Soft tissue wound along the plantar medial forefoot just below the first metatarsophalangeal joint. There are associated lobulated fluid collections deep to the wound which may reflect small abscesses. There is involvement of the flexor hallucis longus   tendon at this location as well with mild associated tenosynovitis.  2.Marrow edema and loss of normal T1 cortical and marrow signal with erosions of the first metatarsal head as well as the base of the first proximal phalanx. There is associated lobulated small joint effusion at the first  metatarsophalangeal joint. These   findings are concerning for septic arthritis with osteomyelitis of the first metatarsal head and base of the first proximal phalanx. There is reactive marrow edema seen along the remainder of the first proximal phalanx and first metatarsal.        Electronically Signed: Jacob Kebede MD    1/13/2025 10:22 PM EST    Workstation ID: [id number]    XR Foot 3+ View Left [[id number]] Collected: 01/13/25 1239     Updated: 01/13/25 1246    Narrative:      XR FOOT 3+ VW LEFT, XR TOE 2+ VW RIGHT    Date of Exam: 1/13/2025 12:02 PM EST    Indication: Diabetic wound infection involving the left foot in the right great toe.    Comparison: None available.    Findings:  Right great toe: There is a cortical destruction and periostitis involving the distal phalanx, especially along the medial border and also involving the medial aspect of the head of the proximal phalanx. The findings are consistent with osteomyelitis in   the appropriate clinical setting. There is a pathologic fracture secondary to the osteomyelitis involving the medial aspect of the head of the proximal phalanx. There is soft tissue swelling. There is a relatively deep ulcer along the medial aspect of   the right great toe at the site of the bony destructive changes. There are incidental arthritic changes involving the IP joint and first MTP joint.    Left foot: There is cortical destruction, osteolysis, and periosteal reaction involving the distal shaft and head of the first metatarsal bone as well as the adjacent base of the proximal phalanx. There also may be some osteolytic changes in the base of   the distal phalanx of the left great toe. This is consistent with radiographic changes of osteomyelitis. There is soft tissue swelling involving mainly the left great toe. Shallow ulcers are suggested along the plantar aspect of the medial left forefoot   in the location of the radiographic changes of osteomyelitis. There are  minor arthritic changes involving the inner-phalangeal joints of the digits.      Impression:      Impression:  1.Radiographic changes of osteomyelitis involving the distal phalanx of the right great toe with a pathologic fracture secondary to the osteomyelitis involving the medial aspect of the head of the proximal phalanx.  2.Radiographic changes of osteomyelitis involving the distal shaft and head of the left first metatarsal bone as well as the adjacent base of the proximal phalanx. There also may be some osteolytic changes in the base of the distal phalanx of the left   great toe representing osteomyelitis.  3.Soft tissue swelling. There is a relatively deep ulcer along the medial aspect of the right great toe at the site of the bony destructive changes.  4.Soft tissue swelling and shallow ulcers along the medial aspect of the left forefoot at the site of the bony destructive changes.        Electronically Signed: Agapito Rick MD    1/13/2025 12:44 PM EST    Workstation ID: [id number]    XR Toe 2+ View Right [[id number]] Collected: 01/13/25 1239     Updated: 01/13/25 1246    Narrative:      XR FOOT 3+ VW LEFT, XR TOE 2+ VW RIGHT    Date of Exam: 1/13/2025 12:02 PM EST    Indication: Diabetic wound infection involving the left foot in the right great toe.    Comparison: None available.    Findings:  Right great toe: There is a cortical destruction and periostitis involving the distal phalanx, especially along the medial border and also involving the medial aspect of the head of the proximal phalanx. The findings are consistent with osteomyelitis in   the appropriate clinical setting. There is a pathologic fracture secondary to the osteomyelitis involving the medial aspect of the head of the proximal phalanx. There is soft tissue swelling. There is a relatively deep ulcer along the medial aspect of   the right great toe at the site of the bony destructive changes. There are incidental arthritic changes involving the  IP joint and first MTP joint.    Left foot: There is cortical destruction, osteolysis, and periosteal reaction involving the distal shaft and head of the first metatarsal bone as well as the adjacent base of the proximal phalanx. There also may be some osteolytic changes in the base of   the distal phalanx of the left great toe. This is consistent with radiographic changes of osteomyelitis. There is soft tissue swelling involving mainly the left great toe. Shallow ulcers are suggested along the plantar aspect of the medial left forefoot   in the location of the radiographic changes of osteomyelitis. There are minor arthritic changes involving the inner-phalangeal joints of the digits.      Impression:      Impression:  1.Radiographic changes of osteomyelitis involving the distal phalanx of the right great toe with a pathologic fracture secondary to the osteomyelitis involving the medial aspect of the head of the proximal phalanx.  2.Radiographic changes of osteomyelitis involving the distal shaft and head of the left first metatarsal bone as well as the adjacent base of the proximal phalanx. There also may be some osteolytic changes in the base of the distal phalanx of the left   great toe representing osteomyelitis.  3.Soft tissue swelling. There is a relatively deep ulcer along the medial aspect of the right great toe at the site of the bony destructive changes.  4.Soft tissue swelling and shallow ulcers along the medial aspect of the left forefoot at the site of the bony destructive changes.        Electronically Signed: Agapito Rick MD    1/13/2025 12:44 PM EST    Workstation ID: [id number]            Cultures:   E. coli, methicillin resistant Staphylococcus aureus, group B streptococcus and Proteus mirabilis .     Bacteroides and Anaerococcus      Results Review:     I reviewed the patient's new clinical results.      Assessment & Plan   - POD #3 s/p repeat incision and drainage left foot abscess. POD #6 R hallux  and left foot I&D.  - Osteomyelitis, left first metatarsal/hallux  - Abscess, left foot  - Osteomyelitis, right hallux    Plan:  - Patient was examined and evaluated at bedside; vital signs stable at this time.   - Surgical site to the left foot with minimal purulence, improved from prior; sutures intact at this time.  Redressed with Betadine dressing. Recommend Left foot dressing change daily.   - X-ray and MRI confirm osteomyelitis of the right hallux as well as left hallux proximal phalanx and first metatarsal head.  Patient continues to refuse surgical intervention in the form of amputation at this time.  Patient aware of the risks with delaying/refusing surgery.  Patient is at high risk for limb loss. Discussion held today regarding worsening signs of infection and to present to the ED if these happen. He understands the risks being limb/life threatening.   - ID recommendations appreciated; plan for 6 weeks of IV antibiotics due to patient's refusal to have amputation. Currently Vanc/Cefipime/flagyl.   - Patient to remain nonweightbearing to the left lower extremity, WBAT to the right lower extremity in the surgical shoe.  - No future plans for OR at this time.   - OK to d/c from podiatry perspective. Patient awaiting bed for SNF placement.     Podiatry discharge recs:  - WB status: NWB LLE, WBAT RLE with surgical shoe.   - Dressings: Right foot - adaptic, gauze, kerlix, ACE every other day. Left foot - betadine, gauze, ABD pad, Kerlix, ACE - every day.   - Abx: per ID recommendation.   - Follow-up: Outpatient follow up 1/27/25    Podiatry will continue to follow while in-house      Susan Garrison DPM  01/22/25  22:52 EST    I spent a total of 40 minutes on this patient encounter including preparation, review of medical, social, surgical hx, medications, labs, xrays review and interpretation, face to face care, evaluation, treatment, counseling, education, consultation with another provider, documentation,  and answering patient questions regarding the plan noted above.
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
## 2025-01-23 NOTE — CASE MANAGEMENT/SOCIAL WORK
Social Work Assessment  [facility]     Patient Name: Tomas Song  MRN: [id number]  Today's Date: 1/23/2025    Admit Date: 1/13/2025     Discharge Plan       Row Name 01/23/25 1456       Plan    Plan Home pending IV antibotic plan.    Plan Comments Due to suboxone, pt accepted to the following SNFs: [facility] (2 hours away), [facility] (over an hour away) and [facility] (over 30 minutes away). SW and RNCM met with pt at bedside to present accepting facilities and pt reports he is not willing to go to any SNF - that he has a home and will d/c home. It was explained at length why a PICC isn't allowed. Pt v/u although he doesn't agree with the reasoning. Pt willing to d/c home with the next best IV antibiotic regimen, even if not ideal. Pt agreeable to AC with daily sticks and understands he cannot get 3 daily doses of IV antibiotic as rec'd by ID in AC. This was relayed in SC to CM Supervisor and ID. Pt also states he is capable of changing his own dressings on both feet.                Destination       Service Provider Request Status Services Address Phone Fax Patient Preferred    [facility] Accepted -- [address] [place] [address] [phone] --    [facility] Accepted -- [address] [place] [address] [phone] --    [facility] Pending - Request Sent -- [address] [place] [id number] [phone] --    [facility] Declined  Cannot meet patient's needs -- [address] [place] [address] [phone] --    [facility] Declined  Cannot meet patient's psychosocial needs -- [address] [facility] [place] [address] [phone] --    [facility] Declined  Cannot meet patient's needs -- [address], [place] [address] [phone] --            MEENU Lynn, [facility]  Medical Social Worker  Ph [phone]  Fax [phone]  [email or web address]

## 2025-01-24 ENCOUNTER — HOSPITAL ENCOUNTER (OUTPATIENT)
Dept: INFUSION THERAPY | Facility: HOSPITAL | Age: 45
Discharge: HOME | End: 2025-01-24
Payer: MEDICAID

## 2025-01-24 VITALS
SYSTOLIC BLOOD PRESSURE: 141 MMHG | RESPIRATION RATE: 18 BRPM | TEMPERATURE: 97.7 F | DIASTOLIC BLOOD PRESSURE: 87 MMHG | OXYGEN SATURATION: 100 % | HEART RATE: 84 BPM

## 2025-01-24 DIAGNOSIS — Z45.2 NEEDS PERIPHERALLY INSERTED CENTRAL CATHETER (PICC): Primary | ICD-10-CM

## 2025-01-24 DIAGNOSIS — M86.079 ACUTE HEMATOGENOUS OSTEOMYELITIS OF FOOT, UNSPECIFIED LATERALITY: Primary | ICD-10-CM

## 2025-01-24 DIAGNOSIS — M86.9: Primary | ICD-10-CM

## 2025-01-24 PROCEDURE — 25010000002 CEFTRIAXONE PER 250 MG: Performed by: NURSE PRACTITIONER

## 2025-01-24 PROCEDURE — 25010000002 DAPTOMYCIN PER 1 MG: Performed by: NURSE PRACTITIONER

## 2025-01-24 PROCEDURE — 96367 TX/PROPH/DG ADDL SEQ IV INF: CPT

## 2025-01-24 PROCEDURE — 96365 THER/PROPH/DIAG IV INF INIT: CPT

## 2025-01-24 PROCEDURE — C1751 CATH, INF, PER/CENT/MIDLINE: HCPCS

## 2025-01-24 RX ORDER — SODIUM CHLORIDE 0.9 % (FLUSH) 0.9 %
10 SYRINGE (ML) INJECTION AS NEEDED
Status: DISCONTINUED | OUTPATIENT
Start: 2025-01-24 | End: 2025-01-26 | Stop reason: HOSPADM

## 2025-01-24 RX ORDER — SODIUM CHLORIDE 9 MG/ML
40 INJECTION, SOLUTION INTRAVENOUS AS NEEDED
Status: DISCONTINUED | OUTPATIENT
Start: 2025-01-24 | End: 2025-01-26 | Stop reason: HOSPADM

## 2025-01-24 RX ORDER — SODIUM CHLORIDE 0.9 % (FLUSH) 0.9 %
20 SYRINGE (ML) INJECTION AS NEEDED
Status: DISCONTINUED | OUTPATIENT
Start: 2025-01-24 | End: 2025-01-26 | Stop reason: HOSPADM

## 2025-01-24 RX ORDER — SODIUM CHLORIDE 0.9 % (FLUSH) 0.9 %
10 SYRINGE (ML) INJECTION EVERY 12 HOURS SCHEDULED
Status: DISCONTINUED | OUTPATIENT
Start: 2025-01-24 | End: 2025-01-26 | Stop reason: HOSPADM

## 2025-01-24 RX ADMIN — CEFTRIAXONE 2000 MG: 2 INJECTION, POWDER, FOR SOLUTION INTRAMUSCULAR; INTRAVENOUS at 17:08

## 2025-01-24 RX ADMIN — DAPTOMYCIN 550 MG: 500 INJECTION, POWDER, LYOPHILIZED, FOR SOLUTION INTRAVENOUS at 16:35

## 2025-01-24 NOTE — CASE MANAGEMENT/SOCIAL WORK
Case Management Discharge Note      Final Note: HOME WITH [facility] FOR ANTIBIOTICS         Selected Continued Care - Discharged on 1/23/2025 Admission date: 1/13/2025 - Discharge disposition: Home or Self Care          Dialysis/Infusion Coordination complete.      Service Provider Services Address Phone Fax Patient Preferred    [facility] [address] [facility] [place] [address] [phone] --                  Transportation Services  Private: Car    Final Discharge Disposition Code: 01 - home or self-care

## 2025-01-24 NOTE — CONSULTS
PICC Line Insertion Procedure Note    Procedure: Insertion of #4 FR/18G PICC    Indications:  Home IV therapy    Active Time Out:  Correct patient: Yes  Correct procedure: Yes  Correct site: Yes  Verified with: primary rn    Procedure Details:  Informed consent was obtained for the procedure.  Risk include, but are not limited to infection, air embolism, catheter tip moving, catheter blockage and phlebitis.     Maximum sterile technique was used including antiseptics, cap, gloves, gown, hand hygiene, mask, and sheet.    Ultrasound Guidance: Yes    #4 FR/18G PICC inserted to the R brachial vein per hospital protocol by Myriam Perez RN.   Non-pulsatile blood return: yes    Lot #: [id number]  Expiration date: 11-    Complications:  none    Findings:  Catheter inserted to 46 cm, with 0 cm exposed.   Mid upper arm circumference is 31 cm.   Catheter was flushed with 20 cc NS and sterile dressing applied.  Patient tolerated procedure well.  PICC tip verified by:       [x] Sapiens 3cg       [] Chest X-ray    Recommendations:  Verbal and/or written Care/Maintenance instructions provided to patient.   Primary nurse notified PICC OK to use.    Myriam Perez RN  01/24/25  16:44 EST

## 2025-01-24 NOTE — OUTREACH NOTE
Prep Survey      Flowsheet Row Responses   [facility] facility patient discharged from? [facility]   Is LACE score < 7 ? No   Eligibility Readm Mgmt   Discharge diagnosis Osteomyelitis of both feet, INCISION AND DRAINAGE WOUND BILATERAL FEET   Does the patient have one of the following disease processes/diagnoses(primary or secondary)? General Surgery   Does the patient have Home health ordered? No   Is there a DME ordered? No   Comments regarding appointments [facility] Ambulatory care for IV antibiotics   Prep survey completed? Yes            Abby BLANCHARD - Registered Nurse
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
## 2025-01-25 ENCOUNTER — HOSPITAL ENCOUNTER (OUTPATIENT)
Dept: INFUSION THERAPY | Facility: HOSPITAL | Age: 45
Discharge: HOME | End: 2025-01-25
Payer: MEDICAID

## 2025-01-25 VITALS
HEART RATE: 96 BPM | RESPIRATION RATE: 16 BRPM | TEMPERATURE: 99 F | DIASTOLIC BLOOD PRESSURE: 90 MMHG | SYSTOLIC BLOOD PRESSURE: 158 MMHG | OXYGEN SATURATION: 99 %

## 2025-01-25 DIAGNOSIS — M86.9: Primary | ICD-10-CM

## 2025-01-25 PROCEDURE — 96365 THER/PROPH/DIAG IV INF INIT: CPT

## 2025-01-25 PROCEDURE — 96368 THER/DIAG CONCURRENT INF: CPT

## 2025-01-25 PROCEDURE — 25010000002 CEFTRIAXONE PER 250 MG: Performed by: NURSE PRACTITIONER

## 2025-01-25 PROCEDURE — 25010000002 DAPTOMYCIN PER 1 MG: Performed by: NURSE PRACTITIONER

## 2025-01-25 RX ADMIN — DAPTOMYCIN 550 MG: 500 INJECTION, POWDER, LYOPHILIZED, FOR SOLUTION INTRAVENOUS at 10:28

## 2025-01-25 RX ADMIN — CEFTRIAXONE 2000 MG: 2 INJECTION, POWDER, FOR SOLUTION INTRAMUSCULAR; INTRAVENOUS at 10:29

## 2025-01-27 ENCOUNTER — READMISSION MANAGEMENT (OUTPATIENT)
Dept: CALL CENTER | Facility: HOSPITAL | Age: 45
End: 2025-01-27
Payer: MEDICAID

## 2025-01-27 NOTE — OUTREACH NOTE
General Surgery Week 1 Survey      Flowsheet Row Responses   [facility] discharged from? [facility]   Does the patient have one of the following disease processes/diagnoses(primary or secondary)? General Surgery   Week 1 attempt successful? No   Unsuccessful attempts Attempt 1            Regina Lino Registered Nurse

## 2025-01-28 ENCOUNTER — HOSPITAL ENCOUNTER (OUTPATIENT)
Dept: INFUSION THERAPY | Facility: HOSPITAL | Age: 45
Discharge: HOME | End: 2025-01-28
Admitting: NURSE PRACTITIONER
Payer: MEDICAID

## 2025-01-28 DIAGNOSIS — M86.9: Primary | ICD-10-CM

## 2025-01-28 PROCEDURE — 96365 THER/PROPH/DIAG IV INF INIT: CPT

## 2025-01-28 PROCEDURE — 25010000002 CEFTRIAXONE PER 250 MG: Performed by: NURSE PRACTITIONER

## 2025-01-28 PROCEDURE — 96368 THER/DIAG CONCURRENT INF: CPT

## 2025-01-28 PROCEDURE — 25010000002 DAPTOMYCIN PER 1 MG: Performed by: NURSE PRACTITIONER

## 2025-01-28 RX ADMIN — CEFTRIAXONE 2000 MG: 2 INJECTION, POWDER, FOR SOLUTION INTRAMUSCULAR; INTRAVENOUS at 14:38

## 2025-01-28 RX ADMIN — DAPTOMYCIN 550 MG: 500 INJECTION, POWDER, LYOPHILIZED, FOR SOLUTION INTRAVENOUS at 14:43

## 2025-01-29 ENCOUNTER — HOSPITAL ENCOUNTER (OUTPATIENT)
Dept: INFUSION THERAPY | Facility: HOSPITAL | Age: 45
Discharge: HOME | End: 2025-01-29
Admitting: NURSE PRACTITIONER
Payer: MEDICAID

## 2025-01-29 VITALS
OXYGEN SATURATION: 99 % | HEART RATE: 107 BPM | RESPIRATION RATE: 16 BRPM | SYSTOLIC BLOOD PRESSURE: 196 MMHG | DIASTOLIC BLOOD PRESSURE: 84 MMHG | TEMPERATURE: 98.9 F

## 2025-01-29 DIAGNOSIS — M86.9: Primary | ICD-10-CM

## 2025-01-29 PROCEDURE — 96365 THER/PROPH/DIAG IV INF INIT: CPT

## 2025-01-29 PROCEDURE — 96368 THER/DIAG CONCURRENT INF: CPT

## 2025-01-29 PROCEDURE — 25010000002 CEFTRIAXONE PER 250 MG: Performed by: NURSE PRACTITIONER

## 2025-01-29 PROCEDURE — 25010000002 DAPTOMYCIN PER 1 MG: Performed by: NURSE PRACTITIONER

## 2025-01-29 RX ADMIN — DAPTOMYCIN 550 MG: 500 INJECTION, POWDER, LYOPHILIZED, FOR SOLUTION INTRAVENOUS at 14:50

## 2025-01-29 RX ADMIN — CEFTRIAXONE 2000 MG: 2 INJECTION, POWDER, FOR SOLUTION INTRAMUSCULAR; INTRAVENOUS at 14:55

## 2025-01-30 ENCOUNTER — READMISSION MANAGEMENT (OUTPATIENT)
Dept: CALL CENTER | Facility: HOSPITAL | Age: 45
End: 2025-01-30
Payer: MEDICAID

## 2025-01-30 ENCOUNTER — HOSPITAL ENCOUNTER (EMERGENCY)
Facility: HOSPITAL | Age: 45
Discharge: HOME | End: 2025-01-30
Payer: MEDICAID

## 2025-01-30 VITALS
TEMPERATURE: 97.4 F | SYSTOLIC BLOOD PRESSURE: 168 MMHG | RESPIRATION RATE: 20 BRPM | OXYGEN SATURATION: 99 % | DIASTOLIC BLOOD PRESSURE: 94 MMHG | HEART RATE: 118 BPM

## 2025-01-30 DIAGNOSIS — Z00.8 MEDICAL CLEARANCE FOR INCARCERATION: Primary | ICD-10-CM

## 2025-01-30 LAB — GLUCOSE BLDC GLUCOMTR-MCNC: 294 MG/DL (ref 70–105)

## 2025-01-30 PROCEDURE — 99282 EMERGENCY DEPT VISIT SF MDM: CPT

## 2025-01-30 PROCEDURE — 82948 REAGENT STRIP/BLOOD GLUCOSE: CPT

## 2025-01-30 NOTE — OUTREACH NOTE
General Surgery Week 1 Survey      Flowsheet Row Responses   [facility] facility patient discharged from? [facility]   Does the patient have one of the following disease processes/diagnoses(primary or secondary)? General Surgery   Week 1 attempt successful? No   Unsuccessful attempts Attempt 2            Yaneth PERKINS - Registered Nurse

## 2025-01-31 ENCOUNTER — APPOINTMENT (OUTPATIENT)
Dept: CT IMAGING | Facility: HOSPITAL | Age: 45
End: 2025-01-31
Payer: OTHER GOVERNMENT

## 2025-01-31 ENCOUNTER — HOSPITAL ENCOUNTER (OUTPATIENT)
Facility: HOSPITAL | Age: 45
Setting detail: OBSERVATION
Discharge: LEFT BEFORE DISCHARGE | End: 2025-01-31
Attending: EMERGENCY MEDICINE | Admitting: EMERGENCY MEDICINE
Payer: OTHER GOVERNMENT

## 2025-01-31 ENCOUNTER — HOSPITAL ENCOUNTER (OUTPATIENT)
Dept: INFUSION THERAPY | Facility: HOSPITAL | Age: 45
Discharge: HOME | End: 2025-01-31
Payer: MEDICAID

## 2025-01-31 VITALS
RESPIRATION RATE: 12 BRPM | HEIGHT: 73 IN | WEIGHT: 210.54 LBS | DIASTOLIC BLOOD PRESSURE: 81 MMHG | BODY MASS INDEX: 27.9 KG/M2 | HEART RATE: 89 BPM | SYSTOLIC BLOOD PRESSURE: 151 MMHG | TEMPERATURE: 98.2 F | OXYGEN SATURATION: 91 %

## 2025-01-31 DIAGNOSIS — F11.10 OPIOID ABUSE: Primary | ICD-10-CM

## 2025-01-31 PROBLEM — T40.2X1A OPIOID OVERDOSE: Status: ACTIVE | Noted: 2025-01-31

## 2025-01-31 LAB
ALBUMIN SERPL-MCNC: 3.8 G/DL (ref 3.5–5.2)
ALBUMIN/GLOB SERPL: 0.9 G/DL
ALP SERPL-CCNC: 90 U/L (ref 39–117)
ALT SERPL W P-5'-P-CCNC: 94 U/L (ref 1–41)
AMMONIA BLD-SCNC: 41 UMOL/L (ref 16–60)
AMPHET+METHAMPHET UR QL: POSITIVE
AMPHETAMINES UR QL: POSITIVE
ANION GAP SERPL CALCULATED.3IONS-SCNC: 10.9 MMOL/L (ref 5–15)
APAP SERPL-MCNC: <5 MCG/ML (ref 0–30)
ARTERIAL PATENCY WRIST A: POSITIVE
AST SERPL-CCNC: 27 U/L (ref 1–40)
ATMOSPHERIC PRESS: ABNORMAL MM[HG]
BARBITURATES UR QL SCN: NEGATIVE
BASE EXCESS BLDA CALC-SCNC: 4 MMOL/L (ref 0–3)
BASOPHILS # BLD AUTO: 0.05 10*3/MM3 (ref 0–0.2)
BASOPHILS NFR BLD AUTO: 0.9 % (ref 0–1.5)
BDY SITE: ABNORMAL
BENZODIAZ UR QL SCN: POSITIVE
BILIRUB SERPL-MCNC: 0.2 MG/DL (ref 0–1.2)
BILIRUB UR QL STRIP: NEGATIVE
BUN SERPL-MCNC: 21 MG/DL (ref 6–20)
BUN/CREAT SERPL: 24.7 (ref 7–25)
BUPRENORPHINE SERPL-MCNC: POSITIVE NG/ML
CALCIUM SPEC-SCNC: 9.6 MG/DL (ref 8.6–10.5)
CANNABINOIDS SERPL QL: POSITIVE
CHLORIDE SERPL-SCNC: 104 MMOL/L (ref 98–107)
CK SERPL-CCNC: 92 U/L (ref 20–200)
CLARITY UR: CLEAR
CO2 BLDA-SCNC: 31.5 MMOL/L (ref 22–29)
CO2 SERPL-SCNC: 27.1 MMOL/L (ref 22–29)
COCAINE UR QL: NEGATIVE
COLOR UR: YELLOW
CREAT SERPL-MCNC: 0.85 MG/DL (ref 0.76–1.27)
DEPRECATED RDW RBC AUTO: 47.5 FL (ref 37–54)
EGFRCR SERPLBLD CKD-EPI 2021: 108.5 ML/MIN/1.73
EOSINOPHIL # BLD AUTO: 0.33 10*3/MM3 (ref 0–0.4)
EOSINOPHIL NFR BLD AUTO: 6.1 % (ref 0.3–6.2)
ERYTHROCYTE [DISTWIDTH] IN BLOOD BY AUTOMATED COUNT: 15.3 % (ref 12.3–15.4)
ETHANOL UR QL: <0.01 %
GLOBULIN UR ELPH-MCNC: 4.2 GM/DL
GLUCOSE BLDC GLUCOMTR-MCNC: 119 MG/DL (ref 70–105)
GLUCOSE BLDC GLUCOMTR-MCNC: 137 MG/DL (ref 70–105)
GLUCOSE BLDC GLUCOMTR-MCNC: 161 MG/DL (ref 70–105)
GLUCOSE BLDC GLUCOMTR-MCNC: 177 MG/DL (ref 70–105)
GLUCOSE BLDC GLUCOMTR-MCNC: 247 MG/DL (ref 70–105)
GLUCOSE SERPL-MCNC: 263 MG/DL (ref 65–99)
GLUCOSE UR STRIP-MCNC: ABNORMAL MG/DL
HCO3 BLDA-SCNC: 30 MMOL/L (ref 21–28)
HCT VFR BLD AUTO: 31.7 % (ref 37.5–51)
HEMODILUTION: NO
HGB BLD-MCNC: 9.9 G/DL (ref 13–17.7)
HGB UR QL STRIP.AUTO: NEGATIVE
HOLD SPECIMEN: NORMAL
IMM GRANULOCYTES # BLD AUTO: 0.01 10*3/MM3 (ref 0–0.05)
IMM GRANULOCYTES NFR BLD AUTO: 0.2 % (ref 0–0.5)
INHALED O2 CONCENTRATION: 21 %
KETONES UR QL STRIP: NEGATIVE
LEUKOCYTE ESTERASE UR QL STRIP.AUTO: NEGATIVE
LYMPHOCYTES # BLD AUTO: 1.41 10*3/MM3 (ref 0.7–3.1)
LYMPHOCYTES NFR BLD AUTO: 26.1 % (ref 19.6–45.3)
MCH RBC QN AUTO: 26.4 PG (ref 26.6–33)
MCHC RBC AUTO-ENTMCNC: 31.2 G/DL (ref 31.5–35.7)
MCV RBC AUTO: 84.5 FL (ref 79–97)
METHADONE UR QL SCN: NEGATIVE
MODALITY: ABNORMAL
MONOCYTES # BLD AUTO: 0.57 10*3/MM3 (ref 0.1–0.9)
MONOCYTES NFR BLD AUTO: 10.6 % (ref 5–12)
NEUTROPHILS NFR BLD AUTO: 3.03 10*3/MM3 (ref 1.7–7)
NEUTROPHILS NFR BLD AUTO: 56.1 % (ref 42.7–76)
NITRITE UR QL STRIP: NEGATIVE
NRBC BLD AUTO-RTO: 0 /100 WBC (ref 0–0.2)
OPIATES UR QL: NEGATIVE
OXYCODONE UR QL SCN: NEGATIVE
PCO2 BLDA: 50.8 MM HG (ref 35–48)
PCP UR QL SCN: NEGATIVE
PH BLDA: 7.38 PH UNITS (ref 7.35–7.45)
PH UR STRIP.AUTO: 6 [PH] (ref 5–8)
PLATELET # BLD AUTO: 240 10*3/MM3 (ref 140–450)
PMV BLD AUTO: 9.9 FL (ref 6–12)
PO2 BLD: 389 MM[HG] (ref 0–500)
PO2 BLDA: 81.7 MM HG (ref 83–108)
POTASSIUM SERPL-SCNC: 4 MMOL/L (ref 3.5–5.2)
PROT SERPL-MCNC: 8 G/DL (ref 6–8.5)
PROT UR QL STRIP: NEGATIVE
QT INTERVAL: 383 MS
QTC INTERVAL: 464 MS
RBC # BLD AUTO: 3.75 10*6/MM3 (ref 4.14–5.8)
SALICYLATES SERPL-MCNC: <0.5 MG/DL
SAO2 % BLDCOA: 95.5 % (ref 94–98)
SODIUM SERPL-SCNC: 142 MMOL/L (ref 136–145)
SP GR UR STRIP: 1.02 (ref 1–1.03)
TRICYCLICS UR QL SCN: NEGATIVE
UROBILINOGEN UR QL STRIP: ABNORMAL
WBC NRBC COR # BLD AUTO: 5.4 10*3/MM3 (ref 3.4–10.8)
WHOLE BLOOD HOLD COAG: NORMAL

## 2025-01-31 PROCEDURE — G0378 HOSPITAL OBSERVATION PER HR: HCPCS

## 2025-01-31 PROCEDURE — 80053 COMPREHEN METABOLIC PANEL: CPT | Performed by: EMERGENCY MEDICINE

## 2025-01-31 PROCEDURE — 25010000002 DAPTOMYCIN PER 1 MG: Performed by: NURSE PRACTITIONER

## 2025-01-31 PROCEDURE — 82140 ASSAY OF AMMONIA: CPT | Performed by: EMERGENCY MEDICINE

## 2025-01-31 PROCEDURE — 85025 COMPLETE CBC W/AUTO DIFF WBC: CPT | Performed by: EMERGENCY MEDICINE

## 2025-01-31 PROCEDURE — 82550 ASSAY OF CK (CPK): CPT | Performed by: EMERGENCY MEDICINE

## 2025-01-31 PROCEDURE — 36600 WITHDRAWAL OF ARTERIAL BLOOD: CPT | Performed by: EMERGENCY MEDICINE

## 2025-01-31 PROCEDURE — 81003 URINALYSIS AUTO W/O SCOPE: CPT | Performed by: EMERGENCY MEDICINE

## 2025-01-31 PROCEDURE — 96376 TX/PRO/DX INJ SAME DRUG ADON: CPT

## 2025-01-31 PROCEDURE — 25010000002 NALOXONE HCL 2 MG/2ML SOLUTION PREFILLED SYRINGE

## 2025-01-31 PROCEDURE — 93005 ELECTROCARDIOGRAM TRACING: CPT | Performed by: EMERGENCY MEDICINE

## 2025-01-31 PROCEDURE — 63710000001 INSULIN GLARGINE PER 5 UNITS: Performed by: NURSE PRACTITIONER

## 2025-01-31 PROCEDURE — 82948 REAGENT STRIP/BLOOD GLUCOSE: CPT

## 2025-01-31 PROCEDURE — P9612 CATHETERIZE FOR URINE SPEC: HCPCS

## 2025-01-31 PROCEDURE — 63710000001 INSULIN LISPRO (HUMAN) PER 5 UNITS: Performed by: NURSE PRACTITIONER

## 2025-01-31 PROCEDURE — 36415 COLL VENOUS BLD VENIPUNCTURE: CPT

## 2025-01-31 PROCEDURE — 80306 DRUG TEST PRSMV INSTRMNT: CPT | Performed by: EMERGENCY MEDICINE

## 2025-01-31 PROCEDURE — 25010000002 CEFTRIAXONE PER 250 MG: Performed by: NURSE PRACTITIONER

## 2025-01-31 PROCEDURE — 82948 REAGENT STRIP/BLOOD GLUCOSE: CPT | Performed by: NURSE PRACTITIONER

## 2025-01-31 PROCEDURE — 70450 CT HEAD/BRAIN W/O DYE: CPT

## 2025-01-31 PROCEDURE — 82803 BLOOD GASES ANY COMBINATION: CPT | Performed by: EMERGENCY MEDICINE

## 2025-01-31 PROCEDURE — 99285 EMERGENCY DEPT VISIT HI MDM: CPT

## 2025-01-31 PROCEDURE — 82077 ASSAY SPEC XCP UR&BREATH IA: CPT | Performed by: EMERGENCY MEDICINE

## 2025-01-31 PROCEDURE — 80143 DRUG ASSAY ACETAMINOPHEN: CPT | Performed by: EMERGENCY MEDICINE

## 2025-01-31 PROCEDURE — 80179 DRUG ASSAY SALICYLATE: CPT | Performed by: EMERGENCY MEDICINE

## 2025-01-31 PROCEDURE — 96374 THER/PROPH/DIAG INJ IV PUSH: CPT

## 2025-01-31 RX ORDER — NALOXONE HYDROCHLORIDE 1 MG/ML
INJECTION INTRAMUSCULAR; INTRAVENOUS; SUBCUTANEOUS
Status: COMPLETED
Start: 2025-01-31 | End: 2025-01-31

## 2025-01-31 RX ORDER — NITROGLYCERIN 0.4 MG/1
0.4 TABLET SUBLINGUAL
Status: DISCONTINUED | OUTPATIENT
Start: 2025-01-31 | End: 2025-02-01 | Stop reason: HOSPADM

## 2025-01-31 RX ORDER — ACETAMINOPHEN 160 MG/5ML
650 SOLUTION ORAL EVERY 4 HOURS PRN
Status: DISCONTINUED | OUTPATIENT
Start: 2025-01-31 | End: 2025-02-01 | Stop reason: HOSPADM

## 2025-01-31 RX ORDER — SODIUM CHLORIDE 9 MG/ML
40 INJECTION, SOLUTION INTRAVENOUS AS NEEDED
Status: DISCONTINUED | OUTPATIENT
Start: 2025-01-31 | End: 2025-02-01 | Stop reason: HOSPADM

## 2025-01-31 RX ORDER — ONDANSETRON 4 MG/1
4 TABLET, ORALLY DISINTEGRATING ORAL EVERY 6 HOURS PRN
Status: DISCONTINUED | OUTPATIENT
Start: 2025-01-31 | End: 2025-02-01 | Stop reason: HOSPADM

## 2025-01-31 RX ORDER — LOSARTAN POTASSIUM 50 MG/1
100 TABLET ORAL DAILY
Status: DISCONTINUED | OUTPATIENT
Start: 2025-01-31 | End: 2025-02-01 | Stop reason: HOSPADM

## 2025-01-31 RX ORDER — BUPRENORPHINE 8 MG/1
8 TABLET SUBLINGUAL 3 TIMES DAILY
Status: DISCONTINUED | OUTPATIENT
Start: 2025-01-31 | End: 2025-02-01 | Stop reason: HOSPADM

## 2025-01-31 RX ORDER — POLYETHYLENE GLYCOL 3350 17 G/17G
17 POWDER, FOR SOLUTION ORAL DAILY PRN
Status: DISCONTINUED | OUTPATIENT
Start: 2025-01-31 | End: 2025-02-01 | Stop reason: HOSPADM

## 2025-01-31 RX ORDER — DEXTROSE MONOHYDRATE 25 G/50ML
25 INJECTION, SOLUTION INTRAVENOUS
Status: DISCONTINUED | OUTPATIENT
Start: 2025-01-31 | End: 2025-02-01 | Stop reason: HOSPADM

## 2025-01-31 RX ORDER — BISACODYL 10 MG
10 SUPPOSITORY, RECTAL RECTAL DAILY PRN
Status: DISCONTINUED | OUTPATIENT
Start: 2025-01-31 | End: 2025-02-01 | Stop reason: HOSPADM

## 2025-01-31 RX ORDER — SODIUM CHLORIDE 9 MG/ML
125 INJECTION, SOLUTION INTRAVENOUS CONTINUOUS
Status: DISPENSED | OUTPATIENT
Start: 2025-01-31 | End: 2025-01-31

## 2025-01-31 RX ORDER — GABAPENTIN 400 MG/1
800 CAPSULE ORAL EVERY 12 HOURS SCHEDULED
Status: DISCONTINUED | OUTPATIENT
Start: 2025-01-31 | End: 2025-02-01 | Stop reason: HOSPADM

## 2025-01-31 RX ORDER — NALOXONE HYDROCHLORIDE 1 MG/ML
2 INJECTION INTRAMUSCULAR; INTRAVENOUS; SUBCUTANEOUS ONCE
Status: COMPLETED | OUTPATIENT
Start: 2025-01-31 | End: 2025-01-31

## 2025-01-31 RX ORDER — INSULIN LISPRO 100 [IU]/ML
2-9 INJECTION, SOLUTION INTRAVENOUS; SUBCUTANEOUS
Status: DISCONTINUED | OUTPATIENT
Start: 2025-01-31 | End: 2025-02-01 | Stop reason: HOSPADM

## 2025-01-31 RX ORDER — NALOXONE HCL 0.4 MG/ML
1 VIAL (ML) INJECTION AS NEEDED
Status: DISCONTINUED | OUTPATIENT
Start: 2025-01-31 | End: 2025-02-01 | Stop reason: HOSPADM

## 2025-01-31 RX ORDER — ACETAMINOPHEN 325 MG/1
650 TABLET ORAL EVERY 4 HOURS PRN
Status: DISCONTINUED | OUTPATIENT
Start: 2025-01-31 | End: 2025-02-01 | Stop reason: HOSPADM

## 2025-01-31 RX ORDER — HYDROCHLOROTHIAZIDE 25 MG/1
25 TABLET ORAL DAILY
Status: DISCONTINUED | OUTPATIENT
Start: 2025-01-31 | End: 2025-02-01 | Stop reason: HOSPADM

## 2025-01-31 RX ORDER — BISACODYL 5 MG/1
5 TABLET, DELAYED RELEASE ORAL DAILY PRN
Status: DISCONTINUED | OUTPATIENT
Start: 2025-01-31 | End: 2025-02-01 | Stop reason: HOSPADM

## 2025-01-31 RX ORDER — SODIUM CHLORIDE 0.9 % (FLUSH) 0.9 %
10 SYRINGE (ML) INJECTION AS NEEDED
Status: DISCONTINUED | OUTPATIENT
Start: 2025-01-31 | End: 2025-02-01 | Stop reason: HOSPADM

## 2025-01-31 RX ORDER — AMLODIPINE BESYLATE 5 MG/1
10 TABLET ORAL DAILY
Status: DISCONTINUED | OUTPATIENT
Start: 2025-01-31 | End: 2025-02-01 | Stop reason: HOSPADM

## 2025-01-31 RX ORDER — IBUPROFEN 600 MG/1
1 TABLET ORAL
Status: DISCONTINUED | OUTPATIENT
Start: 2025-01-31 | End: 2025-02-01 | Stop reason: HOSPADM

## 2025-01-31 RX ORDER — SODIUM CHLORIDE 0.9 % (FLUSH) 0.9 %
10 SYRINGE (ML) INJECTION EVERY 12 HOURS SCHEDULED
Status: DISCONTINUED | OUTPATIENT
Start: 2025-01-31 | End: 2025-02-01 | Stop reason: HOSPADM

## 2025-01-31 RX ORDER — NICOTINE POLACRILEX 4 MG
15 LOZENGE BUCCAL
Status: DISCONTINUED | OUTPATIENT
Start: 2025-01-31 | End: 2025-02-01 | Stop reason: HOSPADM

## 2025-01-31 RX ORDER — AMOXICILLIN 250 MG
2 CAPSULE ORAL 2 TIMES DAILY PRN
Status: DISCONTINUED | OUTPATIENT
Start: 2025-01-31 | End: 2025-02-01 | Stop reason: HOSPADM

## 2025-01-31 RX ORDER — METRONIDAZOLE 500 MG/1
500 TABLET ORAL EVERY 8 HOURS SCHEDULED
Status: DISCONTINUED | OUTPATIENT
Start: 2025-01-31 | End: 2025-02-01 | Stop reason: HOSPADM

## 2025-01-31 RX ORDER — ONDANSETRON 2 MG/ML
4 INJECTION INTRAMUSCULAR; INTRAVENOUS EVERY 6 HOURS PRN
Status: DISCONTINUED | OUTPATIENT
Start: 2025-01-31 | End: 2025-02-01 | Stop reason: HOSPADM

## 2025-01-31 RX ORDER — ACETAMINOPHEN 650 MG/1
650 SUPPOSITORY RECTAL EVERY 4 HOURS PRN
Status: DISCONTINUED | OUTPATIENT
Start: 2025-01-31 | End: 2025-02-01 | Stop reason: HOSPADM

## 2025-01-31 RX ADMIN — Medication 10 ML: at 22:00

## 2025-01-31 RX ADMIN — METRONIDAZOLE 500 MG: 500 TABLET ORAL at 14:38

## 2025-01-31 RX ADMIN — NALOXONE HYDROCHLORIDE 2 MG: 1 INJECTION PARENTERAL at 00:46

## 2025-01-31 RX ADMIN — DAPTOMYCIN 600 MG: 500 INJECTION, POWDER, LYOPHILIZED, FOR SOLUTION INTRAVENOUS at 14:01

## 2025-01-31 RX ADMIN — NALOXONE HYDROCHLORIDE 2 MG: 1 INJECTION INTRAMUSCULAR; INTRAVENOUS; SUBCUTANEOUS at 05:38

## 2025-01-31 RX ADMIN — Medication 10 ML: at 09:30

## 2025-01-31 RX ADMIN — INSULIN GLARGINE 30 UNITS: 100 INJECTION, SOLUTION SUBCUTANEOUS at 22:16

## 2025-01-31 RX ADMIN — AMLODIPINE BESYLATE 10 MG: 5 TABLET ORAL at 10:43

## 2025-01-31 RX ADMIN — BUPRENORPHINE HCL 8 MG: 8 TABLET SUBLINGUAL at 21:45

## 2025-01-31 RX ADMIN — NALOXONE HYDROCHLORIDE 2 MG: 1 INJECTION PARENTERAL at 05:38

## 2025-01-31 RX ADMIN — LOSARTAN POTASSIUM 100 MG: 50 TABLET, FILM COATED ORAL at 10:43

## 2025-01-31 RX ADMIN — GABAPENTIN 800 MG: 400 CAPSULE ORAL at 10:43

## 2025-01-31 RX ADMIN — BUPRENORPHINE HCL 8 MG: 8 TABLET SUBLINGUAL at 10:43

## 2025-01-31 RX ADMIN — BUPRENORPHINE HCL 8 MG: 8 TABLET SUBLINGUAL at 14:38

## 2025-01-31 RX ADMIN — GABAPENTIN 800 MG: 400 CAPSULE ORAL at 21:59

## 2025-01-31 RX ADMIN — NALOXONE HYDROCHLORIDE 2 MG: 1 INJECTION INTRAMUSCULAR; INTRAVENOUS; SUBCUTANEOUS at 00:46

## 2025-01-31 RX ADMIN — INSULIN LISPRO 2 UNITS: 100 INJECTION, SOLUTION INTRAVENOUS; SUBCUTANEOUS at 09:02

## 2025-01-31 RX ADMIN — METRONIDAZOLE 500 MG: 500 TABLET ORAL at 21:59

## 2025-01-31 RX ADMIN — HYDROCHLOROTHIAZIDE 25 MG: 25 TABLET ORAL at 10:43

## 2025-01-31 RX ADMIN — CEFTRIAXONE 2000 MG: 2 INJECTION, POWDER, FOR SOLUTION INTRAMUSCULAR; INTRAVENOUS at 12:58

## 2025-01-31 RX ADMIN — INSULIN GLARGINE 30 UNITS: 100 INJECTION, SOLUTION SUBCUTANEOUS at 10:42

## 2025-01-31 NOTE — CASE MANAGEMENT/SOCIAL WORK
Continued Stay Note  [facility]     Patient Name: Tomas Song  MRN: [id number]  Today's Date: 1/31/2025    Admit Date: 1/31/2025    Plan: TBD. [facility] PD, [facility] PD, or furloughed home from both [facility] (pending). [facility] ACU daily IV abx (current)   Discharge Plan       Row Name 01/31/25 1553       Plan    Plan Comments Per nurse Amari- [facility] Judge has granted an ROR and will be released from [facility] custody. [facility] officer will be on the unit this evening to release the [facility] officer at the bedside. Patient will then remain in [facility] custody and officer will need to be at the bedside while in the hospital. [facility] seeking to release patient on ROR if granted by . If patient is able to be ROR from [facility], patient will discharge home and need to return to ACU daily for IV abx. Patient is cleared to DC with PICC line in per ID APRN. Careteam updated via secure chat.             Luis Felipe RN     Cell number [phone]  Office number [phone]

## 2025-01-31 NOTE — ED PROVIDER NOTES
Subjective   History of Present Illness  44-year-old male found unresponsive in detention with suspicion of overdose transferred from detention to this facility.  Patient was seen by one of my colleagues around 9 PM this evening for medical clearance after patient was in custody for possession of controlled substance.  Patient was alert and oriented at that time.  Patient was discharged from this facility in January 23, 2025 after a 10-day inpatient stay for osteomyelitis of his left foot.  Per detention personnel, patient was found with white powdery substance on his tongue which she swallowed.  Patient was found drowsy and unresponsive with pinpoint pupils.  Patient was given 2 mg of Narcan IV here with improvement in mental status but patient mumbles only.      Review of Systems   Unable to perform ROS: Mental status change       Past Medical History:   Diagnosis Date    Hypertension     Type 2 diabetes mellitus        Allergies   Allergen Reactions    Bactrim [Sulfamethoxazole-Trimethoprim] Swelling       Past Surgical History:   Procedure Laterality Date    INCISION AND DRAINAGE OF WOUND Bilateral 1/14/2025    Procedure: INCISION AND DRAINAGE WOUND BILATERAL FEET;  Surgeon: Susan Garrison DPM;  Location: [facility] MAIN OR;  Service: Podiatry;  Laterality: Bilateral;    INCISION AND DRAINAGE OF WOUND Left 1/17/2025    Procedure: INCISION AND DRAINAGE FOOT;  Surgeon: Susan Garrison DPM;  Location: [facility] MAIN OR;  Service: Podiatry;  Laterality: Left;       Family History   Problem Relation Age of Onset    Diabetes Father     Hypertension Maternal Uncle     Stroke Maternal Uncle        Social History     Socioeconomic History    Marital status: Single    Number of children: 2    Years of education: 14   Tobacco Use    Smoking status: Never    Smokeless tobacco: Never   Vaping Use    Vaping status: Never Used   Substance and Sexual Activity    Alcohol use: Never    Drug use: Yes     Types: Methamphetamines, Marijuana     Sexual activity: Not Currently     Partners: Female           Objective   Physical Exam  Constitutional:       Comments: Drowsy 44-year-old male in no acute distress, gag intact   HENT:      Head: Normocephalic and atraumatic.      Mouth/Throat:      Mouth: Mucous membranes are moist.      Pharynx: Oropharynx is clear.   Eyes:      Conjunctiva/sclera: Conjunctivae normal.      Pupils: Pupils are equal, round, and reactive to light.   Cardiovascular:      Rate and Rhythm: Normal rate and regular rhythm.      Heart sounds: Normal heart sounds.   Pulmonary:      Effort: Pulmonary effort is normal.      Breath sounds: Normal breath sounds.   Abdominal:      General: Bowel sounds are normal.      Palpations: Abdomen is soft.   Skin:     General: Skin is warm and dry.      Capillary Refill: Capillary refill takes less than 2 seconds.   Neurological:      General: No focal deficit present.         Procedures           ED Course                                                       Medical Decision Making  Patient clinically with opioid overdose, with intermittent increased somnolence, pupillary constriction and decreased gag.  Patient does arouse with intermittent Narcan administration, otherwise unremarkable laboratory evaluation.  Will place in ED observation, Narcan should be administered as needed.    Problems Addressed:  Opioid abuse: complicated acute illness or injury    Amount and/or Complexity of Data Reviewed  Labs: ordered.     Details: Urine tox probably positive  Radiology: ordered.  ECG/medicine tests: ordered and independent interpretation performed.     Details: EKG interpretation: Normal sinus rhythm, rate 88, no acute ST elevation, QTc 464    Risk  Prescription drug management.  Decision regarding hospitalization.        Final diagnoses:   Opioid abuse       ED Disposition  ED Disposition       ED Disposition   Decision to Admit    Condition   --    Comment   --               No follow-up provider  specified.       Medication List      No changes were made to your prescriptions during this visit.            Mike Raya MD  01/31/25 0538

## 2025-01-31 NOTE — PLAN OF CARE
Goal Outcome Evaluation:  Plan of Care Reviewed With: patient        Progress: improving  Outcome Evaluation: Pt admitted to room 104. Oriented to room, call light, restroom, and meal ordering. Pt has officer at bedside. Pt continue is alert and oriented. very tired, but able to arouse.

## 2025-01-31 NOTE — ED NOTES
Patient recently discharged from ED for medical clearance. Patient had white substance on tongue at intermediate, patient was instructed to wash mouth out with water.  reported that patient swallowed water that was used to wash out unknown white substance on tongue. Patient found unresponsive in cell, brought to ED. Patient's pupils pinpoint, narcan administered.

## 2025-01-31 NOTE — ED PROVIDER NOTES
Subjective   Chief Complaint   Patient presents with    Medical Clearance       History of Present Illness  Patient is a 44-year-old male presents the emergency department with [place] police for medical clearance.  Police report he was in the car, noted to supposedly have drugs on him.  He was not involved in an accident.  No new injury.    He was sent because he does have a history of diabetes, and has a wound on his foot.  He did recently have surgery here and was admitted for this.    Patient is getting daily infusions outpatient.  And changing his dressing daily.  Patient reports he is supposed be nonweightbearing    No fevers.    Review of Systems    Past Medical History:   Diagnosis Date    Hypertension     Type 2 diabetes mellitus        Allergies   Allergen Reactions    Bactrim [Sulfamethoxazole-Trimethoprim] Swelling       Past Surgical History:   Procedure Laterality Date    INCISION AND DRAINAGE OF WOUND Bilateral 1/14/2025    Procedure: INCISION AND DRAINAGE WOUND BILATERAL FEET;  Surgeon: Susan Garrison DPM;  Location: [facility];  Service: Podiatry;  Laterality: Bilateral;    INCISION AND DRAINAGE OF WOUND Left 1/17/2025    Procedure: INCISION AND DRAINAGE FOOT;  Surgeon: Susan Garrison DPM;  Location: [facility];  Service: Podiatry;  Laterality: Left;       Family History   Problem Relation Age of Onset    Diabetes Father     Hypertension Maternal Uncle     Stroke Maternal Uncle        Social History     Socioeconomic History    Marital status: Single    Number of children: 2    Years of education: 14   Tobacco Use    Smoking status: Never    Smokeless tobacco: Never   Vaping Use    Vaping status: Never Used   Substance and Sexual Activity    Alcohol use: Never    Drug use: Yes     Types: Methamphetamines, Marijuana    Sexual activity: Not Currently     Partners: Female           Objective   Physical Exam  Vitals and nursing note reviewed.   Constitutional:       Appearance: Normal  appearance.      Comments: Disheveled   HENT:      Head: Normocephalic and atraumatic.      Nose: Nose normal.      Mouth/Throat:      Mouth: Mucous membranes are moist.      Pharynx: Oropharynx is clear.   Eyes:      Extraocular Movements: Extraocular movements intact.      Conjunctiva/sclera: Conjunctivae normal.      Pupils: Pupils are equal, round, and reactive to light.   Cardiovascular:      Rate and Rhythm: Normal rate and regular rhythm.      Pulses:           Dorsalis pedis pulses are 2+ on the right side and 2+ on the left side.      Heart sounds: Normal heart sounds.   Pulmonary:      Effort: Pulmonary effort is normal.      Breath sounds: Normal breath sounds.   Feet:      Comments: Patient has wound noted to the dorsum of the left foot.  Please see photo.  Wound appears to be healing well  Skin:     General: Skin is warm and dry.      Capillary Refill: Capillary refill takes less than 2 seconds.   Neurological:      General: No focal deficit present.      Mental Status: He is alert and oriented to person, place, and time.                     Procedures           ED Course  ED Course as of 01/30/25 2302   Thu Jan 30, 2025 2118 I spoke with Dian at the [facility].     We discussed the patient's medical requirements. Discussed the patient's discharge summary from ID in depth.  [LB]      ED Course User Index  [LB] Neyad Solomon APRN                                                       Medical Decision Making  Appropriate PPE worn during patient interactions.    Chart Review: Infusion note.  Patient on daptomycin and Rocephin.  Discharge summary 1/23/2025 for osteomyelitis of the left foot, great toe of the right foot  Patient will need 6 weeks of IV antibiotics from surgery on 117.  Last day to 27.  P.o. Flagyl every 8 hours for 6 weeks.    Discontinue IV vancomycin  Start IV daptomycin 6 Mg per KG (550mg) 24 hours  Stat CPK-was appropriate for daptomycin use  Patient is not on statin-please do  not start the patient on statin while on daptomycin  Continue IV Rocephin 2 g every 24 hours  Patient will need 6 weeks of IV antibiotics from surgery on 1/17/2025-last day on 2/27/25  Continue p.o. Flagyl 500 mg every 8 hours for 6 weeks  Weekly labs CBC, creatinine, sed rate and CPK times x 5 weeks  Continue supportive care  Okay to discharge from Infectious Disease standpoint  Not much more to add from infectious disease standpoint-we will sign off at this time-please call with any questions.  Ambulatory care orders have been placed       Pt was Placed on appropriate monitoring.    Patient presents to the ED for the above complaint, underwent the above, exam and workup.  Head injury.  See no indication for imaging.  Patient does require outpatient antibiotics, dressing changes, care distal extremities and for his diabetes.  I had an extensive discussion with the nurse at [facility] regarding this patient gave permission for this information to be disclosed, as well as on his discharge paperwork    We discussed the ID note, his daily infusions, his wound care and to be nonweightbearing.    I spoke with Faye, she reports that she is going to push for medical furlough for the patient.     His wound was redressed, advised would need to be nonweightbearing.  He was not taken out with police in a wheelchair    Patient is afebrile nontoxic non-ill-appearing    Note Disclaimer: At [facility], we believe that sharing information builds trust and better relationships. You are receiving this note because you recently visited [facility]. It is possible you will see health information before a provider has talked with you about it. This kind of information can be easy to misunderstand. To help you fully understand what it means for your health, we urge you to discuss this note with your provider  Note dictated utilizing Dragon Dictation.          Final diagnoses:   Medical clearance for incarceration        ED Disposition  ED Disposition       ED Disposition   Discharge    Condition   Stable    Comment   --               Ty Gao,   [address]  [facility] [address] [phone]          [facility] EMERGENC[facility]PART[facility]  [address] [place] [id number] [phone]             Medication List      No changes were made to your prescriptions during this visit.            Neyda Solomon, APRN  01/30/25 7662

## 2025-01-31 NOTE — CASE MANAGEMENT/SOCIAL WORK
Discharge Planning Assessment  [facility]     Patient Name: Tomas Song  MRN: [id number]  Today's Date: 1/31/2025    Admit Date: 1/31/2025    Plan: TBD. [place] PD, [place] PD, or furloughed home from both [facility] (pending). [facility] ACU daily IV abx (current)   Discharge Needs Assessment       Row Name 01/31/25 1341       Living Environment    People in Home alone    Current Living Arrangements correctional facility    Potentially Unsafe Housing Conditions none    In the past 12 months has the electric, gas, oil, or water company threatened to shut off services in your home? No    Primary Care Provided by self    Provides Primary Care For no one    Family Caregiver if Needed significant other    Family Caregiver Names significant other María Elena    Quality of Family Relationships helpful;involved;supportive    Able to Return to Prior Arrangements other (see comments)    Living Arrangement Comments if furloughed from [facility], will need to wait for [place] [place] to come and assess patient as patient has legal matters with their county.       Resource/Environmental Concerns    Resource/Environmental Concerns none    Transportation Concerns none       Transportation Needs    In the past 12 months, has lack of transportation kept you from medical appointments or from getting medications? no    In the past 12 months, has lack of transportation kept you from meetings, work, or from getting things needed for daily living? No       Food Insecurity    Within the past 12 months, you worried that your food would run out before you got the money to buy more. Never true    Within the past 12 months, the food you bought just didn't last and you didn't have money to get more. Never true       Transition Planning    Patient/Family Anticipates Transition to home    Patient/Family Anticipated Services at Transition none    Transportation Anticipated other (see comments)  [facility] PD, [place] PD, or home w/ private  transport.       Discharge Needs Assessment    Readmission Within the Last 30 Days previous discharge plan unsuccessful    Current Outpatient/Agency/Support Group infusion therapy, outpatient    Equipment Currently Used at Home walker, rolling;cane, straight    Concerns to be Addressed legal    Concerns Comments if furloughed from [facility], will need to wait for [facility] PD to come and assess patient as patient has legal matters with their county.    Anticipated Changes Related to Illness none    Equipment Needed After Discharge none    Outpatient/Agency/Support Group Needs infusion therapy, outpatient    Discharge Facility/Level of Care Needs correctional facility;outpatient therapy;other (see comments)    Current Discharge Risk legal concerns;substance use/abuse                   Discharge Plan       Row Name 01/31/25 1406       Plan    Plan TBD. [facility], [facility] PD, or furloughed home [facility] both [facility] (pending[facility]    Plan Comments CM met with patient at the bedside, [facility] [place] present. Confirmed PCP. Patient is currently in custody for drug overdose. Patient recently discharged on 1/24 and set up for daily IV abx with [facility] ACU and a PICC line in place. CM spoke with [facility] detention nurse Amari ([phone]) to discuss discharge plan and need for daily IV abx. Per Amari- paperwork has been sent to the  to see if patient is able to be medically furloughed. If patient is able to be furloughed from [facility] detention, [facility] detention will either need to come replace [facility]  at the bedside or be furloughed from [facility] as patient has legal matters to address per nurse Fitzpatrick. CM made [facility] ACU charge nurse Nhung aware of patient's admission and the need to continue daily IV abx. If patient is furloughed from both [facility] PD custody, patient will discharge home alone. Careteam updated in person and via secure chat.                   Demographic  Summary       Row Name 01/31/25 1340       General Information    Admission Type observation    Arrived From correctional system/facility    Referral Source admission list    Reason for Consult discharge planning    Preferred Language English       Contact Information    Permission Granted to Share Info With     Contact Information Obtained for                    Functional Status       Row Name 01/31/25 1341       Functional Status    Usual Activity Tolerance moderate    Current Activity Tolerance moderate       Functional Status, IADL    Medications independent;assistive person    Meal Preparation independent;assistive person    Housekeeping independent;assistive person    Laundry independent;assistive person    Shopping independent;assistive person           Luis Felipe RN     Cell number [phone]  Office number [phone]

## 2025-01-31 NOTE — H&P
[facility] H&P    Patient Name: Tomas Song  : 1980  MRN: [id number]  Primary Care Physician: Ty Gao DO  Date of admission: 2025     Patient Care Team:  Ty Gao DO as PCP - General (Internal Medicine)          Subjective   History Present Illness     Chief Complaint:   Chief Complaint   Patient presents with    Loss of Consciousness    Ingestion     AMS    Mr. Song is a 44 y.o.  presents to [facility] complaining of altered mental status       History of Present Illness    ED 25: 44-year-old male found unresponsive in residential with suspicion of overdose transferred from residential to this facility. Patient was seen by one of my colleagues around 9 PM this evening for medical clearance after patient was in custody for possession of controlled substance. Patient was alert and oriented at that time. Patient was discharged from this facility in 2025 after a 10-day inpatient stay for osteomyelitis of his left foot. Per residential personnel, patient was found with white powdery substance on his tongue which she swallowed. Patient was found drowsy and unresponsive with pinpoint pupils. Patient was given 2 mg of Narcan IV here with improvement in mental status but patient mumbles only.     Observation 25: Patient is a 44 year old male presenting to the hospital after overdose at residential. Officer qat bedside. Patient awake alert oriented upon exam this morning.  Patient states that he was helping his girlfriend move when they were pulled over by the  and he states a baggy with white substance was found on him.  Patient states drugs are not his any has been clean for 10 years.  Patient was recently admitted for osteomyelitis of left foot and continued IV antibiotics.  Patient denies fever, chest pain, or shortness of breath.  Patient was given 2 of Narcan with improvement in mental status.  Patient denies IV drug use.    Review of Systems    Constitutional: Positive for diaphoresis and malaise/fatigue.   HENT: Negative.     Eyes:  Negative for blurred vision and double vision.   Cardiovascular:  Negative for chest pain and dyspnea on exertion.   Respiratory:  Negative for shortness of breath.    Endocrine: Negative.    Skin:  Positive for poor wound healing.   Musculoskeletal:  Positive for back pain.   Gastrointestinal: Negative.    Genitourinary: Negative.    Neurological:  Positive for weakness.   Psychiatric/Behavioral:  Positive for substance abuse.            Personal History     Past Medical History:   Past Medical History:   Diagnosis Date    Hypertension     Type 2 diabetes mellitus        Surgical History:      Past Surgical History:   Procedure Laterality Date    INCISION AND DRAINAGE OF WOUND Bilateral 1/14/2025    Procedure: INCISION AND DRAINAGE WOUND BILATERAL FEET;  Surgeon: Susan Garrison DPM;  Location: [facility];  Service: Podiatry;  Laterality: Bilateral;    INCISION AND DRAINAGE OF WOUND Left 1/17/2025    Procedure: INCISION AND DRAINAGE FOOT;  Surgeon: Susan Garrison DPM;  Location: [facility];  Service: Podiatry;  Laterality: Left;           Family History: family history includes Diabetes in his father; Hypertension in his maternal uncle; Stroke in his maternal uncle. Otherwise pertinent FHx was reviewed and unremarkable.     Social History:  reports that he has never smoked. He has never been exposed to tobacco smoke. He has never used smokeless tobacco. He reports current drug use. Drugs: Methamphetamines and Marijuana. He reports that he does not drink alcohol.      Medications:  Prior to Admission medications    Medication Sig Start Date End Date Taking? Authorizing Provider   acetaminophen (TYLENOL) 325 MG tablet Take 2 tablets by mouth Every 4 (Four) Hours As Needed for Mild Pain for up to 30 days. 1/23/25 2/22/25  Antoinette Medina MD   amLODIPine (NORVASC) 10 MG tablet Take 1 tablet by mouth  Daily. 4/7/23   Aldair Lind MD   amphetamine-dextroamphetamine (ADDERALL) 30 MG tablet Take 1 tablet by mouth 3 (Three) Times a Day. 10/13/22   Scot Jane MD   buprenorphine (SUBUTEX) 8 MG sublingual tablet SL tablet Place 1 tablet under the tongue 3 times a day. 10/17/22   Scot Jane MD   cefTRIAXone 2,000 mg in sodium chloride 0.9 % 100 mL IVPB Infuse 2,000 mg into a venous catheter Daily for 35 days. Indications: Bone and/or Joint Infection 1/23/25 2/27/25  Antoinette Medina MD   Cholecalciferol (Vitamin D-3) 125 MCG (5000 UT) tablet Take 1 tablet by mouth Daily.    Scot Jane MD   DAPTOmycin 550 mg in sodium chloride 0.9 % 50 mL Infuse 550 mg into a venous catheter Daily for 35 days. Indications: Bone and/or Joint Infection 1/23/25 2/27/25  Antoinette Medina MD   fenofibrate 160 MG tablet Take 1 tablet by mouth Daily. 8/22/22   Scot Jane MD   gabapentin (NEURONTIN) 800 MG tablet Take 1.5 tablets by mouth 2 (Two) Times a Day.    Scot Jane MD   hydroCHLOROthiazide (HYDRODIURIL) 25 MG tablet Take 1 tablet by mouth Daily. 8/18/22   Scot Jane MD   insulin aspart (novoLOG FLEXPEN) 100 UNIT/ML solution pen-injector sc pen Inject  under the skin into the appropriate area as directed 3 (Three) Times a Day With Meals. Sliding scale    Scot Jane MD   Insulin Glargine (LANTUS SOLOSTAR) 100 UNIT/ML injection pen Inject 40 Units under the skin into the appropriate area as directed 2 (Two) Times a Day for 30 days.  Patient taking differently: Inject 30 Units under the skin into the appropriate area as directed 2 (Two) Times a Day. 1/23/25 3/2/25  Antoinette Medina MD   Insulin Pen Needle (BD Pen Needle Nakia U/F) 32G X 4 MM misc Use to inject insulins 5 times daily. 1/23/25   Antoinette Medina MD   losartan (COZAAR) 100 MG tablet Take 1 tablet by mouth Daily. 8/23/22   Provider, MD Scot   metroNIDAZOLE  (FLAGYL) 500 MG tablet Take 1 tablet by mouth Every 8 (Eight) Hours for 35 days. Indications: Bone and/or Joint Infection 1/23/25 2/27/25  Antoinette Medina MD   sennosides-docusate (PERICOLACE) 8.6-50 MG per tablet Take 2 tablets by mouth 2 (Two) Times a Day As Needed for Constipation for up to 30 days. As needed for constipation 1/23/25 2/22/25  Antoinette Medina MD   sildenafil (VIAGRA) 50 MG tablet Take 1 tablet by mouth As Needed for Erectile Dysfunction (max dose of 2 tablets per week.).    Provider, MD Scot   Testosterone Cypionate (DEPOTESTOTERONE CYPIONATE) 200 MG/ML injection Inject 1 mL into the appropriate muscle as directed by prescriber 1 (One) Time Per Week. 8/23/22   Provider, MD Scot       Allergies:    Allergies   Allergen Reactions    Bactrim [Sulfamethoxazole-Trimethoprim] Swelling       Objective   Objective     Vital Signs  Temp:  [97.7 °F (36.5 °C)-98.4 °F (36.9 °C)] 98.4 °F (36.9 °C)  Heart Rate:  [83-93] 83  Resp:  [12-15] 12  BP: (128-150)/(86-94) 150/94  SpO2:  [95 %-98 %] 97 %  on   ;   Device (Oxygen Therapy): room air  Body mass index is 27.78 kg/m².    Physical Exam  Vitals and nursing note reviewed.   HENT:      Head: Normocephalic and atraumatic.      Right Ear: External ear normal.      Left Ear: External ear normal.      Nose: Nose normal.      Mouth/Throat:      Pharynx: Oropharynx is clear.   Eyes:      Extraocular Movements: Extraocular movements intact.      Conjunctiva/sclera: Conjunctivae normal.      Pupils: Pupils are equal, round, and reactive to light.   Cardiovascular:      Rate and Rhythm: Normal rate and regular rhythm.      Pulses: Normal pulses.   Pulmonary:      Effort: Pulmonary effort is normal.   Abdominal:      General: Bowel sounds are normal.   Musculoskeletal:         General: Tenderness present.      Cervical back: Normal range of motion.   Skin:     General: Skin is warm.      Capillary Refill: Capillary refill takes less than 2  seconds.      Comments: Left foot dressing applied    Neurological:      Mental Status: He is alert and oriented to person, place, and time.      Motor: Weakness present.   Psychiatric:         Attention and Perception: Attention normal.         Thought Content: Thought content does not include homicidal or suicidal plan.         Judgment: Judgment is impulsive.           Results Review:  I have personally reviewed most recent lab results, microbiology results, and radiology images and interpretations and agree with findings, most notably: CBC, CMP, UDS.    Results from last 7 days   Lab Units 01/31/25  0102   WBC 10*3/mm3 5.40   HEMOGLOBIN g/dL 9.9*   HEMATOCRIT % 31.7*   PLATELETS 10*3/mm3 240     Results from last 7 days   Lab Units 01/31/25  0102   SODIUM mmol/L 142   POTASSIUM mmol/L 4.0   CHLORIDE mmol/L 104   CO2 mmol/L 27.1   BUN mg/dL 21*   CREATININE mg/dL 0.85   GLUCOSE mg/dL 263*   CALCIUM mg/dL 9.6   ALK PHOS U/L 90   ALT (SGPT) U/L 94*   AST (SGOT) U/L 27     Estimated Creatinine Clearance: 146.7 mL/min (by C-G formula based on SCr of 0.85 mg/dL).  Brief Urine Lab Results  (Last result in the past 365 days)        Color   Clarity   Blood   Leuk Est   Nitrite   Protein   CREAT   Urine HCG        01/31/25 0151 Yellow   Clear   Negative   Negative   Negative   Negative                   Microbiology Results (last 10 days)       ** No results found for the last 240 hours. **            ECG/EMG Results (most recent)       Procedure Component Value Units Date/Time    ECG 12 Lead Altered Mental Status [[id number]] Collected: 01/31/25 0107     Updated: 01/31/25 0840     QT Interval 383 ms      QTC Interval 464 ms     Narrative:      HEART RATE=88  bpm  RR Idscyghv=919  ms  MD Twrncvqx=359  ms  P Horizontal Axis=-15  deg  P Front Axis=56  deg  QRSD Interval=93  ms  QT Npfyqxwr=058  ms  HNxR=910  ms  QRS Axis=65  deg  T Wave Axis=56  deg  - NORMAL ECG -  Sinus rhythm  When compared with ECG of 06-Apr-2023  09:52:45,  No significant change  Electronically Signed By: Mike Raya (Kedar) 2025-01-31 08:40:08  Date and Time of Study:2025-01-31 01:07:52    Telemetry Scan [[id number]] Resulted: 01/31/25     Updated: 01/31/25 0842    Telemetry Scan [[id number]] Resulted: 01/31/25     Updated: 01/31/25 0858            Results for orders placed during the hospital encounter of 01/13/25    Doppler Ankle Brachial Index Single Level CAR    Interpretation Summary    Right Conclusion: The right EMILY is normal. Normal digital pressures.    Left Conclusion: The left EMILY is normal. Normal digital pressures.    Waveforms appear normal bilaterally indicating no arterial insufficiency.  ABIs also elevated consistent with arterial calcifications.          CT Head Without Contrast    Result Date: 1/31/2025  Impression: No acute intracranial process. Electronically Signed: Chivo Summers MD  1/31/2025 1:45 AM EST  Workstation ID: [id number]       Estimated Creatinine Clearance: 146.7 mL/min (by C-G formula based on SCr of 0.85 mg/dL).    Assessment & Plan   Assessment/Plan       Active Hospital Problems    Diagnosis  POA    **Opioid overdose [T40.2X1A]  Yes      Resolved Hospital Problems   No resolved problems to display.     Opioid Overdose  Lab Results   Component Value Date    PHART 7.379 01/31/2025    TUI7HAS 50.8 (H) 01/31/2025    NRA0VYJ 30.0 (H) 01/31/2025    FIO2 21 01/31/2025    WBC 5.40 01/31/2025    EOSABS 0.33 01/31/2025   -UDS positive for amphetamine, benzos, methamphetamine, THC, and buprenrphine   -Continue Subutex   -CT head shows no acute intercranial abnormalities  -CK 92, ammonia 41  -EKG: sinus rhythm without ST change  -Urinalysis showed glucose but otherwise unremarkable  -Telemetry and pulse oximetry  -Narcan 2mg given in ED, continued PRN    Osteomyelitis of left foot  Lab Results   Component Value Date    WBC 5.40 01/31/2025    LACTATE 1.9 06/04/2020   -Recent admission with I&D, osteomyelitis of left foot  -Continue IV  daptomycin and Rocephin  -Continue oral Flagyl    Diabetes mellitus Type 2 with long-term insulin use   Lab Results   Component Value Date    GLUCOSE 263 (H) 01/31/2025    GLUCOSE 257 (H) 01/20/2025    GLUCOSE 280 (H) 01/19/2025    GLUCOSE 216 (H) 01/18/2025   -Sliding scale insulin  -Continue Lantus  -Diabetic diet  -Monitor before meals and at bedtime    Anemia  Lab Results   Component Value Date    HGB 9.9 (L) 01/31/2025    MCV 84.5 01/31/2025    MCHC 31.2 (L) 01/31/2025   -Monitor H&H      Hypertension  BP Readings from Last 1 Encounters:   01/31/25 150/94   - Continue losartan, hydrochlorothiazide, amlodipine  - Monitor while admitted    ADHD  -Hold home medications       VTE Prophylaxis - Active VTE Prophylaxis  Mechanical:        Start        01/31/25 0755  Maintain Sequential Compression Device  Continuous                          Select Pharmacologic VTE Prophylaxis if Desired & Appropriate      CODE STATUS:    Code Status and Medical Interventions: CPR (Attempt to Resuscitate); Full Support   Ordered at: 01/31/25 0701     Code Status (Patient has no pulse and is not breathing):    CPR (Attempt to Resuscitate)     Medical Interventions (Patient has pulse or is breathing):    Full Support       This patient has been examined wearing personal protective equipment.     I discussed the patient's findings and my recommendations with patient, family, nursing staff, primary care team, and consulting provider.      Signature:Electronically signed by JESSICA Mead, 01/31/25, 9:40 AM EST.      I spent 35 minutes caring for Tomas on this date of service. This time includes time spent by me in the following activities: reviewing tests, performing a medically appropriate examination and/or evaluation, counseling and educating the patient/family/caregiver, referring and communicating with other health care professionals, documenting information in the medical record, independently interpreting results and  communicating that information with the patient/family/caregiver, care coordination, ordering medications, ordering test(s), ordering procedure(s), obtaining a separately obtained history, and reviewing a separately obtained history.

## 2025-01-31 NOTE — DISCHARGE INSTRUCTIONS
Patient will need continuation of this care per recommendations from infectious disease and previous discharge from hospital.     ID recommendations below with permission from patient to place on AVS    Please request medical records for full instruction    Start IV daptomycin 6 Mg per KG (550mg) 24 hours  Stat CPK-was appropriate for daptomycin use  Patient is not on statin-please do not start the patient on statin while on daptomycin  Continue IV Rocephin 2 g every 24 hours  Patient will need 6 weeks of IV antibiotics from surgery on 1/17/2025-last day on 2/27/25  Continue p.o. Flagyl 500 mg every 8 hours for 6 weeks  Weekly labs CBC, creatinine, sed rate and CPK times x 5 weeks  Continue supportive care  Okay to discharge from Infectious Disease standpoint  Not much more to add from infectious disease standpoint-we will sign off at this time-please call with any questions.  Ambulatory care orders have been placed    Pt will need to be non weight bearing per orders from his surgery and continue his post operative instructions.

## 2025-02-01 NOTE — NURSING NOTE
PT leaving against medical advice. I updated Charge nurse, Marli BURKS in Er. I attempt several times to speak with pt in a calm voice.  Pt will be leaving with his picc line. Pt said he is tired of staying here and he don't know why they brought him back.

## 2025-02-03 ENCOUNTER — TELEPHONE (OUTPATIENT)
Dept: INFUSION THERAPY | Facility: HOSPITAL | Age: 45
End: 2025-02-03
Payer: MEDICAID

## 2025-02-03 NOTE — TELEPHONE ENCOUNTER
Patient called requesting to schedule an appointment in office today for IV antibiotics. Unable to schedule due to patient leaving hospital AMA from ER on 02/01/2025  Informed patient that they will need to be seen again in hospital to be re-evaluated and that they will need a new order.

## 2025-02-03 NOTE — CASE MANAGEMENT/SOCIAL WORK
Case Management Discharge Note      Final Note: Left AMA         Selected Continued Care - Discharged on 1/31/2025 Admission date: 1/31/2025 - Discharge disposition: Left Against Medical Advice          Final Discharge Disposition Code: 07 - left AMA

## 2025-02-03 NOTE — DISCHARGE SUMMARY
Patient left AMA overnight with PICC in place. Patient did not report to ambulatory care for IV antibiotics on Saturday, February 1.  Patient's point was at night and called at 930 with staff to see if patient is going to appointment and left voicemail.  Called [facility] police to updates and go to patient's house to take out PICC line since he left AMA is noncompliant with IV antibiotics.

## 2025-02-10 ENCOUNTER — READMISSION MANAGEMENT (OUTPATIENT)
Dept: CALL CENTER | Facility: HOSPITAL | Age: 45
End: 2025-02-10
Payer: MEDICAID

## 2025-02-10 NOTE — OUTREACH NOTE
General Surgery Week 3 Survey      Flowsheet Row Responses   [facility] facility patient discharged from? [facility]   Does the patient have one of the following disease processes/diagnoses(primary or secondary)? General Surgery   Week 3 attempt successful? No   Unsuccessful attempts Attempt 1   Revoke Other  [Patient left AMA on most recent admission]            ALBERT HOOKER - Registered Nurse

## 2025-04-16 ENCOUNTER — APPOINTMENT (OUTPATIENT)
Dept: CT IMAGING | Facility: HOSPITAL | Age: 45
End: 2025-04-16
Payer: MEDICAID

## 2025-04-16 ENCOUNTER — HOSPITAL ENCOUNTER (EMERGENCY)
Facility: HOSPITAL | Age: 45
Discharge: HOME | End: 2025-04-16
Admitting: EMERGENCY MEDICINE
Payer: MEDICAID

## 2025-04-16 VITALS
TEMPERATURE: 98.4 F | RESPIRATION RATE: 16 BRPM | WEIGHT: 199.08 LBS | SYSTOLIC BLOOD PRESSURE: 148 MMHG | HEART RATE: 84 BPM | OXYGEN SATURATION: 100 % | BODY MASS INDEX: 26.38 KG/M2 | DIASTOLIC BLOOD PRESSURE: 82 MMHG | HEIGHT: 73 IN

## 2025-04-16 DIAGNOSIS — R11.2 NAUSEA AND VOMITING, UNSPECIFIED VOMITING TYPE: Primary | ICD-10-CM

## 2025-04-16 DIAGNOSIS — R93.5 ABNORMAL CT OF THE ABDOMEN: ICD-10-CM

## 2025-04-16 LAB
ALBUMIN SERPL-MCNC: 4.2 G/DL (ref 3.5–5.2)
ALBUMIN/GLOB SERPL: 1.2 G/DL
ALP SERPL-CCNC: 102 U/L (ref 39–117)
ALT SERPL W P-5'-P-CCNC: 29 U/L (ref 1–41)
ANION GAP SERPL CALCULATED.3IONS-SCNC: 9.4 MMOL/L (ref 5–15)
AST SERPL-CCNC: 19 U/L (ref 1–40)
BASOPHILS # BLD AUTO: 0.02 10*3/MM3 (ref 0–0.2)
BASOPHILS NFR BLD AUTO: 0.3 % (ref 0–1.5)
BILIRUB SERPL-MCNC: 0.3 MG/DL (ref 0–1.2)
BILIRUB UR QL STRIP: NEGATIVE
BUN SERPL-MCNC: 18 MG/DL (ref 6–20)
BUN/CREAT SERPL: 20 (ref 7–25)
CALCIUM SPEC-SCNC: 9.3 MG/DL (ref 8.6–10.5)
CHLORIDE SERPL-SCNC: 104 MMOL/L (ref 98–107)
CLARITY UR: CLEAR
CO2 SERPL-SCNC: 23.6 MMOL/L (ref 22–29)
COLOR UR: YELLOW
CREAT SERPL-MCNC: 0.9 MG/DL (ref 0.76–1.27)
DEPRECATED RDW RBC AUTO: 39.6 FL (ref 37–54)
EGFRCR SERPLBLD CKD-EPI 2021: 106.7 ML/MIN/1.73
EOSINOPHIL # BLD AUTO: 0.2 10*3/MM3 (ref 0–0.4)
EOSINOPHIL NFR BLD AUTO: 2.9 % (ref 0.3–6.2)
ERYTHROCYTE [DISTWIDTH] IN BLOOD BY AUTOMATED COUNT: 13.4 % (ref 12.3–15.4)
GLOBULIN UR ELPH-MCNC: 3.6 GM/DL
GLUCOSE SERPL-MCNC: 211 MG/DL (ref 65–99)
GLUCOSE UR STRIP-MCNC: ABNORMAL MG/DL
HCT VFR BLD AUTO: 41.9 % (ref 37.5–51)
HGB BLD-MCNC: 14 G/DL (ref 13–17.7)
HGB UR QL STRIP.AUTO: NEGATIVE
IMM GRANULOCYTES # BLD AUTO: 0.02 10*3/MM3 (ref 0–0.05)
IMM GRANULOCYTES NFR BLD AUTO: 0.3 % (ref 0–0.5)
KETONES UR QL STRIP: NEGATIVE
LEUKOCYTE ESTERASE UR QL STRIP.AUTO: NEGATIVE
LIPASE SERPL-CCNC: 15 U/L (ref 13–60)
LYMPHOCYTES # BLD AUTO: 1.59 10*3/MM3 (ref 0.7–3.1)
LYMPHOCYTES NFR BLD AUTO: 22.7 % (ref 19.6–45.3)
MCH RBC QN AUTO: 27.3 PG (ref 26.6–33)
MCHC RBC AUTO-ENTMCNC: 33.4 G/DL (ref 31.5–35.7)
MCV RBC AUTO: 81.7 FL (ref 79–97)
MONOCYTES # BLD AUTO: 0.44 10*3/MM3 (ref 0.1–0.9)
MONOCYTES NFR BLD AUTO: 6.3 % (ref 5–12)
NEUTROPHILS NFR BLD AUTO: 4.73 10*3/MM3 (ref 1.7–7)
NEUTROPHILS NFR BLD AUTO: 67.5 % (ref 42.7–76)
NITRITE UR QL STRIP: NEGATIVE
NRBC BLD AUTO-RTO: 0 /100 WBC (ref 0–0.2)
PH UR STRIP.AUTO: <=5 [PH] (ref 5–8)
PLATELET # BLD AUTO: 249 10*3/MM3 (ref 140–450)
PMV BLD AUTO: 10 FL (ref 6–12)
POTASSIUM SERPL-SCNC: 4 MMOL/L (ref 3.5–5.2)
PROT SERPL-MCNC: 7.8 G/DL (ref 6–8.5)
PROT UR QL STRIP: ABNORMAL
RBC # BLD AUTO: 5.13 10*6/MM3 (ref 4.14–5.8)
SODIUM SERPL-SCNC: 137 MMOL/L (ref 136–145)
SP GR UR STRIP: 1.04 (ref 1–1.03)
UROBILINOGEN UR QL STRIP: ABNORMAL
WBC NRBC COR # BLD AUTO: 7 10*3/MM3 (ref 3.4–10.8)

## 2025-04-16 PROCEDURE — 81003 URINALYSIS AUTO W/O SCOPE: CPT | Performed by: NURSE PRACTITIONER

## 2025-04-16 PROCEDURE — 80053 COMPREHEN METABOLIC PANEL: CPT | Performed by: NURSE PRACTITIONER

## 2025-04-16 PROCEDURE — 74177 CT ABD & PELVIS W/CONTRAST: CPT

## 2025-04-16 PROCEDURE — 25010000002 ONDANSETRON PER 1 MG: Performed by: NURSE PRACTITIONER

## 2025-04-16 PROCEDURE — 99285 EMERGENCY DEPT VISIT HI MDM: CPT

## 2025-04-16 PROCEDURE — 96374 THER/PROPH/DIAG INJ IV PUSH: CPT

## 2025-04-16 PROCEDURE — 25510000001 IOPAMIDOL PER 1 ML: Performed by: NURSE PRACTITIONER

## 2025-04-16 PROCEDURE — 85025 COMPLETE CBC W/AUTO DIFF WBC: CPT | Performed by: NURSE PRACTITIONER

## 2025-04-16 PROCEDURE — 83690 ASSAY OF LIPASE: CPT | Performed by: NURSE PRACTITIONER

## 2025-04-16 RX ORDER — ONDANSETRON 4 MG/1
4 TABLET, ORALLY DISINTEGRATING ORAL EVERY 8 HOURS PRN
Qty: 15 TABLET | Refills: 0 | Status: SHIPPED | OUTPATIENT
Start: 2025-04-16

## 2025-04-16 RX ORDER — SODIUM CHLORIDE 0.9 % (FLUSH) 0.9 %
10 SYRINGE (ML) INJECTION AS NEEDED
Status: DISCONTINUED | OUTPATIENT
Start: 2025-04-16 | End: 2025-04-16 | Stop reason: HOSPADM

## 2025-04-16 RX ORDER — IOPAMIDOL 755 MG/ML
100 INJECTION, SOLUTION INTRAVASCULAR
Status: COMPLETED | OUTPATIENT
Start: 2025-04-16 | End: 2025-04-16

## 2025-04-16 RX ORDER — ONDANSETRON 2 MG/ML
4 INJECTION INTRAMUSCULAR; INTRAVENOUS ONCE
Status: COMPLETED | OUTPATIENT
Start: 2025-04-16 | End: 2025-04-16

## 2025-04-16 RX ADMIN — IOPAMIDOL 100 ML: 755 INJECTION, SOLUTION INTRAVENOUS at 17:57

## 2025-04-16 RX ADMIN — ONDANSETRON 4 MG: 2 INJECTION, SOLUTION INTRAMUSCULAR; INTRAVENOUS at 16:24

## 2025-04-16 NOTE — ED NOTES
Pt reports n/v/d since Sunday, worse after eating/drinking, intermittent abdominal cramping. Denies any urinary issues, no fever.

## 2025-04-16 NOTE — DISCHARGE INSTRUCTIONS
Please keep your podiatry appointment for recheck  Please help with your primary care provider, you did have lymph node swelling in her abdomen, please have this monitored and rechecked with your PCP, call for an appoint  Return to the ED for new or worsening symptoms

## 2025-04-16 NOTE — ED PROVIDER NOTES
Subjective   Chief Complaint   Patient presents with    Vomiting       History of Present Illness  Patient is a 44-year-old male presents ED with complaint of nausea vomiting.  Patient reports he recently finished a course of antibiotics outpatient from his foot surgery.  He reports he completed his entire course.  He reports that 2 days ago he started having nausea and vomiting after that.  Denies any fevers.  He does report he is been having diarrhea  Review of Systems    Past Medical History:   Diagnosis Date    Hypertension     Type 2 diabetes mellitus        Allergies   Allergen Reactions    Bactrim [Sulfamethoxazole-Trimethoprim] Swelling       Past Surgical History:   Procedure Laterality Date    INCISION AND DRAINAGE OF WOUND Bilateral 1/14/2025    Procedure: INCISION AND DRAINAGE WOUND BILATERAL FEET;  Surgeon: Susan Garrison DPM;  Location: [facility] OR;  Service: Podiatry;  Laterality: Bilateral;    INCISION AND DRAINAGE OF WOUND Left 1/17/2025    Procedure: INCISION AND DRAINAGE FOOT;  Surgeon: Susan Garrison DPM;  Location: [facility];  Service: Podiatry;  Laterality: Left;       Family History   Problem Relation Age of Onset    Diabetes Father     Hypertension Maternal Uncle     Stroke Maternal Uncle        Social History     Socioeconomic History    Marital status: Single    Number of children: 2    Years of education: 14   Tobacco Use    Smoking status: Never     Passive exposure: Never    Smokeless tobacco: Never   Vaping Use    Vaping status: Never Used   Substance and Sexual Activity    Alcohol use: Never    Drug use: Yes     Types: Methamphetamines, Marijuana    Sexual activity: Not Currently     Partners: Female           Objective   Physical Exam  Vitals and nursing note reviewed.   Constitutional:       Appearance: Normal appearance.   HENT:      Head: Normocephalic and atraumatic.   Eyes:      Extraocular Movements: Extraocular movements intact.      Conjunctiva/sclera:  Conjunctivae normal.      Pupils: Pupils are equal, round, and reactive to light.   Cardiovascular:      Rate and Rhythm: Normal rate and regular rhythm.      Heart sounds: No murmur heard.     No friction rub. No gallop.   Pulmonary:      Effort: Pulmonary effort is normal.      Breath sounds: Normal breath sounds.   Abdominal:      General: Bowel sounds are normal.      Palpations: Abdomen is soft.      Tenderness: There is no abdominal tenderness (Mild diffuse tenderness to palpation). There is no guarding or rebound.   Musculoskeletal:      Cervical back: Normal range of motion and neck supple.   Feet:      Comments: Patient noted to have suture hanging from plantar surface of left foot.  He reports this is from previous surgery, he was told that his sutures will fall out.  He reports that his feet are healing, and that much better than they did previously.  He does have some mild bilateral lower extremity edema.  No open wounds at this time good distal pulses  Skin:     General: Skin is warm and dry.      Capillary Refill: Capillary refill takes less than 2 seconds.      Comments: Abrasion noted to right lower leg.   Neurological:      General: No focal deficit present.      Mental Status: He is alert and oriented to person, place, and time.         Procedures           ED Course      CT Abdomen Pelvis With Contrast  Result Date: 4/16/2025  Impression: No acute findings in the abdomen/pelvis. Left greater than right inguinal and left iliac chain lymphadenopathy. These are nonspecific and are potentially reactive to a lower extremity process. Recommend attention on clinical and imaging follow-up and if persists, lymphoproliferative process/malignancy can be considered. 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 follow-up CT abdomen/pelvis in 3 months). Electronically Signed: Shree Flores MD   4/16/2025 6:10 PM EDT  Workstation ID: [id number]                                                     Medical Decision Making  Problems Addressed:  Abnormal CT of the abdomen: complicated acute illness or injury  Nausea and vomiting, unspecified vomiting type: complicated acute illness or injury    Amount and/or Complexity of Data Reviewed  Labs: ordered.  Radiology: ordered.    Risk  Prescription drug management.    Appropriate PPE worn during patient interactions.  Differential diagnoses considered for patient chief complaint and presentation, this list is not all inclusive of diagnoses considered: Enteritis, gastritis, gastroparesis, gastroenteritis    Patient had the above exam and workup.  No signs of DKA or HHS.  He was given Zofran, has not any further episodes of vomiting.  His white blood cell count is normal.  I did remove some remaining suture from his left foot in the plantar surface.  Patient reports he thought this was positive followed on its own, it was penitentiary out.  He states he has an appointment with his podiatrist coming up for recheck.  I discussed with him the abnormality noted on CT, lymphadenopathy, and that he needs a recheck for and follow-up with PCP.  He verbalized understanding.  He has been able to drink.  He is comfortably discharged home, he reports his feet are healing well and he will follow-up with his podiatrist.  Considertion was given for admission, however patient has reassuring exam and workup in the ed and appears appropriate for discharge home and continued outpatient care.     We discussed my findings, plan of care, discharge instructions, the importance of follow up with their PCP/ and or specialist for repeat evaluation and to discuss any abnormal findings in labs or imaging that warrant further outpatient evaluation. We discussed that although a definitive diagnosis is not always found in the ED, it is believed emergent conditions have been ruled out, and patient is safe  for discharge at this time.  We discussed return precautions for the emergency department.  Patient verbalizes understandings, and agrees with current plan of care.      Note Disclaimer: At [facility], we believe that sharing information builds trust and better relationships. You are receiving this note because you recently visited [facility]. It is possible you will see health information before a provider has talked with you about it. This kind of information can be easy to misunderstand. To help you fully understand what it means for your health, we urge you to discuss this note with your provider  Note dictated utilizing Dragon Dictation.          Final diagnoses:   Nausea and vomiting, unspecified vomiting type   Abnormal CT of the abdomen       ED Disposition  ED Disposition       ED Disposition   Discharge    Condition   Stable    Comment   --               Laquita Drew, APRN  [address] [facility] [place] [address] [phone]          [facility] EMERGENCY DEPARTMENT  [address] [place] [id number] [phone]             Medication List        New Prescriptions      ondansetron ODT 4 MG disintegrating tablet  Commonly known as: ZOFRAN-ODT  Place 1 tablet on the tongue Every 8 (Eight) Hours As Needed for Nausea or Vomiting.            Changed      Lantus SoloStar 100 UNIT/ML injection pen  Generic drug: Insulin Glargine  Inject 40 Units under the skin into the appropriate area as directed 2 (Two) Times a Day for 30 days.  What changed: how much to take               Where to Get Your Medications        These medications were sent to [id number] - [place], [place] - [address]  - [phone]  - [phone] FX  [address] [facility] [place] [address]      Phone: [phone]   ondansetron ODT 4 MG disintegrating tablet            Neyda Solomon, JESSICA  04/16/25 [id number]

## (undated) DEVICE — SUT ETHLN 3/0 PS1 18IN 1663H

## (undated) DEVICE — SUT PROLN 3/0 FS2 18IN 8665G

## (undated) DEVICE — Device: Brand: XPERIENCE

## (undated) DEVICE — BNDG,ELSTC,MATRIX,STRL,4"X5YD,LF,HOOK&LP: Brand: MEDLINE

## (undated) DEVICE — GAUZE,PACKING STRIP,IODOFORM,1/2"X5YD,ST: Brand: CURAD

## (undated) DEVICE — CONTAINER,SPECIMEN,OR STERILE,4OZ: Brand: MEDLINE

## (undated) DEVICE — DRAPE,TOWEL,LARGE,INVISISHIELD: Brand: MEDLINE

## (undated) DEVICE — THE STERILE CAMERA HANDLE COVER IS FOR USE WITH THE STERIS SURGICAL LIGHTING AND VISUALIZATION SYSTEMS.

## (undated) DEVICE — SYR LUERLOK 20CC BX/50

## (undated) DEVICE — KT SURG TURNOVER 050

## (undated) DEVICE — PK EXTREM 50

## (undated) DEVICE — WET SKIN PREP TRAY: Brand: MEDLINE INDUSTRIES, INC.

## (undated) DEVICE — GLV SURG BIOGEL LTX PF 6 1/2

## (undated) DEVICE — PADDING,UNDERCAST,COTTON, 4"X4YD STERILE: Brand: MEDLINE

## (undated) DEVICE — ANTIBACTERIAL UNDYED BRAIDED (POLYGLACTIN 910), SYNTHETIC ABSORBABLE SUTURE: Brand: COATED VICRYL

## (undated) DEVICE — BANDAGE,GAUZE,BULKEE II,4.5"X4.1YD,STRL: Brand: MEDLINE

## (undated) DEVICE — PAD,ABDOMINAL,5"X9",STERILE,LF,1/PK: Brand: MEDLINE INDUSTRIES, INC.

## (undated) DEVICE — PREP SOL POVIDONE/IODINE BT 4OZ

## (undated) DEVICE — SUT PROLN 0 CT 30IN 8434H

## (undated) DEVICE — DRSNG GZ CURAD XEROFORM PETROLTM OVERWRP 1X8IN STRL

## (undated) DEVICE — HANDPIECE SET WITH COAXIAL MULTI-ORIFICE TIP AND SUCTION TUBE: Brand: INTERPULSE

## (undated) DEVICE — GOWN,REINFORCE,POLY,SIRUS,BREATH SLV,XLG: Brand: MEDLINE

## (undated) DEVICE — DRAPE,T,LIMB,BILATERAL,STERILE: Brand: MEDLINE

## (undated) DEVICE — UNDERGLV SURG BIOGEL INDICATOR LTX PF 7

## (undated) DEVICE — THE STERILE LIGHT HANDLE COVER IS USED WITH STERIS SURGICAL LIGHTING AND VISUALIZATION SYSTEMS.

## (undated) DEVICE — STRIP PACKING W IODOFORM 1/4

## (undated) DEVICE — SUT PROLN 2/0 CT2 30IN 8411H

## (undated) DEVICE — GAUZE,SPONGE,FLUFF,6"X6.75",STRL,5/TRAY: Brand: MEDLINE

## (undated) DEVICE — MARKER SKIN W/RULER DUAL: Brand: MEDLINE INDUSTRIES, INC.

## (undated) DEVICE — SOL IRR NACL 0.9PCT BO 1000ML

## (undated) DEVICE — SOLUTION,WATER,IRRIGATION,1000ML,STERILE: Brand: MEDLINE

## (undated) DEVICE — INTENDED FOR TISSUE SEPARATION, AND OTHER PROCEDURES THAT REQUIRE A SHARP SURGICAL BLADE TO PUNCTURE OR CUT.: Brand: BARD-PARKER ® CARBON RIB-BACK BLADES

## (undated) DEVICE — PENCL SMOKE/EVAC MEGADYNE TELESCP 15FT